# Patient Record
Sex: FEMALE | Race: WHITE | NOT HISPANIC OR LATINO | URBAN - METROPOLITAN AREA
[De-identification: names, ages, dates, MRNs, and addresses within clinical notes are randomized per-mention and may not be internally consistent; named-entity substitution may affect disease eponyms.]

---

## 2017-01-23 ENCOUNTER — APPOINTMENT (RX ONLY)
Dept: URBAN - METROPOLITAN AREA CLINIC 24 | Facility: CLINIC | Age: 77
Setting detail: DERMATOLOGY
End: 2017-01-23

## 2017-01-23 DIAGNOSIS — L82.0 INFLAMED SEBORRHEIC KERATOSIS: ICD-10-CM

## 2017-01-23 DIAGNOSIS — Z85.828 PERSONAL HISTORY OF OTHER MALIGNANT NEOPLASM OF SKIN: ICD-10-CM

## 2017-01-23 DIAGNOSIS — L82.1 OTHER SEBORRHEIC KERATOSIS: ICD-10-CM

## 2017-01-23 PROCEDURE — ? LIQUID NITROGEN

## 2017-01-23 PROCEDURE — 17111 DESTRUCTION B9 LESIONS 15/>: CPT

## 2017-01-23 PROCEDURE — 99214 OFFICE O/P EST MOD 30 MIN: CPT | Mod: 25

## 2017-01-23 ASSESSMENT — LOCATION DETAILED DESCRIPTION DERM
LOCATION DETAILED: RIGHT MID-UPPER BACK
LOCATION DETAILED: LEFT SUPERIOR MEDIAL UPPER BACK
LOCATION DETAILED: PERIUMBILICAL SKIN
LOCATION DETAILED: RIGHT DISTAL PRETIBIAL REGION
LOCATION DETAILED: LEFT ANTERIOR MEDIAL PROXIMAL UPPER ARM
LOCATION DETAILED: LEFT POSTERIOR SHOULDER

## 2017-01-23 ASSESSMENT — LOCATION SIMPLE DESCRIPTION DERM
LOCATION SIMPLE: LEFT UPPER ARM
LOCATION SIMPLE: LEFT SHOULDER
LOCATION SIMPLE: LEFT UPPER BACK
LOCATION SIMPLE: RIGHT PRETIBIAL REGION
LOCATION SIMPLE: RIGHT UPPER BACK
LOCATION SIMPLE: ABDOMEN

## 2017-01-23 ASSESSMENT — LOCATION ZONE DERM
LOCATION ZONE: TRUNK
LOCATION ZONE: ARM
LOCATION ZONE: LEG

## 2017-01-23 NOTE — PROCEDURE: LIQUID NITROGEN
Detail Level: Detailed
Medical Necessity Information: It is in your best interest to select a reason for this procedure from the list below. All of these items fulfill various CMS LCD requirements except the new and changing color options.
Include Z78.9 (Other Specified Conditions Influencing Health Status) As An Associated Diagnosis?: No
Medical Necessity Clause: This procedure was medically necessary because the lesions that were treated were:
Post-Care Instructions: I reviewed with the patient in detail post-care instructions. Patient is to wear sunprotection, and avoid picking at any of the treated lesions. Pt may apply Vaseline to crusted or scabbing areas.
Duration Of Freeze Thaw-Cycle (Seconds): 5
Total Number Of Lesions Treated: 20
Consent: The patient's consent was obtained including but not limited to risks of crusting, scabbing, blistering, scarring, darker or lighter pigmentary change, recurrence, incomplete removal and infection.

## 2017-03-13 ENCOUNTER — HOSPITAL ENCOUNTER (OUTPATIENT)
Dept: SURGERY | Age: 77
Discharge: HOME | End: 2017-03-13
Payer: MEDICARE

## 2017-03-13 ENCOUNTER — HOSPITAL ENCOUNTER (OUTPATIENT)
Dept: PHYSICAL THERAPY | Age: 77
Discharge: HOME | End: 2017-03-13

## 2017-03-13 LAB
ANION GAP BLD CALC-SCNC: 9 MMOL/L (ref 7–16)
APPEARANCE UR: CLEAR
APTT PPP: 25.4 SEC (ref 25.3–32.9)
BACTERIA SPEC CULT: NORMAL
BASOPHILS # BLD AUTO: 0 K/UL (ref 0–0.2)
BASOPHILS # BLD: 0 % (ref 0–2)
BILIRUB UR QL: NEGATIVE
BUN SERPL-MCNC: 20 MG/DL (ref 8–23)
CALCIUM SERPL-MCNC: 9.2 MG/DL (ref 8.3–10.4)
CHLORIDE SERPL-SCNC: 107 MMOL/L (ref 98–107)
CO2 SERPL-SCNC: 27 MMOL/L (ref 21–32)
COLOR UR: YELLOW
CREAT SERPL-MCNC: 0.97 MG/DL (ref 0.6–1)
DIFFERENTIAL METHOD BLD: ABNORMAL
EOSINOPHIL # BLD: 0.2 K/UL (ref 0–0.8)
EOSINOPHIL NFR BLD: 2 % (ref 0.5–7.8)
ERYTHROCYTE [DISTWIDTH] IN BLOOD BY AUTOMATED COUNT: 12.8 % (ref 11.9–14.6)
GLUCOSE SERPL-MCNC: 120 MG/DL (ref 65–100)
GLUCOSE UR STRIP.AUTO-MCNC: NEGATIVE MG/DL
HCT VFR BLD AUTO: 38.8 % (ref 35.8–46.3)
HGB BLD-MCNC: 12.4 G/DL (ref 11.7–15.4)
HGB UR QL STRIP: NEGATIVE
IMM GRANULOCYTES # BLD: 0 K/UL (ref 0–0.5)
IMM GRANULOCYTES NFR BLD AUTO: 0.4 % (ref 0–5)
INR PPP: 1 (ref 0.9–1.2)
KETONES UR QL STRIP.AUTO: NEGATIVE MG/DL
LEUKOCYTE ESTERASE UR QL STRIP.AUTO: NEGATIVE
LYMPHOCYTES # BLD AUTO: 25 % (ref 13–44)
LYMPHOCYTES # BLD: 1.9 K/UL (ref 0.5–4.6)
MCH RBC QN AUTO: 27.6 PG (ref 26.1–32.9)
MCHC RBC AUTO-ENTMCNC: 32 G/DL (ref 31.4–35)
MCV RBC AUTO: 86.4 FL (ref 79.6–97.8)
MONOCYTES # BLD: 0.4 K/UL (ref 0.1–1.3)
MONOCYTES NFR BLD AUTO: 5 % (ref 4–12)
NEUTS SEG # BLD: 5 K/UL (ref 1.7–8.2)
NEUTS SEG NFR BLD AUTO: 68 % (ref 43–78)
NITRITE UR QL STRIP.AUTO: NEGATIVE
PH UR STRIP: 7 [PH] (ref 5–9)
PLATELET # BLD AUTO: 254 K/UL (ref 150–450)
PMV BLD AUTO: 10.6 FL (ref 10.8–14.1)
POTASSIUM SERPL-SCNC: 3.7 MMOL/L (ref 3.5–5.1)
PROT UR STRIP-MCNC: NEGATIVE MG/DL
PROTHROMBIN TIME: 10.3 SEC (ref 9.6–12)
RBC # BLD AUTO: 4.49 M/UL (ref 4.05–5.25)
SERVICE CMNT-IMP: NORMAL
SODIUM SERPL-SCNC: 143 MMOL/L (ref 136–145)
SP GR UR REFRACTOMETRY: 1 (ref 1–1.02)
UROBILINOGEN UR QL STRIP.AUTO: 0.2 EU/DL (ref 0.2–1)
WBC # BLD AUTO: 7.5 K/UL (ref 4.3–11.1)

## 2017-03-13 PROCEDURE — 85610 PROTHROMBIN TIME: CPT | Performed by: PHYSICIAN ASSISTANT

## 2017-03-13 PROCEDURE — 36415 COLL VENOUS BLD VENIPUNCTURE: CPT | Performed by: PHYSICIAN ASSISTANT

## 2017-03-13 PROCEDURE — 85025 COMPLETE CBC W/AUTO DIFF WBC: CPT | Performed by: PHYSICIAN ASSISTANT

## 2017-03-13 PROCEDURE — 87641 MR-STAPH DNA AMP PROBE: CPT | Performed by: PHYSICIAN ASSISTANT

## 2017-03-13 PROCEDURE — 80048 BASIC METABOLIC PNL TOTAL CA: CPT | Performed by: PHYSICIAN ASSISTANT

## 2017-03-13 PROCEDURE — 81003 URINALYSIS AUTO W/O SCOPE: CPT | Performed by: PHYSICIAN ASSISTANT

## 2017-03-13 PROCEDURE — 85730 THROMBOPLASTIN TIME PARTIAL: CPT | Performed by: PHYSICIAN ASSISTANT

## 2017-03-13 RX ORDER — OMEPRAZOLE 40 MG/1
40 CAPSULE, DELAYED RELEASE ORAL DAILY
COMMUNITY

## 2017-03-13 RX ORDER — ESTRADIOL 2 MG/1
2 TABLET ORAL DAILY
COMMUNITY
End: 2017-04-08

## 2017-03-13 RX ORDER — IBUPROFEN 800 MG/1
800 TABLET ORAL AS NEEDED
COMMUNITY
End: 2017-04-08

## 2017-03-13 RX ORDER — ASPIRIN 81 MG/1
81 TABLET ORAL DAILY
COMMUNITY
End: 2017-04-08

## 2017-03-13 NOTE — ADVANCED PRACTICE NURSE
Total Joint Surgery Preoperative Chart Review      Patient ID:  Kam Kelley  [id number]  78 y.o.  1940  Surgeon: Dr. Bishop Precise  Date of Surgery: 4/5/2017  Procedure: Total Left Knee Arthroplasty  Primary Care Physician: Ludwig Bhatti [phone]      Subjective:   Kam Kelley is a 78 y.o. WHITE OR  female who presents for preoperative evaluation for Total Left Knee arthroplasty. This is a preoperative chart review note based on data collected by the nurse at the surgical Pre-Assessment visit. Past Medical History:   Diagnosis Date    Arthritis     Chronic pain     hands and toes    Former smoker, stopped smoking in distant past     GERD (gastroesophageal reflux disease)     controlled with med    High cholesterol     on medication    Hypertension     Morbid obesity ([address]     Nausea & vomiting     Pre-diabetes     Urinary incontinence     med      Past Surgical History:   Procedure Laterality Date    BREAST SURGERY PROCEDURE UNLISTED      Juan Jose. breast bx    HX GYN      Hysterectomy    HX HEENT      T&A    HX KNEE REPLACEMENT Right 2010    FIDE BIOPSY BREAST STEREOTACTIC      US GUIDED CORE BREAST BIOPSY       Family History   Problem Relation Age of Onset    Elevated Lipids Mother     Heart Disease Mother     Hypertension Mother     Cancer Father     Elevated Lipids Father     Hypertension Father     Cancer Sister     Malignant Hyperthermia Neg Hx     Pseudocholinesterase Deficiency Neg Hx     Delayed Awakening Neg Hx     Post-op Nausea/Vomiting Neg Hx     Emergence Delirium Neg Hx     Post-op Cognitive Dysfunction Neg Hx     Other Neg Hx     Breast Cancer Neg Hx       Social History   Substance Use Topics    Smoking status: Former Smoker    Smokeless tobacco: Never Used      Comment: \"40 years ago\"    Alcohol use Yes      Comment: socially       Prior to Admission medications    Medication Sig Start Date End Date Taking?  Authorizing Provider estradiol (ESTRACE) 2 mg tablet Take 2 mg by mouth daily. Take morning of surgery per anesthesia guidelines. Yes Historical Provider   mirabegron ER (MYRBETRIQ) 50 mg ER tablet Take 50 mg by mouth daily. Take morning of surgery per anesthesia guidelines. Indications: URINARY URGE INCONTINENCE   Yes Historical Provider   omeprazole (PRILOSEC) 40 mg capsule Take 40 mg by mouth daily. Take morning of surgery per anesthesia guidelines. Yes Historical Provider   aspirin delayed-release 81 mg tablet Take 81 mg by mouth daily. Yes Historical Provider   ibuprofen (MOTRIN) 800 mg tablet Take 800 mg by mouth as needed for Pain. Stop 5 days prior to surgery per anesthesia guidelines. Indications: Pain   Yes Historical Provider   simvastatin (ZOCOR) 40 mg tablet Take 40 mg by mouth daily. Per anesthesia protocol:instructed to take am of surgery. 12/21/10   Historical Provider   lisinopril-hydrochlorothiazide (PRINZIDE, ZESTORETIC) 20-12.5 mg per tablet Take 1 Tab by mouth daily. Historical Provider   docusate sodium (COLACE) 100 mg capsule Take 100 mg by mouth daily.  12/21/10   Historical Provider     Allergies   Allergen Reactions    Latex Itching     redness    Adhesive Rash    Pcn [Penicillins] Swelling    Sulfa (Sulfonamide Antibiotics) Swelling          Objective:     Physical Exam:   Patient Vitals for the past 24 hrs:   Temp Pulse Resp BP SpO2   03/13/17 1215 97.3 °F (36.3 °C) 85 18 158/64 95 %       ECG:    EKG Results     None          Data Review:   Labs:   Recent Results (from the past 24 hour(s))   CBC WITH AUTOMATED DIFF    Collection Time: 03/13/17 10:30 AM   Result Value Ref Range    WBC 7.5 4.3 - 11.1 K/uL    RBC 4.49 4.05 - 5.25 M/uL    HGB 12.4 11.7 - 15.4 g/dL    HCT 38.8 35.8 - 46.3 %    MCV 86.4 79.6 - 97.8 FL    MCH 27.6 26.1 - 32.9 PG    MCHC 32.0 31.4 - 35.0 g/dL    RDW 12.8 11.9 - 14.6 %    PLATELET 136 025 - 485 K/uL    MPV 10.6 (L) 10.8 - 14.1 FL    DF AUTOMATED      NEUTROPHILS 68 43 - 78 %    LYMPHOCYTES 25 13 - 44 %    MONOCYTES 5 4.0 - 12.0 %    EOSINOPHILS 2 0.5 - 7.8 %    BASOPHILS 0 0.0 - 2.0 %    IMMATURE GRANULOCYTES 0.4 0.0 - 5.0 %    ABS. NEUTROPHILS 5.0 1.7 - 8.2 K/UL    ABS. LYMPHOCYTES 1.9 0.5 - 4.6 K/UL    ABS. MONOCYTES 0.4 0.1 - 1.3 K/UL    ABS. EOSINOPHILS 0.2 0.0 - 0.8 K/UL    ABS. BASOPHILS 0.0 0.0 - 0.2 K/UL    ABS. IMM.  GRANS. 0.0 0.0 - 0.5 K/UL   PROTHROMBIN TIME + INR    Collection Time: 03/13/17 10:30 AM   Result Value Ref Range    Prothrombin time 10.3 9.6 - 12.0 sec    INR 1.0 0.9 - 1.2     PTT    Collection Time: 03/13/17 10:30 AM   Result Value Ref Range    aPTT 25.4 25.3 - 66.6 SEC   METABOLIC PANEL, BASIC    Collection Time: 03/13/17 10:30 AM   Result Value Ref Range    Sodium 143 136 - 145 mmol/L    Potassium 3.7 3.5 - 5.1 mmol/L    Chloride 107 98 - 107 mmol/L    CO2 27 21 - 32 mmol/L    Anion gap 9 7 - 16 mmol/L    Glucose 120 (H) 65 - 100 mg/dL    BUN 20 8 - 23 MG/DL    Creatinine 0.97 0.6 - 1.0 MG/DL    GFR est AA >60 >60 ml/min/1.73m2    GFR est non-AA 60 (L) >60 ml/min/1.73m2    Calcium 9.2 8.3 - 10.4 MG/DL   URINALYSIS W/ RFLX MICROSCOPIC    Collection Time: 03/13/17 10:30 AM   Result Value Ref Range    Color YELLOW      Appearance CLEAR      Specific gravity 1.005 1.001 - 1.023      pH (UA) 7.0 5.0 - 9.0      Protein NEGATIVE  NEG mg/dL    Glucose NEGATIVE  mg/dL    Ketone NEGATIVE  NEG mg/dL    Bilirubin NEGATIVE  NEG      Blood NEGATIVE  NEG      Urobilinogen 0.2 0.2 - 1.0 EU/dL    Nitrites NEGATIVE  NEG      Leukocyte Esterase NEGATIVE  NEG         Total Joint Surgery Pre-Assessment Recommendations:             Signed By: JAYLA Shields    March 13, 2017

## 2017-03-13 NOTE — PERIOP NOTES
Patient verified name, , and surgery as listed in [facility]. Type 3 surgery, Joint assessment complete. Labs per surgeon: cbc,bmp,pt,ptt,ua ; results within limits; mssa pendibg; T&S on DOS. Copy of labs faxed to PCP and surgeon. EKG with PCP note dated 3-9-17: placed in chart for reference if needed. Hibiclens and instructions given per hospital policy. Nasal Swab collected per MD order and instructions for Mupirocin nasal ointment if required. Patient provided with handouts including Guide to Surgery, Pain Management, Hand Hygiene, Blood Transfusion Education, and [facility] Brochure. Patient answered medical/surgical history questions at their best of ability. All prior to admission medications documented in [facility]. Original medication prescription bottle not visualized during patient appointment. Patient instructed to hold all vitamins 7 days prior to surgery and NSAIDS 5 days prior to surgery. Medications to be held prior to surgery : ibuprofen- 5 days. Patient instructed to continue previous medications as prescribed prior to surgery and to take the following medications the day of surgery according to anesthesia guidelines with a small sip of water: simvastatin, estradiol, myrbetriq, omeprazole. Patient taught back and verbalized understanding.

## 2017-03-13 NOTE — PERIOP NOTES
Recent Results (from the past 12 hour(s))   CBC WITH AUTOMATED DIFF    Collection Time: 03/13/17 10:30 AM   Result Value Ref Range    WBC 7.5 4.3 - 11.1 K/uL    RBC 4.49 4.05 - 5.25 M/uL    HGB 12.4 11.7 - 15.4 g/dL    HCT 38.8 35.8 - 46.3 %    MCV 86.4 79.6 - 97.8 FL    MCH 27.6 26.1 - 32.9 PG    MCHC 32.0 31.4 - 35.0 g/dL    RDW 12.8 11.9 - 14.6 %    PLATELET 151 540 - 834 K/uL    MPV 10.6 (L) 10.8 - 14.1 FL    DF AUTOMATED      NEUTROPHILS 68 43 - 78 %    LYMPHOCYTES 25 13 - 44 %    MONOCYTES 5 4.0 - 12.0 %    EOSINOPHILS 2 0.5 - 7.8 %    BASOPHILS 0 0.0 - 2.0 %    IMMATURE GRANULOCYTES 0.4 0.0 - 5.0 %    ABS. NEUTROPHILS 5.0 1.7 - 8.2 K/UL    ABS. LYMPHOCYTES 1.9 0.5 - 4.6 K/UL    ABS. MONOCYTES 0.4 0.1 - 1.3 K/UL    ABS. EOSINOPHILS 0.2 0.0 - 0.8 K/UL    ABS. BASOPHILS 0.0 0.0 - 0.2 K/UL    ABS. IMM.  GRANS. 0.0 0.0 - 0.5 K/UL   PROTHROMBIN TIME + INR    Collection Time: 03/13/17 10:30 AM   Result Value Ref Range    Prothrombin time 10.3 9.6 - 12.0 sec    INR 1.0 0.9 - 1.2     PTT    Collection Time: 03/13/17 10:30 AM   Result Value Ref Range    aPTT 25.4 25.3 - 37.3 SEC   METABOLIC PANEL, BASIC    Collection Time: 03/13/17 10:30 AM   Result Value Ref Range    Sodium 143 136 - 145 mmol/L    Potassium 3.7 3.5 - 5.1 mmol/L    Chloride 107 98 - 107 mmol/L    CO2 27 21 - 32 mmol/L    Anion gap 9 7 - 16 mmol/L    Glucose 120 (H) 65 - 100 mg/dL    BUN 20 8 - 23 MG/DL    Creatinine 0.97 0.6 - 1.0 MG/DL    GFR est AA >60 >60 ml/min/1.73m2    GFR est non-AA 60 (L) >60 ml/min/1.73m2    Calcium 9.2 8.3 - 10.4 MG/DL   URINALYSIS W/ RFLX MICROSCOPIC    Collection Time: 03/13/17 10:30 AM   Result Value Ref Range    Color YELLOW      Appearance CLEAR      Specific gravity 1.005 1.001 - 1.023      pH (UA) 7.0 5.0 - 9.0      Protein NEGATIVE  NEG mg/dL    Glucose NEGATIVE  mg/dL    Ketone NEGATIVE  NEG mg/dL    Bilirubin NEGATIVE  NEG      Blood NEGATIVE  NEG      Urobilinogen 0.2 0.2 - 1.0 EU/dL    Nitrites NEGATIVE  NEG Leukocyte Esterase NEGATIVE  NEG

## 2017-03-13 NOTE — PROGRESS NOTES
Physical Therapy at [address] [place] [address], [address]  [phone]  [facility] Camp Prehab Interview       ICD-10: Treatment Diagnosis:   · Pain in Left Knee (M25.562)  · Stiffness of Left Knee, Not elsewhere classified (M25.662)    SUBJECTIVE:  Subjective: \"I have been through this before on the R.\"      OBJECTIVE:  Patient attends [facility]. Patient has attended a conservative trial of Physical Therapy at [address] [place] [address]. Patient has a PT HEP that they are currently performing. We reviewed the Prehab Questionnaire:  Home Situation:    · Home Environment: Private residence  · # Steps to Enter: 0  · One/Two Story Residence: One story  · Living Alone: Yes  · Support Systems: Family member(s)  · Patient Expects to be Discharged to[de-identified] Rehabilitation facility ([facility])  · Current DME Used/Available at Home: Walker, rolling, Cane, straight, Commode, bedside  · Tub or Shower Type: Shower     Patient self reported Lower Extremity Functional Scale (LEFS) score for today as 32/80. Patient attends the Prehab Education class and all questions are answered. ASSESSMENT:  COMMENTS: Scheduled for L TKA. Reports pain with transfers and gait. Had R TKA Jan. 2011. Independent with gait and ADLs. PLAN OF CARE:  Left TKA is scheduled with Dr. Nakia Yeboah on 4/5/17.     Thank you for this referral,  Lane Ovalle, PT

## 2017-03-15 VITALS
SYSTOLIC BLOOD PRESSURE: 158 MMHG | BODY MASS INDEX: 30.53 KG/M2 | TEMPERATURE: 97.3 F | OXYGEN SATURATION: 95 % | HEIGHT: 66 IN | WEIGHT: 190 LBS | RESPIRATION RATE: 18 BRPM | HEART RATE: 85 BPM | DIASTOLIC BLOOD PRESSURE: 64 MMHG

## 2017-03-15 NOTE — PROGRESS NOTES
17 1030   Oxygen Therapy   O2 Sat (%) 96 %   Pulse via Oximetry 71 beats per minute   O2 Device Room air   Pre-Treatment   Breath Sounds Bilateral Clear   Pre FEV1 (liters) 1.6 liters   % Predicted 71   Incentive Spirometry Treatment   Actual Volume (ml) 2000 ml     Initial respiratory Assessment completed with pt. Pt was interviewed and evaluated in Joint camp prior to surgery. Patient ID:  Ean Castellon  [id number]  78 y.o.  1940  Surgeon: Dr. Latisha Altamirano  Date of Surgery: 2017  Procedure: Total Left Knee Arthroplasty  Primary Care Physician: Ananth Valdez -[phone]  Specialists:                                  Pt instructed in the use of Incentive Spirometry. Pt instructed to bring Incentive Spirometer back on date of surgery & to start using Is upon return to pt room. Pt taught proper cough technique      History of smokin PPD LESS THAN 10 YEARS     Quit date:    Secondhand smoke:PARENTS      Past procedures with Oxygen desaturation:NONE    Past Medical History:   Diagnosis Date    Arthritis     Chronic pain     hands and toes    Former smoker, stopped smoking in distant past     GERD (gastroesophageal reflux disease)     controlled with med    High cholesterol     on medication    Hypertension     Morbid obesity ([address]     Nausea & vomiting     Pre-diabetes     Urinary incontinence     med                                                                                                                                                         Respiratory history:BRONCHITIS 2-3 TIMES A YEARS                                   DENIES SOB                                 HX OF CARL?      NO        Respiratory meds:                                                     PAST SLEEP STUDY:           NO                                        FAMILY PRESENT:    SON                                    CARL assessment:                                               SLEEPS ON SIDE& BACK                                                    PHYSICAL EXAM   Body mass index is 30.67 kg/(m^2).    Visit Vitals    /64 (BP 1 Location: Right arm, BP Patient Position: At rest;Sitting)    Pulse 85    Temp 97.3 °F (36.3 °C)    Resp 18    Ht 5' 6\" (1.676 m)    Wt 86.2 kg (190 lb)    SpO2 95%    BMI 30.67 kg/m2     Neck circumference: 38     cm    Loud snoring:YES      Witnessed apnea or wakening gasping or choking:,DENIES   PT WAKES HERSELF UP    Awakens with headaches:DENIES    Morning or daytime tiredness/ sleepiness: DENIES      Dry mouth or sore throat in morning:SOME    Freidman stage:4    SACS score:9    STOP/BANG:3                              CPAP:NONE          NONE  Referrals:    Pt. Phone Number:

## 2017-03-30 NOTE — H&P
[address] [facility]  Pre Operative History and Physical Exam    Patient ID:  Nasim Kim  [id number]  28 y.o.  1940    Today: March 30, 2017       Assessment:   1. Arthritis of the left knee      Plan:    1. Proceed with scheduled Procedure(s) (LRB):  LEFT KNEE ARTHROPLASTY TOTAL/HOLLI (Left)            CC: Left knee pain    HPI:   The patient has end stage arthritis of the left knee. The patient was evaluated and examined during a consultation prior to this office visit. There have been no changes to the patient's orthopedic condition since the initial consultation. The patient has failed previous conservative treatment for this condition including antiinflammatories , and lifestyle modifications. The necessity for joint replacement is present. The patient will be admitted the day of surgery for Procedure(s) (LRB):  LEFT KNEE ARTHROPLASTY TOTAL/HOLLI (Left)      Past Medical/Surgical History:  Past Medical History:   Diagnosis Date    Arthritis     Chronic pain     hands and toes    Former smoker, stopped smoking in distant past     GERD (gastroesophageal reflux disease)     controlled with med    High cholesterol     on medication    Hypertension     Morbid obesity ([address]     Nausea & vomiting     Pre-diabetes     Urinary incontinence     med     Past Surgical History:   Procedure Laterality Date    BREAST SURGERY PROCEDURE UNLISTED      Juan Jose. breast bx    HX GYN      Hysterectomy    HX HEENT      T&A    HX KNEE REPLACEMENT Right 2010    FIDE BIOPSY BREAST STEREOTACTIC      US GUIDED CORE BREAST BIOPSY          Allergies:    Allergies   Allergen Reactions    Latex Itching     redness    Adhesive Rash    Pcn [Penicillins] Swelling    Sulfa (Sulfonamide Antibiotics) Swelling        Objective:                    HEENT: NC/AT                   Lungs:  Unlabored respirations, clear breath sounds                    Heart:   RRR                    Abdomen: soft Extremities:  Pain with ROM of the left knee    Meds:   No current facility-administered medications for this encounter. Current Outpatient Prescriptions   Medication Sig    estradiol (ESTRACE) 2 mg tablet Take 2 mg by mouth daily. Take morning of surgery per anesthesia guidelines.  mirabegron ER (MYRBETRIQ) 50 mg ER tablet Take 50 mg by mouth daily. Take morning of surgery per anesthesia guidelines. Indications: URINARY URGE INCONTINENCE    omeprazole (PRILOSEC) 40 mg capsule Take 40 mg by mouth daily. Take morning of surgery per anesthesia guidelines.  aspirin delayed-release 81 mg tablet Take 81 mg by mouth daily.  ibuprofen (MOTRIN) 800 mg tablet Take 800 mg by mouth as needed for Pain. Stop 5 days prior to surgery per anesthesia guidelines. Indications: Pain    simvastatin (ZOCOR) 40 mg tablet Take 40 mg by mouth daily. Per anesthesia protocol:instructed to take am of surgery.  lisinopril-hydrochlorothiazide (PRINZIDE, ZESTORETIC) 20-12.5 mg per tablet Take 1 Tab by mouth daily.  docusate sodium (COLACE) 100 mg capsule Take 100 mg by mouth daily.          Labs:  Hospital Outpatient Visit on 03/13/2017   Component Date Value Ref Range Status    WBC 03/13/2017 7.5  4.3 - 11.1 K/uL Final    RBC 03/13/2017 4.49  4.05 - 5.25 M/uL Final    HGB 03/13/2017 12.4  11.7 - 15.4 g/dL Final    HCT 03/13/2017 38.8  35.8 - 46.3 % Final    MCV 03/13/2017 86.4  79.6 - 97.8 FL Final    MCH 03/13/2017 27.6  26.1 - 32.9 PG Final    MCHC 03/13/2017 32.0  31.4 - 35.0 g/dL Final    RDW 03/13/2017 12.8  11.9 - 14.6 % Final    PLATELET 17/53/3185 236  150 - 450 K/uL Final    MPV 03/13/2017 10.6* 10.8 - 14.1 FL Final    DF 03/13/2017 AUTOMATED    Final    NEUTROPHILS 03/13/2017 68  43 - 78 % Final    LYMPHOCYTES 03/13/2017 25  13 - 44 % Final    MONOCYTES 03/13/2017 5  4.0 - 12.0 % Final    EOSINOPHILS 03/13/2017 2  0.5 - 7.8 % Final    BASOPHILS 03/13/2017 0  0.0 - 2.0 % Final    IMMATURE GRANULOCYTES 03/13/2017 0.4  0.0 - 5.0 % Final    ABS. NEUTROPHILS 03/13/2017 5.0  1.7 - 8.2 K/UL Final    ABS. LYMPHOCYTES 03/13/2017 1.9  0.5 - 4.6 K/UL Final    ABS. MONOCYTES 03/13/2017 0.4  0.1 - 1.3 K/UL Final    ABS. EOSINOPHILS 03/13/2017 0.2  0.0 - 0.8 K/UL Final    ABS. BASOPHILS 03/13/2017 0.0  0.0 - 0.2 K/UL Final    ABS. IMM. GRANS. 03/13/2017 0.0  0.0 - 0.5 K/UL Final    Prothrombin time 03/13/2017 10.3  9.6 - 12.0 sec Final    INR 03/13/2017 1.0  0.9 - 1.2   Final    Comment: Suggested therapeutic INR range:  Venous thrombosis and embolus  2.0-3.0  Prosthetic heart valve         2.5-3.5      aPTT 03/13/2017 25.4  25.3 - 32.9 SEC Final    Heparin Therapeutic Range = 56.6-81.7 secs    Sodium 03/13/2017 143  136 - 145 mmol/L Final    Potassium 03/13/2017 3.7  3.5 - 5.1 mmol/L Final    Chloride 03/13/2017 107  98 - 107 mmol/L Final    CO2 03/13/2017 27  21 - 32 mmol/L Final    Anion gap 03/13/2017 9  7 - 16 mmol/L Final    Glucose 03/13/2017 120* 65 - 100 mg/dL Final    Comment: 47 - 60 mg/dl Consistent with, but not fully diagnostic of hypoglycemia. 101 - 125 mg/dl Impaired fasting glucose/consistent with pre-diabetes mellitus  > 126 mg/dl Fasting glucose consistent with overt diabetes mellitus      BUN 03/13/2017 20  8 - 23 MG/DL Final    Creatinine 03/13/2017 0.97  0.6 - 1.0 MG/DL Final    GFR est AA 03/13/2017 >60  >60 ml/min/1.73m2 Final    GFR est non-AA 03/13/2017 60* >60 ml/min/1.73m2 Final    Comment: (NOTE)  Estimated GFR is calculated using the Modification of Diet in Renal   Disease (MDRD) Study equation, reported for both  Americans   (GFRAA) and non- Americans (GFRNA), and normalized to 1.73m2   body surface area. The physician must decide which value applies to   the patient. The MDRD study equation should only be used in   individuals age 25 or older.  It has not been validated for the   following: pregnant women, patients with serious comorbid conditions, or on certain medications, or persons with extremes of body size,   muscle mass, or nutritional status.  Calcium 03/13/2017 9.2  8.3 - 10.4 MG/DL Final    Color 03/13/2017 YELLOW    Final    Appearance 03/13/2017 CLEAR    Final    Specific gravity 03/13/2017 1.005  1.001 - 1.023   Final    pH (UA) 03/13/2017 7.0  5.0 - 9.0   Final    Protein 03/13/2017 NEGATIVE   NEG mg/dL Final    Glucose 03/13/2017 NEGATIVE   mg/dL Final    Ketone 03/13/2017 NEGATIVE   NEG mg/dL Final    Bilirubin 03/13/2017 NEGATIVE   NEG   Final    Blood 03/13/2017 NEGATIVE   NEG   Final    Urobilinogen 03/13/2017 0.2  0.2 - 1.0 EU/dL Final    Nitrites 03/13/2017 NEGATIVE   NEG   Final    Leukocyte Esterase 03/13/2017 NEGATIVE   NEG   Final    Special Requests: 03/13/2017 NASAL    Final    Culture result: 03/13/2017 SA target not detected. A MRSA NEGATIVE, SA NEGATIVE test result does not preclude MRSA or SA nasal colonization. Final                 There is no problem list on file for this patient.         Signed By: Diony Lux MD  March 30, 2017

## 2017-03-30 NOTE — H&P
????? Insurance: Medicare; [place] Visit: 37706 EST Level 3 Diagnosis: M17.9 Osteoarthritis of left knee  '09/01/2014'    Proc 1: ?????  Laterality:????? Med 1: ????? Proc 2: ?????  Laterality:????? Med 2: ????? FX 1: ?????           Laterality:  ????? FX2: ?????            Laterality: ????? Casting: ? ???? Cast Supply: ? ????   DME: ????? Laterality: ? ??? DME: ????? Laterality: ? ???   XRAY RIGHT: ?????   LEFT: ????? BILAT: ????? Follow up: Surgery   ?????        BMI:30.3  Date of Service: 2016-12-01  Work Status:  ????? Allergies:Latex; Penicillin;Sulfa  Medications:Aspirin;Estradiol (0.5 MG); Lisinopril-Hydrochlorothiazide (20-12.5 MG); Oxybutynin Chloride (5 MG);Pantoprazole Sodium (40 MG); Simvastatin (40 MG); Stool Softener    CC: Left knee pain    HISTORY: Mrs. Noel Albert is a 78year old female who is seen today for evaluation of pain and discomfort in the left knee. She underwent right total knee replacement years ago and has done well with regard to that surgery. She is now complaining of pain and discomfort in the left knee associated with standing, walking, some pain at rest and some pain at night. She is limited in her activities. She has undergone conservative management with modification of activities; she cannot use NSAIDs. Patient is limited in her ADLs. PMH:  The [facility] patient health form was updated by the patient, reviewed and signed. ROS:  Noted on the patient form. Pertinent positives and negatives are addressed with the patient, particularly those related to musculoskeletal concerns. Non-orthopedic concerns were referred back to the primary care physician. PE: On exam today, the patient is alert and oriented x 3. She is ambulatory with a left antalgic. On upright standing, the pelvis is level.  Negative SLR bilaterally, negative stretch and negative popliteal compression test. No point tenderness to palpation about either hip, good painless ROM of both hips with no guarding or spasm. On examination of both knees, the skin is intact. Genu varus alignment of her left knee. Tenderness to palpation over the medial joint line, no instability to varus/valgus stress, no AP instability fair quad strength, no extensor lag and no popliteal masses. Calves are soft and non-tender. Neurovascular exam is normal.     X-RAYS: AP, lateral, sunrise view and 45 degree PA view of the right knee reveals osteoarthritis of the left knee best appreciated on the 45 degree PA view where there is loss of the cartilage space, bone on bone articulation. X-RAY DIAGNOSIS: Osteoarthritis of the left knee. DIAGNOSIS:  Osteoarthritis of the left knee. MEDICAL DECISION MAKING: Patient is having pain and discomfort of the left knee, secondary to osteoarthritis. I have discussed the problem with her, I have recommended left total knee arthroplasty. I have discussed surgical procedure, surgical risk and rehab time with the patient. She understands these and desires to proceed with surgery. Surgery will be scheduled and arranged. She was provided with total knee literature, given a form for handicap parking sticker.        Electronically Signed By Gareth OLIVEIRA/jayjay

## 2017-04-04 ENCOUNTER — ANESTHESIA EVENT (OUTPATIENT)
Dept: SURGERY | Age: 77
DRG: 470 | End: 2017-04-04
Payer: MEDICARE

## 2017-04-04 RX ORDER — VANCOMYCIN HYDROCHLORIDE
1250 ONCE
Status: COMPLETED | OUTPATIENT
Start: 2017-04-05 | End: 2017-04-06

## 2017-04-05 ENCOUNTER — HOSPITAL ENCOUNTER (INPATIENT)
Age: 77
LOS: 3 days | Discharge: NURSING, REHAB OR LONG-TERM CARE | DRG: 470 | End: 2017-04-08
Attending: ORTHOPAEDIC SURGERY | Admitting: ORTHOPAEDIC SURGERY
Payer: MEDICARE

## 2017-04-05 ENCOUNTER — APPOINTMENT (OUTPATIENT)
Dept: GENERAL RADIOLOGY | Age: 77
DRG: 470 | End: 2017-04-05
Attending: ORTHOPAEDIC SURGERY
Payer: MEDICARE

## 2017-04-05 ENCOUNTER — ANESTHESIA (OUTPATIENT)
Dept: SURGERY | Age: 77
DRG: 470 | End: 2017-04-05
Payer: MEDICARE

## 2017-04-05 ENCOUNTER — SURGERY (OUTPATIENT)
Age: 77
End: 2017-04-05

## 2017-04-05 DIAGNOSIS — Z96.652 STATUS POST TOTAL LEFT KNEE REPLACEMENT: Primary | ICD-10-CM

## 2017-04-05 LAB
ABO + RH BLD: NORMAL
BLOOD GROUP ANTIBODIES SERPL: NORMAL
GLUCOSE BLD STRIP.AUTO-MCNC: 127 MG/DL (ref 65–100)
HGB BLD-MCNC: 10.8 G/DL (ref 11.7–15.4)
SPECIMEN EXP DATE BLD: NORMAL

## 2017-04-05 PROCEDURE — 77030034849: Performed by: ORTHOPAEDIC SURGERY

## 2017-04-05 PROCEDURE — 74011000250 HC RX REV CODE- 250: Performed by: ORTHOPAEDIC SURGERY

## 2017-04-05 PROCEDURE — 74011250636 HC RX REV CODE- 250/636: Performed by: ORTHOPAEDIC SURGERY

## 2017-04-05 PROCEDURE — 77030002912 HC SUT ETHBND J&J -A: Performed by: ORTHOPAEDIC SURGERY

## 2017-04-05 PROCEDURE — 77030003602 HC NDL NRV BLK BBMI -B: Performed by: ANESTHESIOLOGY

## 2017-04-05 PROCEDURE — 77030005520 HC CATH URETH FOL38 BARD -A: Performed by: ORTHOPAEDIC SURGERY

## 2017-04-05 PROCEDURE — 74011000250 HC RX REV CODE- 250

## 2017-04-05 PROCEDURE — 85018 HEMOGLOBIN: CPT | Performed by: PHYSICIAN ASSISTANT

## 2017-04-05 PROCEDURE — 77030007880 HC KT SPN EPDRL BBMI -B: Performed by: ANESTHESIOLOGY

## 2017-04-05 PROCEDURE — 77030006720 HC BLD PAT RMR ZIMM -B: Performed by: ORTHOPAEDIC SURGERY

## 2017-04-05 PROCEDURE — 36415 COLL VENOUS BLD VENIPUNCTURE: CPT | Performed by: PHYSICIAN ASSISTANT

## 2017-04-05 PROCEDURE — 86580 TB INTRADERMAL TEST: CPT | Performed by: ORTHOPAEDIC SURGERY

## 2017-04-05 PROCEDURE — 74011250636 HC RX REV CODE- 250/636: Performed by: ANESTHESIOLOGY

## 2017-04-05 PROCEDURE — 74011250636 HC RX REV CODE- 250/636

## 2017-04-05 PROCEDURE — 77030003665 HC NDL SPN BBMI -A: Performed by: ANESTHESIOLOGY

## 2017-04-05 PROCEDURE — 77030011208: Performed by: ORTHOPAEDIC SURGERY

## 2017-04-05 PROCEDURE — 94760 N-INVAS EAR/PLS OXIMETRY 1: CPT

## 2017-04-05 PROCEDURE — 77030032490 HC SLV COMPR SCD KNE COVD -B

## 2017-04-05 PROCEDURE — 77030019557 HC ELECTRD VES SEAL MEDT -F: Performed by: ORTHOPAEDIC SURGERY

## 2017-04-05 PROCEDURE — 77030013727 HC IRR FAN PULSVC ZIMM -B: Performed by: ORTHOPAEDIC SURGERY

## 2017-04-05 PROCEDURE — C1776 JOINT DEVICE (IMPLANTABLE): HCPCS | Performed by: ORTHOPAEDIC SURGERY

## 2017-04-05 PROCEDURE — 74011000258 HC RX REV CODE- 258: Performed by: ORTHOPAEDIC SURGERY

## 2017-04-05 PROCEDURE — 97165 OT EVAL LOW COMPLEX 30 MIN: CPT

## 2017-04-05 PROCEDURE — 82962 GLUCOSE BLOOD TEST: CPT

## 2017-04-05 PROCEDURE — 77030012890

## 2017-04-05 PROCEDURE — 77030012935 HC DRSG AQUACEL BMS -B: Performed by: ORTHOPAEDIC SURGERY

## 2017-04-05 PROCEDURE — 97161 PT EVAL LOW COMPLEX 20 MIN: CPT

## 2017-04-05 PROCEDURE — 77030018836 HC SOL IRR NACL ICUM -A: Performed by: ORTHOPAEDIC SURGERY

## 2017-04-05 PROCEDURE — 77030035236 HC SUT PDS STRATFX BARB J&J -B: Performed by: ORTHOPAEDIC SURGERY

## 2017-04-05 PROCEDURE — 76060000034 HC ANESTHESIA 1.5 TO 2 HR: Performed by: ORTHOPAEDIC SURGERY

## 2017-04-05 PROCEDURE — 76010000162 HC OR TIME 1.5 TO 2 HR INTENSV-TIER 1: Performed by: ORTHOPAEDIC SURGERY

## 2017-04-05 PROCEDURE — 77030011640 HC PAD GRND REM COVD -A: Performed by: ORTHOPAEDIC SURGERY

## 2017-04-05 PROCEDURE — 74011250637 HC RX REV CODE- 250/637: Performed by: PHYSICIAN ASSISTANT

## 2017-04-05 PROCEDURE — 73560 X-RAY EXAM OF KNEE 1 OR 2: CPT

## 2017-04-05 PROCEDURE — 76010010054 HC POST OP PAIN BLOCK: Performed by: ORTHOPAEDIC SURGERY

## 2017-04-05 PROCEDURE — 74011000250 HC RX REV CODE- 250: Performed by: ANESTHESIOLOGY

## 2017-04-05 PROCEDURE — 76942 ECHO GUIDE FOR BIOPSY: CPT | Performed by: ORTHOPAEDIC SURGERY

## 2017-04-05 PROCEDURE — 76210000016 HC OR PH I REC 1 TO 1.5 HR: Performed by: ORTHOPAEDIC SURGERY

## 2017-04-05 PROCEDURE — 97110 THERAPEUTIC EXERCISES: CPT

## 2017-04-05 PROCEDURE — 77030031139 HC SUT VCRL2 J&J -A: Performed by: ORTHOPAEDIC SURGERY

## 2017-04-05 PROCEDURE — 74011000302 HC RX REV CODE- 302: Performed by: ORTHOPAEDIC SURGERY

## 2017-04-05 PROCEDURE — 74011250636 HC RX REV CODE- 250/636: Performed by: PHYSICIAN ASSISTANT

## 2017-04-05 PROCEDURE — 77030020782 HC GWN BAIR PAWS FLX 3M -B: Performed by: ANESTHESIOLOGY

## 2017-04-05 PROCEDURE — 77010033678 HC OXYGEN DAILY

## 2017-04-05 PROCEDURE — 86900 BLOOD TYPING SEROLOGIC ABO: CPT | Performed by: PHYSICIAN ASSISTANT

## 2017-04-05 PROCEDURE — 77030006789 HC BLD SAW OSC STRY -C: Performed by: ORTHOPAEDIC SURGERY

## 2017-04-05 PROCEDURE — 65270000029 HC RM PRIVATE

## 2017-04-05 PROCEDURE — 74011250637 HC RX REV CODE- 250/637: Performed by: ORTHOPAEDIC SURGERY

## 2017-04-05 PROCEDURE — 0SRD0JA REPLACEMENT OF LEFT KNEE JOINT WITH SYNTHETIC SUBSTITUTE, UNCEMENTED, OPEN APPROACH: ICD-10-PCS | Performed by: ORTHOPAEDIC SURGERY

## 2017-04-05 PROCEDURE — 77030002966 HC SUT PDS J&J -A: Performed by: ORTHOPAEDIC SURGERY

## 2017-04-05 PROCEDURE — 77030036688 HC BLNKT CLD THER S2SG -B

## 2017-04-05 PROCEDURE — 77030008467 HC STPLR SKN COVD -B: Performed by: ORTHOPAEDIC SURGERY

## 2017-04-05 DEVICE — COMPONENT PAT DIA32MM THK10MM SUPERIOR/INFERIOR KNEE: Type: IMPLANTABLE DEVICE | Site: KNEE | Status: FUNCTIONAL

## 2017-04-05 DEVICE — BASEPLATE TIB SZ 3 AP44MM ML67MM KNEE TRITANIUM 4 CRUCFRM: Type: IMPLANTABLE DEVICE | Site: KNEE | Status: FUNCTIONAL

## 2017-04-05 DEVICE — IMPLANTABLE DEVICE: Type: IMPLANTABLE DEVICE | Site: KNEE | Status: FUNCTIONAL

## 2017-04-05 RX ORDER — MIDAZOLAM HYDROCHLORIDE 1 MG/ML
2 INJECTION, SOLUTION INTRAMUSCULAR; INTRAVENOUS
Status: DISCONTINUED | OUTPATIENT
Start: 2017-04-05 | End: 2017-04-05 | Stop reason: HOSPADM

## 2017-04-05 RX ORDER — HYDROMORPHONE HYDROCHLORIDE 2 MG/1
2 TABLET ORAL
Status: DISCONTINUED | OUTPATIENT
Start: 2017-04-05 | End: 2017-04-07

## 2017-04-05 RX ORDER — SODIUM CHLORIDE, SODIUM LACTATE, POTASSIUM CHLORIDE, CALCIUM CHLORIDE 600; 310; 30; 20 MG/100ML; MG/100ML; MG/100ML; MG/100ML
1000 INJECTION, SOLUTION INTRAVENOUS CONTINUOUS
Status: DISCONTINUED | OUTPATIENT
Start: 2017-04-05 | End: 2017-04-05 | Stop reason: HOSPADM

## 2017-04-05 RX ORDER — LIDOCAINE HYDROCHLORIDE 10 MG/ML
0.1 INJECTION INFILTRATION; PERINEURAL AS NEEDED
Status: DISCONTINUED | OUTPATIENT
Start: 2017-04-05 | End: 2017-04-05 | Stop reason: HOSPADM

## 2017-04-05 RX ORDER — SODIUM CHLORIDE 9 MG/ML
100 INJECTION, SOLUTION INTRAVENOUS CONTINUOUS
Status: DISPENSED | OUTPATIENT
Start: 2017-04-05 | End: 2017-04-07

## 2017-04-05 RX ORDER — NALOXONE HYDROCHLORIDE 0.4 MG/ML
.2-.4 INJECTION, SOLUTION INTRAMUSCULAR; INTRAVENOUS; SUBCUTANEOUS
Status: DISCONTINUED | OUTPATIENT
Start: 2017-04-05 | End: 2017-04-08 | Stop reason: HOSPADM

## 2017-04-05 RX ORDER — DIPHENHYDRAMINE HCL 25 MG
25 CAPSULE ORAL
Status: DISCONTINUED | OUTPATIENT
Start: 2017-04-05 | End: 2017-04-08 | Stop reason: HOSPADM

## 2017-04-05 RX ORDER — ACETAMINOPHEN 500 MG
500 TABLET ORAL ONCE
Status: DISCONTINUED | OUTPATIENT
Start: 2017-04-05 | End: 2017-04-05 | Stop reason: HOSPADM

## 2017-04-05 RX ORDER — LISINOPRIL AND HYDROCHLOROTHIAZIDE 12.5; 2 MG/1; MG/1
1 TABLET ORAL DAILY
Status: DISCONTINUED | OUTPATIENT
Start: 2017-04-06 | End: 2017-04-08 | Stop reason: HOSPADM

## 2017-04-05 RX ORDER — ROPIVACAINE HYDROCHLORIDE 2 MG/ML
INJECTION, SOLUTION EPIDURAL; INFILTRATION; PERINEURAL AS NEEDED
Status: DISCONTINUED | OUTPATIENT
Start: 2017-04-05 | End: 2017-04-05 | Stop reason: HOSPADM

## 2017-04-05 RX ORDER — SODIUM CHLORIDE 0.9 % (FLUSH) 0.9 %
5-10 SYRINGE (ML) INJECTION EVERY 8 HOURS
Status: DISCONTINUED | OUTPATIENT
Start: 2017-04-05 | End: 2017-04-08 | Stop reason: HOSPADM

## 2017-04-05 RX ORDER — NALOXONE HYDROCHLORIDE 0.4 MG/ML
0.1 INJECTION, SOLUTION INTRAMUSCULAR; INTRAVENOUS; SUBCUTANEOUS AS NEEDED
Status: DISCONTINUED | OUTPATIENT
Start: 2017-04-05 | End: 2017-04-05 | Stop reason: HOSPADM

## 2017-04-05 RX ORDER — FENTANYL CITRATE 50 UG/ML
100 INJECTION, SOLUTION INTRAMUSCULAR; INTRAVENOUS AS NEEDED
Status: DISCONTINUED | OUTPATIENT
Start: 2017-04-05 | End: 2017-04-05 | Stop reason: HOSPADM

## 2017-04-05 RX ORDER — KETOROLAC TROMETHAMINE 30 MG/ML
INJECTION, SOLUTION INTRAMUSCULAR; INTRAVENOUS AS NEEDED
Status: DISCONTINUED | OUTPATIENT
Start: 2017-04-05 | End: 2017-04-05 | Stop reason: HOSPADM

## 2017-04-05 RX ORDER — DEXAMETHASONE SODIUM PHOSPHATE 100 MG/10ML
10 INJECTION INTRAMUSCULAR; INTRAVENOUS ONCE
Status: ACTIVE | OUTPATIENT
Start: 2017-04-06 | End: 2017-04-07

## 2017-04-05 RX ORDER — FAMOTIDINE 20 MG/1
20 TABLET, FILM COATED ORAL ONCE
Status: DISCONTINUED | OUTPATIENT
Start: 2017-04-05 | End: 2017-04-05 | Stop reason: HOSPADM

## 2017-04-05 RX ORDER — ASPIRIN 325 MG
325 TABLET, DELAYED RELEASE (ENTERIC COATED) ORAL EVERY 12 HOURS
Status: DISCONTINUED | OUTPATIENT
Start: 2017-04-05 | End: 2017-04-08 | Stop reason: HOSPADM

## 2017-04-05 RX ORDER — SODIUM CHLORIDE 0.9 % (FLUSH) 0.9 %
5-10 SYRINGE (ML) INJECTION AS NEEDED
Status: DISCONTINUED | OUTPATIENT
Start: 2017-04-05 | End: 2017-04-08 | Stop reason: HOSPADM

## 2017-04-05 RX ORDER — AMOXICILLIN 250 MG
2 CAPSULE ORAL DAILY
Status: DISCONTINUED | OUTPATIENT
Start: 2017-04-06 | End: 2017-04-08 | Stop reason: HOSPADM

## 2017-04-05 RX ORDER — OXYCODONE HYDROCHLORIDE 5 MG/1
5 TABLET ORAL
Status: DISCONTINUED | OUTPATIENT
Start: 2017-04-05 | End: 2017-04-05 | Stop reason: HOSPADM

## 2017-04-05 RX ORDER — HYDROMORPHONE HYDROCHLORIDE 2 MG/1
2 TABLET ORAL
Status: DISCONTINUED | OUTPATIENT
Start: 2017-04-05 | End: 2017-04-05

## 2017-04-05 RX ORDER — CEFAZOLIN SODIUM IN 0.9 % NACL 2 G/50 ML
2 INTRAVENOUS SOLUTION, PIGGYBACK (ML) INTRAVENOUS EVERY 8 HOURS
Status: DISCONTINUED | OUTPATIENT
Start: 2017-04-05 | End: 2017-04-05 | Stop reason: ALTCHOICE

## 2017-04-05 RX ORDER — PANTOPRAZOLE SODIUM 40 MG/1
40 TABLET, DELAYED RELEASE ORAL
Status: DISCONTINUED | OUTPATIENT
Start: 2017-04-06 | End: 2017-04-08 | Stop reason: HOSPADM

## 2017-04-05 RX ORDER — FAMOTIDINE 10 MG/ML
20 INJECTION INTRAVENOUS ONCE
Status: DISCONTINUED | OUTPATIENT
Start: 2017-04-05 | End: 2017-04-05 | Stop reason: HOSPADM

## 2017-04-05 RX ORDER — ONDANSETRON 2 MG/ML
4 INJECTION INTRAMUSCULAR; INTRAVENOUS
Status: DISCONTINUED | OUTPATIENT
Start: 2017-04-05 | End: 2017-04-08 | Stop reason: HOSPADM

## 2017-04-05 RX ORDER — PROPOFOL 10 MG/ML
INJECTION, EMULSION INTRAVENOUS
Status: DISCONTINUED | OUTPATIENT
Start: 2017-04-05 | End: 2017-04-05 | Stop reason: HOSPADM

## 2017-04-05 RX ORDER — DOCUSATE SODIUM 100 MG/1
100 CAPSULE, LIQUID FILLED ORAL DAILY
Status: DISCONTINUED | OUTPATIENT
Start: 2017-04-06 | End: 2017-04-06

## 2017-04-05 RX ORDER — DEXAMETHASONE SODIUM PHOSPHATE 4 MG/ML
INJECTION, SOLUTION INTRA-ARTICULAR; INTRALESIONAL; INTRAMUSCULAR; INTRAVENOUS; SOFT TISSUE AS NEEDED
Status: DISCONTINUED | OUTPATIENT
Start: 2017-04-05 | End: 2017-04-05 | Stop reason: HOSPADM

## 2017-04-05 RX ORDER — LIDOCAINE HYDROCHLORIDE 20 MG/ML
INJECTION, SOLUTION EPIDURAL; INFILTRATION; INTRACAUDAL; PERINEURAL AS NEEDED
Status: DISCONTINUED | OUTPATIENT
Start: 2017-04-05 | End: 2017-04-05 | Stop reason: HOSPADM

## 2017-04-05 RX ORDER — SODIUM CHLORIDE 0.9 % (FLUSH) 0.9 %
5-10 SYRINGE (ML) INJECTION AS NEEDED
Status: DISCONTINUED | OUTPATIENT
Start: 2017-04-05 | End: 2017-04-05 | Stop reason: HOSPADM

## 2017-04-05 RX ORDER — MORPHINE SULFATE 10 MG/ML
INJECTION, SOLUTION INTRAMUSCULAR; INTRAVENOUS AS NEEDED
Status: DISCONTINUED | OUTPATIENT
Start: 2017-04-05 | End: 2017-04-05 | Stop reason: HOSPADM

## 2017-04-05 RX ORDER — SODIUM CHLORIDE 0.9 % (FLUSH) 0.9 %
5-10 SYRINGE (ML) INJECTION EVERY 8 HOURS
Status: DISCONTINUED | OUTPATIENT
Start: 2017-04-05 | End: 2017-04-05 | Stop reason: HOSPADM

## 2017-04-05 RX ORDER — ACETAMINOPHEN 500 MG
1000 TABLET ORAL EVERY 6 HOURS
Status: DISCONTINUED | OUTPATIENT
Start: 2017-04-06 | End: 2017-04-08 | Stop reason: HOSPADM

## 2017-04-05 RX ORDER — HYDROMORPHONE HYDROCHLORIDE 2 MG/ML
0.5 INJECTION, SOLUTION INTRAMUSCULAR; INTRAVENOUS; SUBCUTANEOUS
Status: DISCONTINUED | OUTPATIENT
Start: 2017-04-05 | End: 2017-04-05 | Stop reason: HOSPADM

## 2017-04-05 RX ORDER — MIDAZOLAM HYDROCHLORIDE 1 MG/ML
INJECTION, SOLUTION INTRAMUSCULAR; INTRAVENOUS AS NEEDED
Status: DISCONTINUED | OUTPATIENT
Start: 2017-04-05 | End: 2017-04-05 | Stop reason: HOSPADM

## 2017-04-05 RX ORDER — ACETAMINOPHEN 10 MG/ML
1000 INJECTION, SOLUTION INTRAVENOUS ONCE
Status: COMPLETED | OUTPATIENT
Start: 2017-04-05 | End: 2017-04-05

## 2017-04-05 RX ORDER — ONDANSETRON 2 MG/ML
4 INJECTION INTRAMUSCULAR; INTRAVENOUS ONCE
Status: DISCONTINUED | OUTPATIENT
Start: 2017-04-05 | End: 2017-04-05 | Stop reason: HOSPADM

## 2017-04-05 RX ORDER — DIPHENHYDRAMINE HYDROCHLORIDE 50 MG/ML
12.5 INJECTION, SOLUTION INTRAMUSCULAR; INTRAVENOUS ONCE
Status: DISCONTINUED | OUTPATIENT
Start: 2017-04-05 | End: 2017-04-05 | Stop reason: HOSPADM

## 2017-04-05 RX ORDER — ONDANSETRON 2 MG/ML
INJECTION INTRAMUSCULAR; INTRAVENOUS AS NEEDED
Status: DISCONTINUED | OUTPATIENT
Start: 2017-04-05 | End: 2017-04-05 | Stop reason: HOSPADM

## 2017-04-05 RX ORDER — OXYCODONE HYDROCHLORIDE 5 MG/1
10 TABLET ORAL
Status: DISCONTINUED | OUTPATIENT
Start: 2017-04-05 | End: 2017-04-05 | Stop reason: HOSPADM

## 2017-04-05 RX ORDER — HYDROMORPHONE HYDROCHLORIDE 1 MG/ML
1 INJECTION, SOLUTION INTRAMUSCULAR; INTRAVENOUS; SUBCUTANEOUS
Status: DISCONTINUED | OUTPATIENT
Start: 2017-04-05 | End: 2017-04-08 | Stop reason: HOSPADM

## 2017-04-05 RX ORDER — SODIUM CHLORIDE, SODIUM LACTATE, POTASSIUM CHLORIDE, CALCIUM CHLORIDE 600; 310; 30; 20 MG/100ML; MG/100ML; MG/100ML; MG/100ML
75 INJECTION, SOLUTION INTRAVENOUS CONTINUOUS
Status: DISCONTINUED | OUTPATIENT
Start: 2017-04-05 | End: 2017-04-05 | Stop reason: HOSPADM

## 2017-04-05 RX ORDER — NEOMYCIN AND POLYMYXIN B SULFATES 40; 200000 MG/ML; [USP'U]/ML
SOLUTION IRRIGATION AS NEEDED
Status: DISCONTINUED | OUTPATIENT
Start: 2017-04-05 | End: 2017-04-05 | Stop reason: HOSPADM

## 2017-04-05 RX ADMIN — ASPIRIN 325 MG: 325 TABLET, DELAYED RELEASE ORAL at 21:33

## 2017-04-05 RX ADMIN — VANCOMYCIN HYDROCHLORIDE 1250 MG: 10 INJECTION, POWDER, LYOPHILIZED, FOR SOLUTION INTRAVENOUS at 08:42

## 2017-04-05 RX ADMIN — TUBERCULIN PURIFIED PROTEIN DERIVATIVE 5 UNITS: 5 INJECTION, SOLUTION INTRADERMAL at 07:53

## 2017-04-05 RX ADMIN — SODIUM CHLORIDE, SODIUM LACTATE, POTASSIUM CHLORIDE, AND CALCIUM CHLORIDE: 600; 310; 30; 20 INJECTION, SOLUTION INTRAVENOUS at 09:19

## 2017-04-05 RX ADMIN — SODIUM CHLORIDE 100 ML/HR: 900 INJECTION, SOLUTION INTRAVENOUS at 14:00

## 2017-04-05 RX ADMIN — LIDOCAINE HYDROCHLORIDE 40 MG: 20 INJECTION, SOLUTION EPIDURAL; INFILTRATION; INTRACAUDAL; PERINEURAL at 09:48

## 2017-04-05 RX ADMIN — ACETAMINOPHEN 1000 MG: 500 TABLET, FILM COATED ORAL at 22:59

## 2017-04-05 RX ADMIN — VANCOMYCIN HYDROCHLORIDE 1.25 G: 10 INJECTION, POWDER, LYOPHILIZED, FOR SOLUTION INTRAVENOUS at 09:30

## 2017-04-05 RX ADMIN — ROPIVACAINE HYDROCHLORIDE 20 ML: 2 INJECTION, SOLUTION EPIDURAL; INFILTRATION; PERINEURAL at 08:46

## 2017-04-05 RX ADMIN — HYDROMORPHONE HYDROCHLORIDE 2 MG: 2 TABLET ORAL at 14:05

## 2017-04-05 RX ADMIN — ACETAMINOPHEN 1000 MG: 10 INJECTION, SOLUTION INTRAVENOUS at 17:41

## 2017-04-05 RX ADMIN — HYDROMORPHONE HYDROCHLORIDE 2 MG: 2 TABLET ORAL at 22:59

## 2017-04-05 RX ADMIN — LIDOCAINE HYDROCHLORIDE 0.1 ML: 10 INJECTION, SOLUTION INFILTRATION; PERINEURAL at 07:53

## 2017-04-05 RX ADMIN — PROPOFOL 50 MCG/KG/MIN: 10 INJECTION, EMULSION INTRAVENOUS at 09:48

## 2017-04-05 RX ADMIN — SODIUM CHLORIDE, SODIUM LACTATE, POTASSIUM CHLORIDE, AND CALCIUM CHLORIDE: 600; 310; 30; 20 INJECTION, SOLUTION INTRAVENOUS at 10:31

## 2017-04-05 RX ADMIN — KETOROLAC TROMETHAMINE 30 MG: 30 INJECTION, SOLUTION INTRAMUSCULAR; INTRAVENOUS at 10:50

## 2017-04-05 RX ADMIN — NEOMYCIN AND POLYMYXIN B SULFATES 3 ML: 40; 200000 SOLUTION IRRIGATION at 11:05

## 2017-04-05 RX ADMIN — MIDAZOLAM HYDROCHLORIDE 2 MG: 1 INJECTION, SOLUTION INTRAMUSCULAR; INTRAVENOUS at 09:38

## 2017-04-05 RX ADMIN — CLINDAMYCIN PHOSPHATE 600 MG: 150 INJECTION, SOLUTION, CONCENTRATE INTRAVENOUS at 22:59

## 2017-04-05 RX ADMIN — SODIUM CHLORIDE 1 G: 900 INJECTION, SOLUTION INTRAVENOUS at 11:10

## 2017-04-05 RX ADMIN — ROPIVACAINE HYDROCHLORIDE 60 ML: 2 INJECTION, SOLUTION EPIDURAL; INFILTRATION at 10:50

## 2017-04-05 RX ADMIN — HYDROMORPHONE HYDROCHLORIDE 2 MG: 2 TABLET ORAL at 17:54

## 2017-04-05 RX ADMIN — MIDAZOLAM HYDROCHLORIDE 2 MG: 1 INJECTION, SOLUTION INTRAMUSCULAR; INTRAVENOUS at 08:43

## 2017-04-05 RX ADMIN — MORPHINE SULFATE 10 MG: 10 INJECTION INTRAMUSCULAR; INTRAVENOUS; SUBCUTANEOUS at 10:50

## 2017-04-05 RX ADMIN — GENTAMICIN SULFATE 360 MG: 40 INJECTION, SOLUTION INTRAMUSCULAR; INTRAVENOUS at 07:54

## 2017-04-05 RX ADMIN — FENTANYL CITRATE 100 MCG: 50 INJECTION, SOLUTION INTRAMUSCULAR; INTRAVENOUS at 08:43

## 2017-04-05 RX ADMIN — DIPHENHYDRAMINE HYDROCHLORIDE 25 MG: 25 CAPSULE ORAL at 22:58

## 2017-04-05 RX ADMIN — ONDANSETRON 4 MG: 2 INJECTION INTRAMUSCULAR; INTRAVENOUS at 09:40

## 2017-04-05 RX ADMIN — DEXAMETHASONE SODIUM PHOSPHATE 10 MG: 4 INJECTION, SOLUTION INTRA-ARTICULAR; INTRALESIONAL; INTRAMUSCULAR; INTRAVENOUS; SOFT TISSUE at 09:46

## 2017-04-05 RX ADMIN — SODIUM CHLORIDE, SODIUM LACTATE, POTASSIUM CHLORIDE, AND CALCIUM CHLORIDE 500 ML: 600; 310; 30; 20 INJECTION, SOLUTION INTRAVENOUS at 07:59

## 2017-04-05 RX ADMIN — CLINDAMYCIN PHOSPHATE 600 MG: 150 INJECTION, SOLUTION, CONCENTRATE INTRAVENOUS at 16:16

## 2017-04-05 NOTE — ANESTHESIA PROCEDURE NOTES
Peripheral Block    Start time: 4/5/2017 8:43 AM  End time: 4/5/2017 8:47 AM  Performed by: Gildardo Bañuelos  Authorized by: Gildardo Bañuelos       Pre-procedure: Indications: at surgeon's request, post-op pain management and procedure for pain    Preanesthetic Checklist: patient identified, risks and benefits discussed, site marked, timeout performed, anesthesia consent given and patient being monitored    Timeout Time: 08:43          Block Type:   Block Type:   Adductor canal  Laterality:  Left  Monitoring:  Standard ASA monitoring, responsive to questions, oxygen, continuous pulse ox, frequent vital sign checks and heart rate  Injection Technique:  Single shot  Procedures: ultrasound guided    Patient Position: supine  Prep: chlorhexidine    Location:  Mid thigh  Needle Type:  Stimuplex  Needle Gauge:  22 G  Needle Localization:  Ultrasound guidance  Medication Injected:  0.2%  ropivacaine  Volume (mL):  20    Assessment:  Number of attempts:  1  Injection Assessment:  Incremental injection every 5 mL, no paresthesia, ultrasound image on chart, no intravascular symptoms, negative aspiration for blood and local visualized surrounding nerve on ultrasound  Patient tolerance:  Patient tolerated the procedure well with no immediate complications

## 2017-04-05 NOTE — H&P
The patient has end stage arthritis of the left knee. The patient was see and examined and there are no changes to the patient's orthopedic condition. They have tried conservative treatment for this condition; including antiinflammatories and lifestyle modifications and have failed. The necessity for the joint replacement is still present, and the H&P from the office is still current. The patient will be admitted today for Procedure(s) (LRB):  LEFT KNEE ARTHROPLASTY TOTAL/HOLLI/FNB / VANCO/GENTAMYCIN (RI) (Left) .

## 2017-04-05 NOTE — PROGRESS NOTES
Problem: Mobility Impaired (Adult and Pediatric)  Goal: *Acute Goals and Plan of Care (Insert Text)  GOALS (1-4 days):  (1.)Ms. Tremayne Garcia will move from supine to sit and sit to supine in bed with INDEPENDENT. (2.)Ms. Tremayne Garcia will transfer from bed to chair and chair to bed with INDEPENDENT using the least restrictive device. (3.)Ms. Tremayne Garcia will ambulate with INDEPENDENT for 250 feet with the least restrictive device. (4.)Ms. Tremayne Garcia will ambulate up/down 3 steps with bilateral railing with MINIMAL ASSIST with no device. (5.)Ms. Tremayne Garcia will increase left knee ROM to 5°-80°.  ________________________________________________________________________________________________  Outcome: Progressing Towards Goal      PHYSICAL THERAPY JOINT CAMP TKA: INITIAL ASSESSMENT 4/5/2017  INPATIENT: Hospital Day: 1  [place] / [place] / Product Type: Medicare /      NAME/AGE/GENDER: Mitch Etienne is a 78 y.o. female             PRIMARY DIAGNOSIS:  Osteoarthritis of left knee, unspecified osteoarthritis type [M17.9]              Procedure(s) and Anesthesia Type:     * LEFT KNEE ARTHROPLASTY TOTAL - Spinal (Left)  ICD-10: Treatment Diagnosis:        · Pain in Left Knee (M25.562)  · Stiffness of Left Knee, Not elsewhere classified (M25.662)  · Difficulty in walking, Not elsewhere classified (R26.2)  · Other abnormalities of gait and mobility (R26.89)       ASSESSMENT:      Ms. Tremayne Garcia presents status post left total knee replacement with decreased independence with bed mobility, transfers, gait, and therapeutic exercise. Therapy will maximize independence with functional mobility. This section established at most recent assessment   PROBLEM LIST (Impairments causing functional limitations):  1. Decreased Strength  2. Decreased Transfer Abilities  3. Decreased Ambulation Ability/Technique  4. Decreased Balance  5. Increased Pain  6.  Decreased Activity Tolerance    INTERVENTIONS PLANNED: (Benefits and precautions of physical therapy have been discussed with the patient.)  1. Bed Mobility  2. Gait Training  3. Home Exercise Program (HEP)  4. Therapeutic Exercise/Strengthening  5. Transfer Training  6. Range of Motion: active/assisted/passive  7. Therapeutic Activities  8. Group Therapy      TREATMENT PLAN: Frequency/Duration: Follow patient BID   to address above goals. Rehabilitation Potential For Stated Goals: GOOD      RECOMMENDED REHABILITATION/EQUIPMENT: (at time of discharge pending progress): Rehab. HISTORY:   History of Present Injury/Illness (Reason for Referral):  Decreased mobility ability  Past Medical History/Comorbidities:   Ms. Roseanne Cazares  has a past medical history of Arthritis; Chronic pain; Former smoker, stopped smoking in distant past; GERD (gastroesophageal reflux disease); High cholesterol; Hypertension; Morbid obesity ([address] Nausea & vomiting; Pre-diabetes; Status post total left knee replacement (4/5/2017); and Urinary incontinence. She also has no past medical history of Adverse effect of anesthesia; Aneurysm ([address] Arrhythmia; Asthma; Autoimmune disease ([address] CAD (coronary artery disease); Cancer ([address] Chronic kidney disease; Coagulation defects; COPD; Diabetes ([address] Difficult intubation; Endocarditis; Heart failure ([address] Liver disease; Malignant hyperthermia due to anesthesia; Other ill-defined conditions; Pseudocholinesterase deficiency; Psychiatric disorder; PUD (peptic ulcer disease); Rheumatic fever; Seizures ([address] Stroke [facility]); Thromboembolus ([address] Thyroid disease; Unspecified adverse effect of anesthesia; or Unspecified sleep apnea. Ms. Roseanne Cazares  has a past surgical history that includes gyn; heent; us guided core breast biopsy; rohit biopsy breast stereotactic; knee replacement (Right, 2010); and breast surgery procedure unlisted.   Social History/Living Environment:   Home Environment: Private residence  # Steps to Enter: 1  One/Two Story Residence: One story  Living Alone: Yes  Support Systems: Child(marlene)  Patient Expects to be Discharged to[de-identified] Rehabilitation facility ([address])  Current DME Used/Available at Home: Lunette Leghorn, rolling, Cane, straight, Commode, bedside  Prior Level of Function/Work/Activity:  [place] with ambulation and ADLs. Number of Personal Factors/Comorbidities that affect the Plan of Care: 0: LOW COMPLEXITY   EXAMINATION:   Most Recent Physical Functioning:      Gross Assessment  AROM: Generally decreased, functional  Strength: Generally decreased, functional  Coordination: Generally decreased, functional           LLE AROM  L Knee Flexion: 60  L Knee Extension: 20            Bed Mobility  Supine to Sit: Minimum assistance  Sit to Supine: Minimum assistance     Transfers  Sit to Stand: Minimum assistance  Stand to Sit: Minimum assistance     Balance  Sitting: Intact  Standing: Pull to stand; With support    Posture  Posture (WDL): Within defined limits           Weight Bearing Status  Left Side Weight Bearing: As tolerated  Distance (ft): 5 Feet (ft) (to head of bed)  Ambulation - Level of Assistance: Minimal assistance;Assist x2; Additional time  Assistive Device: Walker, rolling  Gait Abnormalities: Antalgic         Braces/Orthotics: none     Left Knee Cold  Type: Cryocuff       Body Structures Involved:  1. Bones  2. Joints  3. Muscles Body Functions Affected:  1. Neuromusculoskeletal  2. Movement Related Activities and Participation Affected:  1. Mobility   Number of elements that affect the Plan of Care: 4+: HIGH COMPLEXITY   CLINICAL PRESENTATION:   Presentation: Stable and uncomplicated: LOW COMPLEXITY   CLINICAL DECISION MAKIN hospitals [address] [id number] AM-PAC 6 Clicks   Basic Mobility Inpatient Short Form  How much difficulty does the patient currently have. .. Unable A Lot A Little None   1. Turning over in bed (including adjusting bedclothes, sheets and blankets)? [ ] 1   [ ] 2   [X] 3   [ ] 4   2.   Sitting down on and standing up from a chair with arms ( e.g., wheelchair, bedside commode, etc.)   [ ] 1   [ ] 2   [X] 3   [ ] 4   3. Moving from lying on back to sitting on the side of the bed? [ ] 1   [ ] 2   [X] 3   [ ] 4   How much help from another person does the patient currently need. .. Total A Lot A Little None   4. Moving to and from a bed to a chair (including a wheelchair)? [ ] 1   [ ] 2   [X] 3   [ ] 4   5. Need to walk in hospital room? [ ] 1   [ ] 2   [X] 3   [ ] 4   6. Climbing 3-5 steps with a railing? [ ] 1   [ ] 2   [X] 3   [ ] 4   © 2007, Trustees of [address] [place] [address] [address] [address], under license to [employer] All rights reserved       Score:  Initial: 18 Most Recent: X (Date: -- )     Interpretation of Tool:  Represents activities that are increasingly more difficult (i.e. Bed mobility, Transfers, Gait). Score 24 23 22-20 19-15 14-10 9-7 6       Modifier CH CI CJ CK CL CM CN         · Mobility - Walking and Moving Around:               - CURRENT STATUS:    CK - 40%-59% impaired, limited or restricted               - GOAL STATUS:           CK - 40%-59% impaired, limited or restricted               - D/C STATUS:                       CK - 40%-59% impaired, limited or restricted  [place] / Product Type: Medicare /       Medical Necessity:     · Skilled intervention continues to be required due to decreased mobility ability. Reason for Services/Other Comments:  · Patient continues to require skilled intervention due to decreased mobility ability.    Use of outcome tool(s) and clinical judgement create a POC that gives a: Clear prediction of patient's progress: LOW COMPLEXITY                 TREATMENT:   (In addition to Assessment/Re-Assessment sessions the following treatments were rendered)      Pre-treatment Symptoms/Complaints:  No complaints  Pain: Initial:   Pain Intensity 1: 0  Pain Location 1: Knee  Pain Orientation 1: Left  Post Session: 0      Assessment/Reassessment only, no treatment provided today        Date:    Date:    Date:      ACTIVITY/EXERCISE AM PM AM PM AM PM   GROUP THERAPY  [ ]  [ ]  [ ]  [ ]  [ ]  [ ]   Ankle Pumps               Quad Sets               Gluteal Sets               Hip ABd/ADduction               Straight Leg Raises               Knee Slides               Short Arc Quads               Long Arc Quads               Chair Slides                               B = bilateral; AA = active assistive; A = active; P = passive       Treatment/Session Assessment:         Response to Treatment:  Agreeable to take steps to head of bed. Education:  [X] Home Exercises  [ ] Fall Precautions  [ ] Hip Precautions [ ] Going Home Video  [ ] Knee/Hip Prosthesis Review  [ ] Mathieu tamycar Management/Safety [ ] Adaptive Equipment as Needed         Interdisciplinary Collaboration:   · Physical Therapist  · Occupational Therapist  · Registered Nurse     After treatment position/precautions:   · Supine in bed  · Caregiver at bedside  · Call light within reach  · RN notified     Compliance with Program/Exercises: compliant most of the time. Recommendations/Intent for next treatment session:  Treatment next visit will focus on increasing Ms. Rosado's independence with bed mobility, transfers, gait training, strength/ROM exercises, modalities for pain, and patient education.        Total Treatment Duration:  PT Patient Time In/Time Out  Time In: 1340  Time Out: [address] [facility] [address], PT

## 2017-04-05 NOTE — PROGRESS NOTES
04/05/17 1350   Oxygen Therapy   O2 Sat (%) 96 %   Pulse via Oximetry 78 beats per minute   O2 Device Nasal cannula   O2 Flow Rate (L/min) 2 l/min  (placed on RA)   Joint Camp Notes Reviewed. Pt working on IS. Pt encouraged to do 10 breaths per hour while awake on IS. Good NPC. No respiratory distress noted at this time. No complications noted at this time.

## 2017-04-05 NOTE — PROGRESS NOTES
TRANSFER - IN REPORT:    Verbal report received from Yokasta Doyle RN on Emerald Rachel  being received from PACU for routine post - op      Report consisted of patients Situation, Background, Assessment and   Recommendations(SBAR). Information from the following report(s) SBAR, Intake/Output and MAR was reviewed with the receiving nurse. Opportunity for questions and clarification was provided. Assessment completed upon patients arrival to unit and care assumed. Portable xrays taken on pt's arrival to floor. Oriented to room, bed controls, and how to order meals. No complaints. Moving feet but still numb. Aquacel dry and intact to L knee with iceman. TEDs and SCDs to LEs. Instructed to use IS. Yellow gripper socks to feet and instructed not to get up without staff to assist. Son at bedside.

## 2017-04-05 NOTE — PERIOP NOTES
TRANSFER - OUT REPORT:    Verbal report given to Angelica Castellanos on Emerald Rachel  being transferred to room 324 for routine progression of care       Report consisted of patients Situation, Background, Assessment and   Recommendations(SBAR). Information from the following report(s) SBAR, OR Summary, Procedure Summary, Intake/Output and MAR was reviewed with the receiving nurse. Opportunity for questions and clarification was provided.       Patient transported with:   O2 @ 2 liters

## 2017-04-05 NOTE — PROGRESS NOTES
Problem: Self Care Deficits Care Plan (Adult)  Goal: *Acute Goals and Plan of Care (Insert Text)  GOALS:   DISCHARGE GOALS (in preparation for going home/rehab): 3 days  1. Ms. Lee Leon will perform one lower body dressing activity with minimal assistance required to demonstrate improved functional mobility and safety. 2. Ms. Lee Leon will perform one lower body bathing activity with minimal assistance required to demonstrate improved functional mobility and safety. 3. Ms. Lee Leon will perform toileting/toilet transfer with contact guard assistance to demonstrate improved functional mobility and safety. 4. Ms. Lee Leon will perform shower transfer with contact guard assistance to demonstrate improved functional mobility and safety. JOINT CAMP OCCUPATIONAL THERAPY TKA: Initial Assessment and PM 4/5/2017  INPATIENT: Hospital Day: 1  [place] / [place] / Product Type: Medicare /      NAME/AGE/GENDER: Victorina Mullins is a 78 y.o. female             PRIMARY DIAGNOSIS:  Osteoarthritis of left knee, unspecified osteoarthritis type [M17.9]              Procedure(s) and Anesthesia Type:     * LEFT KNEE ARTHROPLASTY TOTAL - Spinal (Left)  ICD-10: Treatment Diagnosis:        · Pain in Left Knee (M25.562)  · Stiffness of Left Knee, Not elsewhere classified (W63.854)  · Other lack of cordination (R27.8)       ASSESSMENT:      Ms. Lee Leon is s/p left TKA and presents with decreased weight bearing on left LE and decreased independence with functional mobility and activities of daily living. Patient would benefit from skilled Occupational Therapy to maximize independence and safety with self-care task and functional mobility. Pt would also benefit from education on adaptive equipment and safety precautions in preparation for going home or for recommendations for post-hospital rehab program.  Patient plans for further rehab [facility] rehab facility.   OT reviewed therapy schedule and plan of care with patient. Patient was able to transfer and preform self care skills as charted below. Patient instructed to call for assistance when needing to get up from the bed and all needs in reach. Patient verbalized understanding of call light. This section established at most recent assessment   PROBLEM LIST (Impairments causing functional limitations):  1. Decreased Strength  2. Decreased ADL/Functional Activities  3. Decreased Transfer Abilities  4. Increased Pain  5. Increased Fatigue  6. Decreased Flexibility/Joint Mobility  7. Decreased Knowledge of Precautions    INTERVENTIONS PLANNED: (Benefits and precautions of occupational therapy have been discussed with the patient.)  1. Activities of daily living training  2. Adaptive equipment training  3. Balance training  4. Clothing management  5. Donning&doffing training  6. Theraputic activity      TREATMENT PLAN: Frequency/Duration: Follow patient 1 time to address above goals. Rehabilitation Potential For Stated Goals: GOOD      RECOMMENDED REHABILITATION/EQUIPMENT: (at time of discharge pending progress): Continue Skilled Therapy and Rehab. OCCUPATIONAL PROFILE AND HISTORY:   History of Present Injury/Illness (Reason for Referral): Pt presents this date s/p (Left) TKA. Past Medical History/Comorbidities:   Ms. Roseanne Cazares  has a past medical history of Arthritis; Chronic pain; Former smoker, stopped smoking in distant past; GERD (gastroesophageal reflux disease); High cholesterol; Hypertension; Morbid obesity ([address] Nausea & vomiting; Pre-diabetes; Status post total left knee replacement (4/5/2017); and Urinary incontinence. She also has no past medical history of Adverse effect of anesthesia; Aneurysm ([address] Arrhythmia; Asthma; Autoimmune disease ([address] CAD (coronary artery disease); Cancer ([address] Chronic kidney disease; Coagulation defects; COPD; Diabetes ([address] Difficult intubation; Endocarditis; Heart failure ([address]  Liver disease; Malignant hyperthermia due to anesthesia; Other ill-defined conditions; Pseudocholinesterase deficiency; Psychiatric disorder; PUD (peptic ulcer disease); Rheumatic fever; Seizures ([facility] Stroke [facility]); Thromboembolus ([facility] Thyroid disease; Unspecified adverse effect of anesthesia; or Unspecified sleep apnea. Ms. Therese Vogt  has a past surgical history that includes gyn; heent; us guided core breast biopsy; rohit biopsy breast stereotactic; knee replacement (Right, 2010); and breast surgery procedure unlisted. Social History/Living Environment:   Home Environment: Private residence  # Steps to Enter: 1  One/Two Story Residence: One story  Living Alone: Yes  Support Systems: Child(marlene)  Patient Expects to be Discharged to[de-identified] Rehabilitation facility ([address])  Current DME Used/Available at Home: Raymon Mayank, rolling, Cane, straight, Commode, bedside  Tub or Shower Type: Shower  Prior Level of Function/Work/Activity:  Mod I and living alone      Number of Personal Factors/Comorbidities that affect the Plan of Care: Brief history (0):  LOW COMPLEXITY   ASSESSMENT OF OCCUPATIONAL PERFORMANCE[de-identified]   Most Recent Physical Functioning:   Balance  Sitting: Intact  Standing: Pull to stand; With support        Patient Vitals for the past 6 hrs:       BP BP Patient Position SpO2 O2 Flow Rate (L/min) Pulse   04/05/17 0837 146/65 At rest 98 % 2 l/min 78   04/05/17 0843 147/67 At rest 99 % 2 l/min 83   04/05/17 0847 167/74 At rest 93 % 2 l/min 74   04/05/17 0852 120/59 - 95 % 2 l/min 74   04/05/17 0857 117/56 - 96 % 2 l/min 75   04/05/17 0902 121/58 - 97 % 2 l/min 74   04/05/17 0917 119/58 - 97 % 2 l/min 71   04/05/17 1128 116/56 - 95 % 2 l/min 81   04/05/17 1133 113/56 - 96 % 2 l/min 78   04/05/17 1138 96/53 - 98 % 2 l/min 74   04/05/17 1143 107/57 - 96 % 2 l/min 72   04/05/17 1153 109/58 - 100 % 2 l/min 73   04/05/17 1209 117/57 - 97 % 2 l/min 68   04/05/17 1216 113/54 - 97 % 2 l/min 68   04/05/17 1330 - - - 2 l/min - 17 1350 - - 96 % 2 l/min -   17 1356 - - 94 % - -   17 1401 117/68 - 97 % - 74        Gross Assessment  AROM: Generally decreased, functional  Strength: Generally decreased, functional  Coordination: Generally decreased, functional            LLE AROM  L Knee Flexion: 60  L Knee Extension: 20 Coordination  Fine Motor Skills-Upper: Left Intact; Right Intact  Gross Motor Skills-Upper: Left Intact; Right Intact           Mental Status  Neurologic State: Alert; Appropriate for age  Orientation Level: Appropriate for age  Cognition: Appropriate decision making; Appropriate for age attention/concentration; Appropriate safety awareness; Follows commands  Perception: Appears intact  Perseveration: No perseveration noted  Safety/Judgement: Awareness of environment; Fall prevention            RLE Assessment  RLE Assessment (WDL): Within defined limits       Basic ADLs (From Assessment) Complex ADLs (From Assessment)   Basic ADL  Feeding: Setup  Oral Facial Hygiene/Grooming: Supervision  Bathing: Moderate assistance  Upper Body Dressing: Supervision  Lower Body Dressing: Moderate assistance  Toileting: Total assistance (catheter)     Grooming/Bathing/Dressing Activities of Daily Living     Cognitive Retraining  Safety/Judgement: Awareness of environment; Fall prevention                 Functional Transfers  Toilet Transfer : Minimum assistance;Assist x2  Shower Transfer: Minimum assistance;Assist x2     Bed/Mat Mobility  Supine to Sit: Minimum assistance  Sit to Supine: Minimum assistance  Sit to Stand: Minimum assistance           Physical Skills Involved:  1. Range of Motion  2. Balance  3. Mobility Cognitive Skills Affected (resulting in the inability to perform in a timely and safe manner): 1. none Psychosocial Skills Affected:  1.  Environmental Adaptations   Number of elements that affect the Plan of Care: 3-5:  MODERATE COMPLEXITY   CLINICAL DECISION MAKIN Splother Mobility Inpatient Short Form  How much help from another person does the patient currently need. .. Total A Lot A Little None   1. Putting on and taking off regular lower body clothing?   [ ] 1   [X] 2   [ ] 3   [ ] 4   2. Bathing (including washing, rinsing, drying)? [ ] 1   [X] 2   [ ] 3   [ ] 4   3. Toileting, which includes using toilet, bedpan or urinal?   [X] 1   [ ] 2   [ ] 3   [ ] 4   4. Putting on and taking off regular upper body clothing?   [ ] 1   [ ] 2   [X] 3   [ ] 4   5. Taking care of personal grooming such as brushing teeth? [ ] 1   [ ] 2   [X] 3   [ ] 4   6. Eating meals? [ ] 1   [ ] 2   [ ] 3   [X] 4   © 2007, Trustees of [address] [place] [address] [address] [address], under license to [employer]. All rights reserved   Score:  Initial: 15 Most Recent: X (Date: -- )     Interpretation of Tool:  Represents activities that are increasingly more difficult (i.e. Bed mobility, Transfers, Gait). Score 24 23 22-20 19-15 14-10 9-7 6       Modifier CH CI CJ CK CL CM CN         · Self Care:               - CURRENT STATUS:    CK - 40%-59% impaired, limited or restricted               - GOAL STATUS:           CJ - 20%-39% impaired, limited or restricted               - D/C STATUS:                       ---------------To be determined---------------  [place] / [place] / Product Type: Medicare /       Medical Necessity:     · Patient is expected to demonstrate progress in balance, coordination and functional technique to decrease assistance required with self care and functional mobility and improve safety during self care and functional mobility. Reason for Services/Other Comments:  · Patient continues to require skilled intervention due to decrease self care and functional mobility.    Use of outcome tool(s) and clinical judgement create a POC that gives a: LOW COMPLEXITY                 TREATMENT:   (In addition to Assessment/Re-Assessment sessions the following treatments were rendered)      Pre-treatment Symptoms/Complaints:  none  Pain: Initial:   Pain Intensity 1: 0     Post Session:  1/10 nurse notified      Assessment/Reassessment only, no treatment provided today     Treatment/Session Assessment:         Response to Treatment:  Tolerated well, wants to go to rehab because she lives alone. Education:  [ ] Home Exercises  [X] Fall Precautions  [ ] Hip Precautions [ ] Going Home Video  [X] Knee/Hip Prosthesis Review  [X] Walker Management/Safety [X] Adaptive Equipment as Needed         Interdisciplinary Collaboration:   · Physical Therapist  · Occupational Therapist  · Registered Nurse     After treatment position/precautions:   · Supine in bed  · Bed/Chair-wheels locked  · Bed in low position  · Call light within reach  · RN notified  · Family at bedside  · Side rails x 3  · respiratory therapy present     Compliance with Program/Exercises: compliant all of the time. Recommendations/Intent for next treatment session:  Treatment next visit will focus on increasing Ms. Rosado's independence with bed mobility, transfers, gait training, strength/ROM exercises, modalities for pain, and patient education.        Total Treatment Duration:  OT Patient Time In/Time Out  Time In: 1350  Time Out: [address] [facility], OT

## 2017-04-05 NOTE — CONSULTS
MD Martha   Medical Director  [address] [place] [address], [address] [facility]  Tel: [phone]     Physical Medicine & Rehabilitation Note-consult    Patient: Megan Watson MRN: [id number]  SSN: [id number]    YOB: 1940  Age: 78 y.o. Sex: female      Admit Date: 4/5/2017  Admitting Physician: Kurt Rodas MD    Medical Decision Making/Plan/Recommend:  Gait impairment and functional deficits. Therapy progressing steadily, without unusual barriers to progress. She is at [facility] with transfers, and ambulation using a RW. Patient plans for SNF discharge. Best care option is admission to a sub acute rehab program at [facility]. She has non acute medical conditions described below and post op  functional deficits. She will benefit from continued daily skilled rehabilitation efforts and regular medical and nursing care at SNF. Continue PT, OT for active/assisted/passive left TKA ROM, strengthening, mobility, transfers, gait training. Will follow progress. Chief Complaint : Gait dysfunction secondary to below.   Admit Diagnosis: Osteoarthritis of left knee, unspecified osteoarthritis typ*  left total knee arthroplasty  Pain  DVT risk  Post op hemorrhagic anemia  Acute Rehab Dx:  Gait impairment  Debility    deconditioning  Mobility and ambulation deficits  Self Care/ADL deficits    Medical Dx:  Past Medical History:   Diagnosis Date    Arthritis     Chronic pain     hands and toes    Former smoker, stopped smoking in distant past     GERD (gastroesophageal reflux disease)     controlled with med    High cholesterol     on medication    Hypertension     Morbid obesity ([address]     Nausea & vomiting     Pre-diabetes     Status post total left knee replacement 4/5/2017    Urinary incontinence     med     Subjective:     Date of Evaluation:  April 5, 2017    HPI: Megan Watson is a 78 y.o. female patient at [address] [place] [address] who was admitted on 4/5/2017  by Jocelyne Abebe MD with below mentioned medical history, is being seen for Physical Medicine and Rehabilitation consult. Tomás Aguilar presented with debilitating left knee pain  Secondary to end stage DJD. The pain was refractory to conservative treatment including intra-articular cortisone injections and modification of activities. She is now s/p left total knee arthroplasty per Dr. Jocelyne Abebe MD on 4/5/2017. We are consulted to assist with rehab needs and placement. Patient will be WBAT LLE. Patient is tolerating acute post op therapy well, shows no unexpected barrier to progress. She is still very l;imited by decreased  LLE strength, decreased ROM and pain. Tomás Aguilar is seen and examined today. Medical Records reviewed. Pt is independent with ambulation, prior to admission, limited by left knee pain. Patient had right TKA last year with good result. He is also limited by chronic low back pain. Current Level of Function: bed mobility - min A, transfers - min A, decreased balance , ambulation -  5' with min A using a RW . Prior Level of Function/Work/Activity:   Tyler with ambulation and ADLs. Family History   Problem Relation Age of Onset    Elevated Lipids Mother     Heart Disease Mother     Hypertension Mother     Cancer Father     Elevated Lipids Father     Hypertension Father     Cancer Sister     Malignant Hyperthermia Neg Hx     Pseudocholinesterase Deficiency Neg Hx     Delayed Awakening Neg Hx     Post-op Nausea/Vomiting Neg Hx     Emergence Delirium Neg Hx     Post-op Cognitive Dysfunction Neg Hx     Other Neg Hx     Breast Cancer Neg Hx       Social History   Substance Use Topics    Smoking status: Former Smoker    Smokeless tobacco: Never Used      Comment: \"40 years ago\"    Alcohol use Yes      Comment: socially     Past Surgical History:   Procedure Laterality Date    BREAST SURGERY PROCEDURE UNLISTED      Juan Jose. breast bx    HX GYN      Hysterectomy    HX HEENT      T&A    HX KNEE REPLACEMENT Right 2010    FIDE BIOPSY BREAST STEREOTACTIC      US GUIDED CORE BREAST BIOPSY        Prior to Admission medications    Medication Sig Start Date End Date Taking? Authorizing Provider   estradiol (ESTRACE) 2 mg tablet Take 2 mg by mouth daily. Take morning of surgery per anesthesia guidelines. Yes Historical Provider   mirabegron ER (MYRBETRIQ) 50 mg ER tablet Take 50 mg by mouth daily. Take morning of surgery per anesthesia guidelines. Indications: URINARY URGE INCONTINENCE   Yes Historical Provider   omeprazole (PRILOSEC) 40 mg capsule Take 40 mg by mouth daily. Take morning of surgery per anesthesia guidelines. Yes Historical Provider   aspirin delayed-release 81 mg tablet Take 81 mg by mouth daily. Yes Historical Provider   ibuprofen (MOTRIN) 800 mg tablet Take 800 mg by mouth as needed for Pain. Stop 5 days prior to surgery per anesthesia guidelines. Indications: Pain   Yes Historical Provider   simvastatin (ZOCOR) 40 mg tablet Take 40 mg by mouth daily. Per anesthesia protocol:instructed to take am of surgery. 12/21/10  Yes Historical Provider   lisinopril-hydrochlorothiazide (PRINZIDE, ZESTORETIC) 20-12.5 mg per tablet Take 1 Tab by mouth daily. Yes Historical Provider   docusate sodium (COLACE) 100 mg capsule Take 100 mg by mouth daily. 12/21/10  Yes Historical Provider     Allergies   Allergen Reactions    Latex Itching     redness    Adhesive Rash    Pcn [Penicillins] Swelling    Sulfa (Sulfonamide Antibiotics) Swelling        Review of Systems: +left knee pain, +antalgic gait. Denies chest pain, shortness of breath, cough, headache, visual problems, abdominal pain, dysurea, calf pain. Pertinent positives are as noted in the medical records and unremarkable otherwise. Objective:     Vitals:  Blood pressure 122/58, pulse 73, temperature 95.7 °F (35.4 °C), resp.  rate 18, height 5' 6\" (1.676 m), weight 190 lb (86.2 kg), SpO2 91 %, not currently breastfeeding. Temp (24hrs), Av.4 °F (35.8 °C), Min:95.6 °F (35.3 °C), Max:98.3 °F (36.8 °C)    BMI (calculated): 30.7 (17 1035)   Intake and Output:       Physical Exam:   General: Alert and age appropriately oriented. No acute cardio respiratory distress. HEENT: Normocephalic, no conjunctival pallor. Oral mucosa moist without cyanosis. No JVD. Lungs: Clear to auscultation. No wheeze. Respiration even and unlabored   Heart: Regular rate and rhythm, S1, S2   No  Murmurs. Abdomen: Soft, non-tender, non-distended. Genitourinary: defered   Neuromuscular:      Grossly no focal motor deficits. Left knee extension strong  Left ankle dorsiflexion 5/5  Left ankle plantarflexion 5/5  No sensory deficits distally BLE to soft touch. Skin/extremity: Non tender calves BLE. No rashes, no marginal erythema. Labs/Studies:  Recent Results (from the past 72 hour(s))   TYPE & SCREEN    Collection Time: 17  7:50 AM   Result Value Ref Range    Crossmatch Expiration 2017     ABO/Rh(D) Consuella Model POSITIVE     Antibody screen NEG    GLUCOSE, POC    Collection Time: 17  8:06 AM   Result Value Ref Range    Glucose (POC) 127 (H) 65 - 100 mg/dL       Functional Assessment:  Reviewed participation and progress in therapies  Gross Assessment  AROM: Generally decreased, functional (17 1405)  Strength: Generally decreased, functional (17 1405)  Coordination: Generally decreased, functional (17 1405)   Bed Mobility  Supine to Sit: Minimum assistance (17 1405)  Sit to Supine: Minimum assistance (17 1405)   Balance  Sitting: Intact (17 1405)  Standing: Pull to stand; With support (17 140)               Bed/Mat Mobility  Supine to Sit: Minimum assistance (17 1405)  Sit to Supine: Minimum assistance (17 1405)  Sit to Stand: Minimum assistance (04/05/17 1405)     Ambulation:  Gait  Gait Abnormalities: Antalgic (04/05/17 1405)  Ambulation - Level of Assistance: Minimal assistance;Assist x2; Additional time (04/05/17 1405)  Distance (ft): 5 Feet (ft) (to head of bed) (04/05/17 1405)  Assistive Device: Walker, rolling (04/05/17 1405)    Impression/Plan:     Principal Problem:    Status post total left knee replacement (4/5/2017)        Current Facility-Administered Medications   Medication Dose Route Frequency Provider Last Rate Last Dose    [START ON 4/6/2017] docusate sodium (COLACE) capsule 100 mg  100 mg Oral DAILY Anila Alves [place]        [START ON 4/6/2017] lisinopril-hydroCHLOROthiazide (PRINZIDE, ZESTORETIC) 20-12.5 mg per tablet 1 Tab  1 Tab Oral DAILY Jodiea Joselyn [place]        [START ON 4/6/2017] mirabegron ER (MYRBETRIQ) tablet 50 mg (Patient Supplied)  50 mg Oral DAILY Jodiea Joselyn [place]        [START ON 4/6/2017] pantoprazole (PROTONIX) tablet 40 mg  40 mg Oral ACB Jodie Joselyn [place]        0.9% sodium chloride infusion  100 mL/hr IntraVENous CONTINUOUS MEHDI Oneill 100 mL/hr at 04/05/17 1400 100 mL/hr at 04/05/17 1400    sodium chloride (NS) flush 5-10 mL  5-10 mL IntraVENous Q8H Nolvia Oneillma        sodium chloride (NS) flush 5-10 mL  5-10 mL IntraVENous PRN MEHDI Oneill        [START ON 4/6/2017] acetaminophen (TYLENOL) tablet 1,000 mg  1,000 mg Oral Q6H Anila Alves [place]        HYDROmorphone (DILAUDID) tablet 2 mg  2 mg Oral Q4H PRN MEHDI Oneill   2 mg at 04/05/17 1754    HYDROmorphone (PF) (DILAUDID) injection 1 mg  1 mg IntraVENous Q3H PRN MEHDI Oneill        naloxone [facility]) injection 0.2-0.4 mg  0.2-0.4 mg IntraVENous Q10MIN PRN MEHDI Oneill        [START ON 4/6/2017] dexamethasone (DECADRON) injection 10 mg  10 mg IntraVENous ONCE Dada Oneill        ondansetron [facility]) injection 4 mg  4 mg IntraVENous Q4H PRN MEHDI Oneill        diphenhydrAMINE (BENADRYL) capsule 25 mg  25 mg Oral Q4H PRN Dada Salas        [START ON 4/6/2017] senna-docusate (PERICOLACE) 8.6-50 mg per tablet 2 Tab  2 Tab Oral DAILY Dada Salas        aspirin delayed-release tablet 325 mg  325 mg Oral Q12H Dada Salas        clindamycin phosphate (CLEOCIN) 600 mg in 0.9% sodium chloride (MBP/ADV) 100 mL ADV  600 mg IntraVENous Seferino Guido  mL/hr at 04/05/17 1616 600 mg at 04/05/17 1616        Recommendations: Recommend sub acute rehab at [facility]. Continue Acute Rehab Program. PT, OT  to focus on  gait training, transfer training, balance activities, ROM and strengthening exercises. Coordination of rehab/medical care. Will follow along with you for PM&R issues and provide you follow up. Will follow with SW/ /Admissions Coordinators regarding insurance approvals and acceptance. Rehabilitation Management/ Medical Management: 1. Devices:Walkers, Type: Rolling Walker  2. Consult:Rehab team including PT, OT,  and . 3. Disposition Rehab-discussed with patient. 4. Thigh-high or knee-high ASUNCION's when out of bed. 5. DVT Prophylaxis - aspirin 325mg bid x 35days. 6. Incentive spirometer Q1H while awake  7. Post op hemorrhagic anemia-monitor. asymptomatic   8. Activity: WBAT LLE  9. Planned Labs: CBC,BMP  10. Pain Control: Continue scheduled tylenol, Celebrex and  PRN meds. 11. Wound Care: Keep left TKA wound clean and dry and reinforce dressing PRN. May remove Aquacel 1 week post op ad replace with new one. Remove staples 12-14 post surgery, when incision appears appropriately closed and apply benzoin and 1/2\" steristrips. Follow up with Dr Leopold Ito  2 weeks after discharge from rehab. Follow up with ORTHO per instructions. Thank you for the opportunity to participate in the care of this patient.     Signed By: Markel Kumari MD     April 5, 2017

## 2017-04-05 NOTE — ANESTHESIA POSTPROCEDURE EVALUATION
Post-Anesthesia Evaluation and Assessment    Patient: Emerald Rachel MRN: [id number]  SSN: [id number]    YOB: 1940  Age: 78 y.o. Sex: female       Cardiovascular Function/Vital Signs  Visit Vitals    /54    Pulse 68    Temp 36.8 °C (98.3 °F)    Resp 20    Ht 5' 6\" (1.676 m)    Wt 86.2 kg (190 lb)    SpO2 97%    BMI 30.67 kg/m2       Patient is status post spinal anesthesia for Procedure(s):  LEFT KNEE ARTHROPLASTY TOTAL. Nausea/Vomiting: None    Postoperative hydration reviewed and adequate. Pain:  Pain Scale 1: Numeric (0 - 10) (04/05/17 1209)  Pain Intensity 1: 0 (04/05/17 1209)   Managed    Neurological Status:   Neuro (WDL): Within Defined Limits (04/05/17 1209)  Neuro  Neurologic State: Alert (04/05/17 1209)  Orientation Level: Oriented to person;Oriented to place (04/05/17 1209)  LUE Motor Response: Purposeful (04/05/17 1209)  LLE Motor Response: Purposeful (04/05/17 1209)  RUE Motor Response: Purposeful (04/05/17 1209)  RLE Motor Response: Purposeful (04/05/17 1209)   At baseline    Mental Status and Level of Consciousness: Arousable    Pulmonary Status:   O2 Device: Nasal cannula (04/05/17 1128)   Adequate oxygenation and airway patent    Complications related to anesthesia: None    Post-anesthesia assessment completed.  No concerns    Signed By: Paco Cantu MD     April 5, 2017

## 2017-04-05 NOTE — PROGRESS NOTES
Sitting up in bed eating. Medicated for pain again with dilaudid po. Good movement and sensation to feet. Son in room.

## 2017-04-05 NOTE — CONSULTS
Consult received for \"medical management. \"  Pt has chronic medical conditions including GERD, HLD, HTN. Vital signs are stable. I see no acute, active medical issues for which to manage. Please re-consult for any specific medical issues that need to be addressed for this hospitalization and I will be happy to address those. No charge to patient.     Barb Calderón, DO

## 2017-04-05 NOTE — PERIOP NOTES
Betadine lavage:  17.5cc of betadine lot # [id number] , exp. Date  ,09-  in 500cc of . 9NS Lot # [id number] , exp.  Date : 03-

## 2017-04-05 NOTE — ANESTHESIA PROCEDURE NOTES
Spinal Block    Start time: 4/5/2017 9:37 AM  End time: 4/5/2017 9:42 AM  Performed by: Boris Bailey  Authorized by: Boris Bailey     Pre-procedure:   Indications: at surgeon's request, post-op pain management, procedure for pain and primary anesthetic  Preanesthetic Checklist: patient identified, risks and benefits discussed, anesthesia consent, site marked, patient being monitored and timeout performed    Timeout Time: 09:37          Spinal Block:   Patient Position:  Seated  Prep Region:  Lumbar  Prep: chlorhexidine      Location:  L3-4  Technique:  Single shot  Local:  Lidocaine 1%      Needle:   Needle Type:  Pencan  Needle Gauge:  25 G  Attempts:  1      Events: CSF confirmed, no blood with aspiration and no paresthesia        Assessment:  Insertion:  Uncomplicated  Patient tolerance:  Patient tolerated the procedure well with no immediate complications  Bupiv 3.06% 2cc

## 2017-04-05 NOTE — ROUTINE PROCESS
TRANSFER - IN REPORT:    Verbal report received from Val Crawford RN on Glorious Riggers  being received from [facility] for routine progression of care      Report consisted of patients Situation, Background, Assessment and   Recommendations(SBAR). Information from the following report(s) SBAR was reviewed with the receiving nurse. Opportunity for questions and clarification was provided. Assessment completed upon patients arrival to unit and care assumed.

## 2017-04-05 NOTE — IP AVS SNAPSHOT
Stacy Ponce 
 
 
 [address] [place] [address] 
[phone] Patient: Wes Boyer MRN: [id number] VTM:[id number]/[id number] You are allergic to the following Allergen Reactions Latex Itching  
 redness Adhesive Rash Pcn (Penicillins) Swelling Sulfa (Sulfonamide Antibiotics) Swelling Immunizations Administered for This Admission Name Date  
 TB Skin Test (PPD) Intradermal 4/5/2017 Recent Documentation Height Weight Breastfeeding? BMI Smoking Status 1.676 m 86.2 kg No 30.67 kg/m2 Former Smoker Emergency Contacts Name Discharge Info Relation Home Work Mobile [facility] DISCHARGE CAREGIVER [3] Son [22] [phone] Berhane [address] CAREGIVER [3] Son [22] [id number] About your hospitalization You were admitted on:  April 5, 2017 You last received care in the:  [facility] You were discharged on:  April 8, 2017 Unit phone number:  [phone] Why you were hospitalized Your primary diagnosis was:  Status Post Total Left Knee Replacement Providers Seen During Your Hospitalizations Provider Role Specialty Primary office phone Fiona Thurston MD Attending Provider Orthopedic Surgery [phone] Your Primary Care Physician (PCP) Primary Care Physician Office Phone Office Fax [address] Follow-up Information Follow up With Details Comments Contact Info [place]   [address] [facility] [address]  Ellis Harper 
[phone] [address] [address] 
[phone] Your Appointments Monday April 24, 2017 11:00 AM EDT  
PT Initial Visit with Nancy Sood, PT  
[facility] ([address]) [address] [facility] [address] [place] [address] [phone] Please arrive 10-15 minutes prior to your appointment time. Wear comfortable clothing. Please bring your insurance cards with you. Please bring a list of your medications including herbal supplements. Please bring a list of your allergies including food allergies. Wednesday April 26, 2017 11:00 AM EDT  
PT Recurring Visit Kathryn Hall with Sheila Hurt, PT  
[facility] ([address]) [address] [place] [address]  
[phone] Friday April 28, 2017 11:00 AM EDT  
PT Recurring Visit Kathryn Hall with Sheila Hurt, PT  
[facility] ([address]) [address] [place] [address]  
[phone] Monday May 01, 2017 11:00 AM EDT  
PT Recurring Visit Kathryn Hall with Sheila Hurt, PT  
[facility] ([address]) [address] [place] [address]  
[phone] Wednesday May 03, 2017 11:00 AM EDT  
PT Recurring Visit Kathryn Hall with Sheila Hurt, PT  
[facility] ([address]) [address] [place] [address]  
[phone] Friday May 05, 2017 11:00 AM EDT  
PT Recurring Visit Kathryn Hall with Sheila Hurt, PT  
[facility] ([address]) [address] [place] [address]  
[phone] Monday May 08, 2017 11:00 AM EDT  
PT Recurring Visit Kathryn Hall with Sheila Hurt, PT  
[facility] ([address]) [address] [place] [address]  
[phone] Wednesday May 10, 2017 11:00 AM EDT  
PT Recurring Visit Kathryn Hall with Sheila Hurt, PT  
[facility] ([address]) [address] [place] [address]  
[phone] Friday May 12, 2017 11:00 AM EDT  
PT Recurring Visit Kathryn Hall with Sheila Hurt, PT  
[facility] ([address]) [address] [place] [address] [phone] Monday May 15, 2017 11:00 AM EDT  
PT Recurring Visit Radha Gómez with Avis Jones, PT  
[facility] ([address]) [address] [facility] [address] [address]  
[phone] Wednesday May 17, 2017 11:00 AM EDT  
PT Recurring Visit Radha Gómez with Avis Jones, PT  
[facility] ([address]) [address] [address]  
[phone] Friday May 19, 2017 11:00 AM EDT  
PT Recurring Visit Radha Gómez with Avis Jones, PT  
[facility] ([address]) [address] [facility] [address]  
[phone] Monday May 22, 2017 11:00 AM EDT  
PT Recurring Visit Radha Gómez with Avis Jones, PT  
[facility] ([address]) [address] [facility] [address] [facility] [address]  
[phone] Current Discharge Medication List  
  
START taking these medications Dose & Instructions Dispensing Information Comments Morning Noon Evening Bedtime HYDROmorphone 2 mg tablet Commonly known as:  DILAUDID Your last dose was: Your next dose is:    
   
   
 Dose:  2 mg Take 1 Tab by mouth every three (3) hours as needed. Max Daily Amount: 16 mg.  
 Quantity:  40 Tab Refills:  0  
     
   
   
   
  
 oxyCODONE IR 10 mg Tab immediate release tablet Commonly known as:  Fide Neighbor Your last dose was: Your next dose is:    
   
   
 Dose:  10 mg Take 1 Tab by mouth every four (4) hours as needed. Max Daily Amount: 60 mg.  
 Quantity:  60 Tab Refills:  0 CONTINUE these medications which have CHANGED Dose & Instructions Dispensing Information Comments Morning Noon Evening Bedtime  
 aspirin delayed-release 325 mg tablet What changed:   
- medication strength 
- how much to take - when to take this Your last dose was: Your next dose is: Dose:  325 mg Take 1 Tab by mouth every twelve (12) hours every twelve (12) hours for 35 days. Quantity:  70 Tab Refills:  0 This medication is to prevent blood clots for 5 weeks after surgery. CONTINUE these medications which have NOT CHANGED Dose & Instructions Dispensing Information Comments Morning Noon Evening Bedtime COLACE 100 mg capsule Generic drug:  docusate sodium Your last dose was: Your next dose is:    
   
   
 Dose:  100 mg Take 100 mg by mouth daily. Refills:  0  
     
   
   
   
  
 lisinopril-hydroCHLOROthiazide 20-12.5 mg per tablet Commonly known as:  Raymona Began Your last dose was: Your next dose is:    
   
   
 Dose:  1 Tab Take 1 Tab by mouth daily. Refills:  0 MYRBETRIQ 50 mg ER tablet Generic drug:  mirabegron ER Your last dose was: Your next dose is:    
   
   
 Dose:  50 mg Take 50 mg by mouth daily. Take morning of surgery per anesthesia guidelines. Indications: URINARY URGE INCONTINENCE Refills:  0  
     
   
   
   
  
 omeprazole 40 mg capsule Commonly known as:  PRILOSEC Your last dose was: Your next dose is:    
   
   
 Dose:  40 mg Take 40 mg by mouth daily. Take morning of surgery per anesthesia guidelines. Refills:  0  
     
   
   
   
  
 simvastatin 40 mg tablet Commonly known as:  ZOCOR Your last dose was: Your next dose is:    
   
   
 Dose:  40 mg Take 40 mg by mouth daily. Per anesthesia protocol:instructed to take am of surgery. Refills:  0 STOP taking these medications   
 estradiol 2 mg tablet Commonly known as:  ESTRACE  
   
  
 ibuprofen 800 mg tablet Commonly known as:  MOTRIN Where to Get Your Medications Information on where to get these meds will be given to you by the nurse or doctor. ! Ask your nurse or doctor about these medications  
  aspirin delayed-release 325 mg tablet HYDROmorphone 2 mg tablet  
 oxyCODONE IR 10 mg Tab immediate release tablet Discharge Instructions [facility] Patient Discharge Instructions Mine Mayers / [id number] : 1940 Admitted 2017 Discharged: 2017 IF YOU HAVE ANY PROBLEMS ONCE YOU ARE AT HOME CALL THE FOLLOWING NUMBERS:  
Main office number: [phone] Medications · The medications you are to continue on are listed on the medication reconciliation sheet. · Narcotic pain medications as well as supplemental iron can cause constipation. If this occurs try stopping the narcotic pain medication and/or the iron. · It is important that you take the medication exactly as they are prescribed. · Medications which increase your risk of blood clots are listed to stop for 5 weeks after surgery as well as medications or supplements which increase your risk of bleeding complications. · Keep your medication in the bottles provided by the pharmacist and keep a list of the medication names, dosages, and times to be taken in your wallet. · Do not take other medications without consulting your doctor. Important Information Do NOT smoke as this will greatly increase your risk of infection! Resume your prehospital diet. If you have excessive nausea or vomitting call your doctor's office Leg swelling and warmth is normal for 6 months after surgery. If you experience swelling in your leg elevate you leg while laying down with your toes above your heart. If you have sudden onset severe swelling with leg pain call our office. Use Domingo Hose stockings until we see you in the office for your follow up appointment. The stitches deep inside take approximately 6 months to dissolve. There will be sharp shooting, stinging and burning pain.  This is normal and will resolve between 3-6 months after surgery. Difficulty sleeping is normal following total Knee and Hip replacement. You may try melatonin, an over-the-counter sleep aid or benadryl to help with sleep. Most patients will resume sleeping through the night 8 weeks after surgery. Home Physical Therapy is arranged. Home Health will contact you within 48 hrs of discharge that you have chosen. If you have not received a call within this time frame please contact that provider you chose. You should be given this information before you leave the hospital.  
 
You are at a risk for falls. Use the rolling walker when walking. Patients who have had a joint replacement should not drive if they are still taking narcotic pain mediation during the daytime hours. Most patients wean themselves off of pain medication within 2-5 weeks after surgery. When to Call the office - If you have a temperature greater then 101 
- Uncontrolled vomiting - Loose control of your bladder or bowel function - Are unable to bear any wieght  
- Need a pain medication refill Information obtained by : 
I understand that if any problems occur once I am at home I am to contact my physician. I understand and acknowledge receipt of the instructions indicated above. Physician's or R.N.'s Signature                                                                  Date/Time Patient or Representative Signature                                                          Date/Time Discharge Orders Procedure Order Date Status Priority Quantity Spec Type Associated Dx  CALL YOUR DOCTOR For: Temperature greater than 100.4., Severe uncontrolled pain. , Persistant nausea and vomiting., Persistant dizziness or light-headedness. , Hives, Difficulty breathing, headache, or visual disturbances. , Redness, tenderness, or s. .. 04/06/17 0735 Normal Routine 1  Status post total left knee replacement [[id number]] Questions: For:  Temperature greater than 100.4. For:  Severe uncontrolled pain. For:  Persistant nausea and vomiting. For:  Persistant dizziness or light-headedness. For:  Irma Sans For:  Difficulty breathing, headache, or visual disturbances. For:  Redness, tenderness, or signs of infection. ACTIVITY AFTER DISCHARGE Patient should: Restrict driving, Restrict lifting, Other (specify) 04/06/17 0735 Normal Routine 1  Status post total left knee replacement [[id number]] Questions: Patient should:  Restrict driving Patient should:  Restrict lifting Patient should: Other (specify) DRESSING, CHANGE SPECIFY 04/06/17 0735 Normal Routine 1  Status post total left knee replacement [[id number]] Comments:  Routine dressing changes. Notify if excessive drainage. If staples are present they are to be removed 10 days post surgery and steri strips applied. REFERRAL TO HOME HEALTH 04/06/17 0735 Normal Routine 1  Status post total left knee replacement [[id number]] REFERRAL TO PHYSICAL THERAPY 04/06/17 0735 Normal Routine 1  Status post total left knee replacement [[id number]] Comments:  Referral to Home PT [facility] Announcement We are excited to announce that we are making your provider's discharge notes available to you in [employer]. You will see these notes when they are completed and signed by the physician that discharged you from your recent hospital stay. If you have any questions or concerns about any information you see in [employer], please call the Health Information Department where you were seen or reach out to your Primary Care Provider for more information about your plan of care. Introducing [facility]! [employer] introduces [facility] patient portal. Now you can access parts of your medical record, email your doctor's office, and request medication refills online. 1. In your internet browser, go to [email or web address] 2. Click on the First Time User? Click Here link in the Sign In box. You will see the New Member Sign Up page. 3. Enter your [facility] Access Code exactly as it appears below. You will not need to use this code after youve completed the sign-up process. If you do not sign up before the expiration date, you must request a new code. · [facility] Access Code: [email or web address] Expires: 6/6/2017 10:53 AM 
 
4. Enter the last four digits of your Social Security Number (xxxx) and Date of Birth (mm/dd/yyyy) as indicated and click Submit. You will be taken to the next sign-up page. 5. Create a [facility] ID. This will be your [facility] login ID and cannot be changed, so think of one that is secure and easy to remember. 6. Create a [facility] password. You can change your password at any time. 7. Enter your Password Reset Question and Answer. This can be used at a later time if you forget your password. 8. Enter your e-mail address. You will receive e-mail notification when new information is available in [address] 9. Click Sign Up. You can now view and download portions of your medical record. 10. Click the Download Summary menu link to download a portable copy of your medical information. If you have questions, please visit the Frequently Asked Questions section of the [facility] website. Remember, [facility] is NOT to be used for urgent needs. For medical emergencies, dial 911. Now available from your iPhone and Android! General Information Please provide this summary of care documentation to your next provider. Patient Signature:  ____________________________________________________________ Date:  ____________________________________________________________  
  
Rita Alan Provider Signature:  ____________________________________________________________ Date:  ____________________________________________________________

## 2017-04-05 NOTE — OP NOTES
[facility]  Cementless Total Knee Arthroplasty: Posterior Cruciate Retaining     Patient:Shirley Rosado   : 1940  Medical Record Number:[id number]  Pre-operative Diagnosis:  Osteoarthritis of left knee, unspecified osteoarthritis type [M17.9]  Post-operative Diagnosis: Status post total left knee replacement  Location: [address] [place] [address]  Surgeon: Jennifer Sweeney MD   Assistant: Bert Vivas PA-C    Anesthesia: Spinal and FNB    Procedure:Procedure(s) (LRB):  LEFT KNEE ARTHROPLASTY TOTAL (Left)   The complexity of the total joint surgery requires the use of a first assistant for positioning, retraction and expertise in closure. Tourniquet Time: 0 minutes  EBL: 250 cc  Findings: severe degenerative arthritis, patellar osteophytes, posterior femoral osteophytes   BMI: Body mass index is 30.67 kg/(m^2). Osmani Reid was brought to the operating room and positioned on the operating table. She was anesthestized with anesthesia. A corey catheter was placed preoperatively and IV antibiotics was administered. Prior to the incision being made a timeout was called identifying the patient, procedure ,operative side and surgeon The operative leg was prepped and draped in the usual sterile manner. An anterior longitudinal incision was accomplished just medial to the tibial tubercle and extending approximal 6 centimeters proximal to the superior pole of the patella. A medial parapatellar capsular incision was performed. The medial capsular flap was elevated around to the insertion of the semimembranous tendon. The patella was everted and the knee flexed and externally rotated. The medial and external menisci were excised. The lateral half of the fat pad excised and the patella femoral ligament was released. The anterior cruciate ligament was resect and the posterior cruciate ligament was retained.   Using extramedullary instrumentation, the tibial cut was accomplished with appropriate posterior slope. The distal femur was addressed. A drill hole was made above the intracondylar notch. Using appropriate intramedullary instrumentation,a five degree valgus distal cut was accomplished. The femur was sized. The anterior and posterior cuts were then made about the distal femur. The osteophytes were removed from the tibial and femoral surfaces. The flexion and extension gaps were assessed with the appropriate spacer blocks. Additional surgical procedures included: none. The flexion and extension gaps were deemed appropriately balanced. The appropriate cutting blocks were then utilized to perform the anterior, posterior and chamfer cuts, with appropriate lateral translation accomplished for the patellofemoral groove. Approximately 9 mm of bone was removed from the high side of the tibia. The tibia was sized. .  The tibial base plate was pinned into place with the appropriate external rotation and stem site prepared. A preliminary range of motion was accomplished. The  Patient was found to obtain full extension as well as appropriate flexion. The patient's ligaments were stable in flexion and extension to medial and lateral stressing and the alignment was through the appropriate mechanical axis. The patella was then everted. The bone was resect to accommodate the three peg patella button. A trial reduction revealed appropriate tracking through the patellofemoral groove with no lateral retinacular release being accomplished. All trial components were removed. The real implants were opened: Sizes listed below. The knee was irrigated. There were no femoral deficiencies. There were no tibial deficiencies. No augmentation was utilized. The permanent cementless Tibial and Femoral components were impacted into place. The cementless  patella component was then pressed in place.     [facility] knee was placed through range of motion and noted to be stable as mentioned above with the trail components. The wound was dry, therefore no drain was used. The operative knee was injected with 60 cc of Naropin, 10 cc's of morphine and 1 cc of 30 mg of Toradol. The knee was then soaked with a diluted betadine solution for approximately 3 min. This was then thoroughly irrigated. The capsular layer was closed using a #1 PDS suture. Then, 1 gram (100 mg/ml) of Transexamic Acid was injected into the joint space. The subcutaneous layers were closed using 2-0 Stratafix. Finally the skin was closed using 3-0 Vicryl and skin staples, which were applied in occlusive fashion and sterile bandage applied. An Iceman cryo pad was applied on the operative leg. Sponge count and needle counts were correct. Zhou Moore left the operating room     Implants:   Implant Name Type Inv.  Item Serial No.  Lot No. LRB No. Used   PAT ASYM MTL-BK 10MM SZ A32 -- [employer] - [id number]  PAT ASYM MTL-BK 10MM SZ A32 -- [employer]ON [id number] [employer] [facility] [id number] Left 1   BASEPLT TIB PC TRITNM SZ 3 -- [employer]ATH[employer] - [id number]  BASEPLT TIB PC TRITNM SZ 3 -- [employer]ON [id number] [employer] HOW [id number] Left 1   COMPNT FEM CR TRIATHLN 3 L PA --  - [id number]  COMPNT FEM CR TRIATHLN 3 L PA --  [id number] [employer] HOW [id number] Left 1   INSERT TIB CR TRIATHLN 3 11MM --  - [id number]  INSERT TIB CR TRIATHLN 3 11MM --  [id number] [employer] HOW [id number] Left 1   INSERT TIB CR TRIATHLN 3 9MM --  - [id number]  not implanted INSERT TIB CR TRIATHLN 3 9MM --  [id number] [employer] HOW [id number] Left 1         Signed By: Joann Christie MD   4/5/2017,  11:33 AM

## 2017-04-05 NOTE — ANESTHESIA PREPROCEDURE EVALUATION
Anesthetic History     PONV          Review of Systems / Medical History  Patient summary reviewed and pertinent labs reviewed    Pulmonary  Within defined limits                 Neuro/Psych   Within defined limits           Cardiovascular    Hypertension: well controlled              Exercise tolerance: >4 METS     GI/Hepatic/Renal     GERD           Endo/Other        Obesity and arthritis     Other Findings              Physical Exam    Airway  Mallampati: II  TM Distance: 4 - 6 cm  Neck ROM: normal range of motion   Mouth opening: Normal     Cardiovascular    Rhythm: regular  Rate: normal         Dental  No notable dental hx       Pulmonary  Breath sounds clear to auscultation               Abdominal  GI exam deferred       Other Findings            Anesthetic Plan    ASA: 2  Anesthesia type: spinal      Post-op pain plan if not by surgeon: peripheral nerve block single      Anesthetic plan and risks discussed with: Patient

## 2017-04-05 NOTE — PROGRESS NOTES
Therapy was in to work with pt. Sitting up in bed eating, starting to have pain, medicated with dilaudid po.

## 2017-04-06 LAB
ANION GAP BLD CALC-SCNC: 5 MMOL/L (ref 7–16)
BUN SERPL-MCNC: 54 MG/DL (ref 8–23)
CALCIUM SERPL-MCNC: 7.2 MG/DL (ref 8.3–10.4)
CHLORIDE SERPL-SCNC: 108 MMOL/L (ref 98–107)
CO2 SERPL-SCNC: 27 MMOL/L (ref 21–32)
CREAT SERPL-MCNC: 1.08 MG/DL (ref 0.6–1)
GLUCOSE SERPL-MCNC: 145 MG/DL (ref 65–100)
HGB BLD-MCNC: 9.8 G/DL (ref 11.7–15.4)
MM INDURATION POC: 0 MM (ref 0–5)
POTASSIUM SERPL-SCNC: 4.4 MMOL/L (ref 3.5–5.1)
PPD POC: NORMAL NEGATIVE
SODIUM SERPL-SCNC: 140 MMOL/L (ref 136–145)

## 2017-04-06 PROCEDURE — 97150 GROUP THERAPEUTIC PROCEDURES: CPT

## 2017-04-06 PROCEDURE — 80048 BASIC METABOLIC PNL TOTAL CA: CPT | Performed by: PHYSICIAN ASSISTANT

## 2017-04-06 PROCEDURE — 85018 HEMOGLOBIN: CPT | Performed by: PHYSICIAN ASSISTANT

## 2017-04-06 PROCEDURE — 65270000029 HC RM PRIVATE

## 2017-04-06 PROCEDURE — 74011250637 HC RX REV CODE- 250/637: Performed by: PHYSICIAN ASSISTANT

## 2017-04-06 PROCEDURE — 36415 COLL VENOUS BLD VENIPUNCTURE: CPT | Performed by: PHYSICIAN ASSISTANT

## 2017-04-06 PROCEDURE — 97116 GAIT TRAINING THERAPY: CPT

## 2017-04-06 PROCEDURE — 97110 THERAPEUTIC EXERCISES: CPT

## 2017-04-06 PROCEDURE — 74011250636 HC RX REV CODE- 250/636: Performed by: PHYSICIAN ASSISTANT

## 2017-04-06 PROCEDURE — 74011250637 HC RX REV CODE- 250/637: Performed by: ORTHOPAEDIC SURGERY

## 2017-04-06 PROCEDURE — 77030032490 HC SLV COMPR SCD KNE COVD -B

## 2017-04-06 RX ORDER — HYDROMORPHONE HYDROCHLORIDE 2 MG/1
2 TABLET ORAL
Qty: 40 TAB | Refills: 0 | Status: SHIPPED | OUTPATIENT
Start: 2017-04-06 | End: 2021-06-23 | Stop reason: ALTCHOICE

## 2017-04-06 RX ORDER — DOCUSATE SODIUM 100 MG/1
100 CAPSULE, LIQUID FILLED ORAL
Status: DISCONTINUED | OUTPATIENT
Start: 2017-04-06 | End: 2017-04-08 | Stop reason: HOSPADM

## 2017-04-06 RX ORDER — ASPIRIN 325 MG
325 TABLET, DELAYED RELEASE (ENTERIC COATED) ORAL EVERY 12 HOURS
Qty: 70 TAB | Refills: 0 | Status: SHIPPED | OUTPATIENT
Start: 2017-04-06 | End: 2017-05-11

## 2017-04-06 RX ADMIN — PANTOPRAZOLE SODIUM 40 MG: 40 TABLET, DELAYED RELEASE ORAL at 05:16

## 2017-04-06 RX ADMIN — HYDROMORPHONE HYDROCHLORIDE 2 MG: 2 TABLET ORAL at 19:40

## 2017-04-06 RX ADMIN — HYDROMORPHONE HYDROCHLORIDE 1 MG: 1 INJECTION, SOLUTION INTRAMUSCULAR; INTRAVENOUS; SUBCUTANEOUS at 23:52

## 2017-04-06 RX ADMIN — ACETAMINOPHEN 1000 MG: 500 TABLET, FILM COATED ORAL at 12:10

## 2017-04-06 RX ADMIN — SENNOSIDES AND DOCUSATE SODIUM 2 TABLET: 8.6; 5 TABLET ORAL at 09:14

## 2017-04-06 RX ADMIN — ASPIRIN 325 MG: 325 TABLET, DELAYED RELEASE ORAL at 09:14

## 2017-04-06 RX ADMIN — DIPHENHYDRAMINE HYDROCHLORIDE 25 MG: 25 CAPSULE ORAL at 23:52

## 2017-04-06 RX ADMIN — HYDROMORPHONE HYDROCHLORIDE 2 MG: 2 TABLET ORAL at 16:13

## 2017-04-06 RX ADMIN — HYDROMORPHONE HYDROCHLORIDE 2 MG: 2 TABLET ORAL at 09:14

## 2017-04-06 RX ADMIN — HYDROMORPHONE HYDROCHLORIDE 2 MG: 2 TABLET ORAL at 05:16

## 2017-04-06 RX ADMIN — ACETAMINOPHEN 1000 MG: 500 TABLET, FILM COATED ORAL at 05:16

## 2017-04-06 RX ADMIN — LISINOPRIL AND HYDROCHLOROTHIAZIDE 1 TABLET: 12.5; 2 TABLET ORAL at 09:14

## 2017-04-06 RX ADMIN — ASPIRIN 325 MG: 325 TABLET, DELAYED RELEASE ORAL at 19:40

## 2017-04-06 RX ADMIN — ACETAMINOPHEN 1000 MG: 500 TABLET, FILM COATED ORAL at 16:15

## 2017-04-06 RX ADMIN — ACETAMINOPHEN 1000 MG: 500 TABLET, FILM COATED ORAL at 23:52

## 2017-04-06 RX ADMIN — HYDROMORPHONE HYDROCHLORIDE 2 MG: 2 TABLET ORAL at 12:10

## 2017-04-06 NOTE — PROGRESS NOTES
2017         Post Op day: 1 Day Post-Op Procedure(s) (LRB):  LEFT KNEE ARTHROPLASTY TOTAL (Left)      Admit Date: 2017  Admit Diagnosis: Osteoarthritis of left knee, unspecified osteoarthritis type [M17.12]       Principle Problem: Status post total left knee replacement. Subjective: Doing well, No complaints, No SOB, No Chest Pain, No Nausea or Vomiting     Objective:   Vital Signs are Stable, No Acute Distress, Alert and Oriented, Dressing is Dry,  Neurovascular exam is normal.     Assessment / Plan :  Patient Active Problem List   Diagnosis Code    Status post total left knee replacement Z96.652    Patient Vitals for the past 8 hrs:   BP Temp Pulse Resp SpO2   17 0352 114/60 96.6 °F (35.9 °C) 63 16 92 %    Temp (24hrs), Av.2 °F (35.7 °C), Min:95.2 °F (35.1 °C), Max:98.3 °F (36.8 °C)    Body mass index is 30.67 kg/(m^2).     Lab Results   Component Value Date/Time    HGB 9.8 2017 05:46 AM      Pt seen by and discussed with Supervising Physician   Continue PT       Signed By: MEHDI Sales  2017,  7:34 AM

## 2017-04-06 NOTE — PROGRESS NOTES
Problem: Mobility Impaired (Adult and Pediatric)  Goal: *Acute Goals and Plan of Care (Insert Text)  GOALS (1-4 days):  (1.)Ms. Lee Loen will move from supine to sit and sit to supine in bed with INDEPENDENT. (2.)Ms. Lee Leon will transfer from bed to chair and chair to bed with INDEPENDENT using the least restrictive device. (3.)Ms. Lee Leon will ambulate with INDEPENDENT for 250 feet with the least restrictive device. (4.)Ms. Lee Leon will ambulate up/down 3 steps with bilateral railing with MINIMAL ASSIST with no device. (5.)Ms. Lee Leon will increase left knee ROM to 5°-80°.  ________________________________________________________________________________________________  Outcome: Progressing Towards Goal      PHYSICAL THERAPY JOINT CAMP TKA: Daily Note 4/6/2017  INPATIENT: Hospital Day: 2  [place] / [place] / Product Type: Medicare /      NAME/AGE/GENDER: Victorina Mullins is a 78 y.o. female             PRIMARY DIAGNOSIS:  Osteoarthritis of left knee, unspecified osteoarthritis type [M17.9]              Procedure(s) and Anesthesia Type:     * LEFT KNEE ARTHROPLASTY TOTAL - Spinal (Left)  ICD-10: Treatment Diagnosis:        · Pain in Left Knee (M25.562)  · Stiffness of Left Knee, Not elsewhere classified (M25.662)  · Difficulty in walking, Not elsewhere classified (R26.2)  · Other abnormalities of gait and mobility (R26.89)       ASSESSMENT:      Ms. Lee Leon presents status post left total knee replacement with decreased independence with bed mobility, transfers, gait, and therapeutic exercise. Therapy will maximize independence with functional mobility. She is supine upon arrival.  She performs exercises in the bed without increase in pain and has a good quad contraction and can do a SLR with knee bent. She walks 30 ft using RW with CGA, during gait she became light headed. She will sit up for awhile.       This section established at most recent assessment   PROBLEM LIST (Impairments causing functional limitations):  1. Decreased Strength  2. Decreased Transfer Abilities  3. Decreased Ambulation Ability/Technique  4. Decreased Balance  5. Increased Pain  6. Decreased Activity Tolerance    INTERVENTIONS PLANNED: (Benefits and precautions of physical therapy have been discussed with the patient.)  1. Bed Mobility  2. Gait Training  3. Home Exercise Program (HEP)  4. Therapeutic Exercise/Strengthening  5. Transfer Training  6. Range of Motion: active/assisted/passive  7. Therapeutic Activities  8. Group Therapy      TREATMENT PLAN: Frequency/Duration: Follow patient BID   to address above goals. Rehabilitation Potential For Stated Goals: GOOD      RECOMMENDED REHABILITATION/EQUIPMENT: (at time of discharge pending progress): Rehab. HISTORY:   History of Present Injury/Illness (Reason for Referral):  Decreased mobility ability  Past Medical History/Comorbidities:   Ms. 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 She also has no past medical history of Adverse effect of anesthesia; Aneurysm ([address] Arrhythmia; Asthma; Autoimmune disease ([address] CAD (coronary artery disease); Cancer ([address] Chronic kidney disease; Coagulation defects; COPD; Diabetes ([address] Difficult intubation; Endocarditis; Heart failure ([address] Liver disease; Malignant hyperthermia due to anesthesia; Other ill-defined conditions; Pseudocholinesterase deficiency; Psychiatric disorder; PUD (peptic ulcer disease); Rheumatic fever; Seizures ([address] Stroke [facility]); Thromboembolus ([address] Thyroid disease; Unspecified adverse effect of anesthesia; or Unspecified sleep apnea.   Ms. Madison Junior  has a past surgical history that includes gyn; heent; us guided core breast biopsy; rohit biopsy breast stereotactic; knee replacement (Right, 2010); and breast surgery procedure unlisted. Social History/Living Environment:   Home Environment: Private residence  # Steps to Enter: 1  One/Two Story Residence: One story  Living Alone: Yes  Support Systems: Child(marlene)  Patient Expects to be Discharged to[de-identified] Rehabilitation facility ([address])  Current DME Used/Available at Home: Sushil De Anda, rolling, Cane, straight, Commode, bedside  Tub or Shower Type: Shower  Prior Level of Function/Work/Activity:  [place] with ambulation and ADLs. Number of Personal Factors/Comorbidities that affect the Plan of Care: 0: LOW COMPLEXITY   EXAMINATION:   Most Recent Physical Functioning:                               Bed Mobility  Supine to Sit: Contact guard assistance  Sit to Supine:  (left up in chair)     Transfers  Sit to Stand: Minimum assistance  Stand to Sit: Minimum assistance                      Weight Bearing Status  Left Side Weight Bearing: As tolerated  Distance (ft): 30 Feet (ft)  Ambulation - Level of Assistance: Contact guard assistance  Assistive Device: Walker, rolling  Gait Abnormalities: Antalgic         Braces/Orthotics: none     Left Knee Cold  Type: Cryocuff       Body Structures Involved:  1. Bones  2. Joints  3. Muscles Body Functions Affected:  1. Neuromusculoskeletal  2. Movement Related Activities and Participation Affected:  1. Mobility   Number of elements that affect the Plan of Care: 4+: HIGH COMPLEXITY   CLINICAL PRESENTATION:   Presentation: Stable and uncomplicated: LOW COMPLEXITY   CLINICAL DECISION MAKIN [facility] [address] [id number] AM-PAC 6 Clicks   Basic Mobility Inpatient Short Form  How much difficulty does the patient currently have. .. Unable A Lot A Little None   1. Turning over in bed (including adjusting bedclothes, sheets and blankets)? [ ] 1   [ ] 2   [X] 3   [ ] 4   2.   Sitting down on and standing up from a chair with arms ( e.g., wheelchair, bedside commode, etc.)   [ ] 1   [ ] 2   [X] 3   [ ] 4   3. Moving from lying on back to sitting on the side of the bed? [ ] 1   [ ] 2   [X] 3   [ ] 4   How much help from another person does the patient currently need. .. Total A Lot A Little None   4. Moving to and from a bed to a chair (including a wheelchair)? [ ] 1   [ ] 2   [X] 3   [ ] 4   5. Need to walk in hospital room? [ ] 1   [ ] 2   [X] 3   [ ] 4   6. Climbing 3-5 steps with a railing? [ ] 1   [ ] 2   [X] 3   [ ] 4   © 2007, Trustees of [address] [place] [address] [address] [address], under license to [employer]. All rights reserved       Score:  Initial: 18 Most Recent: X (Date: -- )     Interpretation of Tool:  Represents activities that are increasingly more difficult (i.e. Bed mobility, Transfers, Gait). Score 24 23 22-20 19-15 14-10 9-7 6       Modifier CH CI CJ CK CL CM CN         · Mobility - Walking and Moving Around:               - CURRENT STATUS:    CK - 40%-59% impaired, limited or restricted               - GOAL STATUS:           CK - 40%-59% impaired, limited or restricted               - D/C STATUS:                       CK - 40%-59% impaired, limited or restricted  [place] / [place] / Product Type: Medicare /       Medical Necessity:     · Skilled intervention continues to be required due to decreased mobility ability. Reason for Services/Other Comments:  · Patient continues to require skilled intervention due to decreased mobility ability.    Use of outcome tool(s) and clinical judgement create a POC that gives a: Clear prediction of patient's progress: LOW COMPLEXITY                 TREATMENT:   (In addition to Assessment/Re-Assessment sessions the following treatments were rendered)      Pre-treatment Symptoms/Complaints:  No complaints  Pain: Initial:   Pain Intensity 1: 2 (3/10  after therapy)  Post Session:        Gait Training (15 Minutes):  Gait training to improve and/or restore physical functioning as related to mobility. Ambulated 30 Feet (ft) with Contact guard assistance using a Walker, rolling and minimal   related to their knee position and motion to promote proper body alignment. Therapeutic Exercise: (15 Minutes):  Exercises per grid below to improve strength. Required minimal verbal cues to promote proper body alignment. Progressed range as indicated. Date:  4/6    Date:    Date:      ACTIVITY/EXERCISE AM PM AM PM AM PM   GROUP THERAPY  [ ]  [ ]  [ ]  [ ]  [ ]  [ ]   Ankle Pumps 15              Quad Sets  15             Gluteal Sets  15             Hip ABd/ADduction  15             Straight Leg Raises  15             Knee Slides  15             Short Arc Quads  15             Long Arc Quads               Chair Slides                               B = bilateral; AA = active assistive; A = active; P = passive       Treatment/Session Assessment:         Response to Treatment:  Agreeable to take steps to head of bed. Education:  [X] Home Exercises  [ ] Fall Precautions  [ ] Hip Precautions [ ] Going Home Video  [ ] Knee/Hip Prosthesis Review  [x ] Walker Management/Safety [ ] Adaptive Equipment as Needed         Interdisciplinary Collaboration:   · Physical Therapy Assistant and Registered Nurse     After treatment position/precautions:   · Up in chair, Caregiver at bedside, Call light within reach and RN notified     Compliance with Program/Exercises: compliant most of the time. Recommendations/Intent for next treatment session:  Treatment next visit will focus on increasing Ms. Rosado's independence with bed mobility, transfers, gait training, strength/ROM exercises, modalities for pain, and patient education.        Total Treatment Duration:  PT Patient Time In/Time Out  Time In: 0715  Time Out: 2874     Lyndsey Pike, PTA

## 2017-04-06 NOTE — PROGRESS NOTES
Dilaudid 2 mg po for c/o pain. Benadryl 25 mg po with Tylenol 1000 mg po as Tylenol PM.No change in NV status noted.

## 2017-04-06 NOTE — DISCHARGE INSTRUCTIONS
[facility]   Patient Discharge Instructions    Sanya Jordan / [id number] : 1940    Admitted 2017 Discharged: 2017     IF YOU HAVE ANY PROBLEMS ONCE YOU ARE AT HOME CALL THE FOLLOWING NUMBERS:   Main office number: [phone]      Medications    · The medications you are to continue on are listed on the medication reconciliation sheet. · Narcotic pain medications as well as supplemental iron can cause constipation. If this occurs try stopping the narcotic pain medication and/or the iron. · It is important that you take the medication exactly as they are prescribed. · Medications which increase your risk of blood clots are listed to stop for 5 weeks after surgery as well as medications or supplements which increase your risk of bleeding complications. · Keep your medication in the bottles provided by the pharmacist and keep a list of the medication names, dosages, and times to be taken in your wallet. · Do not take other medications without consulting your doctor. Important Information    Do NOT smoke as this will greatly increase your risk of infection! Resume your prehospital diet. If you have excessive nausea or vomitting call your doctor's office     Leg swelling and warmth is normal for 6 months after surgery. If you experience swelling in your leg elevate you leg while laying down with your toes above your heart. If you have sudden onset severe swelling with leg pain call our office. Use Domingo Hose stockings until we see you in the office for your follow up appointment. The stitches deep inside take approximately 6 months to dissolve. There will be sharp shooting, stinging and burning pain. This is normal and will resolve between 3-6 months after surgery. Difficulty sleeping is normal following total Knee and Hip replacement. You may try melatonin, an over-the-counter sleep aid or benadryl to help with sleep.  Most patients will resume sleeping through the night 8 weeks after surgery. Home Physical Therapy is arranged. Home Health will contact you within 48 hrs of discharge that you have chosen. If you have not received a call within this time frame please contact that provider you chose. You should be given this information before you leave the hospital.     You are at a risk for falls. Use the rolling walker when walking. Patients who have had a joint replacement should not drive if they are still taking narcotic pain mediation during the daytime hours. Most patients wean themselves off of pain medication within 2-5 weeks after surgery. When to Call the office    - If you have a temperature greater then 101  - Uncontrolled vomiting   - Loose control of your bladder or bowel function  - Are unable to bear any wieght   - Need a pain medication refill     Information obtained by :  I understand that if any problems occur once I am at home I am to contact my physician. I understand and acknowledge receipt of the instructions indicated above.                                                                                                                                            Physician's or R.N.'s Signature                                                                  Date/Time                                                                                                                                              Patient or Representative Signature                                                          Date/Time

## 2017-04-06 NOTE — PROGRESS NOTES
Problem: Mobility Impaired (Adult and Pediatric)  Goal: *Acute Goals and Plan of Care (Insert Text)  GOALS (1-4 days):  (1.)Ms. Mary Carroll will move from supine to sit and sit to supine in bed with INDEPENDENT. (2.)Ms. Mary Carroll will transfer from bed to chair and chair to bed with INDEPENDENT using the least restrictive device. (3.)Ms. Mary Carroll will ambulate with INDEPENDENT for 250 feet with the least restrictive device. (4.)Ms. Mary Carroll will ambulate up/down 3 steps with bilateral railing with MINIMAL ASSIST with no device. (5.)Ms. Mary Carroll will increase left knee ROM to 5°-80°.  ________________________________________________________________________________________________  Outcome: Progressing Towards Goal      PHYSICAL THERAPY JOINT CAMP TKA: Daily Note and PM 4/6/2017  INPATIENT: Hospital Day: 2  [place] / Product Type: Medicare /      NAME/AGE/GENDER: Zhou Moore is a 78 y.o. female             PRIMARY DIAGNOSIS:  Osteoarthritis of left knee, unspecified osteoarthritis type [M17.9]              Procedure(s) and Anesthesia Type:     * LEFT KNEE ARTHROPLASTY TOTAL - Spinal (Left)  ICD-10: Treatment Diagnosis:        · Pain in Left Knee (M25.562)  · Stiffness of Left Knee, Not elsewhere classified (M25.662)  · Difficulty in walking, Not elsewhere classified (R26.2)  · Other abnormalities of gait and mobility (R26.89)       ASSESSMENT:      Ms. Mary Carroll presents status post left total knee replacement with decreased independence with bed mobility, transfers, gait, and therapeutic exercise. Therapy will maximize independence with functional mobility. She walk 60 ft using RW with CGA. She performs exercises in the gym without increase in pain. This section established at most recent assessment   PROBLEM LIST (Impairments causing functional limitations):  1. Decreased Strength  2. Decreased Transfer Abilities  3. Decreased Ambulation Ability/Technique  4.  Decreased Balance  5. Increased Pain  6. Decreased Activity Tolerance    INTERVENTIONS PLANNED: (Benefits and precautions of physical therapy have been discussed with the patient.)  1. Bed Mobility  2. Gait Training  3. Home Exercise Program (HEP)  4. Therapeutic Exercise/Strengthening  5. Transfer Training  6. Range of Motion: active/assisted/passive  7. Therapeutic Activities  8. Group Therapy      TREATMENT PLAN: Frequency/Duration: Follow patient BID   to address above goals. Rehabilitation Potential For Stated Goals: GOOD      RECOMMENDED REHABILITATION/EQUIPMENT: (at time of discharge pending progress): Rehab. HISTORY:   History of Present Injury/Illness (Reason for Referral):  Decreased mobility ability  Past Medical History/Comorbidities:   Ms. Gordon Evans  has a past medical history of Arthritis; Chronic pain; Former smoker, stopped smoking in distant past; GERD (gastroesophageal reflux disease); High cholesterol; Hypertension; Morbid obesity ([address] Nausea & vomiting; Pre-diabetes; Status post total left knee replacement (4/5/2017); and Urinary incontinence. She also has no past medical history of Adverse effect of anesthesia; Aneurysm ([address] Arrhythmia; Asthma; Autoimmune disease ([address] CAD (coronary artery disease); Cancer ([address] Chronic kidney disease; Coagulation defects; COPD; Diabetes ([address] Difficult intubation; Endocarditis; Heart failure ([address] Liver disease; Malignant hyperthermia due to anesthesia; Other ill-defined conditions; Pseudocholinesterase deficiency; Psychiatric disorder; PUD (peptic ulcer disease); Rheumatic fever; Seizures ([address] Stroke [facility]); Thromboembolus ([address] Thyroid disease; Unspecified adverse effect of anesthesia; or Unspecified sleep apnea. Ms. Gordon Evans  has a past surgical history that includes gyn; heent; us guided core breast biopsy; rohit biopsy breast stereotactic; knee replacement (Right, 2010); and breast surgery procedure unlisted.   Social History/Living Environment:   Home Environment: Private residence  # Steps to Enter: 1  One/Two Story Residence: One story  Living Alone: Yes  Support Systems: Child(marlene)  Patient Expects to be Discharged to[de-identified] Rehabilitation facility ([address])  Current DME Used/Available at Home: [place], rolling, Cane, straight, Commode, bedside  Tub or Shower Type: Shower  Prior Level of Function/Work/Activity:  [place] with ambulation and ADLs. Number of Personal Factors/Comorbidities that affect the Plan of Care: 0: LOW COMPLEXITY   EXAMINATION:   Most Recent Physical Functioning:                               Bed Mobility  Supine to Sit: Contact guard assistance  Sit to Supine:  (left up in chair)     Transfers  Sit to Stand: Contact guard assistance  Stand to Sit: Contact guard assistance                      Weight Bearing Status  Left Side Weight Bearing: As tolerated  Distance (ft): 60 Feet (ft)  Ambulation - Level of Assistance: Contact guard assistance  Assistive Device: Walker, rolling  Gait Abnormalities: Antalgic         Braces/Orthotics: none     Left Knee Cold  Type: Cryocuff       Body Structures Involved:  1. Bones  2. Joints  3. Muscles Body Functions Affected:  1. Neuromusculoskeletal  2. Movement Related Activities and Participation Affected:  1. Mobility   Number of elements that affect the Plan of Care: 4+: HIGH COMPLEXITY   CLINICAL PRESENTATION:   Presentation: Stable and uncomplicated: LOW COMPLEXITY   CLINICAL DECISION MAKIN [facility] [address] [id number] AM-PAC 6 Clicks   Basic Mobility Inpatient Short Form  How much difficulty does the patient currently have. .. Unable A Lot A Little None   1. Turning over in bed (including adjusting bedclothes, sheets and blankets)? [ ] 1   [ ] 2   [X] 3   [ ] 4   2. Sitting down on and standing up from a chair with arms ( e.g., wheelchair, bedside commode, etc.)   [ ] 1   [ ] 2   [X] 3   [ ] 4   3. Moving from lying on back to sitting on the side of the bed?    [ ] 1   [ ] 2 [X] 3   [ ] 4   How much help from another person does the patient currently need. .. Total A Lot A Little None   4. Moving to and from a bed to a chair (including a wheelchair)? [ ] 1   [ ] 2   [X] 3   [ ] 4   5. Need to walk in hospital room? [ ] 1   [ ] 2   [X] 3   [ ] 4   6. Climbing 3-5 steps with a railing? [ ] 1   [ ] 2   [X] 3   [ ] 4   © 2007, Trustees of [address] [place] [address] [address] [address], under license to [employer]. All rights reserved       Score:  Initial: 18 Most Recent: X (Date: -- )     Interpretation of Tool:  Represents activities that are increasingly more difficult (i.e. Bed mobility, Transfers, Gait). Score 24 23 22-20 19-15 14-10 9-7 6       Modifier CH CI CJ CK CL CM CN         · Mobility - Walking and Moving Around:               - CURRENT STATUS:    CK - 40%-59% impaired, limited or restricted               - GOAL STATUS:           CK - 40%-59% impaired, limited or restricted               - D/C STATUS:                       CK - 40%-59% impaired, limited or restricted  [place] / Product Type: Medicare /       Medical Necessity:     · Skilled intervention continues to be required due to decreased mobility ability. Reason for Services/Other Comments:  · Patient continues to require skilled intervention due to decreased mobility ability. Use of outcome tool(s) and clinical judgement create a POC that gives a: Clear prediction of patient's progress: LOW COMPLEXITY                 TREATMENT:   (In addition to Assessment/Re-Assessment sessions the following treatments were rendered)      Pre-treatment Symptoms/Complaints:  No complaints  Pain: Initial:   Pain Intensity 1: 2 (2/10 after therapy)  Post Session:        Gait Training (15 Minutes):  Gait training to improve and/or restore physical functioning as related to mobility.   Ambulated 60 Feet (ft) with Contact guard assistance using a Walker, rolling and minimal   related to their knee position and motion to promote proper body alignment. Therapeutic Exercise: (45 Minutes (group)):  Exercises per grid below to improve strength. Required minimal verbal cues to promote proper body alignment. Progressed range as indicated. Date:  4/6    Date:    Date:      ACTIVITY/EXERCISE AM PM AM PM AM PM   GROUP THERAPY  [ ]  [ ]  [ ]  [ ]  [ ]  [ ]   Ankle Pumps 15   20           Quad Sets  15  20           Gluteal Sets  15  20           Hip ABd/ADduction  15  20           Straight Leg Raises  15  20           Knee Slides  15  20           Short Arc Quads  15  20           Long Arc Quads               Chair Slides    20                           B = bilateral; AA = active assistive; A = active; P = passive       Treatment/Session Assessment:         Response to Treatment:  Agreeable to take steps to head of bed. Education:  [X] Home Exercises  [ ] Fall Precautions  [ ] Hip Precautions [ ] Going Home Video  [ ] Knee/Hip Prosthesis Review  [x ] Walker Management/Safety [ ] Adaptive Equipment as Needed         Interdisciplinary Collaboration:   · Physical Therapy Assistant and Registered Nurse     After treatment position/precautions:   · Up in chair, Caregiver at bedside, Call light within reach and RN notified     Compliance with Program/Exercises: compliant most of the time. Recommendations/Intent for next treatment session:  Treatment next visit will focus on increasing Ms. Rosado's independence with bed mobility, transfers, gait training, strength/ROM exercises, modalities for pain, and patient education.        Total Treatment Duration:  PT Patient Time In/Time Out  Time In: 1345  Time Out: [address] [place]

## 2017-04-06 NOTE — PROGRESS NOTES
Slept at intervals during shift. No further c/o voiced. No change in NV status noted. Call light within reach.

## 2017-04-06 NOTE — PROGRESS NOTES
Lying quietly with eyes closed. RR even and unlabored. No apparent distress noted. No change in NV status noted. Call light within reach.

## 2017-04-06 NOTE — PROGRESS NOTES
2017         Post Op day: 1 Day Post-OpProcedure(s) (LRB):  LEFT KNEE ARTHROPLASTY TOTAL (Left)      Admit Date: 2017  Admit Diagnosis: Osteoarthritis of left knee, unspecified osteoarthritis type [M17.12]    LAB:    Recent Results (from the past 24 hour(s))   TYPE & SCREEN    Collection Time: 17  7:50 AM   Result Value Ref Range    Crossmatch Expiration 2017     ABO/Rh(D) [place] POSITIVE     Antibody screen NEG    GLUCOSE, POC    Collection Time: 17  8:06 AM   Result Value Ref Range    Glucose (POC) 127 (H) 65 - 100 mg/dL   HEMOGLOBIN    Collection Time: 17  8:31 PM   Result Value Ref Range    HGB 10.8 (L) 11.7 - 15.4 g/dL   HEMOGLOBIN    Collection Time: 17  5:46 AM   Result Value Ref Range    HGB 9.8 (L) 11.7 - 33.6 g/dL   METABOLIC PANEL, BASIC    Collection Time: 17  5:46 AM   Result Value Ref Range    Sodium 140 136 - 145 mmol/L    Potassium 4.4 3.5 - 5.1 mmol/L    Chloride 108 (H) 98 - 107 mmol/L    CO2 27 21 - 32 mmol/L    Anion gap 5 (L) 7 - 16 mmol/L    Glucose 145 (H) 65 - 100 mg/dL    BUN 54 (H) 8 - 23 MG/DL    Creatinine 1.08 (H) 0.6 - 1.0 MG/DL    GFR est AA >60 >60 ml/min/1.73m2    GFR est non-AA 53 (L) >60 ml/min/1.73m2    Calcium 7.2 (L) 8.3 - 10.4 MG/DL     Vital Signs:    Patient Vitals for the past 8 hrs:   BP Temp Pulse Resp SpO2   17 0352 114/60 96.6 °F (35.9 °C) 63 16 92 %     Temp (24hrs), Av.2 °F (35.7 °C), Min:95.2 °F (35.1 °C), Max:98.3 °F (36.8 °C)    Body mass index is 30.67 kg/(m^2).   Pain Control:   Pain Assessment  Pain Scale 1: Numeric (0 - 10)  Pain Intensity 1: 1  Pain Onset 1: years ago  Pain Location 1: Knee  Pain Orientation 1: Left  Pain Description 1: Aching  Pain Intervention(s) 1: Medication (see MAR)    Subjective: Doing well, No complaints, No SOB, No Chest Pain, No Nausea or Vomitting     Objective: Vital Signs are Stable, No Acute Distress, Alert and Oriented, Dressing is Dry,  Neurovascular exam is normal. PT/OT:            Assistive Device: Walker (comment)        LLE AROM  L Knee Flexion: 60  L Knee Extension: 20       Weight Bearing Status: WBAT    Meds:  [unfilled]  [unfilled]  [unfilled]    Assessment:   Patient Active Problem List   Diagnosis Code    Status post total left knee replacement P71.873             Plan: Continue Physical Therapy, Monitor  Hbg,         Signed By: Isael Robertson MD

## 2017-04-06 NOTE — PROGRESS NOTES
Was in recliner. Up to BR with assistance, voided. Back in bed now with iceman to knee. TEDs on. Good movement and sensation to feet. Aquacel dry and intact to knee. Medicated for pain with dilaudid po.

## 2017-04-06 NOTE — PROGRESS NOTES
Medicated for pain again with dilaudid po and tylenol. Had therapy. Back in bed. Iceman to knee. NV checks WDL. No further changes.

## 2017-04-06 NOTE — PROGRESS NOTES
Care Management Interventions  PCP Verified by CM: Yes  Mode of Transport at Discharge: Self  Transition of Care Consult (CM Consult): SNF  Partner SNF: Yes  Discharge Durable Medical Equipment: No  Physical Therapy Consult: Yes  Occupational Therapy Consult: Yes  Plan discussed with Pt/Family/Caregiver: Yes  Freedom of Choice Offered: Yes  Discharge Location  Discharge Placement: Skilled nursing facility    Patient is a 78 yr old female admitted for a left TKA. Patient reports she has made arrangements to go to [facility] for rehab on d/c. I spoke to [facility] and confirmed these plans. Referral sent for their review. We will make plans for transfer on Sat 4-8 after her three night Inpt stay.   Jose Thomas

## 2017-04-06 NOTE — PROGRESS NOTES
Resting comfortably,dsng D/I. NV status WDL. Denies needs at present. SCDs on bilaterally. Iceman cooling system in use. Call light within reach.

## 2017-04-06 NOTE — PROGRESS NOTES
Dilaudid 2 mg po for c/o pain. Assisted to bathroom,gait steady using walker. Voids QS. Iceman cooling system in use. SCDs on bilaterally. Neurovascular status remains WDL. Call light within reach.

## 2017-04-07 LAB
HGB BLD-MCNC: 9.8 G/DL (ref 11.7–15.4)
MM INDURATION POC: 0 MM (ref 0–5)
PPD POC: NORMAL NEGATIVE

## 2017-04-07 PROCEDURE — 97150 GROUP THERAPEUTIC PROCEDURES: CPT

## 2017-04-07 PROCEDURE — 97110 THERAPEUTIC EXERCISES: CPT

## 2017-04-07 PROCEDURE — 97535 SELF CARE MNGMENT TRAINING: CPT

## 2017-04-07 PROCEDURE — 74011250637 HC RX REV CODE- 250/637: Performed by: ORTHOPAEDIC SURGERY

## 2017-04-07 PROCEDURE — 74011250637 HC RX REV CODE- 250/637: Performed by: PHYSICIAN ASSISTANT

## 2017-04-07 PROCEDURE — 74011250636 HC RX REV CODE- 250/636: Performed by: PHYSICIAN ASSISTANT

## 2017-04-07 PROCEDURE — 36415 COLL VENOUS BLD VENIPUNCTURE: CPT | Performed by: PHYSICIAN ASSISTANT

## 2017-04-07 PROCEDURE — 85018 HEMOGLOBIN: CPT | Performed by: PHYSICIAN ASSISTANT

## 2017-04-07 PROCEDURE — 97116 GAIT TRAINING THERAPY: CPT

## 2017-04-07 PROCEDURE — 65270000029 HC RM PRIVATE

## 2017-04-07 RX ORDER — OXYCODONE HYDROCHLORIDE 5 MG/1
10 TABLET ORAL
Status: DISCONTINUED | OUTPATIENT
Start: 2017-04-07 | End: 2017-04-08 | Stop reason: HOSPADM

## 2017-04-07 RX ADMIN — HYDROMORPHONE HYDROCHLORIDE 0.5 MG: 1 INJECTION, SOLUTION INTRAMUSCULAR; INTRAVENOUS; SUBCUTANEOUS at 16:56

## 2017-04-07 RX ADMIN — PANTOPRAZOLE SODIUM 40 MG: 40 TABLET, DELAYED RELEASE ORAL at 05:43

## 2017-04-07 RX ADMIN — OXYCODONE HYDROCHLORIDE 10 MG: 5 TABLET ORAL at 09:40

## 2017-04-07 RX ADMIN — OXYCODONE HYDROCHLORIDE 10 MG: 5 TABLET ORAL at 14:35

## 2017-04-07 RX ADMIN — HYDROMORPHONE HYDROCHLORIDE 2 MG: 2 TABLET ORAL at 04:14

## 2017-04-07 RX ADMIN — Medication 10 ML: at 17:00

## 2017-04-07 RX ADMIN — LISINOPRIL AND HYDROCHLOROTHIAZIDE 1 TABLET: 12.5; 2 TABLET ORAL at 09:40

## 2017-04-07 RX ADMIN — HYDROMORPHONE HYDROCHLORIDE 1 MG: 1 INJECTION, SOLUTION INTRAMUSCULAR; INTRAVENOUS; SUBCUTANEOUS at 22:25

## 2017-04-07 RX ADMIN — ONDANSETRON 4 MG: 2 INJECTION INTRAMUSCULAR; INTRAVENOUS at 05:48

## 2017-04-07 RX ADMIN — SENNOSIDES AND DOCUSATE SODIUM 2 TABLET: 8.6; 5 TABLET ORAL at 09:40

## 2017-04-07 RX ADMIN — OXYCODONE HYDROCHLORIDE 10 MG: 5 TABLET ORAL at 21:14

## 2017-04-07 RX ADMIN — ASPIRIN 325 MG: 325 TABLET, DELAYED RELEASE ORAL at 09:40

## 2017-04-07 RX ADMIN — ACETAMINOPHEN 1000 MG: 500 TABLET, FILM COATED ORAL at 22:25

## 2017-04-07 RX ADMIN — OXYCODONE HYDROCHLORIDE 10 MG: 5 TABLET ORAL at 17:19

## 2017-04-07 RX ADMIN — ASPIRIN 325 MG: 325 TABLET, DELAYED RELEASE ORAL at 21:00

## 2017-04-07 RX ADMIN — ACETAMINOPHEN 1000 MG: 500 TABLET, FILM COATED ORAL at 05:43

## 2017-04-07 RX ADMIN — HYDROMORPHONE HYDROCHLORIDE 1 MG: 1 INJECTION, SOLUTION INTRAMUSCULAR; INTRAVENOUS; SUBCUTANEOUS at 05:48

## 2017-04-07 RX ADMIN — DIPHENHYDRAMINE HYDROCHLORIDE 25 MG: 25 CAPSULE ORAL at 22:25

## 2017-04-07 NOTE — PROGRESS NOTES
Problem: Mobility Impaired (Adult and Pediatric)  Goal: *Acute Goals and Plan of Care (Insert Text)  GOALS (1-4 days):  (1.)Ms. Jonel Fong will move from supine to sit and sit to supine in bed with INDEPENDENT. (2.)Ms. Jonel Fong will transfer from bed to chair and chair to bed with INDEPENDENT using the least restrictive device. (3.)Ms. Jonel Fong will ambulate with INDEPENDENT for 250 feet with the least restrictive device. (4.)Ms. Jonel Fong will ambulate up/down 3 steps with bilateral railing with MINIMAL ASSIST with no device. (5.)Ms. Jonel Fong will increase left knee ROM to 5°-80°.  ________________________________________________________________________________________________  Outcome: Progressing Towards Goal      PHYSICAL THERAPY JOINT CAMP TKA: Daily Note and PM 4/7/2017  INPATIENT: Hospital Day: 3  [place] / Product Type: Medicare /      NAME/AGE/GENDER: Margret Funez is a 78 y.o. female             PRIMARY DIAGNOSIS:  Osteoarthritis of left knee, unspecified osteoarthritis type [M17.9]              Procedure(s) and Anesthesia Type:     * LEFT KNEE ARTHROPLASTY TOTAL - Spinal (Left)  ICD-10: Treatment Diagnosis:        · Pain in Left Knee (M25.562)  · Stiffness of Left Knee, Not elsewhere classified (M25.662)  · Difficulty in walking, Not elsewhere classified (R26.2)  · Other abnormalities of gait and mobility (R26.89)       ASSESSMENT:      Ms. Jonel Fong presents status post left total knee replacement with decreased independence with bed mobility, transfers, gait, and therapeutic exercise. Therapy will maximize independence with functional mobility. She increase her distance using RW with SBA. She performs exercises in the gym without increase in pain. This section established at most recent assessment   PROBLEM LIST (Impairments causing functional limitations):  1. Decreased Strength  2. Decreased Transfer Abilities  3.  Decreased Ambulation Ability/Technique  4. Decreased Balance  5. Increased Pain  6. Decreased Activity Tolerance    INTERVENTIONS PLANNED: (Benefits and precautions of physical therapy have been discussed with the patient.)  1. Bed Mobility  2. Gait Training  3. Home Exercise Program (HEP)  4. Therapeutic Exercise/Strengthening  5. Transfer Training  6. Range of Motion: active/assisted/passive  7. Therapeutic Activities  8. Group Therapy      TREATMENT PLAN: Frequency/Duration: Follow patient BID   to address above goals. Rehabilitation Potential For Stated Goals: GOOD      RECOMMENDED REHABILITATION/EQUIPMENT: (at time of discharge pending progress): Rehab. HISTORY:   History of Present Injury/Illness (Reason for Referral):  Decreased mobility ability  Past Medical History/Comorbidities:   Ms. Therese Vogt  has a past medical history of Arthritis; Chronic pain; Former smoker, stopped smoking in distant past; GERD (gastroesophageal reflux disease); High cholesterol; Hypertension; Morbid obesity ([address] Nausea & vomiting; Pre-diabetes; Status post total left knee replacement (4/5/2017); and Urinary incontinence. She also has no past medical history of Adverse effect of anesthesia; Aneurysm ([address] Arrhythmia; Asthma; Autoimmune disease ([address] CAD (coronary artery disease); Cancer ([address] Chronic kidney disease; Coagulation defects; COPD; Diabetes ([address] Difficult intubation; Endocarditis; Heart failure ([address] Liver disease; Malignant hyperthermia due to anesthesia; Other ill-defined conditions; Pseudocholinesterase deficiency; Psychiatric disorder; PUD (peptic ulcer disease); Rheumatic fever; Seizures ([address] Stroke [facility]); Thromboembolus ([address] Thyroid disease; Unspecified adverse effect of anesthesia; or Unspecified sleep apnea.   Ms. Therese Vogt  has a past surgical history that includes gyn; heent; us guided core breast biopsy; rohit biopsy breast stereotactic; knee replacement (Right, 2010); and breast surgery procedure unlisted. Social History/Living Environment:   Home Environment: Private residence  # Steps to Enter: 1  One/Two Story Residence: One story  Living Alone: Yes  Support Systems: Child(marlene)  Patient Expects to be Discharged to[de-identified] Rehabilitation facility ([address])  Current DME Used/Available at Home: [place], rolling, Cane, straight, Commode, bedside  Tub or Shower Type: Shower  Prior Level of Function/Work/Activity:  [place] with ambulation and ADLs. Number of Personal Factors/Comorbidities that affect the Plan of Care: 0: LOW COMPLEXITY   EXAMINATION:   Most Recent Physical Functioning:                  LLE AROM  L Knee Flexion: 88  L Knee Extension: 5            Bed Mobility  Supine to Sit: Supervision     Transfers  Sit to Stand: Stand-by asssistance  Stand to Sit: Stand-by asssistance  Bed to Chair: Stand-by asssistance     Balance  Sitting: Intact  Standing: With support                Weight Bearing Status  Left Side Weight Bearing: As tolerated  Distance (ft): 200 Feet (ft) (x 2)  Ambulation - Level of Assistance: Stand-by asssistance  Assistive Device: Walker, rolling  Gait Abnormalities: Antalgic         Braces/Orthotics: none     Left Knee Cold  Type: Cryocuff       Body Structures Involved:  1. Bones  2. Joints  3. Muscles Body Functions Affected:  1. Neuromusculoskeletal  2. Movement Related Activities and Participation Affected:  1. Mobility   Number of elements that affect the Plan of Care: 4+: HIGH COMPLEXITY   CLINICAL PRESENTATION:   Presentation: Stable and uncomplicated: LOW COMPLEXITY   CLINICAL DECISION MAKIN [facility] [address] [id number] AM-PAC 6 Clicks   Basic Mobility Inpatient Short Form  How much difficulty does the patient currently have. .. Unable A Lot A Little None   1. Turning over in bed (including adjusting bedclothes, sheets and blankets)? [ ] 1   [ ] 2   [X] 3   [ ] 4   2.   Sitting down on and standing up from a chair with arms ( e.g., wheelchair, bedside commode, etc.)   [ ] 1   [ ] 2   [X] 3   [ ] 4   3. Moving from lying on back to sitting on the side of the bed? [ ] 1   [ ] 2   [X] 3   [ ] 4   How much help from another person does the patient currently need. .. Total A Lot A Little None   4. Moving to and from a bed to a chair (including a wheelchair)? [ ] 1   [ ] 2   [X] 3   [ ] 4   5. Need to walk in hospital room? [ ] 1   [ ] 2   [X] 3   [ ] 4   6. Climbing 3-5 steps with a railing? [ ] 1   [ ] 2   [X] 3   [ ] 4   © 2007, Trustees of [address] [place] [address] [address] [address], under license to [employer]. All rights reserved       Score:  Initial: 18 Most Recent: X (Date: -- )     Interpretation of Tool:  Represents activities that are increasingly more difficult (i.e. Bed mobility, Transfers, Gait). Score 24 23 22-20 19-15 14-10 9-7 6       Modifier CH CI CJ CK CL CM CN         · Mobility - Walking and Moving Around:               - CURRENT STATUS:    CK - 40%-59% impaired, limited or restricted               - GOAL STATUS:           CK - 40%-59% impaired, limited or restricted               - D/C STATUS:                       CK - 40%-59% impaired, limited or restricted  [place] / Product Type: Medicare /       Medical Necessity:     · Skilled intervention continues to be required due to decreased mobility ability. Reason for Services/Other Comments:  · Patient continues to require skilled intervention due to decreased mobility ability. Use of outcome tool(s) and clinical judgement create a POC that gives a: Clear prediction of patient's progress: LOW COMPLEXITY                 TREATMENT:   (In addition to Assessment/Re-Assessment sessions the following treatments were rendered)      Pre-treatment Symptoms/Complaints:  No complaints  Pain: Initial:   Pain Intensity 1: 0  Post Session:        Gait Training (15 Minutes):  Gait training to improve and/or restore physical functioning as related to mobility.   Ambulated 200 Feet (ft) (x 2) with Stand-by asssistance using a Walker, rolling and minimal   related to their knee position and motion to promote proper body alignment. Therapeutic Exercise: (15 Minutes):  Exercises per grid below to improve strength. Required minimal verbal cues to promote proper body alignment. Progressed range as indicated. Date:  4/6    Date:  4/7    Date:      ACTIVITY/EXERCISE AM PM AM PM AM PM   GROUP THERAPY  [ ]  [ ]  [x ]  [ ]  [ ]  [ ]   Ankle Pumps 15   20 20  20        Quad Sets  15  20  20  20       Gluteal Sets  15  20  20  20       Hip ABd/ADduction  15  20  20  20       Straight Leg Raises  15  20  20  20       Knee Slides  15  20  20  20       Short Arc Quads  15  20  20  20       Long Arc Quads               Chair Slides    20  20  20                       B = bilateral; AA = active assistive; A = active; P = passive       Treatment/Session Assessment:         Response to Treatment:  Agreeable to take steps to head of bed. Education:  [X] Home Exercises  [ ] Fall Precautions  [ ] Hip Precautions [ ] Going Home Video  [ ] Knee/Hip Prosthesis Review  [x ] Walker Management/Safety [ ] Adaptive Equipment as Needed         Interdisciplinary Collaboration:   · Physical Therapy Assistant and Registered Nurse     After treatment position/precautions:   · Up in chair, Caregiver at bedside, Call light within reach and RN notified     Compliance with Program/Exercises: compliant most of the time. Recommendations/Intent for next treatment session:  Treatment next visit will focus on increasing Ms. Rosado's independence with bed mobility, transfers, gait training, strength/ROM exercises, modalities for pain, and patient education.        Total Treatment Duration:  PT Patient Time In/Time Out  Time In: 1300  Time Out: [address] [place] P[place]

## 2017-04-07 NOTE — PROGRESS NOTES
Lying quietly with eyes closed. RR even and unlabored. No apparent distress noted. Call light within reach.

## 2017-04-07 NOTE — PROGRESS NOTES
Elizabethet a/o x4 denies any nausea at this time, patient more comfortable after receiving 0.5 mg Dilaudid IV.

## 2017-04-07 NOTE — PROGRESS NOTES
Problem: Mobility Impaired (Adult and Pediatric)  Goal: *Acute Goals and Plan of Care (Insert Text)  GOALS (1-4 days):  (1.)Ms. Reyes Goddard will move from supine to sit and sit to supine in bed with INDEPENDENT. (2.)Ms. Reyes Goddard will transfer from bed to chair and chair to bed with INDEPENDENT using the least restrictive device. (3.)Ms. Reyes Goddard will ambulate with INDEPENDENT for 250 feet with the least restrictive device. (4.)Ms. Reyes Goddard will ambulate up/down 3 steps with bilateral railing with MINIMAL ASSIST with no device. (5.)Ms. Reyes Goddard will increase left knee ROM to 5°-80°.  ________________________________________________________________________________________________  Outcome: Progressing Towards Goal      PHYSICAL THERAPY JOINT CAMP TKA: Daily Note and AM 4/7/2017  INPATIENT: Hospital Day: 3  [place] / Product Type: Medicare /      NAME/AGE/GENDER: Megan Watson is a 78 y.o. female             PRIMARY DIAGNOSIS:  Osteoarthritis of left knee, unspecified osteoarthritis type [M17.9]              Procedure(s) and Anesthesia Type:     * LEFT KNEE ARTHROPLASTY TOTAL - Spinal (Left)  ICD-10: Treatment Diagnosis:        · Pain in Left Knee (M25.562)  · Stiffness of Left Knee, Not elsewhere classified (M25.662)  · Difficulty in walking, Not elsewhere classified (R26.2)  · Other abnormalities of gait and mobility (R26.89)       ASSESSMENT:      Ms. Reyes Goddard presents status post left total knee replacement with decreased independence with bed mobility, transfers, gait, and therapeutic exercise. Therapy will maximize independence with functional mobility. She increase her distance using RW with SBA. She performs exercises in the gym without increase in pain. She will go to rehab tomorrow. This section established at most recent assessment   PROBLEM LIST (Impairments causing functional limitations):  1. Decreased Strength  2. Decreased Transfer Abilities  3.  Decreased Ambulation Ability/Technique  4. Decreased Balance  5. Increased Pain  6. Decreased Activity Tolerance    INTERVENTIONS PLANNED: (Benefits and precautions of physical therapy have been discussed with the patient.)  1. Bed Mobility  2. Gait Training  3. Home Exercise Program (HEP)  4. Therapeutic Exercise/Strengthening  5. Transfer Training  6. Range of Motion: active/assisted/passive  7. Therapeutic Activities  8. Group Therapy      TREATMENT PLAN: Frequency/Duration: Follow patient BID   to address above goals. Rehabilitation Potential For Stated Goals: GOOD      RECOMMENDED REHABILITATION/EQUIPMENT: (at time of discharge pending progress): Rehab. HISTORY:   History of Present Injury/Illness (Reason for Referral):  Decreased mobility ability  Past Medical History/Comorbidities:   Ms. Tova Nunez  has a past medical history of Arthritis; Chronic pain; Former smoker, stopped smoking in distant past; GERD (gastroesophageal reflux disease); High cholesterol; Hypertension; Morbid obesity ([address] Nausea & vomiting; Pre-diabetes; Status post total left knee replacement (4/5/2017); and Urinary incontinence. She also has no past medical history of Adverse effect of anesthesia; Aneurysm ([address] Arrhythmia; Asthma; Autoimmune disease ([address] CAD (coronary artery disease); Cancer ([address] Chronic kidney disease; Coagulation defects; COPD; Diabetes ([address] Difficult intubation; Endocarditis; Heart failure ([address] Liver disease; Malignant hyperthermia due to anesthesia; Other ill-defined conditions; Pseudocholinesterase deficiency; Psychiatric disorder; PUD (peptic ulcer disease); Rheumatic fever; Seizures ([address] Stroke [facility]); Thromboembolus ([address] Thyroid disease; Unspecified adverse effect of anesthesia; or Unspecified sleep apnea.   Ms. Tova Nunez  has a past surgical history that includes gyn; heent; us guided core breast biopsy; rohit biopsy breast stereotactic; knee replacement (Right, 2010); and breast surgery procedure unlisted. Social History/Living Environment:   Home Environment: Private residence  # Steps to Enter: 1  One/Two Story Residence: One story  Living Alone: Yes  Support Systems: Child(marlene)  Patient Expects to be Discharged to[de-identified] Rehabilitation facility ([address])  Current DME Used/Available at Home: Tamea Bongo, rolling, Cane, straight, Commode, bedside  Tub or Shower Type: Shower  Prior Level of Function/Work/Activity:  [place] with ambulation and ADLs. Number of Personal Factors/Comorbidities that affect the Plan of Care: 0: LOW COMPLEXITY   EXAMINATION:   Most Recent Physical Functioning:                  LLE AROM  L Knee Flexion: 88  L Knee Extension: 5            Bed Mobility  Supine to Sit: Supervision     Transfers  Sit to Stand: Stand-by asssistance  Stand to Sit: Stand-by asssistance  Bed to Chair: Stand-by asssistance     Balance  Sitting: Intact  Standing: With support                Weight Bearing Status  Left Side Weight Bearing: As tolerated  Distance (ft): 200 Feet (ft) (x 2)  Ambulation - Level of Assistance: Stand-by asssistance  Assistive Device: Walker, rolling  Gait Abnormalities: Antalgic         Braces/Orthotics: none     Left Knee Cold  Type: Cryocuff       Body Structures Involved:  1. Bones  2. Joints  3. Muscles Body Functions Affected:  1. Neuromusculoskeletal  2. Movement Related Activities and Participation Affected:  1. Mobility   Number of elements that affect the Plan of Care: 4+: HIGH COMPLEXITY   CLINICAL PRESENTATION:   Presentation: Stable and uncomplicated: LOW COMPLEXITY   CLINICAL DECISION MAKIN [facility] [address] [id number] AM-PAC 6 Clicks   Basic Mobility Inpatient Short Form  How much difficulty does the patient currently have. .. Unable A Lot A Little None   1. Turning over in bed (including adjusting bedclothes, sheets and blankets)? [ ] 1   [ ] 2   [X] 3   [ ] 4   2.   Sitting down on and standing up from a chair with arms ( e.g., wheelchair, bedside commode, etc.)   [ ] 1   [ ] 2   [X] 3   [ ] 4   3. Moving from lying on back to sitting on the side of the bed? [ ] 1   [ ] 2   [X] 3   [ ] 4   How much help from another person does the patient currently need. .. Total A Lot A Little None   4. Moving to and from a bed to a chair (including a wheelchair)? [ ] 1   [ ] 2   [X] 3   [ ] 4   5. Need to walk in hospital room? [ ] 1   [ ] 2   [X] 3   [ ] 4   6. Climbing 3-5 steps with a railing? [ ] 1   [ ] 2   [X] 3   [ ] 4   © 2007, Trustees of [facility] [employer], under license to [employer]. All rights reserved       Score:  Initial: 18 Most Recent: X (Date: -- )     Interpretation of Tool:  Represents activities that are increasingly more difficult (i.e. Bed mobility, Transfers, Gait). Score 24 23 22-20 19-15 14-10 9-7 6       Modifier CH CI CJ CK CL CM CN         · Mobility - Walking and Moving Around:               - CURRENT STATUS:    CK - 40%-59% impaired, limited or restricted               - GOAL STATUS:           CK - 40%-59% impaired, limited or restricted               - D/C STATUS:                       CK - 40%-59% impaired, limited or restricted  [place] / Product Type: Medicare /       Medical Necessity:     · Skilled intervention continues to be required due to decreased mobility ability. Reason for Services/Other Comments:  · Patient continues to require skilled intervention due to decreased mobility ability. Use of outcome tool(s) and clinical judgement create a POC that gives a: Clear prediction of patient's progress: LOW COMPLEXITY                 TREATMENT:   (In addition to Assessment/Re-Assessment sessions the following treatments were rendered)      Pre-treatment Symptoms/Complaints:  No complaints  Pain: Initial:   Pain Intensity 1: 0  Post Session:        Gait Training (15 Minutes):  Gait training to improve and/or restore physical functioning as related to mobility. Ambulated 200 Feet (ft) (x 2) with Stand-by asssistance using a Walker, rolling and minimal   related to their knee position and motion to promote proper body alignment. Therapeutic Exercise: (45 Minutes (group)):  Exercises per grid below to improve strength. Required minimal verbal cues to promote proper body alignment. Progressed range as indicated. Date:  4/6    Date:  4/7    Date:      ACTIVITY/EXERCISE AM PM AM PM AM PM   GROUP THERAPY  [ ]  [ ]  [x ]  [ ]  [ ]  [ ]   Ankle Pumps 15   20 20          Quad Sets  15  20  20         Gluteal Sets  15  20  20         Hip ABd/ADduction  15  20  20         Straight Leg Raises  15  20  20         Knee Slides  15  20  20         Short Arc Quads  15  20  20         Long Arc Quads               Chair Slides    20  20                         B = bilateral; AA = active assistive; A = active; P = passive       Treatment/Session Assessment:         Response to Treatment:  Agreeable to take steps to head of bed. Education:  [X] Home Exercises  [ ] Fall Precautions  [ ] Hip Precautions [ ] Going Home Video  [ ] Knee/Hip Prosthesis Review  [x ] Walker Management/Safety [ ] Adaptive Equipment as Needed         Interdisciplinary Collaboration:   · Physical Therapy Assistant and Registered Nurse     After treatment position/precautions:   · Up in chair, Caregiver at bedside, Call light within reach and RN notified     Compliance with Program/Exercises: compliant most of the time. Recommendations/Intent for next treatment session:  Treatment next visit will focus on increasing Ms. Rosado's independence with bed mobility, transfers, gait training, strength/ROM exercises, modalities for pain, and patient education.        Total Treatment Duration:  PT Patient Time In/Time Out  Time In: 1030  Time Out: Damari Pike, BELTRAN

## 2017-04-07 NOTE — PROGRESS NOTES
2017         Post Op day: 2 Days Post-OpProcedure(s) (LRB):  LEFT KNEE ARTHROPLASTY TOTAL (Left)      Admit Date: 2017  Admit Diagnosis: Osteoarthritis of left knee, unspecified osteoarthritis type [M17.12]    LAB:    Recent Results (from the past 24 hour(s))   PLEASE READ & DOCUMENT PPD TEST IN 24 HRS    Collection Time: 17  9:30 AM   Result Value Ref Range    PPD  Negative    mm Induration 0 mm   HEMOGLOBIN    Collection Time: 17  4:42 AM   Result Value Ref Range    HGB 9.8 (L) 11.7 - 15.4 g/dL     Vital Signs:    Patient Vitals for the past 8 hrs:   BP Temp Pulse Resp SpO2   17 2352 124/55 97.2 °F (36.2 °C) 77 18 96 %     Temp (24hrs), Av.2 °F (35.7 °C), Min:95.4 °F (35.2 °C), Max:97.6 °F (36.4 °C)    Body mass index is 30.67 kg/(m^2). Pain Control:   Pain Assessment  Pain Scale 1: Numeric (0 - 10)  Pain Intensity 1: 5  Pain Onset 1: years ago  Pain Location 1: Knee  Pain Orientation 1: Left  Pain Description 1: Aching  Pain Intervention(s) 1: Medication (see MAR)    Subjective: Doing well, No complaints, No SOB, No Chest Pain, No Nausea or Vomitting     Objective: Vital Signs are Stable, No Acute Distress, Alert and Oriented, Dressing is Dry,  Neurovascular exam is normal.       PT/OT:            Assistive Device: Walker (comment)        LLE AROM  L Knee Flexion: 60  L Knee Extension: 20       Weight Bearing Status: WBAT    Meds:  [unfilled]  [unfilled]  [unfilled]    Assessment:   Patient Active Problem List   Diagnosis Code    Status post total left knee replacement Z96.652             Plan: Continue Physical Therapy, Monitor  Hbg, rehab tomorrow.         Signed By: Nikkie Romo MD

## 2017-04-07 NOTE — PROGRESS NOTES
Dilaudid 1 mg slow IVP for c/o pain. Zofran 4mg given slow IVP for c/o nausea. Slept at intervals during shift. No change in NV status noted. Call light within reach.

## 2017-04-07 NOTE — PROGRESS NOTES
2017         Post Op day: 2 Days Post-Op Procedure(s) (LRB):  LEFT KNEE ARTHROPLASTY TOTAL (Left)      Admit Date: 2017  Admit Diagnosis: Osteoarthritis of left knee, unspecified osteoarthritis type [M17.12]       Principle Problem: Status post total left knee replacement. Subjective: Doing well, No complaints, No SOB, No Chest Pain, No Nausea or Vomiting     Objective:   Vital Signs are Stable, No Acute Distress, Alert and Oriented, Dressing is Dry,  Neurovascular exam is normal.     Assessment / Plan :  Patient Active Problem List   Diagnosis Code    Status post total left knee replacement Z96.652    Patient Vitals for the past 8 hrs:   BP Temp Pulse Resp SpO2   17 2352 124/55 97.2 °F (36.2 °C) 77 18 96 %    Temp (24hrs), Av.2 °F (35.7 °C), Min:95.4 °F (35.2 °C), Max:97.6 °F (36.4 °C)    Body mass index is 30.67 kg/(m^2).     Lab Results   Component Value Date/Time    HGB 9.8 2017 04:42 AM      Pt seen by and discussed with Supervising Physician   Continue PT       Signed By: MEHDI Goff  2017,  7:31 AM

## 2017-04-07 NOTE — PROGRESS NOTES
Problem: Self Care Deficits Care Plan (Adult)  Goal: *Acute Goals and Plan of Care (Insert Text)  GOALS:   DISCHARGE GOALS (in preparation for going home/rehab): 3 days  1. Ms. Martha Canchola will perform one lower body dressing activity with minimal assistance required to demonstrate improved functional mobility and safety. GOAL MET 4/7/2017  2. Ms. Martha Canchola will perform one lower body bathing activity with minimal assistance required to demonstrate improved functional mobility and safety. GOAL MET 4/7/2017  3. Ms. Martha Canchola will perform toileting/toilet transfer with contact guard assistance to demonstrate improved functional mobility and safety. 4. Ms. Martha Canchola will perform shower transfer with contact guard assistance to demonstrate improved functional mobility and safety. GOAL MET 4/7/2017      JOINT CAMP OCCUPATIONAL THERAPY TKA: Daily Note, Treatment Day: 1st and AM 4/7/2017  INPATIENT: Hospital Day: 3  [place] / Product Type: Medicare /      NAME/AGE/GENDER: Shawna Charlton is a 78 y.o. female             PRIMARY DIAGNOSIS:  Osteoarthritis of left knee, unspecified osteoarthritis type [M17.9]              Procedure(s) and Anesthesia Type:     * LEFT KNEE ARTHROPLASTY TOTAL - Spinal (Left)  ICD-10: Treatment Diagnosis:        · Pain in Left Knee (M25.562)  · Stiffness of Left Knee, Not elsewhere classified (L10.591)  · Other lack of cordination (R27.8)       ASSESSMENT:      Ms. Martha Canchola is s/p left TKA and presents with decreased weight bearing on left LE and decreased independence with functional mobility and activities of daily living. Pt tolerated treatment well and is progressing towards treatment. Patient would benefit from skilled Occupational Therapy to maximize independence and safety with self-care task and functional mobility. This section established at most recent assessment   PROBLEM LIST (Impairments causing functional limitations):  1.  Decreased Strength  2. Decreased ADL/Functional Activities  3. Decreased Transfer Abilities  4. Increased Pain  5. Increased Fatigue  6. Decreased Flexibility/Joint Mobility  7. Decreased Knowledge of Precautions    INTERVENTIONS PLANNED: (Benefits and precautions of occupational therapy have been discussed with the patient.)  1. Activities of daily living training  2. Adaptive equipment training  3. Balance training  4. Clothing management  5. Donning&doffing training  6. Theraputic activity      TREATMENT PLAN: Frequency/Duration: Follow patient 1 time to address above goals. Rehabilitation Potential For Stated Goals: GOOD      RECOMMENDED REHABILITATION/EQUIPMENT: (at time of discharge pending progress): Continue Skilled Therapy and Rehab. OCCUPATIONAL PROFILE AND HISTORY:   History of Present Injury/Illness (Reason for Referral): Pt presents this date s/p (Left) TKA. Past Medical History/Comorbidities:   Ms. 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 She also has no past medical history of Adverse effect of anesthesia; Aneurysm ([address] Arrhythmia; Asthma; Autoimmune disease ([address] CAD (coronary artery disease); Cancer ([address] Chronic kidney disease; Coagulation defects; COPD; Diabetes ([address] Difficult intubation; Endocarditis; Heart failure ([address] Liver disease; Malignant hyperthermia due to anesthesia; Other ill-defined conditions; Pseudocholinesterase deficiency; Psychiatric disorder; PUD (peptic ulcer disease); Rheumatic fever; Seizures ([address] Stroke [facility]); Thromboembolus ([address] Thyroid disease; Unspecified adverse effect of anesthesia; or Unspecified sleep apnea.   Ms. Noel Albert  has a past surgical history that includes gyn; heent; us guided core breast biopsy; rohit biopsy breast stereotactic; knee replacement (Right, 2010); and breast surgery procedure unlisted. Social History/Living Environment:   Home Environment: Private residence  # Steps to Enter: 1  One/Two Story Residence: One story  Living Alone: Yes  Support Systems: Child(marlene)  Patient Expects to be Discharged to[de-identified] Rehabilitation facility ([address])  Current DME Used/Available at Home: Vicie Poet, rolling, Cane, straight, Commode, bedside  Tub or Shower Type: Shower  Prior Level of Function/Work/Activity:  Mod I and living alone      Number of Personal Factors/Comorbidities that affect the Plan of Care: Brief history (0):  LOW COMPLEXITY   ASSESSMENT OF OCCUPATIONAL PERFORMANCE[de-identified]   Most Recent Physical Functioning:   Balance  Sitting: Intact  Standing: With support        Patient Vitals for the past 6 hrs:   BP BP Patient Position SpO2 Pulse   04/07/17 0748 102/53 At rest 93 % 73                                   Mental Status  Neurologic State: Alert  Orientation Level: Oriented X4  Cognition: Appropriate decision making; Appropriate for age attention/concentration  Perception: Appears intact  Perseveration: No perseveration noted  Safety/Judgement: Fall prevention                    Basic ADLs (From Assessment) Complex ADLs (From Assessment)   Basic ADL  Feeding: Setup  Oral Facial Hygiene/Grooming: Supervision  Bathing: Moderate assistance  Upper Body Dressing: Supervision  Lower Body Dressing: Moderate assistance  Toileting:  Total assistance (catheter)     Grooming/Bathing/Dressing Activities of Daily Living     Cognitive Retraining  Safety/Judgement: Fall prevention   Upper Body Bathing  Bathing Assistance: Modified independent  Position Performed: Seated in chair  Adaptive Equipment: Shower chair     Lower Body Bathing  Bathing Assistance: Modified independent  Perineal  : Independent  Lower Body : Modified independent  [address]: Shower chair     Upper Body Dressing Assistance  Dressing Assistance: Independent  Bra: Independent  Pullover Shirt: Independent Functional Transfers  Shower Transfer: Supervision   Lower Body Dressing Assistance  Dressing Assistance: Minimum assistance  Underpants: Minimum assistance  Pants With Elastic Waist: Minimum assistance  Socks: Minimum assistance  Antiembolitic Stockings: Compensatory technique training  Slip on Shoes with Back: Minimum assistance  Cues: Don;Doff;Physical assistance Bed/Mat Mobility  Supine to Sit: Supervision  Sit to Stand: Supervision  Bed to Chair: Supervision           Physical Skills Involved:  1. Range of Motion  2. Balance  3. Mobility Cognitive Skills Affected (resulting in the inability to perform in a timely and safe manner): 1. none Psychosocial Skills Affected:  1. Environmental Adaptations   Number of elements that affect the Plan of Care: 3-5:  MODERATE COMPLEXITY   CLINICAL DECISION MAKIN[address] [place] [address] AM-PAC 6 Clicks   Basic Mobility Inpatient Short Form  How much help from another person does the patient currently need. .. Total A Lot A Little None   1. Putting on and taking off regular lower body clothing?   [ ] 1   [X] 2   [ ] 3   [ ] 4   2. Bathing (including washing, rinsing, drying)? [ ] 1   [X] 2   [ ] 3   [ ] 4   3. Toileting, which includes using toilet, bedpan or urinal?   [X] 1   [ ] 2   [ ] 3   [ ] 4   4. Putting on and taking off regular upper body clothing?   [ ] 1   [ ] 2   [X] 3   [ ] 4   5. Taking care of personal grooming such as brushing teeth? [ ] 1   [ ] 2   [X] 3   [ ] 4   6. Eating meals? [ ] 1   [ ] 2   [ ] 3   [X] 4   © , Trustees of [address] [place] [address], under license to [employer]. All rights reserved   Score:  Initial: 15 Most Recent: X (Date: -- )     Interpretation of Tool:  Represents activities that are increasingly more difficult (i.e. Bed mobility, Transfers, Gait).        Score 24 23 22-20 19-15 14-10 9-7 6       Modifier CH CI CJ CK CL CM CN · Self Care:               - CURRENT STATUS:    CK - 40%-59% impaired, limited or restricted               - GOAL STATUS:           CJ - 20%-39% impaired, limited or restricted               - D/C STATUS:                       ---------------To be determined---------------  [place] / Product Type: Medicare /       Medical Necessity:     · Patient is expected to demonstrate progress in balance, coordination and functional technique to decrease assistance required with self care and functional mobility and improve safety during self care and functional mobility. Reason for Services/Other Comments:  · Patient continues to require skilled intervention due to decrease self care and functional mobility. Use of outcome tool(s) and clinical judgement create a POC that gives a: LOW COMPLEXITY                 TREATMENT:   (In addition to Assessment/Re-Assessment sessions the following treatments were rendered)      Pre-treatment Symptoms/Complaints:  none  Pain: Initial:   Pain Intensity 1: 3 (before and after )  Pain Location 1: Knee  Pain Intervention(s) 1: Repositioned, Shower     Post Session:  1/10 nurse notified      Self Care: (30): Procedure(s) (per grid) utilized to improve and/or restore self-care/home management as related to dressing and bathing. Required min verbal and manual cueing to facilitate activities of daily living skills. Treatment/Session Assessment:         Response to Treatment:  Tolerated well, wants to go to rehab because she lives alone.      Education:  [ ] Home Exercises  [X] Fall Precautions  [ ] Hip Precautions [ ] Going Home Video  [X] Knee/Hip Prosthesis Review  [X] Walker Management/Safety [X] Adaptive Equipment as Needed         Interdisciplinary Collaboration:   · Physical Therapist, Certified Occupational Therapy Assistant and Registered Nurse     After treatment position/precautions:   · Up in chair, Bed/Chair-wheels locked, Call light within reach and RN notified     Compliance with Program/Exercises: compliant all of the time. Recommendations/Intent for next treatment session:  Treatment next visit will focus on increasing Ms. Rosado's independence with bed mobility, transfers, gait training, strength/ROM exercises, modalities for pain, and patient education.        Total Treatment Duration:  OT Patient Time In/Time Out  Time In: 9457  Time Out: Charlotte Chavez [address]

## 2017-04-08 VITALS
DIASTOLIC BLOOD PRESSURE: 58 MMHG | WEIGHT: 190 LBS | OXYGEN SATURATION: 90 % | HEART RATE: 86 BPM | RESPIRATION RATE: 18 BRPM | BODY MASS INDEX: 30.53 KG/M2 | HEIGHT: 66 IN | TEMPERATURE: 99.1 F | SYSTOLIC BLOOD PRESSURE: 126 MMHG

## 2017-04-08 LAB — HGB BLD-MCNC: 9.1 G/DL (ref 11.7–15.4)

## 2017-04-08 PROCEDURE — 74011250637 HC RX REV CODE- 250/637: Performed by: ORTHOPAEDIC SURGERY

## 2017-04-08 PROCEDURE — 97150 GROUP THERAPEUTIC PROCEDURES: CPT

## 2017-04-08 PROCEDURE — 97116 GAIT TRAINING THERAPY: CPT

## 2017-04-08 PROCEDURE — 36415 COLL VENOUS BLD VENIPUNCTURE: CPT | Performed by: PHYSICIAN ASSISTANT

## 2017-04-08 PROCEDURE — 74011250637 HC RX REV CODE- 250/637: Performed by: PHYSICIAN ASSISTANT

## 2017-04-08 PROCEDURE — 74011250636 HC RX REV CODE- 250/636: Performed by: PHYSICIAN ASSISTANT

## 2017-04-08 PROCEDURE — 77030012935 HC DRSG AQUACEL BMS -B

## 2017-04-08 PROCEDURE — 85018 HEMOGLOBIN: CPT | Performed by: PHYSICIAN ASSISTANT

## 2017-04-08 RX ORDER — OXYCODONE HYDROCHLORIDE 10 MG/1
10 TABLET ORAL
Qty: 60 TAB | Refills: 0 | OUTPATIENT
Start: 2017-04-08 | End: 2021-06-21

## 2017-04-08 RX ADMIN — ACETAMINOPHEN 1000 MG: 500 TABLET, FILM COATED ORAL at 06:01

## 2017-04-08 RX ADMIN — ACETAMINOPHEN 1000 MG: 500 TABLET, FILM COATED ORAL at 12:02

## 2017-04-08 RX ADMIN — LISINOPRIL AND HYDROCHLOROTHIAZIDE 1 TABLET: 12.5; 2 TABLET ORAL at 08:33

## 2017-04-08 RX ADMIN — Medication 10 ML: at 12:08

## 2017-04-08 RX ADMIN — HYDROMORPHONE HYDROCHLORIDE 1 MG: 1 INJECTION, SOLUTION INTRAMUSCULAR; INTRAVENOUS; SUBCUTANEOUS at 12:02

## 2017-04-08 RX ADMIN — Medication 10 ML: at 12:06

## 2017-04-08 RX ADMIN — OXYCODONE HYDROCHLORIDE 10 MG: 5 TABLET ORAL at 06:01

## 2017-04-08 RX ADMIN — PANTOPRAZOLE SODIUM 40 MG: 40 TABLET, DELAYED RELEASE ORAL at 06:01

## 2017-04-08 RX ADMIN — OXYCODONE HYDROCHLORIDE 10 MG: 5 TABLET ORAL at 13:04

## 2017-04-08 RX ADMIN — OXYCODONE HYDROCHLORIDE 10 MG: 5 TABLET ORAL at 08:34

## 2017-04-08 RX ADMIN — SENNOSIDES AND DOCUSATE SODIUM 2 TABLET: 8.6; 5 TABLET ORAL at 08:33

## 2017-04-08 RX ADMIN — ASPIRIN 325 MG: 325 TABLET, DELAYED RELEASE ORAL at 08:34

## 2017-04-08 NOTE — DISCHARGE SUMMARY
Titus Bowers MD  Medical Director  [address] [place] [address], [address] [facility]  Tel: [phone]       REHABILITATION DISCHARGE SUMMARY     Patient: Rollo Aase MRN: [id number]  SSN: [id number]    YOB: 1940  Age: 78 y.o. Sex: female      Date: 4/8/2017  Admit Date: 4/5/2017  Discharge Date: 4/8/2017    Admitting Physician: Violeta De Jesus MD   Primary Care Physician: Lisha Beckford MD     Admission Condition: good    Chief Complaint : Gait dysfunction secondary to below. Admit Diagnosis: Osteoarthritis of left knee, unspecified osteoarthritis typ*  left total knee arthroplasty  Pain  DVT risk  Post op hemorrhagic anemia  Acute Rehab Dx:  Gait impairment  Debility   deconditioning  Mobility and ambulation deficits  Self Care/ADL deficits    HPI: Rollo Aase is a 78 y.o. female patient at [address] [place] [address] who was admitted on 4/5/2017 by Violeta De Jesus MD with below mentioned medical history, is being seen for Physical Medicine and Rehabilitation. Rollo Aase presented with debilitating left knee pain Secondary to end stage DJD. The pain was refractory to conservative treatment including intra-articular cortisone injections and modification of activities. She is now s/p left total knee arthroplasty per Dr. Violeta De Jesus MD on 4/5/2017. Patient will be WBAT LLE. Patient is tolerating acute post op therapy well, shows no unexpected barrier to progress. She is still very limited by decreased LLE strength, decreased ROM and pain. Rollo Aase is seen and examined today. Medical Records reviewed. Pt is independent with ambulation, prior to admission, limited by left knee pain. Patient had right TKA last year with good result.  He is also limited by chronic low back pain.       Rehabiitation Course:   Functional  Level On Admission: Walk: Independent  Functional Level At Discharge: Walk: [facility] Architecture: Home Situation  Home Environment: Private residence (04/05/17 1358)  # Steps to Enter: 1 (04/05/17 1358)  One/Two Story Residence: One story (04/05/17 1358)  Living Alone: Yes (04/05/17 1358)  Support Systems: Child(marlene) (04/05/17 1358)  Patient Expects to be Discharged to[de-identified] Rehabilitation facility ([facility]) (04/05/17 1358)  Current DME Used/Available at Home: Walker, rolling;Cane, straight;Commode, bedside (04/05/17 1358)  Tub or Shower Type: Shower (04/05/17 1350)     Past Medical History:   Diagnosis Date    Arthritis     Chronic pain     hands and toes    Former smoker, stopped smoking in distant past     GERD (gastroesophageal reflux disease)     controlled with med    High cholesterol     on medication    Hypertension     Morbid obesity ([address]     Nausea & vomiting     Pre-diabetes     Status post total left knee replacement 4/5/2017    Urinary incontinence     med      Past Surgical History:   Procedure Laterality Date    BREAST SURGERY PROCEDURE UNLISTED      Juan Jose. breast bx    HX GYN      Hysterectomy    HX HEENT      T&A    HX KNEE REPLACEMENT Right 2010    FIDE BIOPSY BREAST STEREOTACTIC      US GUIDED CORE BREAST BIOPSY        Family History   Problem Relation Age of Onset    Elevated Lipids Mother     Heart Disease Mother     Hypertension Mother     Cancer Father     Elevated Lipids Father     Hypertension Father     Cancer Sister     Malignant Hyperthermia Neg Hx     Pseudocholinesterase Deficiency Neg Hx     Delayed Awakening Neg Hx     Post-op Nausea/Vomiting Neg Hx     Emergence Delirium Neg Hx     Post-op Cognitive Dysfunction Neg Hx     Other Neg Hx     Breast Cancer Neg Hx       Social History   Substance Use Topics    Smoking status: Former Smoker    Smokeless tobacco: Never Used      Comment: \"40 years ago\"    Alcohol use Yes      Comment: socially       Allergies   Allergen Reactions    Latex Itching     redness    Adhesive Rash    Pcn [Penicillins] Swelling    Sulfa (Sulfonamide Antibiotics) Swelling       Prior to Admission medications    Medication Sig Start Date End Date Taking? Authorizing Provider   oxyCODONE IR (ROXICODONE) 10 mg tab immediate release tablet Take 1 Tab by mouth every four (4) hours as needed. Max Daily Amount: 60 mg. 4/8/17  Yes Ross Redman MD   aspirin delayed-release 325 mg tablet Take 1 Tab by mouth every twelve (12) hours every twelve (12) hours for 35 days. 4/6/17 5/11/17 Yes MEHDI Haque   HYDROmorphone (DILAUDID) 2 mg tablet Take 1 Tab by mouth every three (3) hours as needed. Max Daily Amount: 16 mg. 4/6/17  Yes MEHDI Haque   estradiol (ESTRACE) 2 mg tablet Take 2 mg by mouth daily. Take morning of surgery per anesthesia guidelines. Yes Historical Provider   mirabegron ER (MYRBETRIQ) 50 mg ER tablet Take 50 mg by mouth daily. Take morning of surgery per anesthesia guidelines. Indications: URINARY URGE INCONTINENCE   Yes Historical Provider   omeprazole (PRILOSEC) 40 mg capsule Take 40 mg by mouth daily. Take morning of surgery per anesthesia guidelines. Yes Historical Provider   aspirin delayed-release 81 mg tablet Take 81 mg by mouth daily. Yes Historical Provider   ibuprofen (MOTRIN) 800 mg tablet Take 800 mg by mouth as needed for Pain. Stop 5 days prior to surgery per anesthesia guidelines. Indications: Pain   Yes Historical Provider   simvastatin (ZOCOR) 40 mg tablet Take 40 mg by mouth daily. Per anesthesia protocol:instructed to take am of surgery. 12/21/10  Yes Historical Provider   lisinopril-hydrochlorothiazide (PRINZIDE, ZESTORETIC) 20-12.5 mg per tablet Take 1 Tab by mouth daily. Yes Historical Provider   docusate sodium (COLACE) 100 mg capsule Take 100 mg by mouth daily.  12/21/10  Yes Historical Provider       Current Medications:  Current Facility-Administered Medications   Medication Dose Route Frequency Provider Last Rate Last Dose    oxyCODONE IR (Daniel Lr) tablet 10 mg  10 mg Oral Q4H PRN Diony Lux MD   10 mg at 04/08/17 8510    docusate sodium (COLACE) capsule 100 mg  100 mg Oral DAILY PRN Diony Lux MD        lisinopril-hydroCHLOROthiazide [facility], ZESTORETIC) 20-12.5 mg per tablet 1 Tab  1 Tab Oral DAILY Ltanya Joselyn, [place]   1 Tab at 04/08/17 5638    mirabegron ER (MYRBETRIQ) tablet 50 mg (Patient Supplied)  50 mg Oral DAILY Ltanya Lout, [place]        pantoprazole (PROTONIX) tablet 40 mg  40 mg Oral ACB Ltanya Joselyn, [place]   40 mg at 04/08/17 0601    sodium chloride (NS) flush 5-10 mL  5-10 mL IntraVENous Q8H Ltanya Darianaut, PA   10 mL at 04/07/17 1700    sodium chloride (NS) flush 5-10 mL  5-10 mL IntraVENous PRN Ltanya Darianaut, PA   10 mL at 04/07/17 1700    acetaminophen (TYLENOL) tablet 1,000 mg  1,000 mg Oral Q6H Ltanya Lout, PA   1,000 mg at 04/08/17 0601    HYDROmorphone (PF) (DILAUDID) injection 1 mg  1 mg IntraVENous Q3H PRN Ltanya Darianaut, PA   1 mg at 04/07/17 2225    naloxone [facility]) injection 0.2-0.4 mg  0.2-0.4 mg IntraVENous Q10MIN PRN Ltanya Joselyn, PA        ondansetron [facility]) injection 4 mg  4 mg IntraVENous Q4H PRN Ltanya Darianaut, PA   4 mg at 04/07/17 0548    diphenhydrAMINE (BENADRYL) capsule 25 mg  25 mg Oral Q4H PRN Ltanya Darianaut, PA   25 mg at 04/07/17 2225    senna-docusate (PERICOLACE) 8.6-50 mg per tablet 2 Tab  2 Tab Oral DAILY Ltanya Darianaut, PA   2 Tab at 04/08/17 0469    aspirin delayed-release tablet 325 mg  325 mg Oral Q12H Ltanya Joselyn, [place]   325 mg at 04/08/17 4436        Review of Systems: Denies chest pain, shortness of breath, cough, headache, visual problems, abdominal pain, dysurea, calf pain. Pertinent positives are as noted in the medical records and unremarkable otherwise.     Vital Signs:   Patient Vitals for the past 8 hrs:   BP Temp Pulse Resp SpO2   04/08/17 0816 126/58 99.1 °F (37.3 °C) 86 18 90 %   04/08/17 0555 115/71 99 °F (37.2 °C) 100 18 93 %        Physical Exam:   General: Alert and age appropriately oriented. No acute cardio respiratory distress. HEENT: Normocephalic. Oral mucosa moist without cyanosis. Lungs: Clear to auscultation  bilaterally. Respiration even and unlabored   Heart: Regular rate and rhythm, S1, S2   No  murmurs, clicks, rub or gallops   Abdomen: Soft, non-tender, nondistended. Bowel sounds present   Genitourinary: defered   Neuromuscular:      Grossly no focal neurological deficits noted. L Ankle dorsiflexion 5/5   L Ankle plantarflexion 5/5  R Ankle dorsiflexion 5/5   R Ankle plantarflexion 5/5  No sensory deficits. Skin/extremity: No rashes, no erythema. Calves soft. Wound covered.      Skin Incision(s)/Wound(s):        Lab Review:   Recent Results (from the past 72 hour(s))   HEMOGLOBIN    Collection Time: 04/05/17  8:31 PM   Result Value Ref Range    HGB 10.8 (L) 11.7 - 15.4 g/dL   HEMOGLOBIN    Collection Time: 04/06/17  5:46 AM   Result Value Ref Range    HGB 9.8 (L) 11.7 - 95.2 g/dL   METABOLIC PANEL, BASIC    Collection Time: 04/06/17  5:46 AM   Result Value Ref Range    Sodium 140 136 - 145 mmol/L    Potassium 4.4 3.5 - 5.1 mmol/L    Chloride 108 (H) 98 - 107 mmol/L    CO2 27 21 - 32 mmol/L    Anion gap 5 (L) 7 - 16 mmol/L    Glucose 145 (H) 65 - 100 mg/dL    BUN 54 (H) 8 - 23 MG/DL    Creatinine 1.08 (H) 0.6 - 1.0 MG/DL    GFR est AA >60 >60 ml/min/1.73m2    GFR est non-AA 53 (L) >60 ml/min/1.73m2    Calcium 7.2 (L) 8.3 - 10.4 MG/DL   PLEASE READ & DOCUMENT PPD TEST IN 24 HRS    Collection Time: 04/06/17  9:30 AM   Result Value Ref Range    PPD  Negative    mm Induration 0 mm   HEMOGLOBIN    Collection Time: 04/07/17  4:42 AM   Result Value Ref Range    HGB 9.8 (L) 11.7 - 15.4 g/dL   PLEASE READ & DOCUMENT PPD TEST IN 48 HRS    Collection Time: 04/07/17  9:00 AM   Result Value Ref Range    PPD Neg Neg Negative    mm Induration 0 0 mm   HEMOGLOBIN    Collection Time: 04/08/17  4:32 AM   Result Value Ref Range    HGB 9.1 (L) 11.7 - 15.4 g/dL       PT Initial  PT Most Recent   AROM: Generally decreased, functional (04/05/17 1405) Generally decreased, functional (04/05/17 1405)         Strength: Generally decreased, functional (04/05/17 1405) Generally decreased, functional (04/05/17 1405)   Coordination: Generally decreased, functional (04/05/17 1405) Generally decreased, functional (04/05/17 1405)                     Distance (ft): 5 Feet (ft) (to head of bed) (04/05/17 1405) 200 Feet (ft) (walked distance twice) (04/08/17 1024)   Assistive Device: Walker, rolling (04/05/17 1405) Walker, rolling (04/08/17 1024)       OT Initial OT Most Recent                                               ST Initial ST Most Recent                        Principal Problem:    Status post total left knee replacement (4/5/2017)          Condition on discharge from [facility]:  good  Rehabilitation  potential : Good   Goals in Rehab : Patient to reach maximal rehabilitation potential in functional mobility,ambulation and ADL/ self care skills ; to improve strength and endurance  Plan / Disposition :STR-Rehab  as discussed with patient/ family and the Case management/ Social service team  Expected Length Of Stay : Depending on pace of progress in mobility and self care in PT and OT ,therapies    Discharge Instructions:  Rehabilitation Management/ Medical Management: 1. Devices:Walkers, Type: Rolling Walker  2. Consult:Rehab team including PT, OT,  and . 3. Disposition Rehab-discussed with patient. 4. Thigh-high or knee-high ASUNCION's when out of bed. 5. DVT Prophylaxis - aspirin 325mg bid x 30days. Please notify surgeon at [address] [place] [address] if DVT diagnosed. 6. Incentive spirometer Q1H while awake  7. Post op hemorrhagic anemia- monitor. Asymptomatic. 8. Activity: WBAT LLE  9. Planned Labs: CBC,BMP  10. Pain Control: fair control. Continue scheduled tylenol, celebrex and  PRN meds. Continue current management.   11. Wound Care: Keep right hip wound clean and dry and reinforce dressing PRN. May remove Aquacel 1 week post op ad replace with new one. Remove staples 12-14 post surgery, when incision appears appropriately closed and apply benzoin and 1/2\" steristrips. 12. In case of complications: Please notify surgeon at [address] [place] [address] if suspect infection, cellulitis, wound dehiscence or instrument failure, and if considering starting antibiotic. Follow up with ORTHO per instructions. [address] [place] [address] [phone]  Follow up with Dr Pippa Ventura  2 weeks after discharge from rehab. [phone]. Transfer Medications:      oxyCODONE IR (ROXICODONE) tablet 10 mg   Ordered Dose: 10mg Route: Oral Frequency: EVERY 4 HOURS  as needed for pain             acetaminophen (TYLENOL) tablet 1,000 mg Ordered Dose: 1,000 mg Route: Oral Frequency: EVERY 8 HOURS x 1 week then change to prn.             senna-docusate (PERICOLACE) 8.6-50 mg per tablet 1 Tab Ordered Dose: 1 Tab Route: Oral Frequency: DAILY       Current Discharge Medication List      START taking these medications    Details             CONTINUE these medications which have CHANGED    Details   aspirin delayed-release 325 mg tablet Take 1 Tab by mouth every twelve (12) hours every twelve (12) hours for 35 days. Comments: This medication is to prevent blood clots for 5 weeks after surgery. CONTINUE these medications which have NOT CHANGED    Details   mirabegron ER (MYRBETRIQ) 50 mg ER tablet Take 50 mg by mouth daily. Indications: URINARY URGE INCONTINENCE      omeprazole (PRILOSEC) 40 mg capsule Take 40 mg by mouth daily. simvastatin (ZOCOR) 40 mg tablet Take 40 mg by mouth daily. lisinopril-hydrochlorothiazide (PRINZIDE, ZESTORETIC) 20-12.5 mg per tablet Take 1 Tab by mouth daily. docusate sodium (COLACE) 100 mg capsule Take 100 mg by mouth daily. HOLD. STOP taking these medications       estradiol (ESTRACE) 2 mg tablet Comments:  HOLD. Reason for Stopping:           ibuprofen (MOTRIN) 800 mg tablet Comments:   HOLD. Reason for Stopping:             O.K. Admit to sub acute rehab today. Discharge time: 35 minutes.     Signed By: Lita Ahumada MD     April 8, 2017

## 2017-04-08 NOTE — PROGRESS NOTES
Problem: Mobility Impaired (Adult and Pediatric)  Goal: *Acute Goals and Plan of Care (Insert Text)  GOALS (1-4 days):  (1.)Ms. Madison Junior will move from supine to sit and sit to supine in bed with INDEPENDENT. (2.)Ms. Madison Junior will transfer from bed to chair and chair to bed with INDEPENDENT using the least restrictive device. (3.)Ms. Madison Junior will ambulate with INDEPENDENT for 250 feet with the least restrictive device. (4.)Ms. Madison Junior will ambulate up/down 3 steps with bilateral railing with MINIMAL ASSIST with no device. (5.)Ms. Madison Junior will increase left knee ROM to 5°-80°.  ________________________________________________________________________________________________  Outcome: Progressing Towards Goal      PHYSICAL THERAPY JOINT CAMP TKA: Daily Note, Treatment Day: 1st and AM 4/8/2017  INPATIENT: Hospital Day: 4  [place] / Product Type: Medicare /      NAME/AGE/GENDER: Naty Mlils is a 78 y.o. female             PRIMARY DIAGNOSIS:  Osteoarthritis of left knee, unspecified osteoarthritis type [M17.9]              Procedure(s) and Anesthesia Type:     * LEFT KNEE ARTHROPLASTY TOTAL - Spinal (Left)  ICD-10: Treatment Diagnosis:        · Pain in Left Knee (M25.562)  · Stiffness of Left Knee, Not elsewhere classified (M25.662)  · Difficulty in walking, Not elsewhere classified (R26.2)  · Other abnormalities of gait and mobility (R26.89)       ASSESSMENT:      Ms. Madison Junior presents status post left total knee replacement with decreased independence with bed mobility, transfers, gait, and therapeutic exercise. Therapy will maximize independence with functional mobility. Pt progressing with out of bed activity. This section established at most recent assessment   PROBLEM LIST (Impairments causing functional limitations):  1. Decreased Strength  2. Decreased Transfer Abilities  3. Decreased Ambulation Ability/Technique  4. Decreased Balance  5.  Increased Pain  6. Decreased Activity Tolerance    INTERVENTIONS PLANNED: (Benefits and precautions of physical therapy have been discussed with the patient.)  1. Bed Mobility  2. Gait Training  3. Home Exercise Program (HEP)  4. Therapeutic Exercise/Strengthening  5. Transfer Training  6. Range of Motion: active/assisted/passive  7. Therapeutic Activities  8. Group Therapy      TREATMENT PLAN: Frequency/Duration: Follow patient BID   to address above goals. Rehabilitation Potential For Stated Goals: GOOD      RECOMMENDED REHABILITATION/EQUIPMENT: (at time of discharge pending progress): Rehab. HISTORY:   History of Present Injury/Illness (Reason for Referral):  Decreased mobility ability  Past Medical History/Comorbidities:   Ms. Reyes Goddard  has a past medical history of Arthritis; Chronic pain; Former smoker, stopped smoking in distant past; GERD (gastroesophageal reflux disease); High cholesterol; Hypertension; Morbid obesity ([address] Nausea & vomiting; Pre-diabetes; Status post total left knee replacement (4/5/2017); and Urinary incontinence. She also has no past medical history of Adverse effect of anesthesia; Aneurysm ([address] Arrhythmia; Asthma; Autoimmune disease ([address] CAD (coronary artery disease); Cancer ([address] Chronic kidney disease; Coagulation defects; COPD; Diabetes ([address] Difficult intubation; Endocarditis; Heart failure ([address] Liver disease; Malignant hyperthermia due to anesthesia; Other ill-defined conditions; Pseudocholinesterase deficiency; Psychiatric disorder; PUD (peptic ulcer disease); Rheumatic fever; Seizures ([address] Stroke [facility]); Thromboembolus ([address] Thyroid disease; Unspecified adverse effect of anesthesia; or Unspecified sleep apnea. Ms. Reyes Goddard  has a past surgical history that includes gyn; heent; us guided core breast biopsy; rohit biopsy breast stereotactic; knee replacement (Right, 2010); and breast surgery procedure unlisted.   Social History/Living Environment:   Home Environment: Private residence  # Steps to Enter: 1  One/Two Story Residence: One story  Living Alone: Yes  Support Systems: Child(marlene)  Patient Expects to be Discharged to[de-identified] Rehabilitation facility ([facility])  Current DME Used/Available at Home: [place] Severe, rolling, Cane, straight, Commode, bedside  Tub or Shower Type: Shower  Prior Level of Function/Work/Activity:  [place] with ambulation and ADLs. Number of Personal Factors/Comorbidities that affect the Plan of Care: 0: LOW COMPLEXITY   EXAMINATION:   Most Recent Physical Functioning:                  LLE AROM  L Knee Flexion: 78  L Knee Extension: 5                  Transfers  Sit to Stand: Stand-by asssistance  Stand to Sit: Stand-by asssistance     Balance  Sitting: Intact  Standing: Pull to stand; With support                Weight Bearing Status  Left Side Weight Bearing: As tolerated  Distance (ft): 200 Feet (ft) (walked distance twice)  Ambulation - Level of Assistance: Stand-by asssistance  Assistive Device: Walker, rolling  Gait Abnormalities: Antalgic         Braces/Orthotics: none             Body Structures Involved:  1. Bones  2. Joints  3. Muscles Body Functions Affected:  1. Neuromusculoskeletal  2. Movement Related Activities and Participation Affected:  1. Mobility   Number of elements that affect the Plan of Care: 4+: HIGH COMPLEXITY   CLINICAL PRESENTATION:   Presentation: Stable and uncomplicated: LOW COMPLEXITY   CLINICAL DECISION MAKIN [facility] [id number] AM-PAC 6 Clicks   Basic Mobility Inpatient Short Form  How much difficulty does the patient currently have. .. Unable A Lot A Little None   1. Turning over in bed (including adjusting bedclothes, sheets and blankets)? [ ] 1   [ ] 2   [X] 3   [ ] 4   2. Sitting down on and standing up from a chair with arms ( e.g., wheelchair, bedside commode, etc.)   [ ] 1   [ ] 2   [X] 3   [ ] 4   3. Moving from lying on back to sitting on the side of the bed?    [ ] 1   [ ] 2   [X] 3   [ ] 4   How much help from another person does the patient currently need. .. Total A Lot A Little None   4. Moving to and from a bed to a chair (including a wheelchair)? [ ] 1   [ ] 2   [X] 3   [ ] 4   5. Need to walk in hospital room? [ ] 1   [ ] 2   [X] 3   [ ] 4   6. Climbing 3-5 steps with a railing? [ ] 1   [ ] 2   [X] 3   [ ] 4   © 2007, Trustees of [address] [place] [address] [facility], under license to [employer]. All rights reserved       Score:  Initial: 18 Most Recent: X (Date: -- )     Interpretation of Tool:  Represents activities that are increasingly more difficult (i.e. Bed mobility, Transfers, Gait). Score 24 23 22-20 19-15 14-10 9-7 6       Modifier CH CI CJ CK CL CM CN         · Mobility - Walking and Moving Around:               - CURRENT STATUS:    CK - 40%-59% impaired, limited or restricted               - GOAL STATUS:           CK - 40%-59% impaired, limited or restricted               - D/C STATUS:                       CK - 40%-59% impaired, limited or restricted  [place] / Product Type: Medicare /       Medical Necessity:     · Skilled intervention continues to be required due to decreased mobility ability. Reason for Services/Other Comments:  · Patient continues to require skilled intervention due to decreased mobility ability. Use of outcome tool(s) and clinical judgement create a POC that gives a: Clear prediction of patient's progress: LOW COMPLEXITY                 TREATMENT:   (In addition to Assessment/Re-Assessment sessions the following treatments were rendered)      Pre-treatment Symptoms/Complaints:  No complaints  Pain: Initial:   Pain Intensity 1: 10  Pain Location 1: Knee  Pain Orientation 1: Left  Post Session:        Gait Training (15 Minutes):  Gait training to improve and/or restore physical functioning as related to mobility.   Ambulated 200 Feet (ft) (walked distance twice) with Stand-by asssistance using a Terrie Bilis, rolling and minimal   related to their knee position and motion to promote proper body alignment. Therapeutic Exercise: ( 45 minutes):  Exercises per grid below to improve strength. Required minimal verbal cues to promote proper body alignment. Progressed range as indicated. Date:  4/6    Date:  4/7    Date:   4/8   ACTIVITY/EXERCISE AM PM AM PM AM PM   GROUP THERAPY  [ ]  [ ]  [x ]  [ ]  Juanian ]  [ ]   Ankle Pumps 15   20 20  20   25     Quad Sets  15  20  20  20  25     Gluteal Sets  15  20  20  20  25     Hip ABd/ADduction  15  20  20  20  25     Straight Leg Raises  15  20  20  20  25     Knee Slides  15  20  20  20  25     Short Arc Quads  15  20  20  20  25     Long Arc Quads               Chair Slides    20  20  20  25                     B = bilateral; AA = active assistive; A = active; P = passive       Treatment/Session Assessment:         Response to Treatment:  Agreeable to take steps to head of bed. Education:  [X] Home Exercises  [ ] Fall Precautions  [ ] Hip Precautions [ ] Going Home Video  [ ] Knee/Hip Prosthesis Review  [x ] Walker Management/Safety [ ] Adaptive Equipment as Needed         Interdisciplinary Collaboration:   · Physical Therapist, Registered Nurse and Rehabilitation Attendant     After treatment position/precautions:   · Up in chair, Caregiver at bedside, Call light within reach and RN notified     Compliance with Program/Exercises: compliant most of the time. Recommendations/Intent for next treatment session:  Treatment next visit will focus on increasing Ms. Rosado's independence with bed mobility, transfers, gait training, strength/ROM exercises, modalities for pain, and patient education.        Total Treatment Duration:  PT Patient Time In/Time Out  Time In: 0900  Time Out: LUIS ENRIQUE Knox

## 2017-04-08 NOTE — PROGRESS NOTES
TRANSFER - OUT REPORT:    Verbal report given to Franco RN (name) on Anam Mead  being transferred to [facility] (unit) for routine progression of care       Report consisted of patients Situation, Background, Assessment and   Recommendations(SBAR). Information from the following report(s) SBAR, Kardex, Intake/Output and Recent Results was reviewed with the receiving nurse. Lines:       Opportunity for questions and clarification was provided.       Patient transported with: By car to facility with family

## 2017-04-08 NOTE — PROGRESS NOTES
C/O PAIN 10 OUT OF 10. OXYCODONE 10 MG. PO GIVEN. Has slept all night. She has stress incon't. And her clothes and pad are wet. She did void in commode also. Dressing dry and intact to lt. Knee. neuro vas. cks good to both feet.

## 2017-04-08 NOTE — PROGRESS NOTES
2017         Post Op day: 3 Days Post-Op     Admit Date: 2017  Admit Diagnosis: Osteoarthritis of left knee, unspecified osteoarthritis type [M17.12]        Subjective: Doing well, No complaints, No SOB, No Chest Pain, No Nausea or Vomiting     Objective:   Vital Signs are Stable, No Acute Distress, Alert and Oriented, Dressing is Dry,  Neurovascular exam is normal.     Assessment / Plan :  Patient Active Problem List   Diagnosis Code    Status post total left knee replacement Z96.652    No data found. Temp (24hrs), Av.9 °F (36.1 °C), Min:96.1 °F (35.6 °C), Max:98.2 °F (36.8 °C)    Body mass index is 30.67 kg/(m^2).     Lab Results   Component Value Date/Time    HGB 9.1 2017 04:32 AM      Pt seen by and discussed with Supervising Physician   Continue PT, current pain meds  Plan  For rehab placement today      Signed By: MEHDI Solis

## 2017-04-08 NOTE — PROGRESS NOTES
Assisted to bathroom,gait steady using walker. Voids QS. Dilaudid 1 mg slow IVP for c/o pain. Benadryl 25mg po for c/o itching. Call light within reach.

## 2017-04-24 ENCOUNTER — HOSPITAL ENCOUNTER (OUTPATIENT)
Dept: PHYSICAL THERAPY | Age: 77
Discharge: HOME | End: 2017-04-24
Payer: MEDICARE

## 2017-04-24 PROCEDURE — G8979 MOBILITY GOAL STATUS: HCPCS

## 2017-04-24 PROCEDURE — 97161 PT EVAL LOW COMPLEX 20 MIN: CPT

## 2017-04-24 PROCEDURE — G8978 MOBILITY CURRENT STATUS: HCPCS

## 2017-04-24 PROCEDURE — 97110 THERAPEUTIC EXERCISES: CPT

## 2017-04-24 NOTE — PROGRESS NOTES
Ambulatory/Rehab Services H2 Model Falls Risk Assessment    Risk Factor Pts. ·   Confusion/Disorientation/Impulsivity  []    4 ·   Symptomatic Depression  []   2 ·   Altered Elimination  []   1 ·   Dizziness/Vertigo  []   1 ·   Gender (Male)  []   1 ·   Any administered antiepileptics (anticonvulsants):  []   2 ·   Any administered benzodiazepines:  []   1 ·   Visual Impairment (specify):  []   1 ·   Portable Oxygen Use  []   1 ·   Orthostatic ? BP  []   1 ·   History of Recent Falls (within 3 mos.)  []   5     Ability to Rise from Chair (choose one) Pts. ·   Ability to rise in a single movement  []   0 ·   Pushes up, successful in one attempt  [x]   1 ·   Multiple attempts, but successful  []   3 ·   Unable to rise without assistance  []   4   Total: (5 or greater = High Risk) 1     Falls Prevention Plan:   []                Physical Limitations to Exercise (specify):   []                Mobility Assistance Device (type):   []                Exercise/Equipment Adaptation (specify):    ©2010 [facility] [place] Patent #[id number].  Federal Law prohibits the replication, distribution or use without written permission from [facility] [employer]

## 2017-04-24 NOTE — PROGRESS NOTES
[facility] [place] [address] [facility]   [address], [address]  [phone]   [id number] [id number]        OUTPATIENT PHYSICAL THERAPY:Initial Assessment 4/24/2017    ICD-10: Treatment Diagnosis:   R26.2 Difficulty in walking, not elsewhere classified     M25.662 Stiffness of left knee, not elsewhere classified     M25.562 Pain in left knee     Precautions/Allergies:   Latex; Adhesive; Pcn [penicillins]; and Sulfa (sulfonamide antibiotics)   Fall Risk Score: 1 (? 5 = High Risk)  MD Orders: Eval and treat MEDICAL/REFERRING DIAGNOSIS:  Presence of left artificial knee joint [Z96.652]   DATE OF ONSET: April 5, 2017  REFERRING PHYSICIAN: Jairo Miller., *  RETURN PHYSICIAN APPOINTMENT: April 27     INITIAL ASSESSMENT:  Ms. Tracey Fernando presents with pain and stiffness of L knee post L TKA on 4/5/17. Fair gait with rolling walker. Minimal edema. Very motivated and should progress well. PROBLEM LIST (Impacting functional limitations):  1. Decreased Strength  2. Decreased ADL/Functional Activities  3. Increased Pain  4. Decreased Flexibility/Joint Mobility INTERVENTIONS PLANNED:  1. Gait Training  2. Home Exercise Program (HEP)  3. Manual Therapy  4. Range of Motion (ROM)  5. Therapeutic Activites  6. Therapeutic Exercise/Strengthening   TREATMENT PLAN:  Effective Dates: 4/24/17 TO 7/21/17. Frequency/Duration: 3 times a week for 3 weeks then 2 x wek for 5 weeks and upon reassessment,  will adjust frequency and duration as progress indicates. GOALS: (Goals have been discussed and agreed upon with patient.)  Short-Term Functional Goals: Time Frame: 4 weeks   1. Establish independent HEP with no cueing. 2. Increase L knee ROM to 0-115 to increase ease of sitting and ambulation   3. Increase L LE strength so able to initiate reciprocal pattern on stairs. 4. Independent gait with straight cane in home and community   Discharge Goals: Time Frame:   1.  Improve score on LEFS by 9 points to enable prolonged sitting, standing and ambulation   2. Independent gait without assistive device. 3. Increase L knee ROM to 0-125 to normalize gait   4. Increase strength so able to do reciprocal pattern on stairs  5. Increase L LE strength so able to ambulate 20 min with minimal to no increase in pain   Rehabilitation Potential For Stated Goals: Excellent  Regarding Chano Rosado's therapy, I certify that the treatment plan above will be carried out by a therapist or under their direction. Thank you for this referral,  Avis Jones, PT     Referring Physician Signature: Joe Ramos., *              Date                    The information in this section was collected on 4/24/17 (except where otherwise noted). HISTORY:   History of Present Injury/Illness (Reason for Referral):         Pt is post L TKA on 4/5/2017 secondary to OA. Pt was at [facility] for 10 days for rehab of L knee. Pt was then referred to outpatient PT for ROM and strengthening of L knee and LE. Pt presents with decreased motion of L knee, weakness of LE's and fair gait with rolling walker. Reports pain with increased activity. Not sleeping well. Past Medical History/Comorbidities:   Ms. Therese Vogt  has a past medical history of Arthritis; Chronic pain; Former smoker, stopped smoking in distant past; GERD (gastroesophageal reflux disease); High cholesterol; Hypertension; Morbid obesity ([address] Nausea & vomiting; Pre-diabetes; Status post total left knee replacement (4/5/2017); and Urinary incontinence. She also has no past medical history of Adverse effect of anesthesia; Aneurysm ([address] Arrhythmia; Asthma; Autoimmune disease ([address] CAD (coronary artery disease); Cancer ([address] Chronic kidney disease; Coagulation defects; COPD; Diabetes ([address] Difficult intubation; Endocarditis; Heart failure ([address] Liver disease; Malignant hyperthermia due to anesthesia;  Other ill-defined conditions; Pseudocholinesterase deficiency; Psychiatric disorder; PUD (peptic ulcer disease); Rheumatic fever; Seizures ([facility] Stroke [facility]); Thromboembolus ([facility] Thyroid disease; Unspecified adverse effect of anesthesia; or Unspecified sleep apnea. Ms. Noel Albert  has a past surgical history that includes gyn; heent; us guided core breast biopsy; rohit biopsy breast stereotactic; knee replacement (Right, 2010); and breast surgery procedure unlisted. Social History/Living Environment:     Lives in home without stairs but has stairs at [employer]  Prior Level of Function/Work/Activity:  Retired. Enjoys walking and traveling   Dominant Side:         RIGHT  Current Medications:       Current Outpatient Prescriptions:     oxyCODONE IR (ROXICODONE) 10 mg tab immediate release tablet, Take 1 Tab by mouth every four (4) hours as needed. Max Daily Amount: 60 mg., Disp: 60 Tab, Rfl: 0    aspirin delayed-release 325 mg tablet, Take 1 Tab by mouth every twelve (12) hours every twelve (12) hours for 35 days. , Disp: 70 Tab, Rfl: 0    HYDROmorphone (DILAUDID) 2 mg tablet, Take 1 Tab by mouth every three (3) hours as needed. Max Daily Amount: 16 mg., Disp: 40 Tab, Rfl: 0    mirabegron ER (MYRBETRIQ) 50 mg ER tablet, Take 50 mg by mouth daily. Take morning of surgery per anesthesia guidelines. Indications: URINARY URGE INCONTINENCE, Disp: , Rfl:     omeprazole (PRILOSEC) 40 mg capsule, Take 40 mg by mouth daily. Take morning of surgery per anesthesia guidelines. , Disp: , Rfl:     simvastatin (ZOCOR) 40 mg tablet, Take 40 mg by mouth daily. Per anesthesia protocol:instructed to take am of surgery. , Disp: , Rfl:     lisinopril-hydrochlorothiazide (PRINZIDE, ZESTORETIC) 20-12.5 mg per tablet, Take 1 Tab by mouth daily. , Disp: , Rfl:     docusate sodium (COLACE) 100 mg capsule, Take 100 mg by mouth daily. , Disp: , Rfl:    No longer taking oxycodone. Taking Motrin 800 mg as needed.   Taking motrin and benydril at night    Date Last Reviewed:  4/24/2017     Number of Personal Factors/Comorbidities that affect the Plan of Care: 1-2: MODERATE COMPLEXITY   EXAMINATION:   Observation/Orthostatic Postural Assessment: To PT with rolling walker. Pt with flat foot gait. Decreased knee ext at heel strike and stance . Decreased toe off. Limp to L. Moderate edema. Tight HS. In 90/90, HS are 60. Palpation:          Tender medial knee and back of knee   ROM:     L knee -5 to 103  R knee  0- 125                                       Strength:     Knee ext  L 4-/5, R 4+/5  Knee flex L 4-/5,  R 5/5         Hip flexion  L 4/5,  R 5/5  Hip abd  L 3+/5,  R 4/5              Neurological Screen: Intact to light touch   Balance:  Good with straight cane    Body Structures Involved:  1. Bones  2. Joints  3. Muscles  4. Ligaments Body Functions Affected:  1. Sensory/Pain  2. Neuromusculoskeletal  3. Movement Related Activities and Participation Affected:  1. Learning and Applying Knowledge  2. General Tasks and Demands  3. Mobility  4. Self Care  5. Community, Social and [place]   Number of elements (examined above) that affect the Plan of Care: 3: MODERATE COMPLEXITY   CLINICAL PRESENTATION:   Presentation: Evolving clinical presentation with changing clinical characteristics: MODERATE COMPLEXITY   CLINICAL DECISION MAKING:   Outcome Measure: Tool Used: Lower Extremity Functional Scale (LEFS)  Score:  Initial: 30/80 Most Recent: X/80 (Date: -- )   Interpretation of Score: 20 questions each scored on a 5 point scale with 0 representing \"extreme difficulty or unable to perform\" and 4 representing \"no difficulty\". The lower the score, the greater the functional disability. 80/80 represents no disability. Minimal detectable change is 9 points. Score 80 79-63 62-48 47-32 31-16 15-1 0   Modifier CH CI CJ CK CL CM CN     ?  Mobility - Walking and Moving Around:     - CURRENT STATUS: CL - 60%-79% impaired, limited or restricted    - GOAL STATUS: CJ - 20%-39% impaired, limited or restricted    - D/C STATUS:  ---------------To be determined---------------    Medical Necessity:   · Patient is expected to demonstrate progress in strength, range of motion, balance and gait to increase independence with home and community activities. Reason for Services/Other Comments:  · Patient continues to require skilled intervention due to decreased ROM and strength of L knee and LE. Use of outcome tool(s) and clinical judgement create a POC that gives a: Clear prediction of patient's progress: LOW COMPLEXITY            TREATMENT:   (In addition to Assessment/Re-Assessment sessions the following treatments were rendered)  Pre-treatment Symptoms/Complaints:  Pain and stiffness of L knee and LE. Fair gait   Pain: Initial:   Pain Intensity 1: 8  Post Session:  6     THERAPEUTIC EXERCISE: (15  minutes):  Exercises per grid below to improve mobility, strength, coordination and gait. Required moderate verbal and manual cues to promote proper body alignment, promote proper body posture and promote proper body mechanics. Progressed resistance, range and repetitions as indicated. Instructed in exercises of QS (quadriceps sets), SLR (straight leg raises), hip abd, SAQ's (short arc quadriceps), LAQ's (long arc quadriceps), and standing HSC. Instructed in HS and HC stretch. Instructed in use of straight cane with min assist progressing to SBA. Instructed in 6 in step ups. Evaluation (  xx   ):    Manual Therapy (      ): Patella and patella tendon mobs. Soft tissue work to [employer] and HS. PROM for knee flex and extn. Therapeutic Modalities:    HEP: As above; handouts given to patient for all exercises. Treatment/Session Assessment:  Pt is post L TKA on 4/5/17. Presents with decreased ROM and strength of L knee and LE. Fair gait with rolling walker but progressed to straight cane today. She is to use cane at home and in community.   Will progress to no assistive device as ROM and strength increases. Work to increase ROM to normalize gait. Work to increase strength to enable return to prior activity levels including walking for exercises. Very motivated. Should progress well. · Response to Treatment:  Independent with straight cane. Good understanding of exercises. .  · Compliance with Program/Exercises: Will assess as treatment progresses. · Recommendations/Intent for next treatment session: \"Next visit will focus on ROM and strengtheing of L knee and LE.  \".   Total Treatment Duration:  PT Patient Time In/Time Out  Time In: 1100  Time Out: 1200  Treatment number  1  More Soliz, PT

## 2017-04-26 ENCOUNTER — HOSPITAL ENCOUNTER (OUTPATIENT)
Dept: PHYSICAL THERAPY | Age: 77
Discharge: HOME | End: 2017-04-26
Payer: MEDICARE

## 2017-04-26 PROCEDURE — 97140 MANUAL THERAPY 1/> REGIONS: CPT

## 2017-04-26 PROCEDURE — 97110 THERAPEUTIC EXERCISES: CPT

## 2017-04-28 ENCOUNTER — HOSPITAL ENCOUNTER (OUTPATIENT)
Dept: PHYSICAL THERAPY | Age: 77
Discharge: HOME | End: 2017-04-28
Payer: MEDICARE

## 2017-04-28 PROCEDURE — 97140 MANUAL THERAPY 1/> REGIONS: CPT

## 2017-04-28 PROCEDURE — 97110 THERAPEUTIC EXERCISES: CPT

## 2017-04-28 NOTE — PROGRESS NOTES
[facility]   [address], [address]  [phone]   [phone]        OUTPATIENT PHYSICAL THERAPY:Daily Note 4/27/2017    ICD-10: Treatment Diagnosis:   R26.2 Difficulty in walking, not elsewhere classified     M25.662 Stiffness of left knee, not elsewhere classified     M25.562 Pain in left knee     Precautions/Allergies:   Latex; Adhesive; Pcn [penicillins]; and Sulfa (sulfonamide antibiotics)   Fall Risk Score: 1 (? 5 = High Risk)  MD Orders: Eval and treat MEDICAL/REFERRING DIAGNOSIS:  Presence of left artificial knee joint [Z96.652]   DATE OF ONSET: April 5, 2017  REFERRING PHYSICIAN: Daniel Ellis, *  RETURN PHYSICIAN APPOINTMENT: April 27     INITIAL ASSESSMENT:  Ms. Beatrice Leal presents with pain and stiffness of L knee post L TKA on 4/5/17. Fair gait with rolling walker. Minimal edema. Very motivated and should progress well. PROBLEM LIST (Impacting functional limitations):  1. Decreased Strength  2. Decreased ADL/Functional Activities  3. Increased Pain  4. Decreased Flexibility/Joint Mobility INTERVENTIONS PLANNED:  1. Gait Training  2. Home Exercise Program (HEP)  3. Manual Therapy  4. Range of Motion (ROM)  5. Therapeutic Activites  6. Therapeutic Exercise/Strengthening   TREATMENT PLAN:  Effective Dates: 4/24/17 TO 7/21/17. Frequency/Duration: 3 times a week for 3 weeks then 2 x wek for 5 weeks and upon reassessment,  will adjust frequency and duration as progress indicates. GOALS: (Goals have been discussed and agreed upon with patient.)  Short-Term Functional Goals: Time Frame: 4 weeks   1. Establish independent HEP with no cueing. Discharge Goals: Time Frame:   1. Improve score on  Rehabilitation Potential For Stated Goals: Excellent  Regarding Katie Rosado's therapy, I certify that the treatment plan above will be carried out by a therapist or under their direction.   Thank you for this referral,  Fawn Contreras PT     Referring Physician Signature: Daniel Ospina., *              Date                    The information in this section was collected on 4/24/17 (except where otherwise noted). HISTORY:   History of Present Injury/Illness (Reason for Referral):    Past Medical History/Comorbidities:   Ms. Beatrice Leal  has a past medical history of Arthritis; Chronic pain; Former smoker, stopped smoking in distant past; GERD (gastroesophageal reflux disease); High cholesterol; Hypertension; Morbid obesity ([address] Nausea & vomiting; Pre-diabetes; Status post total left knee replacement (4/5/2017); and Urinary incontinence. She also has no past medical history of Adverse effect of anesthesia; Aneurysm ([address] Arrhythmia; Asthma; Autoimmune disease ([address] CAD (coronary artery disease); Cancer ([address] Chronic kidney disease; Coagulation defects; COPD; Diabetes ([address] Difficult intubation; Endocarditis; Heart failure ([address] Liver disease; Malignant hyperthermia due to anesthesia; Other ill-defined conditions; Pseudocholinesterase deficiency; Psychiatric disorder; PUD (peptic ulcer disease); Rheumatic fever; Seizures ([address] Stroke [facility]); Thromboembolus ([address] Thyroid disease; Unspecified adverse effect of anesthesia; or Unspecified sleep apnea. Ms. Beatrice Leal  has a past surgical history that includes gyn; heent; us guided core breast biopsy; rohit biopsy breast stereotactic; knee replacement (Right, 2010); and breast surgery procedure unlisted. Social History/Living Environment:     Lives in home without stairs but has stairs at [employer]  Prior Level of Function/Work/Activity:  Retired. Enjoys walking and traveling   Dominant Side:         RIGHT  Current Medications:       Current Outpatient Prescriptions:     oxyCODONE IR (ROXICODONE) 10 mg tab immediate release tablet, Take 1 Tab by mouth every four (4) hours as needed.  Max Daily Amount: 60 mg., Disp: 60 Tab, Rfl: 0    aspirin delayed-release 325 mg tablet, Take 1 Tab by mouth every twelve (12) hours every twelve (12) hours for 35 days. , Disp: 70 Tab, Rfl: 0    HYDROmorphone (DILAUDID) 2 mg tablet, Take 1 Tab by mouth every three (3) hours as needed. Max Daily Amount: 16 mg., Disp: 40 Tab, Rfl: 0    mirabegron ER (MYRBETRIQ) 50 mg ER tablet, Take 50 mg by mouth daily. Take morning of surgery per anesthesia guidelines. Indications: URINARY URGE INCONTINENCE, Disp: , Rfl:     omeprazole (PRILOSEC) 40 mg capsule, Take 40 mg by mouth daily. Take morning of surgery per anesthesia guidelines. , Disp: , Rfl:     simvastatin (ZOCOR) 40 mg tablet, Take 40 mg by mouth daily. Per anesthesia protocol:instructed to take am of surgery. , Disp: , Rfl:     lisinopril-hydrochlorothiazide (PRINZIDE, ZESTORETIC) 20-12.5 mg per tablet, Take 1 Tab by mouth daily. , Disp: , Rfl:     docusate sodium (COLACE) 100 mg capsule, Take 100 mg by mouth daily. , Disp: , Rfl:    No longer taking oxycodone. Taking Motrin 800 mg as needed. Taking motrin and benydril at night    Date Last Reviewed:  4/27/2017     Number of Personal Factors/Comorbidities that affect the Plan of Care: 1-2: MODERATE COMPLEXITY   EXAMINATION:   Observation/Orthostatic Postural Assessment: To PT with rolling walker. Palpation:          Tender medial knee and back of knee   ROM:     L knee -5 to 103  R knee  0- 125                                       Strength:     Knee ext  L 4-/5, R 4+/5  Knee flex L 4-/5,  R 5/5         Hip flexion  L 4/5,  R 5/5  Hip abd  L 3+/5,  R 4/5              Neurological Screen: Intact to light touch   Balance:  Good with straight cane    Body Structures Involved:  1. Bones  2. Joints  3. Muscles  4. Ligaments Body Functions Affected:  1. Sensory/Pain  2. Neuromusculoskeletal  3. Movement Related Activities and Participation Affected:  1. Learning and Applying Knowledge  2. General Tasks and Demands  3. Mobility  4. Self Care  5.  Community, Social and [place]   Number of elements (examined above) that affect the Plan of Care: 3: MODERATE COMPLEXITY   CLINICAL PRESENTATION:   Presentation: Evolving clinical presentation with changing clinical characteristics: MODERATE COMPLEXITY   CLINICAL DECISION MAKING:   Outcome Measure: Tool Used: Lower Extremity Functional Scale (LEFS)  Score:  Initial: 30/80 Most Recent: X/80 (Date: -- )   Interpretation of Score: 20 questions each scored on a 5 point scale with 0 representing \"extreme difficulty or unable to perform\" and 4 representing \"no difficulty\". The lower the score, the greater the functional disability. 80/80 represents no disability. Minimal detectable change is 9 points. Score 80 79-63 62-48 47-32 31-16 15-1 0   Modifier CH CI CJ CK CL CM CN     ? Mobility - Walking and Moving Around:     - CURRENT STATUS: CL - 60%-79% impaired, limited or restricted    - GOAL STATUS: CJ - 20%-39% impaired, limited or restricted    - D/C STATUS:  ---------------To be determined---------------    Medical Necessity:   · Patient is expected to demonstrate progress in strength, range of motion, balance and gait to increase independence with home and community activities. Reason for Services/Other Comments:  · Patient continues to require skilled intervention due to decreased ROM and strength of L knee and LE. Use of outcome tool(s) and clinical judgement create a POC that gives a: Clear prediction of patient's progress: LOW COMPLEXITY            TREATMENT:   (In addition to Assessment/Re-Assessment sessions the following treatments were rendered)  Pre-treatment Symptoms/Complaints:  Pain and stiffness of L knee and LE. Fair gait   Pain: Initial:   Pain Intensity 1: 6  Post Session:  6     THERAPEUTIC EXERCISE: (15  minutes):  Exercises per grid below to improve mobility, strength, coordination and gait.   Required moderate verbal and manual cues to promote proper body alignment, promote proper body posture and promote proper body mechanics. Progressed resistance, range and repetitions as indicated. Instructed in exercises of QS (quadriceps sets), SLR (straight leg raises), hip abd, SAQ's (short arc quadriceps), LAQ's (long arc quadriceps), and standing HSC. Instructed in HS and HC stretch. Instructed in use of straight cane with min assist progressing to SBA. Instructed in 6 in step ups. Evaluation (  xx   ):    Manual Therapy (      ): Patella and patella tendon mobs. Soft tissue work to [employer] and HS. PROM for knee flex and extn. Therapeutic Modalities:    HEP: As above; handouts given to patient for all exercises. Treatment/Session Assessment:  Pt is post L TKA on 4/5/17. Presents with decreased ROM and strength of L knee and LE. Fair gait with rolling walker but progressed to straight cane today. She is to use cane at home and in community. Will progress to no assistive device as ROM and strength increases. Work to increase ROM to normalize gait. Work to increase strength to enable return to prior activity levels including walking for exercises. Very motivated. Should progress well. · Response to Treatment:  Independent with straight cane. Good understanding of exercises. .  · Compliance with Program/Exercises: Will assess as treatment progresses. · Recommendations/Intent for next treatment session: \"Next visit will focus on ROM and strengtheing of L knee and LE.  \".   Total Treatment Duration:  PT Patient Time In/Time Out  Time In: 1100  Time Out: 1200  Treatment number  2  Lubna Live PT

## 2017-04-29 NOTE — PROGRESS NOTES
[facility]   [address], [address]  [phone]   [phone]        OUTPATIENT PHYSICAL THERAPY:Daily Note 4/28/2017    ICD-10: Treatment Diagnosis:   R26.2 Difficulty in walking, not elsewhere classified     M25.662 Stiffness of left knee, not elsewhere classified     M25.562 Pain in left knee     Precautions/Allergies:   Latex; Adhesive; Pcn [penicillins]; and Sulfa (sulfonamide antibiotics)   Fall Risk Score: 1 (? 5 = High Risk)  MD Orders: Eval and treat MEDICAL/REFERRING DIAGNOSIS:  Presence of left artificial knee joint [Z96.652]   DATE OF ONSET: April 5, 2017  REFERRING PHYSICIAN: George Rouse., *  RETURN PHYSICIAN APPOINTMENT: April 27     INITIAL ASSESSMENT:  Ms. Elsy Dodson presents with pain and stiffness of L knee post L TKA on 4/5/17. Fair gait with rolling walker. Minimal edema. Very motivated and should progress well. PROBLEM LIST (Impacting functional limitations):  1. Decreased Strength  2. Decreased ADL/Functional Activities  3. Increased Pain  4. Decreased Flexibility/Joint Mobility INTERVENTIONS PLANNED:  1. Gait Training  2. Home Exercise Program (HEP)  3. Manual Therapy  4. Range of Motion (ROM)  5. Therapeutic Activites  6. Therapeutic Exercise/Strengthening   TREATMENT PLAN:  Effective Dates: 4/24/17 TO 7/21/17. Frequency/Duration: 3 times a week for 3 weeks then 2 x wek for 5 weeks and upon reassessment,  will adjust frequency and duration as progress indicates. GOALS: (Goals have been discussed and agreed upon with patient.)  Short-Term Functional Goals: Time Frame: 4 weeks   1. Establish independent HEP with no cueing. Discharge Goals: Time Frame:   1. Improve score on  Rehabilitation Potential For Stated Goals: Excellent  Regarding Lida Rosado's therapy, I certify that the treatment plan above will be carried out by a therapist or under their direction.   Thank you for this referral,  Candis Sheffield, LUIS ENRIQUE     Referring Physician Signature: George Rouse., *              Date                    The information in this section was collected on 4/24/17 (except where otherwise noted). HISTORY:   History of Present Injury/Illness (Reason for Referral):    Past Medical History/Comorbidities:   Ms. 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 She also has no past medical history of Adverse effect of anesthesia; Aneurysm ([address] Arrhythmia; Asthma; Autoimmune disease ([address] CAD (coronary artery disease); Cancer ([address] Chronic kidney disease; Coagulation defects; COPD; Diabetes ([address] Difficult intubation; Endocarditis; Heart failure ([address] Liver disease; Malignant hyperthermia due to anesthesia; Other ill-defined conditions; Pseudocholinesterase deficiency; Psychiatric disorder; PUD (peptic ulcer disease); Rheumatic fever; Seizures ([address] Stroke [facility]); Thromboembolus ([address] Thyroid disease; Unspecified adverse effect of anesthesia; or Unspecified sleep apnea. Ms. Elsy Dodson  has a past surgical history that includes gyn; heent; us guided core breast biopsy; rohit biopsy breast stereotactic; knee replacement (Right, 2010); and breast surgery procedure unlisted. Social History/Living Environment:     Lives in home without stairs but has stairs at [employer]  Prior Level of Function/Work/Activity:  Retired. Enjoys walking and traveling   Dominant Side:         RIGHT  Current Medications:       Current Outpatient Prescriptions:     oxyCODONE IR (ROXICODONE) 10 mg tab immediate release tablet, Take 1 Tab by mouth every four (4) hours as needed.  Max Daily Amount: 60 mg., Disp: 60 Tab, Rfl: 0    aspirin delayed-release 325 mg tablet, Take 1 Tab by mouth every twelve (12) hours every twelve (12) hours for 35 days. , Disp: 70 Tab, Rfl: 0    HYDROmorphone (DILAUDID) 2 mg tablet, Take 1 Tab by mouth every three (3) hours as needed. Max Daily Amount: 16 mg., Disp: 40 Tab, Rfl: 0    mirabegron ER (MYRBETRIQ) 50 mg ER tablet, Take 50 mg by mouth daily. Take morning of surgery per anesthesia guidelines. Indications: URINARY URGE INCONTINENCE, Disp: , Rfl:     omeprazole (PRILOSEC) 40 mg capsule, Take 40 mg by mouth daily. Take morning of surgery per anesthesia guidelines. , Disp: , Rfl:     simvastatin (ZOCOR) 40 mg tablet, Take 40 mg by mouth daily. Per anesthesia protocol:instructed to take am of surgery. , Disp: , Rfl:     lisinopril-hydrochlorothiazide (PRINZIDE, ZESTORETIC) 20-12.5 mg per tablet, Take 1 Tab by mouth daily. , Disp: , Rfl:     docusate sodium (COLACE) 100 mg capsule, Take 100 mg by mouth daily. , Disp: , Rfl:    No longer taking oxycodone. Taking Motrin 800 mg as needed. Taking motrin and benydril at night    Date Last Reviewed:  4/28/2017     Number of Personal Factors/Comorbidities that affect the Plan of Care: 1-2: MODERATE COMPLEXITY   EXAMINATION:   Observation/Orthostatic Postural Assessment: To PT with rolling walker. Palpation:          Tender medial knee and back of knee   ROM:     L knee -5 to 103  R knee  0- 125                                       Strength:     Knee ext  L 4-/5, R 4+/5  Knee flex L 4-/5,  R 5/5         Hip flexion  L 4/5,  R 5/5  Hip abd  L 3+/5,  R 4/5              Neurological Screen: Intact to light touch   Balance:  Good with straight cane    Body Structures Involved:  1. Bones  2. Joints  3. Muscles  4. Ligaments Body Functions Affected:  1. Sensory/Pain  2. Neuromusculoskeletal  3. Movement Related Activities and Participation Affected:  1. Learning and Applying Knowledge  2. General Tasks and Demands  3. Mobility  4. Self Care  5.  Community, Social and [place]   Number of elements (examined above) that affect the Plan of Care: 3: MODERATE COMPLEXITY   CLINICAL PRESENTATION:   Presentation: Evolving clinical presentation with changing clinical characteristics: MODERATE COMPLEXITY   CLINICAL DECISION MAKING:   Outcome Measure: Tool Used: Lower Extremity Functional Scale (LEFS)  Score:  Initial: 30/80 Most Recent: X/80 (Date: -- )   Interpretation of Score: 20 questions each scored on a 5 point scale with 0 representing \"extreme difficulty or unable to perform\" and 4 representing \"no difficulty\". The lower the score, the greater the functional disability. 80/80 represents no disability. Minimal detectable change is 9 points. Score 80 79-63 62-48 47-32 31-16 15-1 0   Modifier CH CI CJ CK CL CM CN     ? Mobility - Walking and Moving Around:     - CURRENT STATUS: CL - 60%-79% impaired, limited or restricted    - GOAL STATUS: CJ - 20%-39% impaired, limited or restricted    - D/C STATUS:  ---------------To be determined---------------    Medical Necessity:   · Patient is expected to demonstrate progress in strength, range of motion, balance and gait to increase independence with home and community activities. Reason for Services/Other Comments:  · Patient continues to require skilled intervention due to decreased ROM and strength of L knee and LE. Use of outcome tool(s) and clinical judgement create a POC that gives a: Clear prediction of patient's progress: LOW COMPLEXITY            TREATMENT:   (In addition to Assessment/Re-Assessment sessions the following treatments were rendered)  Pre-treatment Symptoms/Complaints:  Pain and stiffness of L knee and LE. Fair gait   Pain: Initial:   Pain Intensity 1: 4  Post Session:  6     THERAPEUTIC EXERCISE: (15  minutes):  Exercises per grid below to improve mobility, strength, coordination and gait.   Required moderate verbal and manual cues to promote proper body alignment, promote proper body posture and promote proper body mechanics. Progressed resistance, range and repetitions as indicated. Instructed in exercises of QS (quadriceps sets), SLR (straight leg raises), hip abd, SAQ's (short arc quadriceps), LAQ's (long arc quadriceps), and standing HSC. Instructed in HS and HC stretch. Instructed in use of straight cane with min assist progressing to SBA. Instructed in 6 in step ups. Evaluation (  xx   ):    Manual Therapy (      ): Patella and patella tendon mobs. Soft tissue work to [employer] and HS. PROM for knee flex and extn. Therapeutic Modalities:    HEP: As above; handouts given to patient for all exercises. Treatment/Session Assessment:  Pt is post L TKA on 4/5/17. Presents with decreased ROM and strength of L knee and LE. Fair gait with rolling walker but progressed to straight cane today. She is to use cane at home and in community. Will progress to no assistive device as ROM and strength increases. Work to increase ROM to normalize gait. Work to increase strength to enable return to prior activity levels including walking for exercises. Very motivated. Should progress well. · Response to Treatment:  Independent with straight cane. Good understanding of exercises. .  · Compliance with Program/Exercises: Will assess as treatment progresses. · Recommendations/Intent for next treatment session: \"Next visit will focus on ROM and strengtheing of L knee and LE.  \".   Total Treatment Duration:  PT Patient Time In/Time Out  Time In: 1100  Time Out: 1200  Treatment number  3  More Lind, PT

## 2017-05-01 ENCOUNTER — HOSPITAL ENCOUNTER (OUTPATIENT)
Dept: PHYSICAL THERAPY | Age: 77
Discharge: HOME | End: 2017-05-01
Payer: MEDICARE

## 2017-05-01 PROCEDURE — 97110 THERAPEUTIC EXERCISES: CPT

## 2017-05-01 PROCEDURE — 97140 MANUAL THERAPY 1/> REGIONS: CPT

## 2017-05-02 NOTE — PROGRESS NOTES
Therapy Center at [address]   [address], [address]  [phone]   [phone]        OUTPATIENT PHYSICAL THERAPY:Daily Note 5/1/2017    ICD-10: Treatment Diagnosis:   R26.2 Difficulty in walking, not elsewhere classified     M25.662 Stiffness of left knee, not elsewhere classified     M25.562 Pain in left knee     Precautions/Allergies:   Latex; Adhesive; Pcn [penicillins]; and Sulfa (sulfonamide antibiotics)   Fall Risk Score: 1 (? 5 = High Risk)  MD Orders: Eval and treat MEDICAL/REFERRING DIAGNOSIS:  Presence of left artificial knee joint [Z96.652]   DATE OF ONSET: April 5, 2017  REFERRING PHYSICIAN: Shaina Allen, *  RETURN PHYSICIAN APPOINTMENT: April 27     INITIAL ASSESSMENT:  Ms. Gold Kang presents with pain and stiffness of L knee post L TKA on 4/5/17. Fair gait with rolling walker. Minimal edema. Very motivated and should progress well. PROBLEM LIST (Impacting functional limitations):  1. Decreased Strength  2. Decreased ADL/Functional Activities  3. Increased Pain  4. Decreased Flexibility/Joint Mobility INTERVENTIONS PLANNED:  1. Gait Training  2. Home Exercise Program (HEP)  3. Manual Therapy  4. Range of Motion (ROM)  5. Therapeutic Activites  6. Therapeutic Exercise/Strengthening   TREATMENT PLAN:  Effective Dates: 4/24/17 TO 7/21/17. Frequency/Duration: 3 times a week for 3 weeks then 2 x wek for 5 weeks and upon reassessment,  will adjust frequency and duration as progress indicates. GOALS: (Goals have been discussed and agreed upon with patient.)  Short-Term Functional Goals: Time Frame: 4 weeks   1. Establish independent HEP with no cueing. Discharge Goals: Time Frame:   1. Improve score on  Rehabilitation Potential For Stated Goals: Excellent  Regarding Marjeremiah Rosado's therapy, I certify that the treatment plan above will be carried out by a therapist or under their direction.   Thank you for this referral,  Nancy Sood PT     Referring Physician Signature: Kim Kim., *              Date                    The information in this section was collected on 4/24/17 (except where otherwise noted). HISTORY:   History of Present Injury/Illness (Reason for Referral):    Past Medical History/Comorbidities:   Ms. Reji Harvey  has a past medical history of Arthritis; Chronic pain; Former smoker, stopped smoking in distant past; GERD (gastroesophageal reflux disease); High cholesterol; Hypertension; Morbid obesity ([address] Nausea & vomiting; Pre-diabetes; Status post total left knee replacement (4/5/2017); and Urinary incontinence. She also has no past medical history of Adverse effect of anesthesia; Aneurysm ([address] Arrhythmia; Asthma; Autoimmune disease ([address] CAD (coronary artery disease); Cancer ([address] Chronic kidney disease; Coagulation defects; COPD; Diabetes ([address] Difficult intubation; Endocarditis; Heart failure ([address] Liver disease; Malignant hyperthermia due to anesthesia; Other ill-defined conditions; Pseudocholinesterase deficiency; Psychiatric disorder; PUD (peptic ulcer disease); Rheumatic fever; Seizures ([address] Stroke [facility]); Thromboembolus ([address] Thyroid disease; Unspecified adverse effect of anesthesia; or Unspecified sleep apnea. Ms. Reji Harvey  has a past surgical history that includes gyn; heent; us guided core breast biopsy; rohit biopsy breast stereotactic; knee replacement (Right, 2010); and breast surgery procedure unlisted. Social History/Living Environment:     Lives in home without stairs but has stairs at [employer]  Prior Level of Function/Work/Activity:  Retired. Enjoys walking and traveling   Dominant Side:         RIGHT  Current Medications:       Current Outpatient Prescriptions:     oxyCODONE IR (ROXICODONE) 10 mg tab immediate release tablet, Take 1 Tab by mouth every four (4) hours as needed.  Max Daily Amount: 60 mg., Disp: 60 Tab, Rfl: 0    aspirin delayed-release 325 mg tablet, Take 1 Tab by mouth every twelve (12) hours every twelve (12) hours for 35 days. , Disp: 70 Tab, Rfl: 0    HYDROmorphone (DILAUDID) 2 mg tablet, Take 1 Tab by mouth every three (3) hours as needed. Max Daily Amount: 16 mg., Disp: 40 Tab, Rfl: 0    mirabegron ER (MYRBETRIQ) 50 mg ER tablet, Take 50 mg by mouth daily. Take morning of surgery per anesthesia guidelines. Indications: URINARY URGE INCONTINENCE, Disp: , Rfl:     omeprazole (PRILOSEC) 40 mg capsule, Take 40 mg by mouth daily. Take morning of surgery per anesthesia guidelines. , Disp: , Rfl:     simvastatin (ZOCOR) 40 mg tablet, Take 40 mg by mouth daily. Per anesthesia protocol:instructed to take am of surgery. , Disp: , Rfl:     lisinopril-hydrochlorothiazide (PRINZIDE, ZESTORETIC) 20-12.5 mg per tablet, Take 1 Tab by mouth daily. , Disp: , Rfl:     docusate sodium (COLACE) 100 mg capsule, Take 100 mg by mouth daily. , Disp: , Rfl:    No longer taking oxycodone. Taking Motrin 800 mg as needed. Taking motrin and benydril at night    Date Last Reviewed:  5/1/2017     Number of Personal Factors/Comorbidities that affect the Plan of Care: 1-2: MODERATE COMPLEXITY   EXAMINATION:   Observation/Orthostatic Postural Assessment: To PT with rolling walker. Palpation:          Tender medial knee and back of knee   ROM:     L knee -5 to 103  R knee  0- 125                                       Strength:     Knee ext  L 4-/5, R 4+/5  Knee flex L 4-/5,  R 5/5         Hip flexion  L 4/5,  R 5/5  Hip abd  L 3+/5,  R 4/5              Neurological Screen: Intact to light touch   Balance:  Good with straight cane    Body Structures Involved:  1. Bones  2. Joints  3. Muscles  4. Ligaments Body Functions Affected:  1. Sensory/Pain  2. Neuromusculoskeletal  3. Movement Related Activities and Participation Affected:  1. Learning and Applying Knowledge  2. General Tasks and Demands  3. Mobility  4. Self Care  5.  Community, Social and [place]   Number of elements (examined above) that affect the Plan of Care: 3: MODERATE COMPLEXITY   CLINICAL PRESENTATION:   Presentation: Evolving clinical presentation with changing clinical characteristics: MODERATE COMPLEXITY   CLINICAL DECISION MAKING:   Outcome Measure: Tool Used: Lower Extremity Functional Scale (LEFS)  Score:  Initial: 30/80 Most Recent: X/80 (Date: -- )   Interpretation of Score: 20 questions each scored on a 5 point scale with 0 representing \"extreme difficulty or unable to perform\" and 4 representing \"no difficulty\". The lower the score, the greater the functional disability. 80/80 represents no disability. Minimal detectable change is 9 points. Score 80 79-63 62-48 47-32 31-16 15-1 0   Modifier CH CI CJ CK CL CM CN     ? Mobility - Walking and Moving Around:     - CURRENT STATUS: CL - 60%-79% impaired, limited or restricted    - GOAL STATUS: CJ - 20%-39% impaired, limited or restricted    - D/C STATUS:  ---------------To be determined---------------    Medical Necessity:   · Patient is expected to demonstrate progress in strength, range of motion, balance and gait to increase independence with home and community activities. Reason for Services/Other Comments:  · Patient continues to require skilled intervention due to decreased ROM and strength of L knee and LE. Use of outcome tool(s) and clinical judgement create a POC that gives a: Clear prediction of patient's progress: LOW COMPLEXITY            TREATMENT:   (In addition to Assessment/Re-Assessment sessions the following treatments were rendered)  Pre-treatment Symptoms/Complaints:  Pain and stiffness of L knee and LE. Fair gait   Pain: Initial:   Pain Intensity 1: 4  Post Session:  6     THERAPEUTIC EXERCISE: (15  minutes):  Exercises per grid below to improve mobility, strength, coordination and gait.   Required moderate verbal and manual cues to promote proper body alignment, promote proper body posture and promote proper body mechanics. Progressed resistance, range and repetitions as indicated. Instructed in exercises of QS (quadriceps sets), SLR (straight leg raises), hip abd, SAQ's (short arc quadriceps), LAQ's (long arc quadriceps), and standing HSC. Instructed in HS and HC stretch. Instructed in use of straight cane with min assist progressing to SBA. Instructed in 6 in step ups. Evaluation (  xx   ):    Manual Therapy (      ): Patella and patella tendon mobs. Soft tissue work to [employer] and HS. PROM for knee flex and extn. Therapeutic Modalities:    HEP: As above; handouts given to patient for all exercises. Treatment/Session Assessment:  Pt is post L TKA on 4/5/17. Presents with decreased ROM and strength of L knee and LE. Fair gait with rolling walker but progressed to straight cane today. She is to use cane at home and in community. Will progress to no assistive device as ROM and strength increases. Work to increase ROM to normalize gait. Work to increase strength to enable return to prior activity levels including walking for exercises. Very motivated. Should progress well. · Response to Treatment:  Independent with straight cane. Good understanding of exercises. .  · Compliance with Program/Exercises: Will assess as treatment progresses. · Recommendations/Intent for next treatment session: \"Next visit will focus on ROM and strengtheing of L knee and LE.  \".   Total Treatment Duration:  PT Patient Time In/Time Out  Time In: 1100  Time Out: 1200  Treatment number  [address] [place], PT

## 2017-05-03 ENCOUNTER — HOSPITAL ENCOUNTER (OUTPATIENT)
Dept: PHYSICAL THERAPY | Age: 77
Discharge: HOME | End: 2017-05-03
Payer: MEDICARE

## 2017-05-03 PROCEDURE — 97110 THERAPEUTIC EXERCISES: CPT

## 2017-05-03 PROCEDURE — 97140 MANUAL THERAPY 1/> REGIONS: CPT

## 2017-05-03 NOTE — PROGRESS NOTES
Therapy Center at [facility] Therapy   [address], [address]  [phone]   [phone]        OUTPATIENT PHYSICAL THERAPY:Daily Note 5/3/2017    ICD-10: Treatment Diagnosis:   R26.2 Difficulty in walking, not elsewhere classified     M25.662 Stiffness of left knee, not elsewhere classified     M25.562 Pain in left knee     Precautions/Allergies:   Latex; Adhesive; Pcn [penicillins]; and Sulfa (sulfonamide antibiotics)   Fall Risk Score: 1 (? 5 = High Risk)  MD Orders: Eval and treat MEDICAL/REFERRING DIAGNOSIS:  Presence of left artificial knee joint [Z96.652]   DATE OF ONSET: April 5, 2017  REFERRING PHYSICIAN: Yeimy Barrett., *  RETURN PHYSICIAN APPOINTMENT: April 27     INITIAL ASSESSMENT:  Ms. Taz Hernandes presents with pain and stiffness of L knee post L TKA on 4/5/17. Fair gait with rolling walker. Minimal edema. Very motivated and should progress well. PROBLEM LIST (Impacting functional limitations):  1. Decreased Strength  2. Decreased ADL/Functional Activities  3. Increased Pain  4. Decreased Flexibility/Joint Mobility INTERVENTIONS PLANNED:  1. Gait Training  2. Home Exercise Program (HEP)  3. Manual Therapy  4. Range of Motion (ROM)  5. Therapeutic Activites  6. Therapeutic Exercise/Strengthening   TREATMENT PLAN:  Effective Dates: 4/24/17 TO 7/21/17. Frequency/Duration: 3 times a week for 3 weeks then 2 x wek for 5 weeks and upon reassessment,  will adjust frequency and duration as progress indicates. GOALS: (Goals have been discussed and agreed upon with patient.)  Short-Term Functional Goals: Time Frame: 4 weeks   1. Establish independent HEP with no cueing. Discharge Goals: Time Frame:   1. Improve score on  Rehabilitation Potential For Stated Goals: Excellent  Regarding Radha Rosado's therapy, I certify that the treatment plan above will be carried out by a therapist or under their direction.   Thank you for this referral,  Vivek You PT     Referring Physician Signature: Isadora Aguilera., *              Date                    The information in this section was collected on 4/24/17 (except where otherwise noted). HISTORY:   History of Present Injury/Illness (Reason for Referral):    Past Medical History/Comorbidities:   Ms. 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 She also has no past medical history of Adverse effect of anesthesia; Aneurysm ([address] Arrhythmia; Asthma; Autoimmune disease ([address] CAD (coronary artery disease); Cancer ([address] Chronic kidney disease; Coagulation defects; COPD; Diabetes ([address] Difficult intubation; Endocarditis; Heart failure ([address] Liver disease; Malignant hyperthermia due to anesthesia; Other ill-defined conditions; Pseudocholinesterase deficiency; Psychiatric disorder; PUD (peptic ulcer disease); Rheumatic fever; Seizures ([address] Stroke [facility]); Thromboembolus ([address] Thyroid disease; Unspecified adverse effect of anesthesia; or Unspecified sleep apnea. Ms. Radha Carranza  has a past surgical history that includes gyn; heent; us guided core breast biopsy; rohit biopsy breast stereotactic; knee replacement (Right, 2010); and breast surgery procedure unlisted. Social History/Living Environment:     Lives in home without stairs but has stairs at [employer]  Prior Level of Function/Work/Activity:  Retired. Enjoys walking and traveling   Dominant Side:         RIGHT  Current Medications:       Current Outpatient Prescriptions:     oxyCODONE IR (ROXICODONE) 10 mg tab immediate release tablet, Take 1 Tab by mouth every four (4) hours as needed.  Max Daily Amount: 60 mg., Disp: 60 Tab, Rfl: 0    aspirin delayed-release 325 mg tablet, Take 1 Tab by mouth every twelve (12) hours every twelve (12) hours for 35 days. , Disp: 70 Tab, Rfl: 0    HYDROmorphone (DILAUDID) 2 mg tablet, Take 1 Tab by mouth every three (3) hours as needed. Max Daily Amount: 16 mg., Disp: 40 Tab, Rfl: 0    mirabegron ER (MYRBETRIQ) 50 mg ER tablet, Take 50 mg by mouth daily. Take morning of surgery per anesthesia guidelines. Indications: URINARY URGE INCONTINENCE, Disp: , Rfl:     omeprazole (PRILOSEC) 40 mg capsule, Take 40 mg by mouth daily. Take morning of surgery per anesthesia guidelines. , Disp: , Rfl:     simvastatin (ZOCOR) 40 mg tablet, Take 40 mg by mouth daily. Per anesthesia protocol:instructed to take am of surgery. , Disp: , Rfl:     lisinopril-hydrochlorothiazide (PRINZIDE, ZESTORETIC) 20-12.5 mg per tablet, Take 1 Tab by mouth daily. , Disp: , Rfl:     docusate sodium (COLACE) 100 mg capsule, Take 100 mg by mouth daily. , Disp: , Rfl:    No longer taking oxycodone. Taking Motrin 800 mg as needed. Taking motrin and benydril at night    Date Last Reviewed:  5/3/2017     Number of Personal Factors/Comorbidities that affect the Plan of Care: 1-2: MODERATE COMPLEXITY   EXAMINATION:   Observation/Orthostatic Postural Assessment: To PT with rolling walker. Palpation:          Tender medial knee and back of knee   ROM:     L knee -5 to 103  R knee  0- 125                                       Strength:     Knee ext  L 4-/5, R 4+/5  Knee flex L 4-/5,  R 5/5         Hip flexion  L 4/5,  R 5/5  Hip abd  L 3+/5,  R 4/5              Neurological Screen: Intact to light touch   Balance:  Good with straight cane    Body Structures Involved:  1. Bones  2. Joints  3. Muscles  4. Ligaments Body Functions Affected:  1. Sensory/Pain  2. Neuromusculoskeletal  3. Movement Related Activities and Participation Affected:  1. Learning and Applying Knowledge  2. General Tasks and Demands  3. Mobility  4. Self Care  5.  Community, Social and [place]   Number of elements (examined above) that affect the Plan of Care: 3: MODERATE COMPLEXITY   CLINICAL PRESENTATION:   Presentation: Evolving clinical presentation with changing clinical characteristics: MODERATE COMPLEXITY   CLINICAL DECISION MAKING:   Outcome Measure: Tool Used: Lower Extremity Functional Scale (LEFS)  Score:  Initial: 30/80 Most Recent: X/80 (Date: -- )   Interpretation of Score: 20 questions each scored on a 5 point scale with 0 representing \"extreme difficulty or unable to perform\" and 4 representing \"no difficulty\". The lower the score, the greater the functional disability. 80/80 represents no disability. Minimal detectable change is 9 points. Score 80 79-63 62-48 47-32 31-16 15-1 0   Modifier CH CI CJ CK CL CM CN     ? Mobility - Walking and Moving Around:     - CURRENT STATUS: CL - 60%-79% impaired, limited or restricted    - GOAL STATUS: CJ - 20%-39% impaired, limited or restricted    - D/C STATUS:  ---------------To be determined---------------    Medical Necessity:   · Patient is expected to demonstrate progress in strength, range of motion, balance and gait to increase independence with home and community activities. Reason for Services/Other Comments:  · Patient continues to require skilled intervention due to decreased ROM and strength of L knee and LE. Use of outcome tool(s) and clinical judgement create a POC that gives a: Clear prediction of patient's progress: LOW COMPLEXITY            TREATMENT:   (In addition to Assessment/Re-Assessment sessions the following treatments were rendered)  Pre-treatment Symptoms/Complaints:  Pain and stiffness of L knee and LE. Fair gait   Pain: Initial:   Pain Intensity 1: 4  Post Session:  6     THERAPEUTIC EXERCISE: (15  minutes):  Exercises per grid below to improve mobility, strength, coordination and gait.   Required moderate verbal and manual cues to promote proper body alignment, promote proper body posture and promote proper body mechanics. Progressed resistance, range and repetitions as indicated. Instructed in exercises of QS (quadriceps sets), SLR (straight leg raises), hip abd, SAQ's (short arc quadriceps), LAQ's (long arc quadriceps), and standing HSC. Instructed in HS and HC stretch. Instructed in use of straight cane with min assist progressing to SBA. Instructed in 6 in step ups. Evaluation (  xx   ):    Manual Therapy (      ): Patella and patella tendon mobs. Soft tissue work to [employer] and HS. PROM for knee flex and extn. Therapeutic Modalities:    HEP: As above; handouts given to patient for all exercises. Treatment/Session Assessment:  Pt is post L TKA on 4/5/17. Presents with decreased ROM and strength of L knee and LE. Fair gait with rolling walker but progressed to straight cane today. She is to use cane at home and in community. Will progress to no assistive device as ROM and strength increases. Work to increase ROM to normalize gait. Work to increase strength to enable return to prior activity levels including walking for exercises. Very motivated. Should progress well. · Response to Treatment:  Independent with straight cane. Good understanding of exercises. .  · Compliance with Program/Exercises: Will assess as treatment progresses. · Recommendations/Intent for next treatment session: \"Next visit will focus on ROM and strengtheing of L knee and LE.  \".   Total Treatment Duration:  PT Patient Time In/Time Out  Time In: 1015  Time Out: 1115  Treatment number  [address], PT

## 2017-05-05 ENCOUNTER — HOSPITAL ENCOUNTER (OUTPATIENT)
Dept: PHYSICAL THERAPY | Age: 77
Discharge: HOME | End: 2017-05-05
Payer: MEDICARE

## 2017-05-05 PROCEDURE — 97110 THERAPEUTIC EXERCISES: CPT

## 2017-05-05 PROCEDURE — 97140 MANUAL THERAPY 1/> REGIONS: CPT

## 2017-05-05 NOTE — PROGRESS NOTES
Therapy Center at [place] [address] Therapy   [address], [address]  [phone]   [phone]        OUTPATIENT PHYSICAL THERAPY:Daily Note 5/5/2017    ICD-10: Treatment Diagnosis:   R26.2 Difficulty in walking, not elsewhere classified     M25.662 Stiffness of left knee, not elsewhere classified     M25.562 Pain in left knee     Precautions/Allergies:   Latex; Adhesive; Pcn [penicillins]; and Sulfa (sulfonamide antibiotics)   Fall Risk Score: 1 (? 5 = High Risk)  MD Orders: Eval and treat MEDICAL/REFERRING DIAGNOSIS:  Presence of left artificial knee joint [Z96.652]   DATE OF ONSET: April 5, 2017  REFERRING PHYSICIAN: Frederick Bell., *  RETURN PHYSICIAN APPOINTMENT: April 27     INITIAL ASSESSMENT:  Ms. Mariel Cabrera presents with pain and stiffness of L knee post L TKA on 4/5/17. Fair gait with rolling walker. Minimal edema. Very motivated and should progress well. PROBLEM LIST (Impacting functional limitations):  1. Decreased Strength  2. Decreased ADL/Functional Activities  3. Increased Pain  4. Decreased Flexibility/Joint Mobility INTERVENTIONS PLANNED:  1. Gait Training  2. Home Exercise Program (HEP)  3. Manual Therapy  4. Range of Motion (ROM)  5. Therapeutic Activites  6. Therapeutic Exercise/Strengthening   TREATMENT PLAN:  Effective Dates: 4/24/17 TO 7/21/17. Frequency/Duration: 3 times a week for 3 weeks then 2 x wek for 5 weeks and upon reassessment,  will adjust frequency and duration as progress indicates. GOALS: (Goals have been discussed and agreed upon with patient.)  Short-Term Functional Goals: Time Frame: 4 weeks   1. Establish independent HEP with no cueing. 2. Increase L knee ROM to 0-115 to increase ease of sitting and ambulation   3. Increase L LE strength so able to initiate reciprocal pattern on stairs. 4. Independent gait with straight cane in home and community   Discharge Goals: Time Frame:   1.  Improve score on LEFS by 9 points to enable prolonged sitting, standing and ambulation   2. Independent gait without assistive device. 3. Increase L knee ROM to 0-125 to normalize gait   4. Increase strength so able to do reciprocal pattern on stairs  Increase L LE strength so able to ambulate 20 min with minimal to no increase in pain   Rehabilitation Potential For Stated Goals: Excellent  Regarding Lili Rosado's therapy, I certify that the treatment plan above will be carried out by a therapist or under their direction. Thank you for this referral,  Genia Obregon, PT     Referring Physician Signature: Meng Murray., *              Date                    The information in this section was collected on 4/24/17 (except where otherwise noted). HISTORY:   History of Present Injury/Illness (Reason for Referral):    Pt is post L TKA on 4/5/2017 secondary to OA. Pt was at [facility] for 10 days for rehab of L knee. Pt was then referred to outpatient PT for ROM and strengthening of L knee and LE. Pt presents with decreased motion of L knee, weakness of LE's and fair gait with rolling walker. Reports pain with increased activity. Not sleeping well. Past Medical History/Comorbidities:   Ms. 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 She also has no past medical history of Adverse effect of anesthesia; Aneurysm ([address] Arrhythmia; Asthma; Autoimmune disease ([address] CAD (coronary artery disease); Cancer ([address] Chronic kidney disease; Coagulation defects; COPD; Diabetes ([address] Difficult intubation; Endocarditis; Heart failure ([address] Liver disease; Malignant hyperthermia due to anesthesia;  Other ill-defined conditions; Pseudocholinesterase deficiency; Psychiatric disorder; PUD (peptic ulcer disease); Rheumatic fever; Seizures ([facility]); Stroke [facility]); Thromboembolus ([facility]); Thyroid disease; Unspecified adverse effect of anesthesia; or Unspecified sleep apnea. Ms. Shelley Lanier  has a past surgical history that includes gyn; heent; us guided core breast biopsy; rohit biopsy breast stereotactic; knee replacement (Right, 2010); and breast surgery procedure unlisted. Social History/Living Environment:     Lives in home without stairs but has stairs at [employer]  Prior Level of Function/Work/Activity:  Retired. Enjoys walking and traveling   Dominant Side:         RIGHT   Current Medications:       Current Outpatient Prescriptions:     oxyCODONE IR (ROXICODONE) 10 mg tab immediate release tablet, Take 1 Tab by mouth every four (4) hours as needed. Max Daily Amount: 60 mg., Disp: 60 Tab, Rfl: 0    aspirin delayed-release 325 mg tablet, Take 1 Tab by mouth every twelve (12) hours every twelve (12) hours for 35 days. , Disp: 70 Tab, Rfl: 0    HYDROmorphone (DILAUDID) 2 mg tablet, Take 1 Tab by mouth every three (3) hours as needed. Max Daily Amount: 16 mg., Disp: 40 Tab, Rfl: 0    mirabegron ER (MYRBETRIQ) 50 mg ER tablet, Take 50 mg by mouth daily. Take morning of surgery per anesthesia guidelines. Indications: URINARY URGE INCONTINENCE, Disp: , Rfl:     omeprazole (PRILOSEC) 40 mg capsule, Take 40 mg by mouth daily. Take morning of surgery per anesthesia guidelines. , Disp: , Rfl:     simvastatin (ZOCOR) 40 mg tablet, Take 40 mg by mouth daily. Per anesthesia protocol:instructed to take am of surgery. , Disp: , Rfl:     lisinopril-hydrochlorothiazide (PRINZIDE, ZESTORETIC) 20-12.5 mg per tablet, Take 1 Tab by mouth daily. , Disp: , Rfl:     docusate sodium (COLACE) 100 mg capsule, Take 100 mg by mouth daily. , Disp: , Rfl:    No longer taking oxycodone. Taking Motrin 800 mg as needed.   Taking motrin and benydril at night    Date Last Reviewed:  5/5/2017     Number of Personal Factors/Comorbidities that affect the Plan of Care: 1-2: MODERATE COMPLEXITY   EXAMINATION:   Observation/Orthostatic Postural Assessment: To PT with rolling walker. . Pt with flat foot gait. Decreased knee ext at heel strike and stance . Decreased toe off. Limp to L. Moderate edema. Tight HS. In 90/90, HS are 60. Palpation:          Tender medial knee and back of knee   ROM:     L knee -5 to 103  R knee  0- 125                                       Strength:     Knee ext  L 4-/5, R 4+/5  Knee flex L 4-/5,  R 5/5         Hip flexion  L 4/5,  R 5/5  Hip abd  L 3+/5,  R 4/5              Neurological Screen: Intact to light touch   Balance:  Good with straight cane    Body Structures Involved:  1. Bones  2. Joints  3. Muscles  4. Ligaments Body Functions Affected:  1. Sensory/Pain  2. Neuromusculoskeletal  3. Movement Related Activities and Participation Affected:  1. Learning and Applying Knowledge  2. General Tasks and Demands  3. Mobility  4. Self Care  5. Community, Social and [place]   Number of elements (examined above) that affect the Plan of Care: 3: MODERATE COMPLEXITY   CLINICAL PRESENTATION:   Presentation: Evolving clinical presentation with changing clinical characteristics: MODERATE COMPLEXITY   CLINICAL DECISION MAKING:   Outcome Measure: Tool Used: Lower Extremity Functional Scale (LEFS)  Score:  Initial: 30/80 Most Recent: X/80 (Date: -- )   Interpretation of Score: 20 questions each scored on a 5 point scale with 0 representing \"extreme difficulty or unable to perform\" and 4 representing \"no difficulty\". The lower the score, the greater the functional disability. 80/80 represents no disability. Minimal detectable change is 9 points. Score 80 79-63 62-48 47-32 31-16 15-1 0   Modifier CH CI CJ CK CL CM CN     ?  Mobility - Walking and Moving Around:     - CURRENT STATUS: CL - 60%-79% impaired, limited or restricted    - GOAL STATUS: CJ - 20%-39% impaired, limited or restricted    - D/C STATUS:  ---------------To be determined---------------    Medical Necessity:   · Patient is expected to demonstrate progress in strength, range of motion, balance and gait to increase independence with home and community activities. Reason for Services/Other Comments:  · Patient continues to require skilled intervention due to decreased ROM and strength of L knee and LE. Use of outcome tool(s) and clinical judgement create a POC that gives a: Clear prediction of patient's progress: LOW COMPLEXITY            TREATMENT:   (In addition to Assessment/Re-Assessment sessions the following treatments were rendered)  Pre-treatment Symptoms/Complaints:  No longer using cane. Feels stiff today. Taking pain med at night and only occasionally during the day. Sleeping a little better. Sister leaving today. Pain: Initial:   Pain Intensity 1: 4  Post Session:  3      THERAPEUTIC EXERCISE: (30  minutes):  Exercises per grid below to improve mobility, strength, coordination and gait. Required moderate verbal and manual cues to promote proper body alignment, promote proper body posture and promote proper body mechanics. Progressed resistance, range and repetitions as indicated. Performed  QS (quadriceps sets),  SAQ's (short arc quadriceps),and LAQ's. . HS and HC stretch. Sitting [address] at 20# x 4 sets of 10. Standing cable for hip flex and abd at 17# and hip ext at 32# x 2 sets of 20. Performed 6 in step up and over x 30. Worked on heel toe gait without assistive device. Good increase in knee ext and toe off. Work to increase weight shift . NuStep at level 3 x 10 min. .          Manual Therapy ( 30 min  ): Patella and patella tendon mobs. Soft tissue work to [employer] and HS. PROM for knee flex and extn. Sitting knee flex to 120. Full passive ext. Still lacks active ext in sitting. Therapeutic Modalities:    HEP: As above; handouts given to patient for all exercises.   Treatment/Session Assessment:  Pt is post L TKA on 4/5/17. Presents with decreased ROM and strength of L knee and LE. Initially gait with rolling walker but quickly progress to straight cane and now progressing to independence without assistive device. No using cane in home and in community for short distances. Good increase in ROM in flexion and ext. Will continue to work on end range flexion and active et. Progressing strengthening program so able to increase activity level. Encouraged to start walking for 10 -15 min. Very motivated. Should progress well. · Response to Treatment:  Independent with out assistive device in home. Good increase in ROM Good understanding of exercises. .  · Compliance with Program/Exercises: Doing exercises at home . · Recommendations/Intent for next treatment session: \"Next visit will focus on ROM and strengtheing of L knee and LE.  \".   Total Treatment Duration:  PT Patient Time In/Time Out  Time In: 1100  Time Out: 1200  Treatment number  [id number]

## 2017-05-08 ENCOUNTER — HOSPITAL ENCOUNTER (OUTPATIENT)
Dept: PHYSICAL THERAPY | Age: 77
Discharge: HOME | End: 2017-05-08
Payer: MEDICARE

## 2017-05-08 PROCEDURE — 97140 MANUAL THERAPY 1/> REGIONS: CPT

## 2017-05-08 PROCEDURE — 97110 THERAPEUTIC EXERCISES: CPT

## 2017-05-08 NOTE — PROGRESS NOTES
[facility]   [address], [address]  [phone]   [phone]        OUTPATIENT PHYSICAL THERAPY:Daily Note 5/8/2017    ICD-10: Treatment Diagnosis:   R26.2 Difficulty in walking, not elsewhere classified     M25.662 Stiffness of left knee, not elsewhere classified     M25.562 Pain in left knee     Precautions/Allergies:   Latex; Adhesive; Pcn [penicillins]; and Sulfa (sulfonamide antibiotics)   Fall Risk Score: 1 (? 5 = High Risk)  MD Orders: Eval and treat MEDICAL/REFERRING DIAGNOSIS:  Presence of left artificial knee joint [Z96.652]   DATE OF ONSET: April 5, 2017  REFERRING PHYSICIAN: Edie Pearson., *  RETURN PHYSICIAN APPOINTMENT: April 27     INITIAL ASSESSMENT:  Ms. Quique Rosas presents with pain and stiffness of L knee post L TKA on 4/5/17. Fair gait with rolling walker. Minimal edema. Very motivated and should progress well. PROBLEM LIST (Impacting functional limitations):  1. Decreased Strength  2. Decreased ADL/Functional Activities  3. Increased Pain  4. Decreased Flexibility/Joint Mobility INTERVENTIONS PLANNED:  1. Gait Training  2. Home Exercise Program (HEP)  3. Manual Therapy  4. Range of Motion (ROM)  5. Therapeutic Activites  6. Therapeutic Exercise/Strengthening   TREATMENT PLAN:  Effective Dates: 4/24/17 TO 7/21/17. Frequency/Duration: 3 times a week for 3 weeks then 2 x wek for 5 weeks and upon reassessment,  will adjust frequency and duration as progress indicates. GOALS: (Goals have been discussed and agreed upon with patient.)  Short-Term Functional Goals: Time Frame: 4 weeks   1. Establish independent HEP with no cueing. 2. Increase L knee ROM to 0-115 to increase ease of sitting and ambulation   3. Increase L LE strength so able to initiate reciprocal pattern on stairs. 4. Independent gait with straight cane in home and community   Discharge Goals: Time Frame:   1.  Improve score on LEFS by 9 points to enable prolonged sitting, standing and ambulation   2. Independent gait without assistive device. 3. Increase L knee ROM to 0-125 to normalize gait   4. Increase strength so able to do reciprocal pattern on stairs  Increase L LE strength so able to ambulate 20 min with minimal to no increase in pain   Rehabilitation Potential For Stated Goals: Excellent  Regarding Joelle Rosado's therapy, I certify that the treatment plan above will be carried out by a therapist or under their direction. Thank you for this referral,  Hope Mata     Referring Physician Signature: Manjula Fischer., *              Date                    The information in this section was collected on 4/24/17 (except where otherwise noted). HISTORY:   History of Present Injury/Illness (Reason for Referral):    Pt is post L TKA on 4/5/2017 secondary to OA. Pt was at [facility] for 10 days for rehab of L knee. Pt was then referred to outpatient PT for ROM and strengthening of L knee and LE. Pt presents with decreased motion of L knee, weakness of LE's and fair gait with rolling walker. Reports pain with increased activity. Not sleeping well. Past Medical History/Comorbidities:   Ms. 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 She also has no past medical history of Adverse effect of anesthesia; Aneurysm ([address] Arrhythmia; Asthma; Autoimmune disease ([address] CAD (coronary artery disease); Cancer ([address] Chronic kidney disease; Coagulation defects; COPD; Diabetes ([address] Difficult intubation; Endocarditis; Heart failure ([address] Liver disease; Malignant hyperthermia due to anesthesia;  Other ill-defined conditions; Pseudocholinesterase deficiency; Psychiatric disorder; PUD (peptic ulcer disease); Rheumatic fever; Seizures ([facility] [address] Stroke [facility]); Thromboembolus ([facility] [address] Thyroid disease; Unspecified adverse effect of anesthesia; or Unspecified sleep apnea. Ms. Yunior Cr  has a past surgical history that includes gyn; heent; us guided core breast biopsy; rohit biopsy breast stereotactic; knee replacement (Right, 2010); and breast surgery procedure unlisted. Social History/Living Environment:     Lives in home without stairs but has stairs at [employer]  Prior Level of Function/Work/Activity:  Retired. Enjoys walking and traveling   Dominant Side:         RIGHT   Current Medications:       Current Outpatient Prescriptions:     oxyCODONE IR (ROXICODONE) 10 mg tab immediate release tablet, Take 1 Tab by mouth every four (4) hours as needed. Max Daily Amount: 60 mg., Disp: 60 Tab, Rfl: 0    aspirin delayed-release 325 mg tablet, Take 1 Tab by mouth every twelve (12) hours every twelve (12) hours for 35 days. , Disp: 70 Tab, Rfl: 0    HYDROmorphone (DILAUDID) 2 mg tablet, Take 1 Tab by mouth every three (3) hours as needed. Max Daily Amount: 16 mg., Disp: 40 Tab, Rfl: 0    mirabegron ER (MYRBETRIQ) 50 mg ER tablet, Take 50 mg by mouth daily. Take morning of surgery per anesthesia guidelines. Indications: URINARY URGE INCONTINENCE, Disp: , Rfl:     omeprazole (PRILOSEC) 40 mg capsule, Take 40 mg by mouth daily. Take morning of surgery per anesthesia guidelines. , Disp: , Rfl:     simvastatin (ZOCOR) 40 mg tablet, Take 40 mg by mouth daily. Per anesthesia protocol:instructed to take am of surgery. , Disp: , Rfl:     lisinopril-hydrochlorothiazide (PRINZIDE, ZESTORETIC) 20-12.5 mg per tablet, Take 1 Tab by mouth daily. , Disp: , Rfl:     docusate sodium (COLACE) 100 mg capsule, Take 100 mg by mouth daily. , Disp: , Rfl:    No longer taking oxycodone. Taking Motrin 800 mg as needed.   Taking motrin and benydril at night    Date Last Reviewed:  5/8/2017     Number of Personal Factors/Comorbidities that affect the Plan of Care: 1-2: MODERATE COMPLEXITY   EXAMINATION:   Observation/Orthostatic Postural Assessment: To PT with rolling walker. . Pt with flat foot gait. Decreased knee ext at heel strike and stance . Decreased toe off. Limp to L. Moderate edema. Tight HS. In 90/90, HS are 60. Palpation:          Tender medial knee and back of knee   ROM:     L knee -5 to 103  R knee  0- 125                                       Strength:     Knee ext  L 4-/5, R 4+/5  Knee flex L 4-/5,  R 5/5         Hip flexion  L 4/5,  R 5/5  Hip abd  L 3+/5,  R 4/5              Neurological Screen: Intact to light touch   Balance:  Good with straight cane    Body Structures Involved:  1. Bones  2. Joints  3. Muscles  4. Ligaments Body Functions Affected:  1. Sensory/Pain  2. Neuromusculoskeletal  3. Movement Related Activities and Participation Affected:  1. Learning and Applying Knowledge  2. General Tasks and Demands  3. Mobility  4. Self Care  5. Community, Social and [place]   Number of elements (examined above) that affect the Plan of Care: 3: MODERATE COMPLEXITY   CLINICAL PRESENTATION:   Presentation: Evolving clinical presentation with changing clinical characteristics: MODERATE COMPLEXITY   CLINICAL DECISION MAKING:   Outcome Measure: Tool Used: Lower Extremity Functional Scale (LEFS)  Score:  Initial: 30/80 Most Recent: X/80 (Date: -- )   Interpretation of Score: 20 questions each scored on a 5 point scale with 0 representing \"extreme difficulty or unable to perform\" and 4 representing \"no difficulty\". The lower the score, the greater the functional disability. 80/80 represents no disability. Minimal detectable change is 9 points. Score 80 79-63 62-48 47-32 31-16 15-1 0   Modifier CH CI CJ CK CL CM CN     ?  Mobility - Walking and Moving Around:     - CURRENT STATUS: CL - 60%-79% impaired, limited or restricted    - GOAL STATUS: CJ - 20%-39% impaired, limited or restricted    - D/C STATUS:  ---------------To be determined---------------    Medical Necessity:   · Patient is expected to demonstrate progress in strength, range of motion, balance and gait to increase independence with home and community activities. Reason for Services/Other Comments:  · Patient continues to require skilled intervention due to decreased ROM and strength of L knee and LE. Use of outcome tool(s) and clinical judgement create a POC that gives a: Clear prediction of patient's progress: LOW COMPLEXITY            TREATMENT:   (In addition to Assessment/Re-Assessment sessions the following treatments were rendered)  Pre-treatment Symptoms/Complaints: Patient reports knee still feels stiff, but she was able to sleep in the bed last night with little difficulty. Pain: Initial:   Pain Intensity 1: 4  Post Session:  3      THERAPEUTIC EXERCISE: (30  minutes):  Exercises per grid below to improve mobility, strength, coordination and gait. Required moderate verbal and manual cues to promote proper body alignment, promote proper body posture and promote proper body mechanics. Progressed resistance, range and repetitions as indicated. Performed  QS (quadriceps sets),  SAQ's (short arc quadriceps),and LAQ's. . HS and HC stretch. Sitting [address] at 20# x 4 sets of 10. Standing cable for hip flex and abd at 17# and hip ext at 32# x 2 sets of 20. Performed 6 in step up and over x 30. Worked on heel toe gait without assistive device. Good increase in knee ext and toe off. Work to increase weight shift . NuStep at level 3 x 10 min. Wall ball squats x 10         Manual Therapy ( 30 min  ): Patella and patella tendon mobs. Soft tissue work to [employer] and HS. PROM for knee flex and extn. Sitting knee flex to 123. Full passive ext. Still lacks active ext in sitting. Therapeutic Modalities:    HEP: As above; handouts given to patient for all exercises.   Treatment/Session Assessment:  Pt is post L TKA on 4/5/17. Presents with decreased ROM and strength of L knee and LE. Good increase in ROM in flexion and ext. Will continue to work on end range flexion and active et. Progressing strengthening program so able to increase activity level. Very motivated. Should progress well. Instructed patient to continue home exercises and stretches as directed. · Response to Treatment:  Independent with out assistive device in home. Good increase in ROM Good understanding of exercises. .  · Compliance with Program/Exercises: Doing exercises at home . · Recommendations/Intent for next treatment session: \"Next visit will focus on ROM and strengtheing of L knee and LE.  \".   Total Treatment Duration:  PT Patient Time In/Time Out  Time In: 1100  Time Out: 1200  Treatment number 300 [employer], [place]

## 2017-05-10 ENCOUNTER — HOSPITAL ENCOUNTER (OUTPATIENT)
Dept: PHYSICAL THERAPY | Age: 77
Discharge: HOME | End: 2017-05-10
Payer: MEDICARE

## 2017-05-10 PROCEDURE — 97140 MANUAL THERAPY 1/> REGIONS: CPT

## 2017-05-10 PROCEDURE — G8979 MOBILITY GOAL STATUS: HCPCS

## 2017-05-10 PROCEDURE — 97110 THERAPEUTIC EXERCISES: CPT

## 2017-05-10 PROCEDURE — G8978 MOBILITY CURRENT STATUS: HCPCS

## 2017-05-10 NOTE — PROGRESS NOTES
[facility] at [facility] Therapy   [address], [address]  [phone]   [phone]        OUTPATIENT PHYSICAL THERAPY:Daily Note and Progress Report 5/10/2017    ICD-10: Treatment Diagnosis:   R26.2 Difficulty in walking, not elsewhere classified     M25.662 Stiffness of left knee, not elsewhere classified     M25.562 Pain in left knee     Precautions/Allergies:   Latex; Adhesive; Pcn [penicillins]; and Sulfa (sulfonamide antibiotics)   Fall Risk Score: 1 (? 5 = High Risk)  MD Orders: Eval and treat MEDICAL/REFERRING DIAGNOSIS:  Presence of left artificial knee joint [Z96.652]   DATE OF ONSET: April 5, 2017  REFERRING PHYSICIAN: Jose Antonio Spencer., *  RETURN PHYSICIAN APPOINTMENT: April 27     INITIAL ASSESSMENT:  Ms. Tova Nunez presents with pain and stiffness of L knee post L TKA on 4/5/17. Fair gait with rolling walker. Minimal edema. Very motivated and should progress well. PROBLEM LIST (Impacting functional limitations):  1. Decreased Strength  2. Decreased ADL/Functional Activities  3. Increased Pain  4. Decreased Flexibility/Joint Mobility INTERVENTIONS PLANNED:  1. Gait Training  2. Home Exercise Program (HEP)  3. Manual Therapy  4. Range of Motion (ROM)  5. Therapeutic Activites  6. Therapeutic Exercise/Strengthening   TREATMENT PLAN:  Effective Dates: 4/24/17 TO 7/21/17. Frequency/Duration: 3 times a week for 3 weeks then 2 x wek for 5 weeks and upon reassessment,  will adjust frequency and duration as progress indicates. GOALS: (Goals have been discussed and agreed upon with patient.)  Short-Term Functional Goals: Time Frame: 4 weeks   1. Establish independent HEP with no cueing. MET  2. Increase L knee ROM to 0-115 to increase ease of sitting and ambulation  MET  3. Increase L LE strength so able to initiate reciprocal pattern on stairs. MET  4.  Independent gait with straight cane in home and community  MET  Discharge Goals: Time Frame: 1. Improve score on LEFS by 9 points to enable prolonged sitting, standing and ambulation   2. Independent gait without assistive device. MET IN HOME  3. Increase L knee ROM to 0-125 to normalize gait   ONGOING  4. Increase strength so able to do reciprocal pattern on stairs  ONGOING  5. Increase L LE strength so able to ambulate 20 min with minimal to no increase in pain ONGOING   Rehabilitation Potential For Stated Goals: Excellent  Regarding Wilian Rosado's therapy, I certify that the treatment plan above will be carried out by a therapist or under their direction. Thank you for this referral,  Nancy Sood, PT     Referring Physician Signature: Kim Kim., *              Date                    The information in this section was collected on 4/24/17 (except where otherwise noted). HISTORY:   History of Present Injury/Illness (Reason for Referral):    Pt is post L TKA on 4/5/2017 secondary to OA. Pt was at [facility] for 10 days for rehab of L knee. Pt was then referred to outpatient PT for ROM and strengthening of L knee and LE. Pt presents with decreased motion of L knee, weakness of LE's and fair gait with rolling walker. Reports pain with increased activity. Not sleeping well. Past Medical History/Comorbidities:   Ms. Reji Harvey  has a past medical history of Arthritis; Chronic pain; Former smoker, stopped smoking in distant past; GERD (gastroesophageal reflux disease); High cholesterol; Hypertension; Morbid obesity ([address] Nausea & vomiting; Pre-diabetes; Status post total left knee replacement (4/5/2017); and Urinary incontinence. She also has no past medical history of Adverse effect of anesthesia; Aneurysm ([address] Arrhythmia; Asthma; Autoimmune disease ([address] CAD (coronary artery disease); Cancer ([address] Chronic kidney disease; Coagulation defects; COPD; Diabetes ([address] Difficult intubation; Endocarditis; Heart failure ([address]  Liver disease; Malignant hyperthermia due to anesthesia; Other ill-defined conditions; Pseudocholinesterase deficiency; Psychiatric disorder; PUD (peptic ulcer disease); Rheumatic fever; Seizures ([facility] [address] Stroke [facility]); Thromboembolus ([facility] [address] Thyroid disease; Unspecified adverse effect of anesthesia; or Unspecified sleep apnea. Ms. Reji Harvey  has a past surgical history that includes gyn; heent; us guided core breast biopsy; rohit biopsy breast stereotactic; knee replacement (Right, 2010); and breast surgery procedure unlisted. Social History/Living Environment:     Lives in home without stairs but has stairs at [employer]  Prior Level of Function/Work/Activity:  Retired. Enjoys walking and traveling   Dominant Side:         RIGHT   Current Medications:       Current Outpatient Prescriptions:     oxyCODONE IR (ROXICODONE) 10 mg tab immediate release tablet, Take 1 Tab by mouth every four (4) hours as needed. Max Daily Amount: 60 mg., Disp: 60 Tab, Rfl: 0    aspirin delayed-release 325 mg tablet, Take 1 Tab by mouth every twelve (12) hours every twelve (12) hours for 35 days. , Disp: 70 Tab, Rfl: 0    HYDROmorphone (DILAUDID) 2 mg tablet, Take 1 Tab by mouth every three (3) hours as needed. Max Daily Amount: 16 mg., Disp: 40 Tab, Rfl: 0    mirabegron ER (MYRBETRIQ) 50 mg ER tablet, Take 50 mg by mouth daily. Take morning of surgery per anesthesia guidelines. Indications: URINARY URGE INCONTINENCE, Disp: , Rfl:     omeprazole (PRILOSEC) 40 mg capsule, Take 40 mg by mouth daily. Take morning of surgery per anesthesia guidelines. , Disp: , Rfl:     simvastatin (ZOCOR) 40 mg tablet, Take 40 mg by mouth daily. Per anesthesia protocol:instructed to take am of surgery. , Disp: , Rfl:     lisinopril-hydrochlorothiazide (PRINZIDE, ZESTORETIC) 20-12.5 mg per tablet, Take 1 Tab by mouth daily. , Disp: , Rfl:     docusate sodium (COLACE) 100 mg capsule, Take 100 mg by mouth daily. , Disp: , Rfl:    No longer taking oxycodone. Taking Motrin 800 mg as needed.   Taking motrin and benydril at night    Date Last Reviewed:  5/10/2017     Number of Personal Factors/Comorbidities that affect the Plan of Care: 1-2: MODERATE COMPLEXITY   EXAMINATION:   Observation/Orthostatic Postural Assessment: To PT without assistive device. Reports that she is only using cane in a crowd. Good heel toe gait. Improved knee ext at heel strike and improved toe off. Still slight limp to L. Minimal edema. Less tight HS. In 90/90, HS are 75. Palpation:          Tender medial knee   ROM:     L knee 0-123  R knee  0- 125                                       Strength:     Knee ext  L 4/5, R 4+/5  Knee flex L 4/5,  R 5/5         Hip flexion  L 4+/5,  R 5/5  Hip abd  L 4-/5,  R 4/5              Neurological Screen: Intact to light touch   Balance:  Good with straight cane    Body Structures Involved:  1. Bones  2. Joints  3. Muscles  4. Ligaments Body Functions Affected:  1. Sensory/Pain  2. Neuromusculoskeletal  3. Movement Related Activities and Participation Affected:  1. Learning and Applying Knowledge  2. General Tasks and Demands  3. Mobility  4. Self Care  5. Community, Social and [place]   Number of elements (examined above) that affect the Plan of Care: 3: MODERATE COMPLEXITY   CLINICAL PRESENTATION:   Presentation: Evolving clinical presentation with changing clinical characteristics: MODERATE COMPLEXITY   CLINICAL DECISION MAKING:   Outcome Measure: Tool Used: Lower Extremity Functional Scale (LEFS)  Score:  Initial: 30/80 Most Recent: 45/80 (Date: 5/10/17 )   Interpretation of Score: 20 questions each scored on a 5 point scale with 0 representing \"extreme difficulty or unable to perform\" and 4 representing \"no difficulty\". The lower the score, the greater the functional disability. 80/80 represents no disability. Minimal detectable change is 9 points. Score 80 79-63 62-48 47-32 31-16 15-1 0   Modifier CH CI CJ CK CL CM CN     ?  Mobility - Walking and Moving Around:     - CURRENT STATUS: CK - 40%-59% impaired, limited or restricted    - GOAL STATUS: CJ - 20%-39% impaired, limited or restricted    - D/C STATUS:  ---------------To be determined---------------    Medical Necessity:   · Patient is expected to demonstrate progress in strength, range of motion, balance and gait to increase independence with home and community activities. Reason for Services/Other Comments:  · Patient continues to require skilled intervention due to decreased ROM and strength of L knee and LE. Use of outcome tool(s) and clinical judgement create a POC that gives a: Clear prediction of patient's progress: LOW COMPLEXITY            TREATMENT:   (In addition to Assessment/Re-Assessment sessions the following treatments were rendered)  Pre-treatment Symptoms/Complaints: Patient reports knee still feels stiff starla in AM and after sitting but doing more. Taking less pain med  Pain: Initial:   Pain Intensity 1: 4  Post Session:  3      THERAPEUTIC EXERCISE: (30  minutes):  Exercises per grid below to improve mobility, strength, coordination and gait. Required moderate verbal and manual cues to promote proper body alignment, promote proper body posture and promote proper body mechanics. Progressed resistance, range and repetitions as indicated. Performed  QS (quadriceps sets),  SAQ's (short arc quadriceps),and LAQ's. . HS and HC stretch. Sitting [address] at 20# x 4 sets of 10. Standing cable for hip flex and abd at 17# and hip ext at 32# x 2 sets of 20. Performed 6 in step up and over x 20 and 8 on x 10. .  Worked on heel toe gait without assistive device. Good increase in knee ext and toe off. Work to increase weight shift . NuStep at level 4 x 10 min. Sit to stand from plinth x 2 sets of 10. Wall ball squats x 10         Manual Therapy ( 30 min  ): Patella and patella tendon mobs. Soft tissue work to [employer] and HS. PROM for knee flex and extn. Sitting knee flex to 125. Full passive ext.   Still lacks active ext in sitting. Therapeutic Modalities:    HEP: As above; handouts given to patient for all exercises. Treatment/Session Assessment:  Pt is post L TKA on 4/5/17. Presented with decreased ROM and strength of L knee and LE. Good increase in ROM in flexion and ext. Excellent improvement in gait. Will continue to work on end range flexion and active ext. Steady increase in strength with  increased activity level. Very motivated. Should continue to progress well. Instructed patient to continue home exercises and stretches as directed. · Response to Treatment:  Independent with out assistive device in home. Good increase in ROM Good understanding of exercises. .  · Compliance with Program/Exercises: Doing exercises at home . · Recommendations/Intent for next treatment session: \"Next visit will focus on ROM and strengtheing of L knee and LE.  \".   Total Treatment Duration:  PT Patient Time In/Time Out  Time In: 1100  Time Out: 1200  Treatment number 8  More Roa, PT,

## 2017-05-12 ENCOUNTER — HOSPITAL ENCOUNTER (OUTPATIENT)
Dept: PHYSICAL THERAPY | Age: 77
Discharge: HOME | End: 2017-05-12
Payer: MEDICARE

## 2017-05-12 PROCEDURE — 97140 MANUAL THERAPY 1/> REGIONS: CPT

## 2017-05-12 PROCEDURE — 97110 THERAPEUTIC EXERCISES: CPT

## 2017-05-12 NOTE — PROGRESS NOTES
[facility]rapy Center at [facility] Therapy   [address], [address]  [phone]   [phone]        OUTPATIENT PHYSICAL THERAPY:Daily Note 5/12/2017    ICD-10: Treatment Diagnosis:   R26.2 Difficulty in walking, not elsewhere classified     M25.662 Stiffness of left knee, not elsewhere classified     M25.562 Pain in left knee     Precautions/Allergies:   Latex; Adhesive; Pcn [penicillins]; and Sulfa (sulfonamide antibiotics)   Fall Risk Score: 1 (? 5 = High Risk)  MD Orders: Eval and treat MEDICAL/REFERRING DIAGNOSIS:  Presence of left artificial knee joint [Z96.652]   DATE OF ONSET: April 5, 2017  REFERRING PHYSICIAN: Cathi Harrington., *  RETURN PHYSICIAN APPOINTMENT: April 27     INITIAL ASSESSMENT:  Ms. Lee Leon presents with pain and stiffness of L knee post L TKA on 4/5/17. Fair gait with rolling walker. Minimal edema. Very motivated and should progress well. PROBLEM LIST (Impacting functional limitations):  1. Decreased Strength  2. Decreased ADL/Functional Activities  3. Increased Pain  4. Decreased Flexibility/Joint Mobility INTERVENTIONS PLANNED:  1. Gait Training  2. Home Exercise Program (HEP)  3. Manual Therapy  4. Range of Motion (ROM)  5. Therapeutic Activites  6. Therapeutic Exercise/Strengthening   TREATMENT PLAN:  Effective Dates: 4/24/17 TO 7/21/17. Frequency/Duration: 3 times a week for 3 weeks then 2 x wek for 5 weeks and upon reassessment,  will adjust frequency and duration as progress indicates. GOALS: (Goals have been discussed and agreed upon with patient.)  Short-Term Functional Goals: Time Frame: 4 weeks   1. Establish independent HEP with no cueing. MET  2. Increase L knee ROM to 0-115 to increase ease of sitting and ambulation  MET  3. Increase L LE strength so able to initiate reciprocal pattern on stairs. MET  4. Independent gait with straight cane in home and community  MET  Discharge Goals: Time Frame:   1.  Improve score on LEFS by 9 points to enable prolonged sitting, standing and ambulation   2. Independent gait without assistive device. MET IN HOME  3. Increase L knee ROM to 0-125 to normalize gait   ONGOING  4. Increase strength so able to do reciprocal pattern on stairs  ONGOING  5. Increase L LE strength so able to ambulate 20 min with minimal to no increase in pain ONGOING   Rehabilitation Potential For Stated Goals: Excellent  Regarding Warren Rosado's therapy, I certify that the treatment plan above will be carried out by a therapist or under their direction. Thank you for this referral,  Sara Gomez, PT     Referring Physician Signature: Massimo Robertson., *              Date                    The information in this section was collected on 4/24/17 (except where otherwise noted). HISTORY:   History of Present Injury/Illness (Reason for Referral):    Pt is post L TKA on 4/5/2017 secondary to OA. Pt was at [facility] for 10 days for rehab of L knee. Pt was then referred to outpatient PT for ROM and strengthening of L knee and LE. Pt presents with decreased motion of L knee, weakness of LE's and fair gait with rolling walker. Reports pain with increased activity. Not sleeping well. Past Medical History/Comorbidities:   Ms. Lewis Pacheco  has a past medical history of Arthritis; Chronic pain; Former smoker, stopped smoking in distant past; GERD (gastroesophageal reflux disease); High cholesterol; Hypertension; Morbid obesity ([address] Nausea & vomiting; Pre-diabetes; Status post total left knee replacement (4/5/2017); and Urinary incontinence. She also has no past medical history of Adverse effect of anesthesia; Aneurysm ([address] Arrhythmia; Asthma; Autoimmune disease ([address] CAD (coronary artery disease); Cancer ([address] Chronic kidney disease; Coagulation defects; COPD; Diabetes ([address] Difficult intubation; Endocarditis; Heart failure ([address] Liver disease; Malignant hyperthermia due to anesthesia;  Other ill-defined conditions; Pseudocholinesterase deficiency; Psychiatric disorder; PUD (peptic ulcer disease); Rheumatic fever; Seizures ([facility] Stroke [facility]); Thromboembolus ([facility] Thyroid disease; Unspecified adverse effect of anesthesia; or Unspecified sleep apnea. Ms. Luigi Parson  has a past surgical history that includes gyn; heent; us guided core breast biopsy; rohit biopsy breast stereotactic; knee replacement (Right, 2010); and breast surgery procedure unlisted. Social History/Living Environment:     Lives in home without stairs but has stairs at [facility]  Prior Level of Function/Work/Activity:  Retired. Enjoys walking and traveling   Dominant Side:         RIGHT   Current Medications:       Current Outpatient Prescriptions:     oxyCODONE IR (ROXICODONE) 10 mg tab immediate release tablet, Take 1 Tab by mouth every four (4) hours as needed. Max Daily Amount: 60 mg., Disp: 60 Tab, Rfl: 0    HYDROmorphone (DILAUDID) 2 mg tablet, Take 1 Tab by mouth every three (3) hours as needed. Max Daily Amount: 16 mg., Disp: 40 Tab, Rfl: 0    mirabegron ER (MYRBETRIQ) 50 mg ER tablet, Take 50 mg by mouth daily. Take morning of surgery per anesthesia guidelines. Indications: URINARY URGE INCONTINENCE, Disp: , Rfl:     omeprazole (PRILOSEC) 40 mg capsule, Take 40 mg by mouth daily. Take morning of surgery per anesthesia guidelines. , Disp: , Rfl:     simvastatin (ZOCOR) 40 mg tablet, Take 40 mg by mouth daily. Per anesthesia protocol:instructed to take am of surgery. , Disp: , Rfl:     lisinopril-hydrochlorothiazide (PRINZIDE, ZESTORETIC) 20-12.5 mg per tablet, Take 1 Tab by mouth daily. , Disp: , Rfl:     docusate sodium (COLACE) 100 mg capsule, Take 100 mg by mouth daily. , Disp: , Rfl:    No longer taking oxycodone. Taking Motrin 800 mg as needed.   Taking motrin and benydril at night    Date Last Reviewed:  5/12/2017     Number of Personal Factors/Comorbidities that affect the Plan of Care: 1-2: MODERATE COMPLEXITY   EXAMINATION: Observation/Orthostatic Postural Assessment: To PT without assistive device. Reports that she is only using cane in a crowd. Good heel toe gait. Improved knee ext at heel strike and improved toe off. Still slight limp to L. Minimal edema. Less tight HS. In 90/90, HS are 75. Palpation:          Tender medial knee   ROM:     L knee 0-123  R knee  0- 125                                       Strength:     Knee ext  L 4/5, R 4+/5  Knee flex L 4/5,  R 5/5         Hip flexion  L 4+/5,  R 5/5  Hip abd  L 4-/5,  R 4/5              Neurological Screen: Intact to light touch   Balance:  Good with straight cane    Body Structures Involved:  1. Bones  2. Joints  3. Muscles  4. Ligaments Body Functions Affected:  1. Sensory/Pain  2. Neuromusculoskeletal  3. Movement Related Activities and Participation Affected:  1. Learning and Applying Knowledge  2. General Tasks and Demands  3. Mobility  4. Self Care  5. Community, Social and [place]   Number of elements (examined above) that affect the Plan of Care: 3: MODERATE COMPLEXITY   CLINICAL PRESENTATION:   Presentation: Evolving clinical presentation with changing clinical characteristics: MODERATE COMPLEXITY   CLINICAL DECISION MAKING:   Outcome Measure: Tool Used: Lower Extremity Functional Scale (LEFS)  Score:  Initial: 30/80 Most Recent: 45/80 (Date: 5/10/17 )   Interpretation of Score: 20 questions each scored on a 5 point scale with 0 representing \"extreme difficulty or unable to perform\" and 4 representing \"no difficulty\". The lower the score, the greater the functional disability. 80/80 represents no disability. Minimal detectable change is 9 points. Score 80 79-63 62-48 47-32 31-16 15-1 0   Modifier CH CI CJ CK CL CM CN     ?  Mobility - Walking and Moving Around:     - CURRENT STATUS: CK - 40%-59% impaired, limited or restricted    - GOAL STATUS: CJ - 20%-39% impaired, limited or restricted    - D/C STATUS:  ---------------To be determined---------------    Medical Necessity:   · Patient is expected to demonstrate progress in strength, range of motion, balance and gait to increase independence with home and community activities. Reason for Services/Other Comments:  · Patient continues to require skilled intervention due to decreased ROM and strength of L knee and LE. Use of outcome tool(s) and clinical judgement create a POC that gives a: Clear prediction of patient's progress: LOW COMPLEXITY            TREATMENT:   (In addition to Assessment/Re-Assessment sessions the following treatments were rendered)  Pre-treatment Symptoms/Complaints: Patient reports knee is really sore and stiff today. She states she may have over done it yesterday working in the yard. Pain: Initial:   Pain Intensity 1: 5  Post Session:  3      THERAPEUTIC EXERCISE: (30  minutes):  Exercises per grid below to improve mobility, strength, coordination and gait. Required moderate verbal and manual cues to promote proper body alignment, promote proper body posture and promote proper body mechanics. Progressed resistance, range and repetitions as indicated. Performed  QS (quadriceps sets),  SAQ's (short arc quadriceps),and LAQ's. . HS and HC stretch. Sitting [address] at 20# x 4 sets of 10. Standing cable for hip flex and abd at 17# and hip ext at 32# x 2 sets of 20. Performed 6 in step up and over x 20 and 8 on x 10. .  Worked on heel toe gait without assistive device. Good increase in knee ext and toe off. Work to increase weight shift . NuStep at level 4 x 10 min. Sit to stand from plinth x 2 sets of 10. Wall ball squats x 10         Manual Therapy ( 30 min  ): Patella and patella tendon mobs. Soft tissue work to [employer] and HS. PROM for knee flex and extn. Sitting knee flex to 125. Full passive ext. Still lacks active ext in sitting. Therapeutic Modalities:    HEP: As directed. Treatment/Session Assessment:  Pt is post L TKA on 4/5/17.   Presented with decreased ROM and strength of L knee and LE. Good increase in ROM in flexion and ext. Excellent improvement in gait. Will continue to work on end range flexion and active ext. Steady increase in strength with  increased activity level. Patient indicated less discomfort following treatment today and hopes to do more walking in her neighborhood over the weekend. Very motivated. Should continue to progress well. Instructed patient to continue home exercises and stretches as directed. · Response to Treatment:  Independent with out assistive device in home. Good increase in ROM Good understanding of exercises. .  · Compliance with Program/Exercises: Doing exercises at home . · Recommendations/Intent for next treatment session: \"Next visit will focus on ROM and strengtheing of L knee and LE.  \".   Total Treatment Duration:  PT Patient Time In/Time Out  Time In: 1100  Time Out: 1200  Treatment number [id number], [place]

## 2017-05-15 ENCOUNTER — HOSPITAL ENCOUNTER (OUTPATIENT)
Dept: PHYSICAL THERAPY | Age: 77
Discharge: HOME | End: 2017-05-15
Payer: MEDICARE

## 2017-05-15 PROCEDURE — 97140 MANUAL THERAPY 1/> REGIONS: CPT

## 2017-05-15 PROCEDURE — 97110 THERAPEUTIC EXERCISES: CPT

## 2017-05-15 NOTE — PROGRESS NOTES
Therapy Center at [facility] Therapy   [address], [address]  [phone]   [phone]        OUTPATIENT PHYSICAL THERAPY:Daily Note 5/15/2017    ICD-10: Treatment Diagnosis:   R26.2 Difficulty in walking, not elsewhere classified     M25.662 Stiffness of left knee, not elsewhere classified     M25.562 Pain in left knee     Precautions/Allergies:   Latex; Adhesive; Pcn [penicillins]; and Sulfa (sulfonamide antibiotics)   Fall Risk Score: 1 (? 5 = High Risk)  MD Orders: Eval and treat MEDICAL/REFERRING DIAGNOSIS:  Presence of left artificial knee joint [Z96.652]   DATE OF ONSET: April 5, 2017  REFERRING PHYSICIAN: Shaina Allen, *  RETURN PHYSICIAN APPOINTMENT: April 27     INITIAL ASSESSMENT:  Ms. Gold Kang presents with pain and stiffness of L knee post L TKA on 4/5/17. Fair gait with rolling walker. Minimal edema. Very motivated and should progress well. PROBLEM LIST (Impacting functional limitations):  1. Decreased Strength  2. Decreased ADL/Functional Activities  3. Increased Pain  4. Decreased Flexibility/Joint Mobility INTERVENTIONS PLANNED:  1. Gait Training  2. Home Exercise Program (HEP)  3. Manual Therapy  4. Range of Motion (ROM)  5. Therapeutic Activites  6. Therapeutic Exercise/Strengthening   TREATMENT PLAN:  Effective Dates: 4/24/17 TO 7/21/17. Frequency/Duration: 3 times a week for 3 weeks then 2 x wek for 5 weeks and upon reassessment,  will adjust frequency and duration as progress indicates. GOALS: (Goals have been discussed and agreed upon with patient.)  Short-Term Functional Goals: Time Frame: 4 weeks   1. Establish independent HEP with no cueing. MET  2. Increase L knee ROM to 0-115 to increase ease of sitting and ambulation  MET  3. Increase L LE strength so able to initiate reciprocal pattern on stairs. MET  4. Independent gait with straight cane in home and community  MET  Discharge Goals: Time Frame:   1.  Improve score on LEFS by 9 points to enable prolonged sitting, standing and ambulation   2. Independent gait without assistive device. MET IN HOME  3. Increase L knee ROM to 0-125 to normalize gait   ONGOING  4. Increase strength so able to do reciprocal pattern on stairs  ONGOING  5. Increase L LE strength so able to ambulate 20 min with minimal to no increase in pain ONGOING   Rehabilitation Potential For Stated Goals: Excellent  Regarding Debbie Rosado's therapy, I certify that the treatment plan above will be carried out by a therapist or under their direction. Thank you for this referral,  Len Avila, PT     Referring Physician Signature: Edie Pearson., *              Date                    The information in this section was collected on 4/24/17 (except where otherwise noted). HISTORY:   History of Present Injury/Illness (Reason for Referral):    Pt is post L TKA on 4/5/2017 secondary to OA. Pt was at [facility] for 10 days for rehab of L knee. Pt was then referred to outpatient PT for ROM and strengthening of L knee and LE. Pt presents with decreased motion of L knee, weakness of LE's and fair gait with rolling walker. Reports pain with increased activity. Not sleeping well. Past Medical History/Comorbidities:   Ms. Quique Rosas  has a past medical history of Arthritis; Chronic pain; Former smoker, stopped smoking in distant past; GERD (gastroesophageal reflux disease); High cholesterol; Hypertension; Morbid obesity ([facility]); Nausea & vomiting; Pre-diabetes; Status post total left knee replacement (4/5/2017); and Urinary incontinence. She also has no past medical history of Adverse effect of anesthesia; Aneurysm ([facility]); Arrhythmia; Asthma; Autoimmune disease ([facility]); CAD (coronary artery disease); Cancer ([facility]); Chronic kidney disease; Coagulation defects; COPD; Diabetes ([facility]); Difficult intubation; Endocarditis; Heart failure ([facility]); Liver disease; Malignant hyperthermia due to anesthesia;  Other ill-defined conditions; Pseudocholinesterase deficiency; Psychiatric disorder; PUD (peptic ulcer disease); Rheumatic fever; Seizures ([facility] [address] Stroke [facility]); Thromboembolus ([facility] [address] Thyroid disease; Unspecified adverse effect of anesthesia; or Unspecified sleep apnea. Ms. Stanton Mike  has a past surgical history that includes gyn; heent; us guided core breast biopsy; rohit biopsy breast stereotactic; knee replacement (Right, 2010); and breast surgery procedure unlisted. Social History/Living Environment:     Lives in home without stairs but has stairs at [employer]  Prior Level of Function/Work/Activity:  Retired. Enjoys walking and traveling   Dominant Side:         RIGHT   Current Medications:       Current Outpatient Prescriptions:     oxyCODONE IR (ROXICODONE) 10 mg tab immediate release tablet, Take 1 Tab by mouth every four (4) hours as needed. Max Daily Amount: 60 mg., Disp: 60 Tab, Rfl: 0    HYDROmorphone (DILAUDID) 2 mg tablet, Take 1 Tab by mouth every three (3) hours as needed. Max Daily Amount: 16 mg., Disp: 40 Tab, Rfl: 0    mirabegron ER (MYRBETRIQ) 50 mg ER tablet, Take 50 mg by mouth daily. Take morning of surgery per anesthesia guidelines. Indications: URINARY URGE INCONTINENCE, Disp: , Rfl:     omeprazole (PRILOSEC) 40 mg capsule, Take 40 mg by mouth daily. Take morning of surgery per anesthesia guidelines. , Disp: , Rfl:     simvastatin (ZOCOR) 40 mg tablet, Take 40 mg by mouth daily. Per anesthesia protocol:instructed to take am of surgery. , Disp: , Rfl:     lisinopril-hydrochlorothiazide (PRINZIDE, ZESTORETIC) 20-12.5 mg per tablet, Take 1 Tab by mouth daily. , Disp: , Rfl:     docusate sodium (COLACE) 100 mg capsule, Take 100 mg by mouth daily. , Disp: , Rfl:    No longer taking oxycodone. Taking Motrin 800 mg as needed.   Taking motrin and benydril at night    Date Last Reviewed:  5/15/2017     Number of Personal Factors/Comorbidities that affect the Plan of Care: 1-2: MODERATE COMPLEXITY   EXAMINATION: Observation/Orthostatic Postural Assessment: To PT without assistive device. Reports that she is only using cane in a crowd. Good heel toe gait. Improved knee ext at heel strike and improved toe off. Still slight limp to L. Minimal edema. Less tight HS. In 90/90, HS are 75. Palpation:          Tender medial knee   ROM:     L knee 0-123  R knee  0- 125                                       Strength:     Knee ext  L 4/5, R 4+/5  Knee flex L 4/5,  R 5/5         Hip flexion  L 4+/5,  R 5/5  Hip abd  L 4-/5,  R 4/5              Neurological Screen: Intact to light touch   Balance:  Good with straight cane    Body Structures Involved:  1. Bones  2. Joints  3. Muscles  4. Ligaments Body Functions Affected:  1. Sensory/Pain  2. Neuromusculoskeletal  3. Movement Related Activities and Participation Affected:  1. Learning and Applying Knowledge  2. General Tasks and Demands  3. Mobility  4. Self Care  5. Community, Social and [place]   Number of elements (examined above) that affect the Plan of Care: 3: MODERATE COMPLEXITY   CLINICAL PRESENTATION:   Presentation: Evolving clinical presentation with changing clinical characteristics: MODERATE COMPLEXITY   CLINICAL DECISION MAKING:   Outcome Measure: Tool Used: Lower Extremity Functional Scale (LEFS)  Score:  Initial: 30/80 Most Recent: 45/80 (Date: 5/10/17 )   Interpretation of Score: 20 questions each scored on a 5 point scale with 0 representing \"extreme difficulty or unable to perform\" and 4 representing \"no difficulty\". The lower the score, the greater the functional disability. 80/80 represents no disability. Minimal detectable change is 9 points. Score 80 79-63 62-48 47-32 31-16 15-1 0   Modifier CH CI CJ CK CL CM CN     ?  Mobility - Walking and Moving Around:     - CURRENT STATUS: CK - 40%-59% impaired, limited or restricted    - GOAL STATUS: CJ - 20%-39% impaired, limited or restricted    - D/C STATUS:  ---------------To be determined---------------    Medical Necessity:   · Patient is expected to demonstrate progress in strength, range of motion, balance and gait to increase independence with home and community activities. Reason for Services/Other Comments:  · Patient continues to require skilled intervention due to decreased ROM and strength of L knee and LE. Use of outcome tool(s) and clinical judgement create a POC that gives a: Clear prediction of patient's progress: LOW COMPLEXITY            TREATMENT:   (In addition to Assessment/Re-Assessment sessions the following treatments were rendered)  Pre-treatment Symptoms/Complaints: Patient reports knee still feels stiff but not as painful. I am doing more at home. .  Pain: Initial:   Pain Intensity 1: 4  Post Session:  3      THERAPEUTIC EXERCISE: (30  minutes):  Exercises per grid below to improve mobility, strength, coordination and gait. Required moderate verbal and manual cues to promote proper body alignment, promote proper body posture and promote proper body mechanics. Progressed resistance, range and repetitions as indicated. Performed  QS (quadriceps sets),  SAQ's (short arc quadriceps),and LAQ's. . HS and HC stretch. Sitting [address] at 20# x 4 sets of 10. Standing cable for hip flex and abd at 17# and hip ext at 32# x 2 sets of 20. Performed 6 in step up and over x 20 and on 8 in x 10. .  Worked on heel toe gait without assistive device. Good increase in knee ext and toe off. Work to increase weight shift . NuStep at level 4 x 10 min. Sit to stand from plinth x 2 sets of 10. Wall ball squats x 10         Manual Therapy ( 30 min  ): Patella and patella tendon mobs. Soft tissue work to [employer] and HS. PROM for knee flex and extn. Sitting knee flex to 127. Full passive ext. Still lacks active ext in sitting. Therapeutic Modalities:    HEP: As directed. Treatment/Session Assessment:  Pt is post L TKA on 4/5/17.   Presented with decreased ROM and strength of L knee and LE. Good increase in ROM in flexion and ext. Excellent improvement in gait. Slight limp when fatigued. Will continue to work on end range flexion and active ext. Steady increase in strength with  increased activity level including some light yard work. . Patient indicated less discomfort following treatment today and hopes to do more at home including walking and light yard work. .  Very motivated. Should continue to progress well. Instructed patient to continue home exercises and stretches as directed. · Response to Treatment:  Independent with out assistive device in home. Good increase in ROM Good understanding of exercises. .  · Compliance with Program/Exercises: Doing exercises at home . · Recommendations/Intent for next treatment session: \"Next visit will focus on ROM and strengtheing of L knee and LE.  \".   Total Treatment Duration:  PT Patient Time In/Time Out  Time In: 1100  Time Out: 1200  Treatment number [facility], PT,

## 2017-05-17 ENCOUNTER — HOSPITAL ENCOUNTER (OUTPATIENT)
Dept: PHYSICAL THERAPY | Age: 77
Discharge: HOME | End: 2017-05-17
Payer: MEDICARE

## 2017-05-17 PROCEDURE — 97140 MANUAL THERAPY 1/> REGIONS: CPT

## 2017-05-17 PROCEDURE — 97110 THERAPEUTIC EXERCISES: CPT

## 2017-05-18 NOTE — PROGRESS NOTES
[facility] [place] [address] [facility]   [address], [address]  [phone]   [phone]        OUTPATIENT PHYSICAL THERAPY:Daily Note 5/17/2017    ICD-10: Treatment Diagnosis:   R26.2 Difficulty in walking, not elsewhere classified     M25.662 Stiffness of left knee, not elsewhere classified     M25.562 Pain in left knee     Precautions/Allergies:   Latex; Adhesive; Pcn [penicillins]; and Sulfa (sulfonamide antibiotics)   Fall Risk Score: 1 (? 5 = High Risk)  MD Orders: Eval and treat MEDICAL/REFERRING DIAGNOSIS:  Presence of left artificial knee joint [Z96.652]   DATE OF ONSET: April 5, 2017  REFERRING PHYSICIAN: Solo Uriostegui., *  RETURN PHYSICIAN APPOINTMENT: April 27     INITIAL ASSESSMENT:  Ms. Reyes Goddard presents with pain and stiffness of L knee post L TKA on 4/5/17. Fair gait with rolling walker. Minimal edema. Very motivated and should progress well. PROBLEM LIST (Impacting functional limitations):  1. Decreased Strength  2. Decreased ADL/Functional Activities  3. Increased Pain  4. Decreased Flexibility/Joint Mobility INTERVENTIONS PLANNED:  1. Gait Training  2. Home Exercise Program (HEP)  3. Manual Therapy  4. Range of Motion (ROM)  5. Therapeutic Activites  6. Therapeutic Exercise/Strengthening   TREATMENT PLAN:  Effective Dates: 4/24/17 TO 7/21/17. Frequency/Duration: 3 times a week for 3 weeks then 2 x wek for 5 weeks and upon reassessment,  will adjust frequency and duration as progress indicates. GOALS: (Goals have been discussed and agreed upon with patient.)  Short-Term Functional Goals: Time Frame: 4 weeks   1. Establish independent HEP with no cueing. MET  2. Increase L knee ROM to 0-115 to increase ease of sitting and ambulation  MET  3. Increase L LE strength so able to initiate reciprocal pattern on stairs. MET  4. Independent gait with straight cane in home and community  MET  Discharge Goals: Time Frame:   1.  Improve score on LEFS by 9 points to enable prolonged sitting, standing and ambulation   2. Independent gait without assistive device. MET IN HOME  3. Increase L knee ROM to 0-125 to normalize gait   ONGOING  4. Increase strength so able to do reciprocal pattern on stairs  ONGOING  5. Increase L LE strength so able to ambulate 20 min with minimal to no increase in pain ONGOING   Rehabilitation Potential For Stated Goals: Excellent  Regarding [facility] therapy, I certify that the treatment plan above will be carried out by a therapist or under their direction. Thank you for this referral,  Aldo Lara, PT     Referring Physician Signature: Clair Rose., *              Date                    The information in this section was collected on 4/24/17 (except where otherwise noted). HISTORY:   History of Present Injury/Illness (Reason for Referral):    Pt is post L TKA on 4/5/2017 secondary to OA. Pt was at [facility] for 10 days for rehab of L knee. Pt was then referred to outpatient PT for ROM and strengthening of L knee and LE. Pt presents with decreased motion of L knee, weakness of LE's and fair gait with rolling walker. Reports pain with increased activity. Not sleeping well. Past Medical History/Comorbidities:   Ms. Vane Chong  has a past medical history of Arthritis; Chronic pain; Former smoker, stopped smoking in distant past; GERD (gastroesophageal reflux disease); High cholesterol; Hypertension; Morbid obesity ([address] Nausea & vomiting; Pre-diabetes; Status post total left knee replacement (4/5/2017); and Urinary incontinence. She also has no past medical history of Adverse effect of anesthesia; Aneurysm ([address] Arrhythmia; Asthma; Autoimmune disease ([address] CAD (coronary artery disease); Cancer ([address] Chronic kidney disease; Coagulation defects; COPD; Diabetes ([address] Difficult intubation; Endocarditis; Heart failure ([address] Liver disease; Malignant hyperthermia due to anesthesia;  Other ill-defined conditions; Pseudocholinesterase deficiency; Psychiatric disorder; PUD (peptic ulcer disease); Rheumatic fever; Seizures ([facility] Stroke [facility]); Thromboembolus ([facility] Thyroid disease; Unspecified adverse effect of anesthesia; or Unspecified sleep apnea. Ms. Mariel Cabrera  has a past surgical history that includes gyn; heent; us guided core breast biopsy; rohit biopsy breast stereotactic; knee replacement (Right, 2010); and breast surgery procedure unlisted. Social History/Living Environment:     Lives in home without stairs but has stairs at [employer]  Prior Level of Function/Work/Activity:  Retired. Enjoys walking and traveling   Dominant Side:         RIGHT   Current Medications:       Current Outpatient Prescriptions:     oxyCODONE IR (ROXICODONE) 10 mg tab immediate release tablet, Take 1 Tab by mouth every four (4) hours as needed. Max Daily Amount: 60 mg., Disp: 60 Tab, Rfl: 0    HYDROmorphone (DILAUDID) 2 mg tablet, Take 1 Tab by mouth every three (3) hours as needed. Max Daily Amount: 16 mg., Disp: 40 Tab, Rfl: 0    mirabegron ER (MYRBETRIQ) 50 mg ER tablet, Take 50 mg by mouth daily. Take morning of surgery per anesthesia guidelines. Indications: URINARY URGE INCONTINENCE, Disp: , Rfl:     omeprazole (PRILOSEC) 40 mg capsule, Take 40 mg by mouth daily. Take morning of surgery per anesthesia guidelines. , Disp: , Rfl:     simvastatin (ZOCOR) 40 mg tablet, Take 40 mg by mouth daily. Per anesthesia protocol:instructed to take am of surgery. , Disp: , Rfl:     lisinopril-hydrochlorothiazide (PRINZIDE, ZESTORETIC) 20-12.5 mg per tablet, Take 1 Tab by mouth daily. , Disp: , Rfl:     docusate sodium (COLACE) 100 mg capsule, Take 100 mg by mouth daily. , Disp: , Rfl:    No longer taking oxycodone. Taking Motrin 800 mg as needed.   Taking motrin and benydril at night    Date Last Reviewed:  5/17/2017     Number of Personal Factors/Comorbidities that affect the Plan of Care: 1-2: MODERATE COMPLEXITY   EXAMINATION: Observation/Orthostatic Postural Assessment: To PT without assistive device. Reports that she is only using cane in a crowd. Good heel toe gait. Improved knee ext at heel strike and improved toe off. Still slight limp to L. Minimal edema. Less tight HS. In 90/90, HS are 75. Palpation:          Tender medial knee   ROM:     L knee 0-123  R knee  0- 125                                       Strength:     Knee ext  L 4/5, R 4+/5  Knee flex L 4/5,  R 5/5         Hip flexion  L 4+/5,  R 5/5  Hip abd  L 4-/5,  R 4/5              Neurological Screen: Intact to light touch   Balance:  Good with straight cane    Body Structures Involved:  1. Bones  2. Joints  3. Muscles  4. Ligaments Body Functions Affected:  1. Sensory/Pain  2. Neuromusculoskeletal  3. Movement Related Activities and Participation Affected:  1. Learning and Applying Knowledge  2. General Tasks and Demands  3. Mobility  4. Self Care  5. Community, Social and [place]   Number of elements (examined above) that affect the Plan of Care: 3: MODERATE COMPLEXITY   CLINICAL PRESENTATION:   Presentation: Evolving clinical presentation with changing clinical characteristics: MODERATE COMPLEXITY   CLINICAL DECISION MAKING:   Outcome Measure: Tool Used: Lower Extremity Functional Scale (LEFS)  Score:  Initial: 30/80 Most Recent: 45/80 (Date: 5/10/17 )   Interpretation of Score: 20 questions each scored on a 5 point scale with 0 representing \"extreme difficulty or unable to perform\" and 4 representing \"no difficulty\". The lower the score, the greater the functional disability. 80/80 represents no disability. Minimal detectable change is 9 points. Score 80 79-63 62-48 47-32 31-16 15-1 0   Modifier CH CI CJ CK CL CM CN     ?  Mobility - Walking and Moving Around:     - CURRENT STATUS: CK - 40%-59% impaired, limited or restricted    - GOAL STATUS: CJ - 20%-39% impaired, limited or restricted    - D/C STATUS:  ---------------To be determined---------------    Medical Necessity:   · Patient is expected to demonstrate progress in strength, range of motion, balance and gait to increase independence with home and community activities. Reason for Services/Other Comments:  · Patient continues to require skilled intervention due to decreased ROM and strength of L knee and LE. Use of outcome tool(s) and clinical judgement create a POC that gives a: Clear prediction of patient's progress: LOW COMPLEXITY            TREATMENT:   (In addition to Assessment/Re-Assessment sessions the following treatments were rendered)  Pre-treatment Symptoms/Complaints: Patient reports knee still feels stiff but not as painful. I am doing more at home. .  Pain: Initial:   Pain Intensity 1: 3  Post Session:  3      THERAPEUTIC EXERCISE: (30  minutes):  Exercises per grid below to improve mobility, strength, coordination and gait. Required moderate verbal and manual cues to promote proper body alignment, promote proper body posture and promote proper body mechanics. Progressed resistance, range and repetitions as indicated. Performed  QS (quadriceps sets),  SAQ's (short arc quadriceps),and LAQ's. . HS and HC stretch. Sitting [address] at 20# x 4 sets of 10. Standing cable for hip flex and abd at 17# and hip ext at 32# x 2 sets of 20. Performed 6 in step up and over x 20 and on 8 in x 10. .  Worked on heel toe gait without assistive device. Good increase in knee ext and toe off. Work to increase weight shift . NuStep at level 4 x 10 min. Sit to stand from plinth x 2 sets of 10. Wall ball squats x 10         Manual Therapy ( 30 min  ): Patella and patella tendon mobs. Soft tissue work to [employer] and HS. PROM for knee flex and extn. Sitting knee flex to 127. Full passive ext. Still lacks active ext in sitting. Therapeutic Modalities:    HEP: As directed. Treatment/Session Assessment:  Pt is post L TKA on 4/5/17.   Presented with decreased ROM and strength of L knee and LE. Good increase in ROM in flexion and ext. Excellent improvement in gait. Slight limp when fatigued. Will continue to work on end range flexion and active ext. Steady increase in strength with  increased activity level including some light yard work. . Patient indicated less discomfort following treatment today and hopes to do more at home including walking and light yard work. .  Very motivated. Should continue to progress well. Instructed patient to continue home exercises and stretches as directed. · Response to Treatment:  Independent with out assistive device in home. Good increase in ROM Good understanding of exercises. .  · Compliance with Program/Exercises: Doing exercises at home . · Recommendations/Intent for next treatment session: \"Next visit will focus on ROM and strengtheing of L knee and LE.  \".   Total Treatment Duration:  PT Patient Time In/Time Out  Time In: 1100  Time Out: 1200  Treatment number [address] Kathia Cardoso, PT,

## 2017-05-19 ENCOUNTER — HOSPITAL ENCOUNTER (OUTPATIENT)
Dept: PHYSICAL THERAPY | Age: 77
Discharge: HOME | End: 2017-05-19
Payer: MEDICARE

## 2017-05-19 PROCEDURE — 97140 MANUAL THERAPY 1/> REGIONS: CPT

## 2017-05-19 PROCEDURE — 97110 THERAPEUTIC EXERCISES: CPT

## 2017-05-19 NOTE — PROGRESS NOTES
[facility] [place] [address] Therapy   [address], [address]  [phone]   [phone]        OUTPATIENT PHYSICAL THERAPY:Daily Note 5/19/2017    ICD-10: Treatment Diagnosis:   R26.2 Difficulty in walking, not elsewhere classified     M25.662 Stiffness of left knee, not elsewhere classified     M25.562 Pain in left knee     Precautions/Allergies:   Latex; Adhesive; Pcn [penicillins]; and Sulfa (sulfonamide antibiotics)   Fall Risk Score: 1 (? 5 = High Risk)  MD Orders: Eval and treat MEDICAL/REFERRING DIAGNOSIS:  Presence of left artificial knee joint [Z96.652]   DATE OF ONSET: April 5, 2017  REFERRING PHYSICIAN: Alfredo Roach, *  RETURN PHYSICIAN APPOINTMENT: April 27     INITIAL ASSESSMENT:  Ms. Queta Fenton presents with pain and stiffness of L knee post L TKA on 4/5/17. Fair gait with rolling walker. Minimal edema. Very motivated and should progress well. PROBLEM LIST (Impacting functional limitations):  1. Decreased Strength  2. Decreased ADL/Functional Activities  3. Increased Pain  4. Decreased Flexibility/Joint Mobility INTERVENTIONS PLANNED:  1. Gait Training  2. Home Exercise Program (HEP)  3. Manual Therapy  4. Range of Motion (ROM)  5. Therapeutic Activites  6. Therapeutic Exercise/Strengthening   TREATMENT PLAN:  Effective Dates: 4/24/17 TO 7/21/17. Frequency/Duration: 3 times a week for 3 weeks then 2 x wek for 5 weeks and upon reassessment,  will adjust frequency and duration as progress indicates. GOALS: (Goals have been discussed and agreed upon with patient.)  Short-Term Functional Goals: Time Frame: 4 weeks   1. Establish independent HEP with no cueing. MET  2. Increase L knee ROM to 0-115 to increase ease of sitting and ambulation  MET  3. Increase L LE strength so able to initiate reciprocal pattern on stairs. MET  4. Independent gait with straight cane in home and community  MET  Discharge Goals: Time Frame:   1.  Improve score on LEFS by 9 points to enable prolonged sitting, standing and ambulation   2. Independent gait without assistive device. MET IN HOME  3. Increase L knee ROM to 0-125 to normalize gait   ONGOING  4. Increase strength so able to do reciprocal pattern on stairs  ONGOING  5. Increase L LE strength so able to ambulate 20 min with minimal to no increase in pain ONGOING   Rehabilitation Potential For Stated Goals: Excellent  Regarding [facility]'s therapy, I certify that the treatment plan above will be carried out by a therapist or under their direction. Thank you for this referral,  Quoc Nevarez, PT     Referring Physician Signature: Daniel Ospina., *              Date                    The information in this section was collected on 4/24/17 (except where otherwise noted). HISTORY:   History of Present Injury/Illness (Reason for Referral):    Pt is post L TKA on 4/5/2017 secondary to OA. Pt was at [facility] for 10 days for rehab of L knee. Pt was then referred to outpatient PT for ROM and strengthening of L knee and LE. Pt presents with decreased motion of L knee, weakness of LE's and fair gait with rolling walker. Reports pain with increased activity. Not sleeping well. Past Medical History/Comorbidities:   Ms. Beatrice Leal  has a past medical history of Arthritis; Chronic pain; Former smoker, stopped smoking in distant past; GERD (gastroesophageal reflux disease); High cholesterol; Hypertension; Morbid obesity ([address] Nausea & vomiting; Pre-diabetes; Status post total left knee replacement (4/5/2017); and Urinary incontinence. She also has no past medical history of Adverse effect of anesthesia; Aneurysm ([address] Arrhythmia; Asthma; Autoimmune disease ([address] CAD (coronary artery disease); Cancer ([address] Chronic kidney disease; Coagulation defects; COPD; Diabetes ([address] Difficult intubation; Endocarditis; Heart failure ([address] Liver disease; Malignant hyperthermia due to anesthesia;  Other ill-defined conditions; Pseudocholinesterase deficiency; Psychiatric disorder; PUD (peptic ulcer disease); Rheumatic fever; Seizures ([facility] Stroke [facility]); Thromboembolus ([facility] Thyroid disease; Unspecified adverse effect of anesthesia; or Unspecified sleep apnea. Ms. Shanell Chester  has a past surgical history that includes gyn; heent; us guided core breast biopsy; rohit biopsy breast stereotactic; knee replacement (Right, 2010); and breast surgery procedure unlisted. Social History/Living Environment:     Lives in home without stairs but has stairs at [employer]  Prior Level of Function/Work/Activity:  Retired. Enjoys walking and traveling   Dominant Side:         RIGHT   Current Medications:       Current Outpatient Prescriptions:     oxyCODONE IR (ROXICODONE) 10 mg tab immediate release tablet, Take 1 Tab by mouth every four (4) hours as needed. Max Daily Amount: 60 mg., Disp: 60 Tab, Rfl: 0    HYDROmorphone (DILAUDID) 2 mg tablet, Take 1 Tab by mouth every three (3) hours as needed. Max Daily Amount: 16 mg., Disp: 40 Tab, Rfl: 0    mirabegron ER (MYRBETRIQ) 50 mg ER tablet, Take 50 mg by mouth daily. Take morning of surgery per anesthesia guidelines. Indications: URINARY URGE INCONTINENCE, Disp: , Rfl:     omeprazole (PRILOSEC) 40 mg capsule, Take 40 mg by mouth daily. Take morning of surgery per anesthesia guidelines. , Disp: , Rfl:     simvastatin (ZOCOR) 40 mg tablet, Take 40 mg by mouth daily. Per anesthesia protocol:instructed to take am of surgery. , Disp: , Rfl:     lisinopril-hydrochlorothiazide (PRINZIDE, ZESTORETIC) 20-12.5 mg per tablet, Take 1 Tab by mouth daily. , Disp: , Rfl:     docusate sodium (COLACE) 100 mg capsule, Take 100 mg by mouth daily. , Disp: , Rfl:    No longer taking oxycodone. Taking Motrin 800 mg as needed.   Taking motrin and benydril at night    Date Last Reviewed:  5/19/2017     Number of Personal Factors/Comorbidities that affect the Plan of Care: 1-2: MODERATE COMPLEXITY   EXAMINATION: Observation/Orthostatic Postural Assessment: To PT without assistive device. Reports that she is only using cane in a crowd. Good heel toe gait. Improved knee ext at heel strike and improved toe off. Still slight limp to L. Minimal edema. Less tight HS. In 90/90, HS are 75. Palpation:          Tender medial knee   ROM:     L knee 0-123  R knee  0- 125                                       Strength:     Knee ext  L 4/5, R 4+/5  Knee flex L 4/5,  R 5/5         Hip flexion  L 4+/5,  R 5/5  Hip abd  L 4-/5,  R 4/5              Neurological Screen: Intact to light touch   Balance:  Good with straight cane    Body Structures Involved:  1. Bones  2. Joints  3. Muscles  4. Ligaments Body Functions Affected:  1. Sensory/Pain  2. Neuromusculoskeletal  3. Movement Related Activities and Participation Affected:  1. Learning and Applying Knowledge  2. General Tasks and Demands  3. Mobility  4. Self Care  5. Community, Social and [place]   Number of elements (examined above) that affect the Plan of Care: 3: MODERATE COMPLEXITY   CLINICAL PRESENTATION:   Presentation: Evolving clinical presentation with changing clinical characteristics: MODERATE COMPLEXITY   CLINICAL DECISION MAKING:   Outcome Measure: Tool Used: Lower Extremity Functional Scale (LEFS)  Score:  Initial: 30/80 Most Recent: 45/80 (Date: 5/10/17 )   Interpretation of Score: 20 questions each scored on a 5 point scale with 0 representing \"extreme difficulty or unable to perform\" and 4 representing \"no difficulty\". The lower the score, the greater the functional disability. 80/80 represents no disability. Minimal detectable change is 9 points. Score 80 79-63 62-48 47-32 31-16 15-1 0   Modifier CH CI CJ CK CL CM CN     ?  Mobility - Walking and Moving Around:     - CURRENT STATUS: CK - 40%-59% impaired, limited or restricted    - GOAL STATUS: CJ - 20%-39% impaired, limited or restricted    - D/C STATUS:  ---------------To be determined---------------    Medical Necessity:   · Patient is expected to demonstrate progress in strength, range of motion, balance and gait to increase independence with home and community activities. Reason for Services/Other Comments:  · Patient continues to require skilled intervention due to decreased ROM and strength of L knee and LE. Use of outcome tool(s) and clinical judgement create a POC that gives a: Clear prediction of patient's progress: LOW COMPLEXITY            TREATMENT:   (In addition to Assessment/Re-Assessment sessions the following treatments were rendered)  Pre-treatment Symptoms/Complaints: Patient reports knee is sore and her foot has been hurting for last couple of days. Pain: Initial:   Pain Intensity 1: 3  Post Session:  2      THERAPEUTIC EXERCISE: (30  minutes):  Exercises per grid below to improve mobility, strength, coordination and gait. Required moderate verbal and manual cues to promote proper body alignment, promote proper body posture and promote proper body mechanics. Progressed resistance, range and repetitions as indicated. Performed  QS (quadriceps sets),  SAQ's (short arc quadriceps),and LAQ's. . HS and HC stretch. Sitting [address] at 20# x 4 sets of 10. Standing cable for hip flex and abd at 17# and hip ext at 32# x 2 sets of 20. Performed 6 in step up and over x 20 and on 8 in x 10. .  Worked on heel toe gait without assistive device. Good increase in knee ext and toe off. Work to increase weight shift . NuStep at level 4 x 10 min. Sit to stand from plinth x 2 sets of 10. Wall ball squats x 10 standing knee ext with pulley #25 x 10        Manual Therapy ( 30 min  ): Patella and patella tendon mobs. Soft tissue work to [employer] and HS. PROM for knee flex and extn. Sitting knee flex to 128. Full passive ext. Still lacks active ext in sitting. Therapeutic Modalities:    HEP: As directed. Treatment/Session Assessment:  Pt is post L TKA on 4/5/17.   Presented with decreased ROM and strength of L knee and LE. Good increase in ROM in flexion and ext. Excellent improvement in gait. Will continue to work on end range flexion and active ext. Steady increase in strength with  increased activity level including some light yard work. . Patient indicated less discomfort following treatment today and hopes to do more at home including walking and light yard work. Very motivated. Patient indicated that the tape seemed to help with both her foot and knee. Should continue to progress well. Instructed patient to continue home exercises and stretches as directed. · Response to Treatment:  Independent with out assistive device in home. Good increase in ROM Good understanding of exercises. .  · Compliance with Program/Exercises: Doing exercises at home . · Recommendations/Intent for next treatment session: \"Next visit will focus on ROM and strengtheing of L knee and LE.  \".   Total Treatment Duration:  PT Patient Time In/Time Out  Time In: 1100  Time Out: 1200  Treatment number [place]

## 2017-05-22 ENCOUNTER — HOSPITAL ENCOUNTER (OUTPATIENT)
Dept: PHYSICAL THERAPY | Age: 77
Discharge: HOME | End: 2017-05-22
Payer: MEDICARE

## 2017-05-22 PROCEDURE — 97110 THERAPEUTIC EXERCISES: CPT

## 2017-05-22 PROCEDURE — 97140 MANUAL THERAPY 1/> REGIONS: CPT

## 2017-05-22 NOTE — PROGRESS NOTES
[facility]   [address], [address]  [phone]   [phone]        OUTPATIENT PHYSICAL THERAPY:Daily Note 5/22/2017    ICD-10: Treatment Diagnosis:   R26.2 Difficulty in walking, not elsewhere classified     M25.662 Stiffness of left knee, not elsewhere classified     M25.562 Pain in left knee     Precautions/Allergies:   Latex; Adhesive; Pcn [penicillins]; and Sulfa (sulfonamide antibiotics)   Fall Risk Score: 1 (? 5 = High Risk)  MD Orders: Eval and treat MEDICAL/REFERRING DIAGNOSIS:  Presence of left artificial knee joint [Z96.652]   DATE OF ONSET: April 5, 2017  REFERRING PHYSICIAN: Eligio Garrido., *  RETURN PHYSICIAN APPOINTMENT: April 27     INITIAL ASSESSMENT:  Ms. Tremayne Garcia presents with pain and stiffness of L knee post L TKA on 4/5/17. Fair gait with rolling walker. Minimal edema. Very motivated and should progress well. PROBLEM LIST (Impacting functional limitations):  1. Decreased Strength  2. Decreased ADL/Functional Activities  3. Increased Pain  4. Decreased Flexibility/Joint Mobility INTERVENTIONS PLANNED:  1. Gait Training  2. Home Exercise Program (HEP)  3. Manual Therapy  4. Range of Motion (ROM)  5. Therapeutic Activites  6. Therapeutic Exercise/Strengthening   TREATMENT PLAN:  Effective Dates: 4/24/17 TO 7/21/17. Frequency/Duration: 3 times a week for 3 weeks then 2 x wek for 5 weeks and upon reassessment,  will adjust frequency and duration as progress indicates. GOALS: (Goals have been discussed and agreed upon with patient.)  Short-Term Functional Goals: Time Frame: 4 weeks   1. Establish independent HEP with no cueing. MET  2. Increase L knee ROM to 0-115 to increase ease of sitting and ambulation  MET  3. Increase L LE strength so able to initiate reciprocal pattern on stairs. MET  4. Independent gait with straight cane in home and community  MET  Discharge Goals: Time Frame:   1.  Improve score on LEFS by 9 points to enable prolonged sitting, standing and ambulation   2. Independent gait without assistive device. MET IN HOME  3. Increase L knee ROM to 0-125 to normalize gait   ONGOING  4. Increase strength so able to do reciprocal pattern on stairs  ONGOING  5. Increase L LE strength so able to ambulate 20 min with minimal to no increase in pain ONGOING   Rehabilitation Potential For Stated Goals: Excellent  Regarding [facility] therapy, I certify that the treatment plan above will be carried out by a therapist or under their direction. Thank you for this referral,  Aldo Lara, PT     Referring Physician Signature: Clair Rose., *              Date                    The information in this section was collected on 4/24/17 (except where otherwise noted). HISTORY:   History of Present Injury/Illness (Reason for Referral):    Pt is post L TKA on 4/5/2017 secondary to OA. Pt was at [facility] for 10 days for rehab of L knee. Pt was then referred to outpatient PT for ROM and strengthening of L knee and LE. Pt presents with decreased motion of L knee, weakness of LE's and fair gait with rolling walker. Reports pain with increased activity. Not sleeping well. Past Medical History/Comorbidities:   Ms. Vane Chong  has a past medical history of Arthritis; Chronic pain; Former smoker, stopped smoking in distant past; GERD (gastroesophageal reflux disease); High cholesterol; Hypertension; Morbid obesity ([address] Nausea & vomiting; Pre-diabetes; Status post total left knee replacement (4/5/2017); and Urinary incontinence. She also has no past medical history of Adverse effect of anesthesia; Aneurysm ([address] Arrhythmia; Asthma; Autoimmune disease ([address] CAD (coronary artery disease); Cancer ([address] Chronic kidney disease; Coagulation defects; COPD; Diabetes ([address] Difficult intubation; Endocarditis; Heart failure ([address] Liver disease; Malignant hyperthermia due to anesthesia;  Other ill-defined conditions; Pseudocholinesterase deficiency; Psychiatric disorder; PUD (peptic ulcer disease); Rheumatic fever; Seizures ([facility] Stroke [facility]); Thromboembolus ([facility] [address] Thyroid disease; Unspecified adverse effect of anesthesia; or Unspecified sleep apnea. Ms. Lee Leon  has a past surgical history that includes gyn; heent; us guided core breast biopsy; rohit biopsy breast stereotactic; knee replacement (Right, 2010); and breast surgery procedure unlisted. Social History/Living Environment:     Lives in home without stairs but has stairs at [place]  Prior Level of Function/Work/Activity:  Retired. Enjoys walking and traveling   Dominant Side:         RIGHT   Current Medications:       Current Outpatient Prescriptions:     oxyCODONE IR (ROXICODONE) 10 mg tab immediate release tablet, Take 1 Tab by mouth every four (4) hours as needed. Max Daily Amount: 60 mg., Disp: 60 Tab, Rfl: 0    HYDROmorphone (DILAUDID) 2 mg tablet, Take 1 Tab by mouth every three (3) hours as needed. Max Daily Amount: 16 mg., Disp: 40 Tab, Rfl: 0    mirabegron ER (MYRBETRIQ) 50 mg ER tablet, Take 50 mg by mouth daily. Take morning of surgery per anesthesia guidelines. Indications: URINARY URGE INCONTINENCE, Disp: , Rfl:     omeprazole (PRILOSEC) 40 mg capsule, Take 40 mg by mouth daily. Take morning of surgery per anesthesia guidelines. , Disp: , Rfl:     simvastatin (ZOCOR) 40 mg tablet, Take 40 mg by mouth daily. Per anesthesia protocol:instructed to take am of surgery. , Disp: , Rfl:     lisinopril-hydrochlorothiazide (PRINZIDE, ZESTORETIC) 20-12.5 mg per tablet, Take 1 Tab by mouth daily. , Disp: , Rfl:     docusate sodium (COLACE) 100 mg capsule, Take 100 mg by mouth daily. , Disp: , Rfl:    No longer taking oxycodone. Taking Motrin 800 mg as needed.   Taking motrin and benydril at night    Date Last Reviewed:  5/22/2017     Number of Personal Factors/Comorbidities that affect the Plan of Care: 1-2: MODERATE COMPLEXITY   EXAMINATION: Observation/Orthostatic Postural Assessment: To PT without assistive device. Reports that she is only using cane in a crowd. Good heel toe gait. Improved knee ext at heel strike and improved toe off. Still slight limp to L. Minimal edema. Less tight HS. In 90/90, HS are 75. Palpation:          Tender medial knee   ROM:     L knee 0-123  R knee  0- 125                                       Strength:     Knee ext  L 4/5, R 4+/5  Knee flex L 4/5,  R 5/5         Hip flexion  L 4+/5,  R 5/5  Hip abd  L 4-/5,  R 4/5              Neurological Screen: Intact to light touch   Balance:  Good with straight cane    Body Structures Involved:  1. Bones  2. Joints  3. Muscles  4. Ligaments Body Functions Affected:  1. Sensory/Pain  2. Neuromusculoskeletal  3. Movement Related Activities and Participation Affected:  1. Learning and Applying Knowledge  2. General Tasks and Demands  3. Mobility  4. Self Care  5. Community, Social and [place]   Number of elements (examined above) that affect the Plan of Care: 3: MODERATE COMPLEXITY   CLINICAL PRESENTATION:   Presentation: Evolving clinical presentation with changing clinical characteristics: MODERATE COMPLEXITY   CLINICAL DECISION MAKING:   Outcome Measure: Tool Used: Lower Extremity Functional Scale (LEFS)  Score:  Initial: 30/80 Most Recent: 45/80 (Date: 5/10/17 )   Interpretation of Score: 20 questions each scored on a 5 point scale with 0 representing \"extreme difficulty or unable to perform\" and 4 representing \"no difficulty\". The lower the score, the greater the functional disability. 80/80 represents no disability. Minimal detectable change is 9 points. Score 80 79-63 62-48 47-32 31-16 15-1 0   Modifier CH CI CJ CK CL CM CN     ?  Mobility - Walking and Moving Around:     - CURRENT STATUS: CK - 40%-59% impaired, limited or restricted    - GOAL STATUS: CJ - 20%-39% impaired, limited or restricted    - D/C STATUS:  ---------------To be determined---------------    Medical Necessity:   · Patient is expected to demonstrate progress in strength, range of motion, balance and gait to increase independence with home and community activities. Reason for Services/Other Comments:  · Patient continues to require skilled intervention due to decreased ROM and strength of L knee and LE. Use of outcome tool(s) and clinical judgement create a POC that gives a: Clear prediction of patient's progress: LOW COMPLEXITY            TREATMENT:   (In addition to Assessment/Re-Assessment sessions the following treatments were rendered)  Pre-treatment Symptoms/Complaints: Patient reports taping helped foot and knee. Did a lot this wekend. Stiff after sitting  Pain: Initial:   Pain Intensity 1: 3  Post Session:  2      THERAPEUTIC EXERCISE: (30  minutes):  Exercises per grid below to improve mobility, strength, coordination and gait. Required moderate verbal and manual cues to promote proper body alignment, promote proper body posture and promote proper body mechanics. Progressed resistance, range and repetitions as indicated. Performed  QS (quadriceps sets),  SAQ's (short arc quadriceps),and LAQ's. . HS and HC stretch. Sitting [address] at 20# x 4 sets of 10. Standing cable for hip flex and abd at 17# and hip ext at 32# x 2 sets of 20. Performed 6 in step up and over x 15 and on 8 in x 15. .  Worked on heel toe gait without assistive device. Good increase in knee ext and toe off. Improved weight shift. NuStep at level 4 x 10 min. Sit to stand from plinth x 2 sets of 10. Standing knee ext with pulley #25 x 10        Manual Therapy ( 30 min  ): Patella and patella tendon mobs. Soft tissue work to [employer] and HS. PROM for knee flex and extn. Sitting knee flex to 130. Full passive ext. Still lacks active ext in sitting. Therapeutic Modalities:    HEP: As directed. Treatment/Session Assessment:  Pt is post L TKA on 4/5/17.   Presented with decreased ROM and strength of L knee and LE. Good increase in ROM in flexion and ext. Excellent improvement in gait. Will continue to work on end range flexion and active ext. Pt with steady increase in strength with  increased activity level including some light yard work. . Patient indicated less overall pain. Trying to do more walking at home. Very motivated. Patient indicated that the tape seemed to help with both her foot and knee. Should continue to progress well. Instructed patient to continue home exercises and stretches as directed. · Response to Treatment:  Independent with out assistive device in home. Good increase in ROM Good understanding of exercises. .  · Compliance with Program/Exercises: Doing exercises at home . · Recommendations/Intent for next treatment session: \"Next visit will focus on ROM and strengtheing of L knee and LE.  \".   Total Treatment Duration:  PT Patient Time In/Time Out  Time In: 0100  Time Out: 1200  Treatment number 3  More Griffin, PT,

## 2017-05-24 ENCOUNTER — HOSPITAL ENCOUNTER (OUTPATIENT)
Dept: PHYSICAL THERAPY | Age: 77
Discharge: HOME | End: 2017-05-24
Payer: MEDICARE

## 2017-05-26 ENCOUNTER — HOSPITAL ENCOUNTER (OUTPATIENT)
Dept: PHYSICAL THERAPY | Age: 77
Discharge: HOME | End: 2017-05-26
Payer: MEDICARE

## 2017-05-26 PROCEDURE — 97110 THERAPEUTIC EXERCISES: CPT

## 2017-05-26 PROCEDURE — 97140 MANUAL THERAPY 1/> REGIONS: CPT

## 2017-05-26 NOTE — PROGRESS NOTES
[facility]rapy [facility] at [facility] Therapy   [address], [address]  [phone]   [phone]        OUTPATIENT PHYSICAL THERAPY:Daily Note 5/26/2017    ICD-10: Treatment Diagnosis:   R26.2 Difficulty in walking, not elsewhere classified     M25.662 Stiffness of left knee, not elsewhere classified     M25.562 Pain in left knee     Precautions/Allergies:   Latex; Adhesive; Pcn [penicillins]; and Sulfa (sulfonamide antibiotics)   Fall Risk Score: 1 (? 5 = High Risk)  MD Orders: Eval and treat MEDICAL/REFERRING DIAGNOSIS:  Presence of left artificial knee joint [Z96.652]   DATE OF ONSET: April 5, 2017  REFERRING PHYSICIAN: Eligio Garrido., *  RETURN PHYSICIAN APPOINTMENT: April 27     INITIAL ASSESSMENT:  Ms. Tremayne Garcia presents with pain and stiffness of L knee post L TKA on 4/5/17. Fair gait with rolling walker. Minimal edema. Very motivated and should progress well. PROBLEM LIST (Impacting functional limitations):  1. Decreased Strength  2. Decreased ADL/Functional Activities  3. Increased Pain  4. Decreased Flexibility/Joint Mobility INTERVENTIONS PLANNED:  1. Gait Training  2. Home Exercise Program (HEP)  3. Manual Therapy  4. Range of Motion (ROM)  5. Therapeutic Activites  6. Therapeutic Exercise/Strengthening   TREATMENT PLAN:  Effective Dates: 4/24/17 TO 7/21/17. Frequency/Duration: 3 times a week for 3 weeks then 2 x wek for 5 weeks and upon reassessment,  will adjust frequency and duration as progress indicates. GOALS: (Goals have been discussed and agreed upon with patient.)  Short-Term Functional Goals: Time Frame: 4 weeks   1. Establish independent HEP with no cueing. MET  2. Increase L knee ROM to 0-115 to increase ease of sitting and ambulation  MET  3. Increase L LE strength so able to initiate reciprocal pattern on stairs. MET  4. Independent gait with straight cane in home and community  MET  Discharge Goals: Time Frame:   1.  Improve score on LEFS by 9 points to enable prolonged sitting, standing and ambulation   2. Independent gait without assistive device. MET IN HOME  3. Increase L knee ROM to 0-125 to normalize gait   ONGOING  4. Increase strength so able to do reciprocal pattern on stairs  ONGOING  5. Increase L LE strength so able to ambulate 20 min with minimal to no increase in pain ONGOING   Rehabilitation Potential For Stated Goals: Excellent  Regarding [facility] therapy, I certify that the treatment plan above will be carried out by a therapist or under their direction. Thank you for this referral,  Elfredia Marking, PT     Referring Physician Signature: Scar Melara., *              Date                    The information in this section was collected on 4/24/17 (except where otherwise noted). HISTORY:   History of Present Injury/Illness (Reason for Referral):    Pt is post L TKA on 4/5/2017 secondary to OA. Pt was at [place] [facility] 10 [facility] for rehab of L knee. Pt was then referred to outpatient PT for ROM and strengthening of L knee and LE. Pt presents with decreased motion of L knee, weakness of LE's and fair gait with rolling walker. Reports pain with increased activity. Not sleeping well. Past Medical History/Comorbidities:   Ms. Mary Carroll  has a past medical history of Arthritis; Chronic pain; Former smoker, stopped smoking in distant past; GERD (gastroesophageal reflux disease); High cholesterol; Hypertension; Morbid obesity ([address] Nausea & vomiting; Pre-diabetes; Status post total left knee replacement (4/5/2017); and Urinary incontinence. She also has no past medical history of Adverse effect of anesthesia; Aneurysm ([address] Arrhythmia; Asthma; Autoimmune disease ([address] CAD (coronary artery disease); Cancer ([address] Chronic kidney disease; Coagulation defects; COPD; Diabetes ([address] Difficult intubation; Endocarditis; Heart failure ([address] Liver disease; Malignant hyperthermia due to anesthesia;  Other ill-defined conditions; Pseudocholinesterase deficiency; Psychiatric disorder; PUD (peptic ulcer disease); Rheumatic fever; Seizures ([facility] [address] Stroke [facility]); Thromboembolus ([facility] [address] [facility] Thyroid disease; Unspecified adverse effect of anesthesia; or Unspecified sleep apnea. Ms. Laine Beth  has a past surgical history that includes gyn; heent; us guided core breast biopsy; rohit biopsy breast stereotactic; knee replacement (Right, 2010); and breast surgery procedure unlisted. Social History/Living Environment:     Lives in home without stairs but has stairs at [employer]  Prior Level of Function/Work/Activity:  Retired. Enjoys walking and traveling   Dominant Side:         RIGHT   Current Medications:       Current Outpatient Prescriptions:     oxyCODONE IR (ROXICODONE) 10 mg tab immediate release tablet, Take 1 Tab by mouth every four (4) hours as needed. Max Daily Amount: 60 mg., Disp: 60 Tab, Rfl: 0    HYDROmorphone (DILAUDID) 2 mg tablet, Take 1 Tab by mouth every three (3) hours as needed. Max Daily Amount: 16 mg., Disp: 40 Tab, Rfl: 0    mirabegron ER (MYRBETRIQ) 50 mg ER tablet, Take 50 mg by mouth daily. Take morning of surgery per anesthesia guidelines. Indications: URINARY URGE INCONTINENCE, Disp: , Rfl:     omeprazole (PRILOSEC) 40 mg capsule, Take 40 mg by mouth daily. Take morning of surgery per anesthesia guidelines. , Disp: , Rfl:     simvastatin (ZOCOR) 40 mg tablet, Take 40 mg by mouth daily. Per anesthesia protocol:instructed to take am of surgery. , Disp: , Rfl:     lisinopril-hydrochlorothiazide (PRINZIDE, ZESTORETIC) 20-12.5 mg per tablet, Take 1 Tab by mouth daily. , Disp: , Rfl:     docusate sodium (COLACE) 100 mg capsule, Take 100 mg by mouth daily. , Disp: , Rfl:    No longer taking oxycodone. Taking Motrin 800 mg as needed.   Taking motrin and benydril at night    Date Last Reviewed:  5/26/2017     Number of Personal Factors/Comorbidities that affect the Plan of Care: 1-2: MODERATE COMPLEXITY   EXAMINATION: Observation/Orthostatic Postural Assessment: To PT without assistive device. Reports that she is only using cane in a crowd. Good heel toe gait. Improved knee ext at heel strike and improved toe off. Still slight limp to L. Minimal edema. Less tight HS. In 90/90, HS are 75. Palpation:          Tender medial knee   ROM:     L knee 0-123  R knee  0- 125                                       Strength:     Knee ext  L 4/5, R 4+/5  Knee flex L 4/5,  R 5/5         Hip flexion  L 4+/5,  R 5/5  Hip abd  L 4-/5,  R 4/5              Neurological Screen: Intact to light touch   Balance:  Good with straight cane    Body Structures Involved:  1. Bones  2. Joints  3. Muscles  4. Ligaments Body Functions Affected:  1. Sensory/Pain  2. Neuromusculoskeletal  3. Movement Related Activities and Participation Affected:  1. Learning and Applying Knowledge  2. General Tasks and Demands  3. Mobility  4. Self Care  5. Community, Social and [place]   Number of elements (examined above) that affect the Plan of Care: 3: MODERATE COMPLEXITY   CLINICAL PRESENTATION:   Presentation: Evolving clinical presentation with changing clinical characteristics: MODERATE COMPLEXITY   CLINICAL DECISION MAKING:   Outcome Measure: Tool Used: Lower Extremity Functional Scale (LEFS)  Score:  Initial: 30/80 Most Recent: 45/80 (Date: 5/10/17 )   Interpretation of Score: 20 questions each scored on a 5 point scale with 0 representing \"extreme difficulty or unable to perform\" and 4 representing \"no difficulty\". The lower the score, the greater the functional disability. 80/80 represents no disability. Minimal detectable change is 9 points. Score 80 79-63 62-48 47-32 31-16 15-1 0   Modifier CH CI CJ CK CL CM CN     ?  Mobility - Walking and Moving Around:     - CURRENT STATUS: CK - 40%-59% impaired, limited or restricted    - GOAL STATUS: CJ - 20%-39% impaired, limited or restricted    - D/C STATUS:  ---------------To be determined---------------    Medical Necessity:   · Patient is expected to demonstrate progress in strength, range of motion, balance and gait to increase independence with home and community activities. Reason for Services/Other Comments:  · Patient continues to require skilled intervention due to decreased ROM and strength of L knee and LE. Use of outcome tool(s) and clinical judgement create a POC that gives a: Clear prediction of patient's progress: LOW COMPLEXITY            TREATMENT:   (In addition to Assessment/Re-Assessment sessions the following treatments were rendered)  Pre-treatment Symptoms/Complaints: Patient reports taping helped foot and knee. Knee is feeling better    Pain: Initial:   Pain Intensity 1: 3  Post Session:  2      THERAPEUTIC EXERCISE: (30  minutes):  Exercises per grid below to improve mobility, strength, coordination and gait. Required moderate verbal and manual cues to promote proper body alignment, promote proper body posture and promote proper body mechanics. Progressed resistance, range and repetitions as indicated. Performed  QS (quadriceps sets),  SAQ's (short arc quadriceps),and LAQ's x 20. . . HS and HC stretch. Sitting [address] at 25# x 4 sets of 10. Standing cable for hip flex and abd at 17# and hip ext at 37# x 2 sets of 20. Performed 6 in step up and over x 15 and on 8 in x 15. .  Worked on heel toe gait without assistive device. Good increase in knee ext and toe off. Improved weight shift. NuStep at level 4 x 10 min. Sit to stand from plinth x 3 sets of 10. Standing knee ext with pulley #25 x 10 Kinesiotape of medial knee pain and foot. Added taping of arch with decreased foot pain. Manual Therapy ( 30 min  ): Patella and patella tendon mobs. Soft tissue work to [employer] and HS. PROM for knee flex and extn. Sitting knee flex to 131. Full passive ext. Still lacks active ext in sitting. Therapeutic Modalities:    HEP: As directed.    Treatment/Session Assessment:  Pt is post L TKA on 4/5/17. Presented with decreased ROM and strength of L knee and LE. Good increase in ROM in flexion and ext. Full passive motion but still lacks full active ext. Excellent improvement in gait. Will continue to work on end range flexion and active ext. Pt with steady increase in strength with  increased activity level including some light yard work. . Patient indicated less overall pain. Trying to do more walking at home. Very motivated. Patient indicated that the tape seemed to help with both her foot and knee pain. Should continue to progress well. Instructed patient to continue home exercises and stretches as directed. · Response to Treatment:  Independent with out assistive device in home. Good increase in ROM Good understanding of exercises. .  · Compliance with Program/Exercises: Doing exercises at home . · Recommendations/Intent for next treatment session: \"Next visit will focus on ROM and strengtheing of L knee and LE.  \".   Total Treatment Duration:  PT Patient Time In/Time Out  Time In: 1200  Time Out: 0100  Treatment number  14  More Webb, PT,

## 2017-05-31 ENCOUNTER — HOSPITAL ENCOUNTER (OUTPATIENT)
Dept: PHYSICAL THERAPY | Age: 77
Discharge: HOME | End: 2017-05-31
Payer: MEDICARE

## 2017-05-31 PROCEDURE — 97140 MANUAL THERAPY 1/> REGIONS: CPT

## 2017-05-31 PROCEDURE — 97110 THERAPEUTIC EXERCISES: CPT

## 2017-05-31 NOTE — PROGRESS NOTES
[facility]   [address], [address]  [phone]   [phone]        OUTPATIENT PHYSICAL THERAPY:Daily Note 5/31/2017    ICD-10: Treatment Diagnosis:   R26.2 Difficulty in walking, not elsewhere classified     M25.662 Stiffness of left knee, not elsewhere classified     M25.562 Pain in left knee     Precautions/Allergies:   Latex; Adhesive; Pcn [penicillins]; and Sulfa (sulfonamide antibiotics)   Fall Risk Score: 1 (? 5 = High Risk)  MD Orders: Eval and treat MEDICAL/REFERRING DIAGNOSIS:  Presence of left artificial knee joint [Z96.652]   DATE OF ONSET: April 5, 2017  REFERRING PHYSICIAN: Nazanin Pitts., *  RETURN PHYSICIAN APPOINTMENT: April 27     INITIAL ASSESSMENT:  Ms. Laine Beth presents with pain and stiffness of L knee post L TKA on 4/5/17. Fair gait with rolling walker. Minimal edema. Very motivated and should progress well. PROBLEM LIST (Impacting functional limitations):  1. Decreased Strength  2. Decreased ADL/Functional Activities  3. Increased Pain  4. Decreased Flexibility/Joint Mobility INTERVENTIONS PLANNED:  1. Gait Training  2. Home Exercise Program (HEP)  3. Manual Therapy  4. Range of Motion (ROM)  5. Therapeutic Activites  6. Therapeutic Exercise/Strengthening   TREATMENT PLAN:  Effective Dates: 4/24/17 TO 7/21/17. Frequency/Duration: 3 times a week for 3 weeks then 2 x wek for 5 weeks and upon reassessment,  will adjust frequency and duration as progress indicates. GOALS: (Goals have been discussed and agreed upon with patient.)  Short-Term Functional Goals: Time Frame: 4 weeks   1. Establish independent HEP with no cueing. MET  2. Increase L knee ROM to 0-115 to increase ease of sitting and ambulation  MET  3. Increase L LE strength so able to initiate reciprocal pattern on stairs. MET  4. Independent gait with straight cane in home and community  MET  Discharge Goals: Time Frame:   1.  Improve score on LEFS by 9 points to enable prolonged sitting, standing and ambulation   2. Independent gait without assistive device. MET IN HOME  3. Increase L knee ROM to 0-125 to normalize gait   ONGOING  4. Increase strength so able to do reciprocal pattern on stairs  ONGOING  5. Increase L LE strength so able to ambulate 20 min with minimal to no increase in pain ONGOING   Rehabilitation Potential For Stated Goals: Excellent  Regarding Jun Rosado's therapy, I certify that the treatment plan above will be carried out by a therapist or under their direction. Thank you for this referral,  Radha Charles, PT     Referring Physician Signature: Yovany Aponte., *              Date                    The information in this section was collected on 4/24/17 (except where otherwise noted). HISTORY:   History of Present Injury/Illness (Reason for Referral):    Pt is post L TKA on 4/5/2017 secondary to OA. Pt was at [facility] for 10 days for rehab of L knee. Pt was then referred to outpatient PT for ROM and strengthening of L knee and LE. Pt presents with decreased motion of L knee, weakness of LE's and fair gait with rolling walker. Reports pain with increased activity. Not sleeping well. Past Medical History/Comorbidities:   Ms. Armando Flores  has a past medical history of Arthritis; Chronic pain; Former smoker, stopped smoking in distant past; GERD (gastroesophageal reflux disease); High cholesterol; Hypertension; Morbid obesity ([address] Nausea & vomiting; Pre-diabetes; Status post total left knee replacement (4/5/2017); and Urinary incontinence. She also has no past medical history of Adverse effect of anesthesia; Aneurysm ([address] Arrhythmia; Asthma; Autoimmune disease ([address] CAD (coronary artery disease); Cancer ([address] Chronic kidney disease; Coagulation defects; COPD; Diabetes ([address] Difficult intubation; Endocarditis; Heart failure ([address] Liver disease; Malignant hyperthermia due to anesthesia;  Other ill-defined conditions; Pseudocholinesterase deficiency; Psychiatric disorder; PUD (peptic ulcer disease); Rheumatic fever; Seizures ([facility] [address] Stroke [facility]); Thromboembolus ([facility] [address] Thyroid disease; Unspecified adverse effect of anesthesia; or Unspecified sleep apnea. Ms. Sidney Acevedo  has a past surgical history that includes gyn; heent; us guided core breast biopsy; rohit biopsy breast stereotactic; knee replacement (Right, 2010); and breast surgery procedure unlisted. Social History/Living Environment:     Lives in home without stairs but has stairs at [place]  Prior Level of Function/Work/Activity:  Retired. Enjoys walking and traveling   Dominant Side:         RIGHT   Current Medications:       Current Outpatient Prescriptions:     oxyCODONE IR (ROXICODONE) 10 mg tab immediate release tablet, Take 1 Tab by mouth every four (4) hours as needed. Max Daily Amount: 60 mg., Disp: 60 Tab, Rfl: 0    HYDROmorphone (DILAUDID) 2 mg tablet, Take 1 Tab by mouth every three (3) hours as needed. Max Daily Amount: 16 mg., Disp: 40 Tab, Rfl: 0    mirabegron ER (MYRBETRIQ) 50 mg ER tablet, Take 50 mg by mouth daily. Take morning of surgery per anesthesia guidelines. Indications: URINARY URGE INCONTINENCE, Disp: , Rfl:     omeprazole (PRILOSEC) 40 mg capsule, Take 40 mg by mouth daily. Take morning of surgery per anesthesia guidelines. , Disp: , Rfl:     simvastatin (ZOCOR) 40 mg tablet, Take 40 mg by mouth daily. Per anesthesia protocol:instructed to take am of surgery. , Disp: , Rfl:     lisinopril-hydrochlorothiazide (PRINZIDE, ZESTORETIC) 20-12.5 mg per tablet, Take 1 Tab by mouth daily. , Disp: , Rfl:     docusate sodium (COLACE) 100 mg capsule, Take 100 mg by mouth daily. , Disp: , Rfl:    No longer taking oxycodone. Taking Motrin 800 mg as needed.   Taking motrin and benydril at night    Date Last Reviewed:  5/31/2017     Number of Personal Factors/Comorbidities that affect the Plan of Care: 1-2: MODERATE COMPLEXITY   EXAMINATION: Observation/Orthostatic Postural Assessment: To PT without assistive device. Reports that she is only using cane in a crowd. Good heel toe gait. Improved knee ext at heel strike and improved toe off. Still slight limp to L. Minimal edema. Less tight HS. In 90/90, HS are 75. Palpation:          Tender medial knee   ROM:     L knee 0-123  R knee  0- 125                                       Strength:     Knee ext  L 4/5, R 4+/5  Knee flex L 4/5,  R 5/5         Hip flexion  L 4+/5,  R 5/5  Hip abd  L 4-/5,  R 4/5              Neurological Screen: Intact to light touch   Balance:  Good with straight cane    Body Structures Involved:  1. Bones  2. Joints  3. Muscles  4. Ligaments Body Functions Affected:  1. Sensory/Pain  2. Neuromusculoskeletal  3. Movement Related Activities and Participation Affected:  1. Learning and Applying Knowledge  2. General Tasks and Demands  3. Mobility  4. Self Care  5. Community, Social and [place]   Number of elements (examined above) that affect the Plan of Care: 3: MODERATE COMPLEXITY   CLINICAL PRESENTATION:   Presentation: Evolving clinical presentation with changing clinical characteristics: MODERATE COMPLEXITY   CLINICAL DECISION MAKING:   Outcome Measure: Tool Used: Lower Extremity Functional Scale (LEFS)  Score:  Initial: 30/80 Most Recent: 45/80 (Date: 5/10/17 )   Interpretation of Score: 20 questions each scored on a 5 point scale with 0 representing \"extreme difficulty or unable to perform\" and 4 representing \"no difficulty\". The lower the score, the greater the functional disability. 80/80 represents no disability. Minimal detectable change is 9 points. Score 80 79-63 62-48 47-32 31-16 15-1 0   Modifier CH CI CJ CK CL CM CN     ?  Mobility - Walking and Moving Around:     - CURRENT STATUS: CK - 40%-59% impaired, limited or restricted    - GOAL STATUS: CJ - 20%-39% impaired, limited or restricted    - D/C STATUS:  ---------------To be determined---------------    Medical Necessity:   · Patient is expected to demonstrate progress in strength, range of motion, balance and gait to increase independence with home and community activities. Reason for Services/Other Comments:  · Patient continues to require skilled intervention due to decreased ROM and strength of L knee and LE. Use of outcome tool(s) and clinical judgement create a POC that gives a: Clear prediction of patient's progress: LOW COMPLEXITY            TREATMENT:   (In addition to Assessment/Re-Assessment sessions the following treatments were rendered)  Pre-treatment Symptoms/Complaints: Patient reports taping helped foot and knee. I fell off a bag seat this weekend. I caught myself but I tweaked my knee and had some increased pain. It is better today and was able to walk some this morning. Pain: Initial:   Pain Intensity 1: 3  Post Session:  2      THERAPEUTIC EXERCISE: (30  minutes):  Exercises per grid below to improve mobility, strength, coordination and gait. Required moderate verbal and manual cues to promote proper body alignment, promote proper body posture and promote proper body mechanics. Progressed resistance, range and repetitions as indicated. Performed  QS (quadriceps sets),  SAQ's (short arc quadriceps),and LAQ's x 20. . . HS and HC stretch. Sitting [address] at 25# x 4 sets of 10. Standing cable for hip flex and abd at 17# and hip ext at 37# x 2 sets of 20. Performed 6 in step up and over x 15 and on 8 in x 15. .  Worked on heel toe gait without assistive device. Good increase in knee ext and toe off. Improved weight shift. NuStep at level 4 x 10 min. Sit to stand from plinth x 3 sets of 10. Standing knee ext with pulley #25 x 10 Kinesiotape of medial knee pain and foot. Added taping of arch with decreased foot pain. Manual Therapy ( 30 min  ): Patella and patella tendon mobs. Soft tissue work to [employer] and HS. PROM for knee flex and extn.  Sitting knee flex to 131. Full passive ext. Still lacks active ext in sitting. Therapeutic Modalities:    HEP: As directed. Treatment/Session Assessment:  Pt is post L TKA on 4/5/17. Presented with decreased ROM and strength of L knee and LE. Good increase in ROM in flexion and ext. Full passive motion but still lacks full active ext. Excellent improvement in gait. Will continue to work on end range flexion and active ext. Pt with steady increase in strength with  increased activity level including some light yard work. . Patient indicated less overall pain. Trying to do more walking at home. Very motivated. Patient indicated that the tape seemed to help with both her foot and knee pain. Should continue to progress well. Instructed patient to continue home exercises and stretches as directed. · Response to Treatment:  Independent with out assistive device in home. Good increase in ROM Good understanding of exercises. .  · Compliance with Program/Exercises: Doing exercises at home . · Recommendations/Intent for next treatment session: \"Next visit will focus on ROM and strengtheing of L knee and LE.  \".   Total Treatment Duration:  PT Patient Time In/Time Out  Time In: 1115  Time Out: 1215  Treatment number  [address] [facility], PT,

## 2017-06-02 ENCOUNTER — HOSPITAL ENCOUNTER (OUTPATIENT)
Dept: PHYSICAL THERAPY | Age: 77
Discharge: HOME | End: 2017-06-02
Payer: MEDICARE

## 2017-06-02 NOTE — PROGRESS NOTES
Derick Appiah  : 1940 [facility]  [address], [facility] [address]  [phone]   [phone]        OUTPATIENT DAILY NOTE    NAME/AGE/GENDER: Derick Appiah is a 78 y.o. female. DATE: 2017    Patient did not come  for appointment today due to family conflict. Will plan to follow up on next scheduled visit.     Elvira Chopra, PT

## 2017-06-05 ENCOUNTER — HOSPITAL ENCOUNTER (OUTPATIENT)
Dept: PHYSICAL THERAPY | Age: 77
Discharge: HOME | End: 2017-06-05
Payer: MEDICARE

## 2017-06-05 PROCEDURE — 97140 MANUAL THERAPY 1/> REGIONS: CPT

## 2017-06-05 PROCEDURE — 97110 THERAPEUTIC EXERCISES: CPT

## 2017-06-06 NOTE — PROGRESS NOTES
[facility]   [address], [address]  [phone]   [phone]        OUTPATIENT PHYSICAL THERAPY:Daily Note 6/5/2017    ICD-10: Treatment Diagnosis:   R26.2 Difficulty in walking, not elsewhere classified     M25.662 Stiffness of left knee, not elsewhere classified     M25.562 Pain in left knee     Precautions/Allergies:   Latex; Adhesive; Pcn [penicillins]; and Sulfa (sulfonamide antibiotics)   Fall Risk Score: 1 (? 5 = High Risk)  MD Orders: Eval and treat MEDICAL/REFERRING DIAGNOSIS:  Presence of left artificial knee joint [Z96.652]   DATE OF ONSET: April 5, 2017  REFERRING PHYSICIAN: Yeimy Barrett., *  RETURN PHYSICIAN APPOINTMENT: April 27     INITIAL ASSESSMENT:  Ms. Taz Hernandes presents with pain and stiffness of L knee post L TKA on 4/5/17. Fair gait with rolling walker. Minimal edema. Very motivated and should progress well. PROBLEM LIST (Impacting functional limitations):  1. Decreased Strength  2. Decreased ADL/Functional Activities  3. Increased Pain  4. Decreased Flexibility/Joint Mobility INTERVENTIONS PLANNED:  1. Gait Training  2. Home Exercise Program (HEP)  3. Manual Therapy  4. Range of Motion (ROM)  5. Therapeutic Activites  6. Therapeutic Exercise/Strengthening   TREATMENT PLAN:  Effective Dates: 4/24/17 TO 7/21/17. Frequency/Duration: 3 times a week for 3 weeks then 2 x wek for 5 weeks and upon reassessment,  will adjust frequency and duration as progress indicates. GOALS: (Goals have been discussed and agreed upon with patient.)  Short-Term Functional Goals: Time Frame: 4 weeks   1. Establish independent HEP with no cueing. MET  2. Increase L knee ROM to 0-115 to increase ease of sitting and ambulation  MET  3. Increase L LE strength so able to initiate reciprocal pattern on stairs. MET  4. Independent gait with straight cane in home and community  MET  Discharge Goals: Time Frame:   1.  Improve score on LEFS by 9 points to enable prolonged sitting, standing and ambulation   2. Independent gait without assistive device. MET IN HOME  3. Increase L knee ROM to 0-125 to normalize gait   ONGOING  4. Increase strength so able to do reciprocal pattern on stairs  ONGOING  5. Increase L LE strength so able to ambulate 20 min with minimal to no increase in pain ONGOING   Rehabilitation Potential For Stated Goals: Excellent  Regarding Leah Rosado's therapy, I certify that the treatment plan above will be carried out by a therapist or under their direction. Thank you for this referral,  Soumya Pelletier, PT     Referring Physician Signature: Kev Iraheta., *              Date                    The information in this section was collected on 4/24/17 (except where otherwise noted). HISTORY:   History of Present Injury/Illness (Reason for Referral):    Pt is post L TKA on 4/5/2017 secondary to OA. Pt was at [facility] for 10 days for rehab of L knee. Pt was then referred to outpatient PT for ROM and strengthening of L knee and LE. Pt presents with decreased motion of L knee, weakness of LE's and fair gait with rolling walker. Reports pain with increased activity. Not sleeping well. Past Medical History/Comorbidities:   Ms. Jennie Kc  has a past medical history of Arthritis; Chronic pain; Former smoker, stopped smoking in distant past; GERD (gastroesophageal reflux disease); High cholesterol; Hypertension; Morbid obesity ([address] Nausea & vomiting; Pre-diabetes; Status post total left knee replacement (4/5/2017); and Urinary incontinence. She also has no past medical history of Adverse effect of anesthesia; Aneurysm ([address] Arrhythmia; Asthma; Autoimmune disease ([address] CAD (coronary artery disease); Cancer ([address] Chronic kidney disease; Coagulation defects; COPD; Diabetes ([address] Difficult intubation; Endocarditis; Heart failure ([address] Liver disease; Malignant hyperthermia due to anesthesia;  Other ill-defined conditions; Pseudocholinesterase deficiency; Psychiatric disorder; PUD (peptic ulcer disease); Rheumatic fever; Seizures ([facility] [address] Stroke [facility]); Thromboembolus ([facility] [address] [facility] Thyroid disease; Unspecified adverse effect of anesthesia; or Unspecified sleep apnea. Ms. Marcelino Fernando  has a past surgical history that includes gyn; heent; us guided core breast biopsy; rohit biopsy breast stereotactic; knee replacement (Right, 2010); and breast surgery procedure unlisted. Social History/Living Environment:     Lives in home without stairs but has stairs at [employer]  Prior Level of Function/Work/Activity:  Retired. Enjoys walking and traveling   Dominant Side:         RIGHT   Current Medications:       Current Outpatient Prescriptions:     oxyCODONE IR (ROXICODONE) 10 mg tab immediate release tablet, Take 1 Tab by mouth every four (4) hours as needed. Max Daily Amount: 60 mg., Disp: 60 Tab, Rfl: 0    HYDROmorphone (DILAUDID) 2 mg tablet, Take 1 Tab by mouth every three (3) hours as needed. Max Daily Amount: 16 mg., Disp: 40 Tab, Rfl: 0    mirabegron ER (MYRBETRIQ) 50 mg ER tablet, Take 50 mg by mouth daily. Take morning of surgery per anesthesia guidelines. Indications: URINARY URGE INCONTINENCE, Disp: , Rfl:     omeprazole (PRILOSEC) 40 mg capsule, Take 40 mg by mouth daily. Take morning of surgery per anesthesia guidelines. , Disp: , Rfl:     simvastatin (ZOCOR) 40 mg tablet, Take 40 mg by mouth daily. Per anesthesia protocol:instructed to take am of surgery. , Disp: , Rfl:     lisinopril-hydrochlorothiazide (PRINZIDE, ZESTORETIC) 20-12.5 mg per tablet, Take 1 Tab by mouth daily. , Disp: , Rfl:     docusate sodium (COLACE) 100 mg capsule, Take 100 mg by mouth daily. , Disp: , Rfl:    No longer taking oxycodone. Taking Motrin 800 mg as needed.   Taking motrin and benydril at night    Date Last Reviewed:  6/5/2017     Number of Personal Factors/Comorbidities that affect the Plan of Care: 1-2: MODERATE COMPLEXITY   EXAMINATION: Observation/Orthostatic Postural Assessment: To PT without assistive device. Reports that she is only using cane in a crowd. Good heel toe gait. Improved knee ext at heel strike and improved toe off. Still slight limp to L. Minimal edema. Less tight HS. In 90/90, HS are 75. Palpation:          Tender medial knee   ROM:     L knee 0-123  R knee  0- 125                                       Strength:     Knee ext  L 4/5, R 4+/5  Knee flex L 4/5,  R 5/5         Hip flexion  L 4+/5,  R 5/5  Hip abd  L 4-/5,  R 4/5              Neurological Screen: Intact to light touch   Balance:  Good with straight cane    Body Structures Involved:  1. Bones  2. Joints  3. Muscles  4. Ligaments Body Functions Affected:  1. Sensory/Pain  2. Neuromusculoskeletal  3. Movement Related Activities and Participation Affected:  1. Learning and Applying Knowledge  2. General Tasks and Demands  3. Mobility  4. Self Care  5. Community, Social and [place]   Number of elements (examined above) that affect the Plan of Care: 3: MODERATE COMPLEXITY   CLINICAL PRESENTATION:   Presentation: Evolving clinical presentation with changing clinical characteristics: MODERATE COMPLEXITY   CLINICAL DECISION MAKING:   Outcome Measure: Tool Used: Lower Extremity Functional Scale (LEFS)  Score:  Initial: 30/80 Most Recent: 45/80 (Date: 5/10/17 )   Interpretation of Score: 20 questions each scored on a 5 point scale with 0 representing \"extreme difficulty or unable to perform\" and 4 representing \"no difficulty\". The lower the score, the greater the functional disability. 80/80 represents no disability. Minimal detectable change is 9 points. Score 80 79-63 62-48 47-32 31-16 15-1 0   Modifier CH CI CJ CK CL CM CN     ?  Mobility - Walking and Moving Around:     - CURRENT STATUS: CK - 40%-59% impaired, limited or restricted    - GOAL STATUS: CJ - 20%-39% impaired, limited or restricted    - D/C STATUS:  ---------------To be determined---------------    Medical Necessity:   · Patient is expected to demonstrate progress in strength, range of motion, balance and gait to increase independence with home and community activities. Reason for Services/Other Comments:  · Patient continues to require skilled intervention due to decreased ROM and strength of L knee and LE. Use of outcome tool(s) and clinical judgement create a POC that gives a: Clear prediction of patient's progress: LOW COMPLEXITY            TREATMENT:   (In addition to Assessment/Re-Assessment sessions the following treatments were rendered)  Pre-treatment Symptoms/Complaints: Patient reports she got an arch support for foot which is helping foot pain. Knee is feeling better though still some soreness with increased activity. Pleased with progress. ..     Pain: Initial:   Pain Intensity 1: 2  Post Session:  2      THERAPEUTIC EXERCISE: (30  minutes):  Exercises per grid below to improve mobility, strength, coordination and gait. Required moderate verbal and manual cues to promote proper body alignment, promote proper body posture and promote proper body mechanics. Progressed resistance, range and repetitions as indicated. Performed  QS (quadriceps sets),  SAQ's (short arc quadriceps),and LAQ's x 20. . . HS and HC stretch. Sitting [address] at 25# x 4 sets of 10. Standing cable for hip flex and abd at 17# and hip ext at 37# x 2 sets of 20. Performed 6 in step up and over x 15 and on 8 in x 15. . Sit ti stand x 20. Worked on heel toe gait without assistive device. Good increase in knee ext and toe off. Improved weight shift. NuStep at level 4 x 10 min. Sit to stand from plinth x 3 sets of 10. Standing knee ext with pulley #25 x 10 Kinesiotape of medial knee pain and foot. Added taping of arch with decreased foot pain. Manual Therapy ( 30 min  ): Patella and patella tendon mobs. Soft tissue work to [employer] and HS. PROM for knee flex and extn.  Sitting knee flex to 131.  Full passive ext. Still lacks active ext in sitting. Therapeutic Modalities:    HEP: As directed. Treatment/Session Assessment:  Pt is post L TKA on 4/5/17. Presented with decreased ROM and strength of L knee and LE. Good increase in ROM in flexion and ext. Full passive motion with good active extn. .  Excellent improvement in gait. Will continue to work on end range flexion and active ext. Discussed need to continue to work to increase quad strength and endurance. Progressing to HEP. Pt with steady increase in strength with  increased activity level including some light yard work and walking. .. Patient indicated less overall pain. Patient indicated that the tape seemed to help with both her foot and knee pain. As the tape increased support at arch, pt has gotten an arch support to use with walking and prolonged standing. Should continue to progress well. Instructed patient to continue home exercises and stretches as directed. · Response to Treatment:  Independent with out assistive device in home. Good increase in ROM Good understanding of exercises. .  · Compliance with Program/Exercises: Doing exercises at home . · Recommendations/Intent for next treatment session: \"Next visit will focus on ROM and strengtheing of L knee and LE.  \".   Total Treatment Duration:  PT Patient Time In/Time Out  Time In: 1100  Time Out: 1200  Treatment number  16  [place], PT,

## 2017-06-07 ENCOUNTER — HOSPITAL ENCOUNTER (OUTPATIENT)
Dept: PHYSICAL THERAPY | Age: 77
Discharge: HOME | End: 2017-06-07
Payer: MEDICARE

## 2017-06-07 PROCEDURE — 97140 MANUAL THERAPY 1/> REGIONS: CPT

## 2017-06-07 PROCEDURE — G8980 MOBILITY D/C STATUS: HCPCS

## 2017-06-07 PROCEDURE — G8979 MOBILITY GOAL STATUS: HCPCS

## 2017-06-07 PROCEDURE — 97110 THERAPEUTIC EXERCISES: CPT

## 2017-06-07 NOTE — PROGRESS NOTES
[facility] at [facility]   [address], [address]  [phone]   [phone]        OUTPATIENT PHYSICAL THERAPY:Daily Note and Discharge 6/7/2017    ICD-10: Treatment Diagnosis:   R26.2 Difficulty in walking, not elsewhere classified     M25.662 Stiffness of left knee, not elsewhere classified     M25.562 Pain in left knee     Precautions/Allergies:   Latex; Adhesive; Pcn [penicillins]; and Sulfa (sulfonamide antibiotics)   Fall Risk Score: 1 (? 5 = High Risk)  MD Orders: Eval and treat MEDICAL/REFERRING DIAGNOSIS:  Presence of left artificial knee joint [Z96.652]   DATE OF ONSET: April 5, 2017  REFERRING PHYSICIAN: Gavi Calderon., *  RETURN PHYSICIAN APPOINTMENT: April 27     INITIAL ASSESSMENT:  Ms. Sanchez Limb presents with pain and stiffness of L knee post L TKA on 4/5/17. Fair gait with rolling walker. Minimal edema. Very motivated and should progress well. PROBLEM LIST (Impacting functional limitations):  1. Decreased Strength  2. Decreased ADL/Functional Activities  3. Increased Pain  4. Decreased Flexibility/Joint Mobility INTERVENTIONS PLANNED:  1. Gait Training  2. Home Exercise Program (HEP)  3. Manual Therapy  4. Range of Motion (ROM)  5. Therapeutic Activites  6. Therapeutic Exercise/Strengthening   TREATMENT PLAN:  Effective Dates: 4/24/17 TO 7/21/17. Frequency/Duration: 3 times a week for 3 weeks then 2 x wek for 5 weeks and upon reassessment,  will adjust frequency and duration as progress indicates. GOALS: (Goals have been discussed and agreed upon with patient.)  Short-Term Functional Goals: Time Frame: 4 weeks   1. Establish independent HEP with no cueing. MET  2. Increase L knee ROM to 0-115 to increase ease of sitting and ambulation  MET  3. Increase L LE strength so able to initiate reciprocal pattern on stairs. MET  4.  Independent gait with straight cane in home and community  MET  Discharge Goals: Time Frame: 1. Improve score on LEFS by 9 points to enable prolonged sitting, standing and ambulation   MET  2. Independent gait without assistive device. MET   3. Increase L knee ROM to 0-125 to normalize gait   MET  4. Increase strength so able to do reciprocal pattern on stairs  MET WITH RAILING  5. Increase L LE strength so able to ambulate 20 min with minimal to no increase in pain  MET  Rehabilitation Potential For Stated Goals: Excellent  Regarding Tara Rosado's therapy, I certify that the treatment plan above will be carried out by a therapist or under their direction. Thank you for this referral,  Heather Donis, PT     Referring Physician Signature: Albina Webb., *              Date                    The information in this section was collected on 4/24/17 (except where otherwise noted). HISTORY:   History of Present Injury/Illness (Reason for Referral):    Pt is post L TKA on 4/5/2017 secondary to OA. Pt was at [facility] for 10 days for rehab of L knee. Pt was then referred to outpatient PT for ROM and strengthening of L knee and LE. Pt presents with decreased motion of L knee, weakness of LE's and fair gait with rolling walker. Reports pain with increased activity. Not sleeping well. Past Medical History/Comorbidities:   Ms. Roseanne Cazares  has a past medical history of Arthritis; Chronic pain; Former smoker, stopped smoking in distant past; GERD (gastroesophageal reflux disease); High cholesterol; Hypertension; Morbid obesity ([address] Nausea & vomiting; Pre-diabetes; Status post total left knee replacement (4/5/2017); and Urinary incontinence. She also has no past medical history of Adverse effect of anesthesia; Aneurysm ([address] Arrhythmia; Asthma; Autoimmune disease ([address] CAD (coronary artery disease); Cancer ([address] Chronic kidney disease; Coagulation defects; COPD; Diabetes ([address] Difficult intubation; Endocarditis; Heart failure ([address] Liver disease; Malignant hyperthermia due to anesthesia;  Other ill-defined conditions; Pseudocholinesterase deficiency; Psychiatric disorder; PUD (peptic ulcer disease); Rheumatic fever; Seizures ([facility] [facility] Stroke [facility]); Thromboembolus ([facility] [facility] Thyroid disease; Unspecified adverse effect of anesthesia; or Unspecified sleep apnea. Ms. Patricia Tadeo  has a past surgical history that includes gyn; heent; us guided core breast biopsy; rohit biopsy breast stereotactic; knee replacement (Right, 2010); and breast surgery procedure unlisted. Social History/Living Environment:     Lives in home without stairs but has stairs at [place]  Prior Level of Function/Work/Activity:  Retired. Enjoys walking and traveling   Dominant Side:         RIGHT   Current Medications:       Current Outpatient Prescriptions:     oxyCODONE IR (ROXICODONE) 10 mg tab immediate release tablet, Take 1 Tab by mouth every four (4) hours as needed. Max Daily Amount: 60 mg., Disp: 60 Tab, Rfl: 0    HYDROmorphone (DILAUDID) 2 mg tablet, Take 1 Tab by mouth every three (3) hours as needed. Max Daily Amount: 16 mg., Disp: 40 Tab, Rfl: 0    mirabegron ER (MYRBETRIQ) 50 mg ER tablet, Take 50 mg by mouth daily. Take morning of surgery per anesthesia guidelines. Indications: URINARY URGE INCONTINENCE, Disp: , Rfl:     omeprazole (PRILOSEC) 40 mg capsule, Take 40 mg by mouth daily. Take morning of surgery per anesthesia guidelines. , Disp: , Rfl:     simvastatin (ZOCOR) 40 mg tablet, Take 40 mg by mouth daily. Per anesthesia protocol:instructed to take am of surgery. , Disp: , Rfl:     lisinopril-hydrochlorothiazide (PRINZIDE, ZESTORETIC) 20-12.5 mg per tablet, Take 1 Tab by mouth daily. , Disp: , Rfl:     docusate sodium (COLACE) 100 mg capsule, Take 100 mg by mouth daily. , Disp: , Rfl:    No longer taking oxycodone. Taking Motrin 800 mg as needed.   Taking motrin and benydril at night    Date Last Reviewed:  6/7/2017     Number of Personal Factors/Comorbidities that affect the Plan of Care: 1-2: MODERATE COMPLEXITY EXAMINATION:   Observation/Orthostatic Postural Assessment: To PT without assistive device. Reports that she is only using cane in a crowd. Good heel toe gait. Improved knee ext at heel strike and improved toe off. Still slight limp to L. Minimal edema. Less tight HS. In 90/90, HS are 75. Palpation:          Tender medial knee   ROM:     L knee 0-130  R knee  0- 125                                       Strength:     Knee ext  L 4+/5, R 5/5  Knee flex L 4+/5,  R 5/5         Hip flexion  L 5/5,  R 5/5  Hip abd  L 4/5,  R 4/5              Neurological Screen: Intact to light touch   Balance:  Good with straight cane    Body Structures Involved:  1. Bones  2. Joints  3. Muscles  4. Ligaments Body Functions Affected:  1. Sensory/Pain  2. Neuromusculoskeletal  3. Movement Related Activities and Participation Affected:  1. Learning and Applying Knowledge  2. General Tasks and Demands  3. Mobility  4. Self Care  5. Community, Social and [place]   Number of elements (examined above) that affect the Plan of Care: 3: MODERATE COMPLEXITY   CLINICAL PRESENTATION:   Presentation: Evolving clinical presentation with changing clinical characteristics: MODERATE COMPLEXITY   CLINICAL DECISION MAKING:   Outcome Measure: Tool Used: Lower Extremity Functional Scale (LEFS)  Score:  Initial: 30/80 Most Recent: 62/80 (Date: 5/10/17 )   Interpretation of Score: 20 questions each scored on a 5 point scale with 0 representing \"extreme difficulty or unable to perform\" and 4 representing \"no difficulty\". The lower the score, the greater the functional disability. 80/80 represents no disability. Minimal detectable change is 9 points. Score 80 79-63 62-48 47-32 31-16 15-1 0   Modifier CH CI CJ CK CL CM CN     ?  Mobility - Walking and Moving Around:     - CURRENT STATUS: CJ - 20%-39% impaired, limited or restricted3    - GOAL STATUS: CJ - 20%-39% impaired, limited or restricted    - D/C STATUS:   - 20%-39% impaired, limited or restricted    Medical Necessity:   · Patient is expected to demonstrate progress in strength, range of motion, balance and gait to increase independence with home and community activities. Reason for Services/Other Comments:  · Patient continues to require skilled intervention due to decreased ROM and strength of L knee and LE. Use of outcome tool(s) and clinical judgement create a POC that gives a: Clear prediction of patient's progress: LOW COMPLEXITY            TREATMENT:   (In addition to Assessment/Re-Assessment sessions the following treatments were rendered)  Pre-treatment Symptoms/Complaints: Patient reports she got an arch support for foot which is helping foot pain. Knee is feeling better though still some soreness with increased activity. Pleased with progress. ..     Pain: Initial:   Pain Intensity 1: 2  Post Session:  2      THERAPEUTIC EXERCISE: (30  minutes):  Exercises per grid below to improve mobility, strength, coordination and gait. Required moderate verbal and manual cues to promote proper body alignment, promote proper body posture and promote proper body mechanics. Progressed resistance, range and repetitions as indicated. Performed  QS (quadriceps sets),  SAQ's (short arc quadriceps),and LAQ's x 20. . . HS and HC stretch. Sitting [address] at 25# x 4 sets of 10. Standing cable for hip flex and abd at 17# and hip ext at 37# x 2 sets of 20. Performed 6 in step up and over x 15 and on 8 in x 15. . Sit ti stand x 20. Worked on heel toe gait without assistive device. Good increase in knee ext and toe off. Improved weight shift. NuStep at level 4 x 10 min. Sit to stand from plinth x 3 sets of 10. Standing knee ext with pulley #25 x 10 Kinesiotape of medial knee pain and foot. Added taping of arch with decreased foot pain. Manual Therapy ( 30 min  ): Patella and patella tendon mobs. Soft tissue work to [employer] and HS. PROM for knee flex and extn.  Sitting knee flex to 131. Full passive ext. Still lacks active ext in sitting. Therapeutic Modalities:    HEP: As directed. Treatment/Session Assessment:  Pt is post L TKA on 4/5/17. Presented with decreased ROM and strength of L knee and LE. Good increase in ROM in flexion and ext. Full passive motion with good active extn. .  Excellent improvement in gait. Will continue to work on end range flexion and active ext. Discussed need to continue to work to increase quad strength and endurance. Progressing to HEP. Pt with steady increase in strength with  increased activity level including some light yard work and walking. .. Patient indicated less overall pain. Patient indicated that the tape seemed to help with both her foot and knee pain. As the tape increased support at arch, pt has gotten an arch support to use with walking and prolonged standing. Should continue to progress well. Instructed patient to continue home exercises and stretches as directed. · Response to Treatment:  Independent with out assistive device in home. Good increase in ROM Good understanding of exercises. .  · Compliance with Program/Exercises: Doing exercises at home . · Recommendations/Intent for next treatment session: \"Next visit will focus on ROM and strengtheing of L knee and LE.  \".   Total Treatment Duration:  PT Patient Time In/Time Out  Time In: 1100  Time Out: 1200  Treatment number  901 Bharti Zuniga PT,

## 2017-08-29 ENCOUNTER — HOSPITAL ENCOUNTER (OUTPATIENT)
Dept: MAMMOGRAPHY | Age: 77
Discharge: HOME | End: 2017-08-29
Attending: NURSE PRACTITIONER
Payer: MEDICARE

## 2017-08-29 DIAGNOSIS — Z12.31 VISIT FOR SCREENING MAMMOGRAM: ICD-10-CM

## 2017-08-29 PROCEDURE — 77067 SCR MAMMO BI INCL CAD: CPT

## 2018-01-22 ENCOUNTER — APPOINTMENT (RX ONLY)
Dept: URBAN - METROPOLITAN AREA CLINIC 24 | Facility: CLINIC | Age: 78
Setting detail: DERMATOLOGY
End: 2018-01-22

## 2018-01-22 DIAGNOSIS — D485 NEOPLASM OF UNCERTAIN BEHAVIOR OF SKIN: ICD-10-CM

## 2018-01-22 DIAGNOSIS — L82.0 INFLAMED SEBORRHEIC KERATOSIS: ICD-10-CM

## 2018-01-22 DIAGNOSIS — L30.4 ERYTHEMA INTERTRIGO: ICD-10-CM

## 2018-01-22 DIAGNOSIS — H01.13 ECZEMATOUS DERMATITIS OF EYELID: ICD-10-CM

## 2018-01-22 DIAGNOSIS — L82.1 OTHER SEBORRHEIC KERATOSIS: ICD-10-CM

## 2018-01-22 DIAGNOSIS — Z85.828 PERSONAL HISTORY OF OTHER MALIGNANT NEOPLASM OF SKIN: ICD-10-CM

## 2018-01-22 DIAGNOSIS — D22 MELANOCYTIC NEVI: ICD-10-CM

## 2018-01-22 PROBLEM — D22.5 MELANOCYTIC NEVI OF TRUNK: Status: ACTIVE | Noted: 2018-01-22

## 2018-01-22 PROBLEM — D48.5 NEOPLASM OF UNCERTAIN BEHAVIOR OF SKIN: Status: ACTIVE | Noted: 2018-01-22

## 2018-01-22 PROBLEM — E78.5 HYPERLIPIDEMIA, UNSPECIFIED: Status: ACTIVE | Noted: 2018-01-22

## 2018-01-22 PROBLEM — H01.134 ECZEMATOUS DERMATITIS OF LEFT UPPER EYELID: Status: ACTIVE | Noted: 2018-01-22

## 2018-01-22 PROBLEM — L55.1 SUNBURN OF SECOND DEGREE: Status: ACTIVE | Noted: 2018-01-22

## 2018-01-22 PROBLEM — H01.139 ECZEMATOUS DERMATITIS OF UNSPECIFIED EYE, UNSPECIFIED EYELID: Status: ACTIVE | Noted: 2018-01-22

## 2018-01-22 PROBLEM — M12.9 ARTHROPATHY, UNSPECIFIED: Status: ACTIVE | Noted: 2018-01-22

## 2018-01-22 PROCEDURE — ? BIOPSY BY SHAVE METHOD

## 2018-01-22 PROCEDURE — ? PRESCRIPTION

## 2018-01-22 PROCEDURE — 11310 SHAVE SKIN LESION 0.5 CM/<: CPT | Mod: 59

## 2018-01-22 PROCEDURE — ? LIQUID NITROGEN

## 2018-01-22 PROCEDURE — 99214 OFFICE O/P EST MOD 30 MIN: CPT | Mod: 25

## 2018-01-22 PROCEDURE — 11100: CPT | Mod: 59

## 2018-01-22 PROCEDURE — ? SHAVE REMOVAL

## 2018-01-22 PROCEDURE — 17110 DESTRUCTION B9 LES UP TO 14: CPT

## 2018-01-22 PROCEDURE — ? COUNSELING

## 2018-01-22 PROCEDURE — 11101: CPT

## 2018-01-22 PROCEDURE — ? TREATMENT REGIMEN

## 2018-01-22 RX ORDER — DESONIDE 0.5 MG/ML
LOTION TOPICAL
Qty: 1 | Refills: 2 | Status: ERX | COMMUNITY
Start: 2018-01-22

## 2018-01-22 RX ADMIN — DESONIDE: 0.5 LOTION TOPICAL at 00:00

## 2018-01-22 ASSESSMENT — LOCATION DETAILED DESCRIPTION DERM
LOCATION DETAILED: LEFT ANTERIOR PROXIMAL THIGH
LOCATION DETAILED: RIGHT DISTAL PRETIBIAL REGION
LOCATION DETAILED: RIGHT MEDIAL BREAST 5-6:00 REGION
LOCATION DETAILED: LEFT LATERAL SUPERIOR EYELID
LOCATION DETAILED: RIGHT RIB CAGE
LOCATION DETAILED: PERIANAL SKIN
LOCATION DETAILED: LEFT MEDIAL BREAST 6-7:00 REGION
LOCATION DETAILED: LEFT RIB CAGE
LOCATION DETAILED: RIGHT PROXIMAL PRETIBIAL REGION
LOCATION DETAILED: RIGHT MEDIAL UPPER BACK
LOCATION DETAILED: LEFT DISTAL PRETIBIAL REGION
LOCATION DETAILED: RIGHT CENTRAL FRONTAL SCALP
LOCATION DETAILED: RIGHT ANTERIOR PROXIMAL THIGH
LOCATION DETAILED: LEFT PROXIMAL PRETIBIAL REGION
LOCATION DETAILED: RIGHT INFERIOR LATERAL NECK
LOCATION DETAILED: SUPERIOR THORACIC SPINE
LOCATION DETAILED: LEFT SUPERIOR CENTRAL MALAR CHEEK
LOCATION DETAILED: SUBXIPHOID

## 2018-01-22 ASSESSMENT — LOCATION SIMPLE DESCRIPTION DERM
LOCATION SIMPLE: RIGHT ANTERIOR NECK
LOCATION SIMPLE: ABDOMEN
LOCATION SIMPLE: LEFT PRETIBIAL REGION
LOCATION SIMPLE: RIGHT UPPER BACK
LOCATION SIMPLE: RIGHT PRETIBIAL REGION
LOCATION SIMPLE: RIGHT THIGH
LOCATION SIMPLE: LEFT CHEEK
LOCATION SIMPLE: LEFT BREAST
LOCATION SIMPLE: LEFT SUPERIOR EYELID
LOCATION SIMPLE: PERIANAL SKIN
LOCATION SIMPLE: SCALP
LOCATION SIMPLE: RIGHT BREAST
LOCATION SIMPLE: UPPER BACK
LOCATION SIMPLE: LEFT THIGH

## 2018-01-22 ASSESSMENT — LOCATION ZONE DERM
LOCATION ZONE: EYELID
LOCATION ZONE: TRUNK
LOCATION ZONE: ANUS
LOCATION ZONE: NECK
LOCATION ZONE: LEG
LOCATION ZONE: SCALP
LOCATION ZONE: FACE

## 2018-01-22 NOTE — PROCEDURE: BIOPSY BY SHAVE METHOD
Dressing: bandage
Additional Anesthesia Volume In Cc (Will Not Render If 0): 0
Wound Care: Vaseline
Consent: Written consent was obtained and risks were reviewed including but not limited to scarring, infection, bleeding, scabbing, incomplete removal, and allergy to anesthesia.
Silver Nitrate Text: The wound bed was treated with silver nitrate after the biopsy was performed.
Hemostasis: Aluminum Chloride
Electrodesiccation And Curettage Text: The wound bed was treated with electrodesiccation and curettage after the biopsy was performed.
Cryotherapy Text: The wound bed was treated with cryotherapy after the biopsy was performed.
Biopsy Method: Double edge Personna blades
Destruction After The Procedure: No
Post-care instructions were reviewed in detail and written instructions are provided. Patient is to keep the biopsy site dry overnight, and then apply bacitracin twice daily until healed. Patient may apply hydrogen peroxide soaks to remove any crusting.
Accession #: PC
Anesthesia Type: 1% lidocaine without epinephrine and a 1:12 solution of 8.4% sodium bicarbonate
Billing Type: Third-Party Bill
Notification Instructions: Patient will be notified of biopsy results. However, patient instructed to call the office if not contacted within 2 weeks.
Detail Level: Detailed
Electrodesiccation Text: The wound bed was treated with electrodesiccation after the biopsy was performed.
Biopsy Type: H and E
Anesthesia Volume In Cc: 0.5
Type Of Destruction Used: Curettage

## 2018-01-22 NOTE — PROCEDURE: SHAVE REMOVAL
Consent was obtained from the patient. The risks and benefits to therapy were discussed in detail. Specifically, the risks of infection, scarring, bleeding, prolonged wound healing, incomplete removal, allergy to anesthesia, nerve injury and recurrence were addressed. Prior to the procedure, the treatment site was clearly identified and confirmed by the patient. All components of Universal Protocol/PAUSE Rule completed.
Notification Instructions: Patient will be notified of biopsy results. However, patient instructed to call the office if not contacted within 2 weeks.
Render Post-Care Instructions In Note?: no
Wound Care: Petrolatum
Medical Necessity Information: It is in your best interest to select a reason for this procedure from the list below. All of these items fulfill various CMS LCD requirements except the new and changing color options.
Post-Care Instructions: I reviewed with the patient in detail post-care instructions. Patient is to keep the biopsy site dry overnight, and then apply bacitracin twice daily until healed. Patient may apply hydrogen peroxide soaks to remove any crusting.
Medical Necessity Clause: This procedure was medically necessary because the lesion is
X Size Of Lesion In Cm (Optional): 0
Detail Level: Detailed
Hemostasis: Aluminum Chloride
Biopsy Method: Double edge Personna blades
Anesthesia Volume In Cc: 0.5
Anesthesia Type: 1% lidocaine without epinephrine and a 1:10 solution of 8.4% sodium bicarbonate
Accession #: PC
Size Of Lesion In Cm (Required): 0.4
Billing Type: Third-Party Bill
Path Notes (To The Dermatopathologist): Please check margins.
Size Of Margin In Cm (Margins Are Not Added To Billing Dimensions): -

## 2018-01-22 NOTE — PROCEDURE: LIQUID NITROGEN
Duration Of Freeze Thaw-Cycle (Seconds): 5
Post-Care Instructions: I reviewed with the patient in detail post-care instructions. Patient is to wear sunprotection, and avoid picking at any of the treated lesions. Pt may apply Vaseline to crusted or scabbing areas.
Total Number Of Lesions Treated: 10
Include Z78.9 (Other Specified Conditions Influencing Health Status) As An Associated Diagnosis?: No
Render Post Care In The Note?: yes
Consent: The patient's consent was obtained including but not limited to risks of crusting, scabbing, blistering, scarring, darker or lighter pigmentary change, recurrence, incomplete removal and infection.
Detail Level: Detailed
Medical Necessity Clause: This procedure was medically necessary because the lesions that were treated were: rubbed by bra
Medical Necessity Information: It is in your best interest to select a reason for this procedure from the list below. All of these items fulfill various CMS LCD requirements except the new and changing color options.

## 2018-01-30 ENCOUNTER — RX ONLY (OUTPATIENT)
Age: 78
Setting detail: RX ONLY
End: 2018-01-30

## 2018-01-30 RX ORDER — FLUOROURACIL 50 MG/G
CREAM TOPICAL
Qty: 1 | Refills: 1 | Status: ERX | COMMUNITY
Start: 2018-01-30

## 2018-01-30 RX ORDER — CLINDAMYCIN HYDROCHLORIDE 300 MG/1
CAPSULE ORAL
Qty: 2 | Refills: 0 | Status: ERX | COMMUNITY
Start: 2018-01-30

## 2018-02-20 ENCOUNTER — RX ONLY (OUTPATIENT)
Age: 78
Setting detail: RX ONLY
End: 2018-02-20

## 2018-02-20 RX ORDER — CLINDAMYCIN HYDROCHLORIDE 300 MG/1
CAPSULE ORAL
Qty: 4 | Refills: 0 | Status: ERX

## 2018-02-22 ENCOUNTER — APPOINTMENT (RX ONLY)
Dept: URBAN - METROPOLITAN AREA CLINIC 24 | Facility: CLINIC | Age: 78
Setting detail: DERMATOLOGY
End: 2018-02-22

## 2018-02-22 PROBLEM — L85.3 XEROSIS CUTIS: Status: ACTIVE | Noted: 2018-02-22

## 2018-02-22 PROBLEM — C44.729 SQUAMOUS CELL CARCINOMA OF SKIN OF LEFT LOWER LIMB, INCLUDING HIP: Status: ACTIVE | Noted: 2018-02-22

## 2018-02-22 PROCEDURE — A4550 SURGICAL TRAYS: HCPCS

## 2018-02-22 PROCEDURE — 17313 MOHS 1 STAGE T/A/L: CPT

## 2018-02-22 PROCEDURE — 17314 MOHS ADDL STAGE T/A/L: CPT

## 2018-02-22 PROCEDURE — ? MOHS SURGERY

## 2018-02-22 PROCEDURE — 12032 INTMD RPR S/A/T/EXT 2.6-7.5: CPT

## 2018-02-22 NOTE — PROCEDURE: MOHS SURGERY
Location Indication Override (Is Already Calculated Based On Selected Body Location): Area M
Stage 15: Number Of Blocks?: 0
No Residual Tumor Seen Histology Text: There were no malignant cells seen in the sections examined.
Rapid Mohs Report (Note: If The Tumor Is Complex, Or If Any Stage Is Divided Into More Than 5 Pieces Or If You Want A More Detailed Report, Select No And Proceed To The Individual Stages Below): yes
Consent (Lip)/Introductory Paragraph: The rationale for Mohs was explained to the patient and consent was obtained. The risks, benefits and alternatives to therapy were discussed in detail. Specifically, the risks of lip deformity, changes in the oral aperture, infection, scarring, bleeding, prolonged wound healing, incomplete removal, allergy to anesthesia, nerve injury and recurrence were addressed. Prior to the procedure, the treatment site was clearly identified and confirmed by the patient. All components of Universal Protocol/PAUSE Rule completed.
Additional Secondary Defect Length (In Cm): -
Mauc Instructions: By selecting yes to the question below the MAUC number will be added into the note.  This will be calculated automatically based on the diagnosis chosen, the size entered, the body zone selected (H,M,L) and the specific indications you chose. You will also have the option to override the Mohs AUC if you disagree with the automatically calculated number and this option is found in the Case Summary tab.
Hemostasis: Electrocautery
Alternatives Discussed Intro (Do Not Add Period): I discussed alternative treatments to Mohs surgery and specifically discussed the risks and benefits of
Island Pedicle Flap Text: The defect edges were debeveled with a #15 scalpel blade.  Given the location of the defect, shape of the defect and the proximity to free margins an island pedicle advancement flap was deemed most appropriate.  Using a sterile surgical marker, an appropriate advancement flap was drawn incorporating the defect, outlining the appropriate donor tissue and placing the expected incisions within the relaxed skin tension lines where possible.    The area thus outlined was incised deep to adipose tissue with a #15 scalpel blade.  The skin margins were undermined to an appropriate distance in all directions around the primary defect and laterally outward around the island pedicle utilizing iris scissors.  There was minimal undermining beneath the pedicle flap.
Repair Performed By Another Provider Text (Leave Blank If You Do Not Want): After obtaining clear surgical margins the defect was repaired by another provider.
Lazy S Intermediate Repair Preamble Text (Leave Blank If You Do Not Want): Undermining was performed with blunt dissection.
Keystone Flap Text: The defect edges were debeveled with a #15 scalpel blade.  Given the location of the defect, shape of the defect a keystone flap was deemed most appropriate.  Using a sterile surgical marker, an appropriate keystone flap was drawn incorporating the defect, outlining the appropriate donor tissue and placing the expected incisions within the relaxed skin tension lines where possible. The area thus outlined was incised deep to adipose tissue with a #15 scalpel blade.  The skin margins were undermined to an appropriate distance in all directions around the primary defect and laterally outward around the flap utilizing iris scissors.
Integrate Histology Into Note?: No
No Repair - Repaired With Adjacent Surgical Defect Text (Leave Blank If You Do Not Want): After obtaining clear surgical margins the defect was repaired concurrently with another surgical defect which was in close approximation.
Closure 3 Information: This tab is for additional flaps and grafts above and beyond our usual structured repairs.  Please note if you enter information here it will not currently bill and you will need to add the billing information manually.
Stage 9: Additional Anesthesia Type: 1% lidocaine with epinephrine
Partial Purse String (Simple) Text: Given the location of the defect and the characteristics of the surrounding skin a simple purse string closure was deemed most appropriate.  Undermining was performed circumfirentially around the surgical defect.  A purse string suture was then placed and tightened. Wound tension only allowed a partial closure of the circular defect.
Same Histology In Subsequent Stages Text: The pattern and morphology of the tumor is as described in the first stage.
Anesthesia Volume In Cc: 4.5
Ftsg Text: The defect edges were debeveled with a #15 scalpel blade.  Given the location of the defect, shape of the defect and the proximity to free margins a full thickness skin graft was deemed most appropriate.  Using a sterile surgical marker, the primary defect shape was transferred to the donor site. The area thus outlined was incised deep to adipose tissue with a #15 scalpel blade.  The harvested graft was then trimmed of adipose tissue until only dermis and epidermis was left.  The skin margins of the secondary defect were undermined to an appropriate distance in all directions utilizing iris scissors.  The secondary defect was closed with interrupted buried subcutaneous sutures.  The skin edges were then re-apposed with running  sutures.  The skin graft was then placed in the primary defect and oriented appropriately.
Interpolation Flap Text: A decision was made to reconstruct the defect utilizing an interpolation axial flap and a staged reconstruction.  A telfa template was made of the defect.  This telfa template was then used to outline the interpolation flap.  The donor area for the pedicle flap was then injected with anesthesia.  The flap was excised through the skin and subcutaneous tissue down to the layer of the underlying musculature.  The interpolation flap was carefully excised within this deep plane to maintain its blood supply.  The edges of the donor site were undermined.   The donor site was closed in a primary fashion.  The pedicle was then rotated into position and sutured.  Once the tube was sutured into place, adequate blood supply was confirmed with blanching and refill.  The pedicle was then wrapped with xeroform gauze and dressed appropriately with a telfa and gauze bandage to ensure continued blood supply and protect the attached pedicle.
Referred To Plastics For Closure Text (Leave Blank If You Do Not Want): After obtaining clear surgical margins the patient was sent to plastics for surgical repair.  The patient understands they will receive post-surgical care and follow-up from the referring physician's office.
Home Suture Removal Text: Patient was provided instructions on removing sutures and will remove their sutures at home.  If they have any questions or difficulties they will call the office.
Closure 2 Information: This tab is for additional flaps and grafts, including complex repair and grafts and complex repair and flaps. You can also specify a different location for the additional defect, if the location is the same you do not need to select a new one. We will insert the automated text for the repair you select below just as we do for solitary flaps and grafts. Please note that at this time if you select a location with a different insurance zone you will need to override the ICD10 and CPT if appropriate.
Partial Purse String (Intermediate) Text: Given the location of the defect and the characteristics of the surrounding skin an intermediate purse string closure was deemed most appropriate.  Undermining was performed circumfirentially around the surgical defect.  A purse string suture was then placed and tightened. Wound tension only allowed a partial closure of the circular defect.
Cheek Interpolation Flap Text: A decision was made to reconstruct the defect utilizing an interpolation axial flap and a staged reconstruction.  A telfa template was made of the defect.  This telfa template was then used to outline the Cheek Interpolation flap.  The donor area for the pedicle flap was then injected with anesthesia.  The flap was excised through the skin and subcutaneous tissue down to the layer of the underlying musculature.  The interpolation flap was carefully excised within this deep plane to maintain its blood supply.  The edges of the donor site were undermined.   The donor site was closed in a primary fashion.  The pedicle was then rotated into position and sutured.  Once the tube was sutured into place, adequate blood supply was confirmed with blanching and refill.  The pedicle was then wrapped with xeroform gauze and dressed appropriately with a telfa and gauze bandage to ensure continued blood supply and protect the attached pedicle.
Trilobed Flap Text: The defect edges were debeveled with a #15 scalpel blade.  Given the location of the defect and the proximity to free margins a trilobed flap was deemed most appropriate.  Using a sterile surgical marker, an appropriate trilobed flap drawn around the defect.    The area thus outlined was incised deep to adipose tissue with a #15 scalpel blade.  The skin margins were undermined to an appropriate distance in all directions utilizing iris scissors.
Consent (Nose)/Introductory Paragraph: The rationale for Mohs was explained to the patient and consent was obtained. The risks, benefits and alternatives to therapy were discussed in detail. Specifically, the risks of nasal deformity, changes in the flow of air through the nose, infection, scarring, bleeding, prolonged wound healing, incomplete removal, allergy to anesthesia, nerve injury and recurrence were addressed. Prior to the procedure, the treatment site was clearly identified and confirmed by the patient. All components of Universal Protocol/PAUSE Rule completed.
W Plasty Text: The lesion was extirpated to the level of the fat with a #15 scalpel blade.  Given the location of the defect, shape of the defect and the proximity to free margins a W-plasty was deemed most appropriate for repair.  Using a sterile surgical marker, the appropriate transposition arms of the W-plasty were drawn incorporating the defect and placing the expected incisions within the relaxed skin tension lines where possible.    The area thus outlined was incised deep to adipose tissue with a #15 scalpel blade.  The skin margins were undermined to an appropriate distance in all directions utilizing iris scissors.  The opposing transposition arms were then transposed into place in opposite direction and anchored with interrupted buried subcutaneous sutures.
Mohs Histo Method Verbiage: The section(s) were then chromacoded and processed in the Mohs lab using the Mohs protocol and submitted for frozen section.
V-Y Plasty Text: The defect edges were debeveled with a #15 scalpel blade.  Given the location of the defect, shape of the defect and the proximity to free margins an V-Y advancement flap was deemed most appropriate.  Using a sterile surgical marker, an appropriate advancement flap was drawn incorporating the defect and placing the expected incisions within the relaxed skin tension lines where possible.    The area thus outlined was incised deep to adipose tissue with a #15 scalpel blade.  The skin margins were undermined to an appropriate distance in all directions utilizing iris scissors.
Consent 3/Introductory Paragraph: I gave the patient a chance to ask questions they had about the procedure.  Following this I explained the Mohs procedure and consent was obtained. The risks, benefits and alternatives to therapy were discussed in detail. Specifically, the risks of infection, scarring, bleeding, prolonged wound healing, incomplete removal, allergy to anesthesia, nerve injury and recurrence were addressed. Prior to the procedure, the treatment site was clearly identified and confirmed by the patient. All components of Universal Protocol/PAUSE Rule completed.
Postop Diagnosis: same
Consent (Marginal Mandibular)/Introductory Paragraph: The rationale for Mohs was explained to the patient and consent was obtained. The risks, benefits and alternatives to therapy were discussed in detail. Specifically, the risks of damage to the marginal mandibular branch of the facial nerve, infection, scarring, bleeding, prolonged wound healing, incomplete removal, allergy to anesthesia, and recurrence were addressed. Prior to the procedure, the treatment site was clearly identified and confirmed by the patient. All components of Universal Protocol/PAUSE Rule completed.
S Plasty Text: Given the location and shape of the defect, and the orientation of relaxed skin tension lines, an S-plasty was deemed most appropriate for repair.  Using a sterile surgical marker, the appropriate outline of the S-plasty was drawn, incorporating the defect and placing the expected incisions within the relaxed skin tension lines where possible.  The area thus outlined was incised deep to adipose tissue with a #15 scalpel blade.  The skin margins were undermined to an appropriate distance in all directions utilizing iris scissors. The skin flaps were advanced over the defect.  The opposing margins were then approximated with interrupted buried subcutaneous sutures.
Bcc Histology Text: There were numerous aggregates of basaloid cells.
Dorsal Nasal Flap Text: The defect edges were debeveled with a #15 scalpel blade.  Given the location of the defect and the proximity to free margins a dorsal nasal flap was deemed most appropriate.  Using a sterile surgical marker, an appropriate dorsal nasal flap was drawn around the defect.    The area thus outlined was incised deep to adipose tissue with a #15 scalpel blade.  The skin margins were undermined to an appropriate distance in all directions utilizing iris scissors.
Subsequent Stages Histo Method Verbiage: Using a similar technique to that described above, a thin layer of tissue was removed from all areas where tumor was visible on the previous stage.  The tissue was again oriented, mapped, dyed, and processed as above.
Advancement Flap (Double) Text: The defect edges were debeveled with a #15 scalpel blade.  Given the location of the defect and the proximity to free margins a double advancement flap was deemed most appropriate.  Using a sterile surgical marker, the appropriate advancement flaps were drawn incorporating the defect and placing the expected incisions within the relaxed skin tension lines where possible.    The area thus outlined was incised deep to adipose tissue with a #15 scalpel blade.  The skin margins were undermined to an appropriate distance in all directions utilizing iris scissors.
Epidermal Sutures: 4-0 Prolene
Area L Indication Text: Tumors in this location are included in Area L (trunk and extremities).  Mohs surgery is indicated for larger tumors, 2 cm or larger, in these anatomic locations.
Referred To Oculoplastics For Closure Text (Leave Blank If You Do Not Want): After obtaining clear surgical margins the patient was sent to oculoplastics for surgical repair.  The patient understands they will receive post-surgical care and follow-up from the referring physician's office.
Advancement Flap (Single) Text: The defect edges were debeveled with a #15 scalpel blade.  Given the location of the defect and the proximity to free margins a single advancement flap was deemed most appropriate.  Using a sterile surgical marker, an appropriate advancement flap was drawn incorporating the defect and placing the expected incisions within the relaxed skin tension lines where possible.    The area thus outlined was incised deep to adipose tissue with a #15 scalpel blade.  The skin margins were undermined to an appropriate distance in all directions utilizing iris scissors.
Mohs Method Verbiage: An incision at a 45 degree angle following the standard Mohs approach was done and the specimen was harvested as a microscopic controlled layer.
Bilobed Flap Text: The defect edges were debeveled with a #15 scalpel blade.  Given the location of the defect and the proximity to free margins a bilobe flap was deemed most appropriate.  Using a sterile surgical marker, an appropriate bilobe flap drawn around the defect.    The area thus outlined was incised deep to adipose tissue with a #15 scalpel blade.  The skin margins were undermined to an appropriate distance in all directions utilizing iris scissors.
Helical Rim Advancement Flap Text: The defect edges were debeveled with a #15 blade scalpel.  Given the location of the defect and the proximity to free margins (helical rim) a double helical rim advancement flap was deemed most appropriate.  Using a sterile surgical marker, the appropriate advancement flaps were drawn incorporating the defect and placing the expected incisions between the helical rim and antihelix where possible.  The area thus outlined was incised through and through with a #15 scalpel blade.  With a skin hook and iris scissors, the flaps were gently and sharply undermined and freed up.
Tissue Cultured Epidermal Autograft Text: The defect edges were debeveled with a #15 scalpel blade.  Given the location of the defect, shape of the defect and the proximity to free margins a tissue cultured epidermal autograft was deemed most appropriate.  The graft was then trimmed to fit the size of the defect.  The graft was then placed in the primary defect and oriented appropriately.
Anesthesia Type: 1% lidocaine without epinephrine and a 1:10 solution of 8.4% sodium bicarbonate
Tarsorrhaphy Text: A tarsorrhaphy was performed using Frost sutures.
Crescentic Complex Repair Preamble Text (Leave Blank If You Do Not Want): Extensive wide undermining was performed.
Repair Type: Intermediate Layered Repair
Localized Dermabrasion With Wire Brush Text: The patient was draped in routine manner.  Localized dermabrasion using 3 x 17 mm wire brush was performed in routine manner to papillary dermis. This spot dermabrasion is being performed to complete skin cancer reconstruction. It also will eliminate the other sun damaged precancerous cells that are known to be part of the regional effect of a lifetime's worth of sun exposure. This localized dermabrasion is therapeutic and should not be considered cosmetic in any regard.
Epidermal Autograft Text: The defect edges were debeveled with a #15 scalpel blade.  Given the location of the defect, shape of the defect and the proximity to free margins an epidermal autograft was deemed most appropriate.  Using a sterile surgical marker, the primary defect shape was transferred to the donor site. The epidermal graft was then harvested.  The skin graft was then placed in the primary defect and oriented appropriately.
O-T Plasty Text: The defect edges were debeveled with a #15 scalpel blade.  Given the location of the defect, shape of the defect and the proximity to free margins an O-T plasty was deemed most appropriate.  Using a sterile surgical marker, an appropriate O-T plasty was drawn incorporating the defect and placing the expected incisions within the relaxed skin tension lines where possible.    The area thus outlined was incised deep to adipose tissue with a #15 scalpel blade.  The skin margins were undermined to an appropriate distance in all directions utilizing iris scissors.
O-L Flap Text: The defect edges were debeveled with a #15 scalpel blade.  Given the location of the defect, shape of the defect and the proximity to free margins an O-L flap was deemed most appropriate.  Using a sterile surgical marker, an appropriate advancement flap was drawn incorporating the defect and placing the expected incisions within the relaxed skin tension lines where possible.    The area thus outlined was incised deep to adipose tissue with a #15 scalpel blade.  The skin margins were undermined to an appropriate distance in all directions utilizing iris scissors.
Anesthesia Volume In Cc: 2
Cheek-To-Nose Interpolation Flap Text: A decision was made to reconstruct the defect utilizing an interpolation axial flap and a staged reconstruction.  A telfa template was made of the defect.  This telfa template was then used to outline the Cheek-To-Nose Interpolation flap.  The donor area for the pedicle flap was then injected with anesthesia.  The flap was excised through the skin and subcutaneous tissue down to the layer of the underlying musculature.  The interpolation flap was carefully excised within this deep plane to maintain its blood supply.  The edges of the donor site were undermined.   The donor site was closed in a primary fashion.  The pedicle was then rotated into position and sutured.  Once the tube was sutured into place, adequate blood supply was confirmed with blanching and refill.  The pedicle was then wrapped with xeroform gauze and dressed appropriately with a telfa and gauze bandage to ensure continued blood supply and protect the attached pedicle.
Inflammation Suggestive Of Cancer Camouflage Histology Text: There was a dense lymphocytic infiltrate which prevented adequate histologic evaluation of adjacent structures.
Donor Site Anesthesia Type: same as repair anesthesia
Consent (Temporal Branch)/Introductory Paragraph: The rationale for Mohs was explained to the patient and consent was obtained. The risks, benefits and alternatives to therapy were discussed in detail. Specifically, the risks of damage to the temporal branch of the facial nerve, infection, scarring, bleeding, prolonged wound healing, incomplete removal, allergy to anesthesia, and recurrence were addressed. Prior to the procedure, the treatment site was clearly identified and confirmed by the patient. All components of Universal Protocol/PAUSE Rule completed.
Alar Island Pedicle Flap Text: The defect edges were debeveled with a #15 scalpel blade.  Given the location of the defect, shape of the defect and the proximity to the alar rim an island pedicle advancement flap was deemed most appropriate.  Using a sterile surgical marker, an appropriate advancement flap was drawn incorporating the defect, outlining the appropriate donor tissue and placing the expected incisions within the nasal ala running parallel to the alar rim. The area thus outlined was incised with a #15 scalpel blade.  The skin margins were undermined minimally to an appropriate distance in all directions around the primary defect and laterally outward around the island pedicle utilizing iris scissors.  There was minimal undermining beneath the pedicle flap.
Mucosal Advancement Flap Text: Given the location of the defect, shape of the defect and the proximity to free margins a mucosal advancement flap was deemed most appropriate. Incisions were made with a 15 blade scalpel in the appropriate fashion along the cutaneous vermillion border and the mucosal lip. The remaining actinically damaged mucosal tissue was excised.  The mucosal advancement flap was then elevated to the gingival sulcus with care taken to preserve the neurovascular structures and advanced into the primary defect. Care was taken to ensure that precise realignment of the vermillion border was achieved.
Area M Indication Text: Tumors in this location are included in Area M (cheek, forehead, scalp, neck, jawline and pretibial skin).  Mohs surgery is indicated for tumors 1 cm or larger in these anatomic locations.
Initial Size Of Lesion: 0.8
Additional Epidermal Closure (Optional): vertical mattress
Consent (Near Eyelid Margin)/Introductory Paragraph: The rationale for Mohs was explained to the patient and consent was obtained. The risks, benefits and alternatives to therapy were discussed in detail. Specifically, the risks of ectropion or eyelid deformity, infection, scarring, bleeding, prolonged wound healing, incomplete removal, allergy to anesthesia, nerve injury and recurrence were addressed. Prior to the procedure, the treatment site was clearly identified and confirmed by the patient. All components of Universal Protocol/PAUSE Rule completed.
Secondary Intention Text (Leave Blank If You Do Not Want): The defect will heal with secondary intention.
Wound Care: Bacitracin
Referred To Otolaryngology For Closure Text (Leave Blank If You Do Not Want): After obtaining clear surgical margins the patient was sent to otolaryngology for surgical repair.  The patient understands they will receive post-surgical care and follow-up from the referring physician's office.
Star Wedge Flap Text: The defect edges were debeveled with a #15 scalpel blade.  Given the location of the defect, shape of the defect and the proximity to free margins a star wedge flap was deemed most appropriate.  Using a sterile surgical marker, an appropriate rotation flap was drawn incorporating the defect and placing the expected incisions within the relaxed skin tension lines where possible. The area thus outlined was incised deep to adipose tissue with a #15 scalpel blade.  The skin margins were undermined to an appropriate distance in all directions utilizing iris scissors.
Bi-Rhombic Flap Text: The defect edges were debeveled with a #15 scalpel blade.  Given the location of the defect and the proximity to free margins a bi-rhombic flap was deemed most appropriate.  Using a sterile surgical marker, an appropriate rhombic flap was drawn incorporating the defect. The area thus outlined was incised deep to adipose tissue with a #15 scalpel blade.  The skin margins were undermined to an appropriate distance in all directions utilizing iris scissors.
Cartilage Graft Text: The defect edges were debeveled with a #15 scalpel blade.  Given the location of the defect, shape of the defect, the fact the defect involved a full thickness cartilage defect a cartilage graft was deemed most appropriate.  An appropriate donor site was identified, cleansed, and anesthetized. The cartilage graft was then harvested and transferred to the recipient site, oriented appropriately and then sutured into place.  The secondary defect was then repaired using a primary closure.
Paramedian Forehead Flap Text: A decision was made to reconstruct the defect utilizing an interpolation axial flap and a staged reconstruction.  A telfa template was made of the defect.  This telfa template was then used to outline the paramedian forehead pedicle flap.  The donor area for the pedicle flap was then injected with anesthesia.  The flap was excised through the skin and subcutaneous tissue down to the layer of the underlying musculature.  The pedicle flap was carefully excised within this deep plane to maintain its blood supply.  The edges of the donor site were undermined.   The donor site was closed in a primary fashion.  The pedicle was then rotated into position and sutured.  Once the tube was sutured into place, adequate blood supply was confirmed with blanching and refill.  The pedicle was then wrapped with xeroform gauze and dressed appropriately with a telfa and gauze bandage to ensure continued blood supply and protect the attached pedicle.
Bilobed Transposition Flap Text: The defect edges were debeveled with a #15 scalpel blade.  Given the location of the defect and the proximity to free margins a bilobed transposition flap was deemed most appropriate.  Using a sterile surgical marker, an appropriate bilobe flap drawn around the defect.    The area thus outlined was incised deep to adipose tissue with a #15 scalpel blade.  The skin margins were undermined to an appropriate distance in all directions utilizing iris scissors.
Advancement-Rotation Flap Text: The defect edges were debeveled with a #15 scalpel blade.  Given the location of the defect, shape of the defect and the proximity to free margins an advancement-rotation flap was deemed most appropriate.  Using a sterile surgical marker, an appropriate flap was drawn incorporating the defect and placing the expected incisions within the relaxed skin tension lines where possible. The area thus outlined was incised deep to adipose tissue with a #15 scalpel blade.  The skin margins were undermined to an appropriate distance in all directions utilizing iris scissors.
Dressing (No Sutures): dry sterile dressing
Consent (Scalp)/Introductory Paragraph: The rationale for Mohs was explained to the patient and consent was obtained. The risks, benefits and alternatives to therapy were discussed in detail. Specifically, the risks of changes in hair growth pattern secondary to repair, infection, scarring, bleeding, prolonged wound healing, incomplete removal, allergy to anesthesia, nerve injury and recurrence were addressed. Prior to the procedure, the treatment site was clearly identified and confirmed by the patient. All components of Universal Protocol/PAUSE Rule completed.
Complex Repair And Graft Additional Text (Will Appearing After The Standard Complex Repair Text): The complex repair was not sufficient to completely close the primary defect. The remaining additional defect was repaired with the graft mentioned below.
Ear Star Wedge Flap Text: The defect edges were debeveled with a #15 blade scalpel.  Given the location of the defect and the proximity to free margins (helical rim) an ear star wedge flap was deemed most appropriate.  Using a sterile surgical marker, the appropriate flap was drawn incorporating the defect and placing the expected incisions between the helical rim and antihelix where possible.  The area thus outlined was incised through and through with a #15 scalpel blade.
Xenograft Text: The defect edges were debeveled with a #15 scalpel blade.  Given the location of the defect, shape of the defect and the proximity to free margins a xenograft was deemed most appropriate.  The graft was then trimmed to fit the size of the defect.  The graft was then placed in the primary defect and oriented appropriately.
Spiral Flap Text: The defect edges were debeveled with a #15 scalpel blade.  Given the location of the defect, shape of the defect and the proximity to free margins a spiral flap was deemed most appropriate.  Using a sterile surgical marker, an appropriate rotation flap was drawn incorporating the defect and placing the expected incisions within the relaxed skin tension lines where possible. The area thus outlined was incised deep to adipose tissue with a #15 scalpel blade.  The skin margins were undermined to an appropriate distance in all directions utilizing iris scissors.
Island Pedicle Flap-Requiring Vessel Identification Text: The defect edges were debeveled with a #15 scalpel blade.  Given the location of the defect, shape of the defect and the proximity to free margins an island pedicle advancement flap was deemed most appropriate.  Using a sterile surgical marker, an appropriate advancement flap was drawn, based on the axial vessel mentioned above, incorporating the defect, outlining the appropriate donor tissue and placing the expected incisions within the relaxed skin tension lines where possible.    The area thus outlined was incised deep to adipose tissue with a #15 scalpel blade.  The skin margins were undermined to an appropriate distance in all directions around the primary defect and laterally outward around the island pedicle utilizing iris scissors.  There was minimal undermining beneath the pedicle flap.
Z Plasty Text: The lesion was extirpated to the level of the fat with a #15 scalpel blade.  Given the location of the defect, shape of the defect and the proximity to free margins a Z-plasty was deemed most appropriate for repair.  Using a sterile surgical marker, the appropriate transposition arms of the Z-plasty were drawn incorporating the defect and placing the expected incisions within the relaxed skin tension lines where possible.    The area thus outlined was incised deep to adipose tissue with a #15 scalpel blade.  The skin margins were undermined to an appropriate distance in all directions utilizing iris scissors.  The opposing transposition arms were then transposed into place in opposite direction and anchored with interrupted buried subcutaneous sutures.
Cheiloplasty (Complex) Text: A decision was made to reconstruct the defect with a  cheiloplasty.  The defect was undermined extensively.  Additional obicularis oris muscle was excised with a 15 blade scalpel.  The defect was converted into a full thickness wedge to facilite a better cosmetic result.  Small vessels were then tied off with 5-0 monocyrl. The obicularis oris, superficial fascia, adipose and dermis were then reapproximated.  After the deeper layers were approximated the epidermis was reapproximated with particular care given to realign the vermillion border.
Skin Substitute Text: The defect edges were debeveled with a #15 scalpel blade.  Given the location of the defect, shape of the defect and the proximity to free margins a skin substitute graft was deemed most appropriate.  The graft material was trimmed to fit the size of the defect. The graft was then placed in the primary defect and oriented appropriately.
Melolabial Transposition Flap Text: The defect edges were debeveled with a #15 scalpel blade.  Given the location of the defect and the proximity to free margins a melolabial flap was deemed most appropriate.  Using a sterile surgical marker, an appropriate melolabial transposition flap was drawn incorporating the defect.    The area thus outlined was incised deep to adipose tissue with a #15 scalpel blade.  The skin margins were undermined to an appropriate distance in all directions utilizing iris scissors.
Double Island Pedicle Flap Text: The defect edges were debeveled with a #15 scalpel blade.  Given the location of the defect, shape of the defect and the proximity to free margins a double island pedicle advancement flap was deemed most appropriate.  Using a sterile surgical marker, an appropriate advancement flap was drawn incorporating the defect, outlining the appropriate donor tissue and placing the expected incisions within the relaxed skin tension lines where possible.    The area thus outlined was incised deep to adipose tissue with a #15 scalpel blade.  The skin margins were undermined to an appropriate distance in all directions around the primary defect and laterally outward around the island pedicle utilizing iris scissors.  There was minimal undermining beneath the pedicle flap.
Muscle Hinge Flap Text: The defect edges were debeveled with a #15 scalpel blade.  Given the size, depth and location of the defect and the proximity to free margins a muscle hinge flap was deemed most appropriate.  Using a sterile surgical marker, an appropriate hinge flap was drawn incorporating the defect. The area thus outlined was incised with a #15 scalpel blade.  The skin margins were undermined to an appropriate distance in all directions utilizing iris scissors.
Anesthesia Type: 1% lidocaine without epinephrine
Surgeon/Pathologist Verbiage (Will Incorporate Name Of Surgeon From Intro If Not Blank): operated in two distinct and integrated capacities as the surgeon and pathologist.
Melolabial Interpolation Flap Text: A decision was made to reconstruct the defect utilizing an interpolation axial flap and a staged reconstruction.  A telfa template was made of the defect.  This telfa template was then used to outline the melolabial interpolation flap.  The donor area for the pedicle flap was then injected with anesthesia.  The flap was excised through the skin and subcutaneous tissue down to the layer of the underlying musculature.  The pedicle flap was carefully excised within this deep plane to maintain its blood supply.  The edges of the donor site were undermined.   The donor site was closed in a primary fashion.  The pedicle was then rotated into position and sutured.  Once the tube was sutured into place, adequate blood supply was confirmed with blanching and refill.  The pedicle was then wrapped with xeroform gauze and dressed appropriately with a telfa and gauze bandage to ensure continued blood supply and protect the attached pedicle.
Bcc Infiltrative Histology Text: There were numerous aggregates of basaloid cells demonstrating an infiltrative pattern.
Wound Care (No Sutures): Petrolatum
Stage 3: Additional Anesthesia Type: 0.5% lidocaine with 1:200,000 epinephrine and a 1:10 solution of 8.4% sodium bicarbonate
Purse String (Simple) Text: Given the location of the defect and the characteristics of the surrounding skin a pursestring closure was deemed most appropriate.  Undermining was performed circumfirentially around the surgical defect.  A purstring suture was then placed and tightened.
Mohs Rapid Report Verbiage: The area of clinically evident tumor was marked with skin marking ink and appropriately hatched.  The initial incision was made following the Mohs approach through the skin.  The specimen was taken to the lab, divided into the necessary number of pieces, chromacoded and processed according to the Mohs protocol.  This was repeated in successive stages until a tumor free defect was achieved.
Split-Thickness Skin Graft Text: The defect edges were debeveled with a #15 scalpel blade.  Given the location of the defect, shape of the defect and the proximity to free margins a split thickness skin graft was deemed most appropriate.  Using a sterile surgical marker, the primary defect shape was transferred to the donor site. The split thickness graft was then harvested.  The skin graft was then placed in the primary defect and oriented appropriately.
Referred To Mid-Level For Closure Text (Leave Blank If You Do Not Want): After obtaining clear surgical margins the patient was sent to a mid-level provider for surgical repair.  The patient understands they will receive post-surgical care and follow-up from the mid-level provider.
Island Pedicle Flap With Canthal Suspension Text: The defect edges were debeveled with a #15 scalpel blade.  Given the location of the defect, shape of the defect and the proximity to free margins an island pedicle advancement flap was deemed most appropriate.  Using a sterile surgical marker, an appropriate advancement flap was drawn incorporating the defect, outlining the appropriate donor tissue and placing the expected incisions within the relaxed skin tension lines where possible. The area thus outlined was incised deep to adipose tissue with a #15 scalpel blade.  The skin margins were undermined to an appropriate distance in all directions around the primary defect and laterally outward around the island pedicle utilizing iris scissors.  There was minimal undermining beneath the pedicle flap. A suspension suture was placed in the canthal tendon to prevent tension and prevent ectropion.
Graft Donor Site Epidermal Sutures (Optional): 6-0 Prolene
O-Z Plasty Text: The defect edges were debeveled with a #15 scalpel blade.  Given the location of the defect, shape of the defect and the proximity to free margins an O-Z plasty (double transposition flap) was deemed most appropriate.  Using a sterile surgical marker, the appropriate transposition flaps were drawn incorporating the defect and placing the expected incisions within the relaxed skin tension lines where possible.    The area thus outlined was incised deep to adipose tissue with a #15 scalpel blade.  The skin margins were undermined to an appropriate distance in all directions utilizing iris scissors.  Hemostasis was achieved with electrocautery.  The flaps were then transposed into place, one clockwise and the other counterclockwise, and anchored with interrupted buried subcutaneous sutures.
H Plasty Text: Given the location of the defect, shape of the defect and the proximity to free margins a H-plasty was deemed most appropriate for repair.  Using a sterile surgical marker, the appropriate advancement arms of the H-plasty were drawn incorporating the defect and placing the expected incisions within the relaxed skin tension lines where possible. The area thus outlined was incised deep to adipose tissue with a #15 scalpel blade. The skin margins were undermined to an appropriate distance in all directions utilizing iris scissors.  The opposing advancement arms were then advanced into place in opposite direction and anchored with interrupted buried subcutaneous sutures.
Bilateral Helical Rim Advancement Flap Text: The defect edges were debeveled with a #15 blade scalpel.  Given the location of the defect and the proximity to free margins (helical rim) a bilateral helical rim advancement flap was deemed most appropriate.  Using a sterile surgical marker, the appropriate advancement flaps were drawn incorporating the defect and placing the expected incisions between the helical rim and antihelix where possible.  The area thus outlined was incised through and through with a #15 scalpel blade.  With a skin hook and iris scissors, the flaps were gently and sharply undermined and freed up.
Consent (Spinal Accessory)/Introductory Paragraph: The rationale for Mohs was explained to the patient and consent was obtained. The risks, benefits and alternatives to therapy were discussed in detail. Specifically, the risks of damage to the spinal accessory nerve, infection, scarring, bleeding, prolonged wound healing, incomplete removal, allergy to anesthesia, and recurrence were addressed. Prior to the procedure, the treatment site was clearly identified and confirmed by the patient. All components of Universal Protocol/PAUSE Rule completed.
V-Y Flap Text: The defect edges were debeveled with a #15 scalpel blade.  Given the location of the defect, shape of the defect and the proximity to free margins a V-Y flap was deemed most appropriate.  Using a sterile surgical marker, an appropriate advancement flap was drawn incorporating the defect and placing the expected incisions within the relaxed skin tension lines where possible.    The area thus outlined was incised deep to adipose tissue with a #15 scalpel blade.  The skin margins were undermined to an appropriate distance in all directions utilizing iris scissors.
Epidermal Closure: running
Deep Sutures: 4-0 PDS
Graft Basting Suture (Optional): 5-0 PDS
Surgeon: Romaine [email or web address]
Purse String (Intermediate) Text: Given the location of the defect and the characteristics of the surrounding skin a pursestring intermediate closure was deemed most appropriate.  Undermining was performed circumfirentially around the surgical defect.  A purstring suture was then placed and tightened.
Stage 2: Additional Anesthesia Type: 1% lidocaine with 1:100,000 epinephrine
Mastoid Interpolation Flap Text: A decision was made to reconstruct the defect utilizing an interpolation axial flap and a staged reconstruction.  A telfa template was made of the defect.  This telfa template was then used to outline the mastoid interpolation flap.  The donor area for the pedicle flap was then injected with anesthesia.  The flap was excised through the skin and subcutaneous tissue down to the layer of the underlying musculature.  The pedicle flap was carefully excised within this deep plane to maintain its blood supply.  The edges of the donor site were undermined.   The donor site was closed in a primary fashion.  The pedicle was then rotated into position and sutured.  Once the tube was sutured into place, adequate blood supply was confirmed with blanching and refill.  The pedicle was then wrapped with xeroform gauze and dressed appropriately with a telfa and gauze bandage to ensure continued blood supply and protect the attached pedicle.
Manual Repair Warning Statement: We plan on removing the manually selected variable below in favor of our much easier automatic structured text blocks found in the previous tab. We decided to do this to help make the flow better and give you the full power of structured data. Manual selection is never going to be ideal in our platform and I would encourage you to avoid using manual selection from this point on, especially since I will be sunsetting this feature. It is important that you do one of two things with the customized text below. First, you can save all of the text in a word file so you can have it for future reference. Second, transfer the text to the appropriate area in the Library tab. Lastly, if there is a flap or graft type which we do not have you need to let us know right away so I can add it in before the variable is hidden. No need to panic, we plan to give you roughly 6 months to make the change.
Full Thickness Lip Wedge Repair (Flap) Text: Given the location of the defect and the proximity to free margins a full thickness wedge repair was deemed most appropriate.  Using a sterile surgical marker, the appropriate repair was drawn incorporating the defect and placing the expected incisions perpendicular to the vermillion border.  The vermillion border was also meticulously outlined to ensure appropriate reapproximation during the repair.  The area thus outlined was incised through and through with a #15 scalpel blade.  The muscularis and dermis were reaproximated with deep sutures following hemostasis. Care was taken to realign the vermillion border before proceeding with the superficial closure.  Once the vermillion was realigned the superfical and mucosal closure was finished.
Estimated Blood Loss (Cc): minimal
Area H Indication Text: Tumors in this location are included in Area H (eyelids, eyebrows, nose, lips, chin, ear, pre-auricular, post-auricular, temple, genitalia, hands, feet, ankles and areola).  Tissue conservation is critical in these anatomic locations.
Eye Protection Verbiage: Before proceeding with the stage, a plastic scleral shield was inserted. The globe was anesthetized with a few drops of 1% lidocaine with 1:100,000 epinephrine. Then, an appropriate sized scleral shield was chosen and coated with lacrilube ointment. The shield was gently inserted and left in place for the duration of each stage. After the stage was completed, the shield was gently removed.
Cheiloplasty (Less Than 50%) Text: A decision was made to reconstruct the defect with a  cheiloplasty.  The defect was undermined extensively.  Additional obicularis oris muscle was excised with a 15 blade scalpel.  The defect was converted into a full thickness wedge, of less than 50% of the vertical height of the lip, to facilite a better cosmetic result.  Small vessels were then tied off with 5-0 monocyrl. The obicularis oris, superficial fascia, adipose and dermis were then reapproximated.  After the deeper layers were approximated the epidermis was reapproximated with particular care given to realign the vermillion border.
Modified Advancement Flap Text: The defect edges were debeveled with a #15 scalpel blade.  Given the location of the defect, shape of the defect and the proximity to free margins a modified advancement flap was deemed most appropriate.  Using a sterile surgical marker, an appropriate advancement flap was drawn incorporating the defect and placing the expected incisions within the relaxed skin tension lines where possible.    The area thus outlined was incised deep to adipose tissue with a #15 scalpel blade.  The skin margins were undermined to an appropriate distance in all directions utilizing iris scissors.
Repair Anesthesia Method: local infiltration
Tumor Debulked?: curette
Simple / Intermediate / Complex Repair - Final Wound Length In Cm: 4
Posterior Auricular Interpolation Flap Text: A decision was made to reconstruct the defect utilizing an interpolation axial flap and a staged reconstruction.  A telfa template was made of the defect.  This telfa template was then used to outline the posterior auricular interpolation flap.  The donor area for the pedicle flap was then injected with anesthesia.  The flap was excised through the skin and subcutaneous tissue down to the layer of the underlying musculature.  The pedicle flap was carefully excised within this deep plane to maintain its blood supply.  The edges of the donor site were undermined.   The donor site was closed in a primary fashion.  The pedicle was then rotated into position and sutured.  Once the tube was sutured into place, adequate blood supply was confirmed with blanching and refill.  The pedicle was then wrapped with xeroform gauze and dressed appropriately with a telfa and gauze bandage to ensure continued blood supply and protect the attached pedicle.
Dermal Autograft Text: The defect edges were debeveled with a #15 scalpel blade.  Given the location of the defect, shape of the defect and the proximity to free margins a dermal autograft was deemed most appropriate.  Using a sterile surgical marker, the primary defect shape was transferred to the donor site. The area thus outlined was incised deep to adipose tissue with a #15 scalpel blade.  The harvested graft was then trimmed of adipose and epidermal tissue until only dermis was left.  The skin graft was then placed in the primary defect and oriented appropriately.
Ear Wedge Repair Text: A wedge excision was completed by carrying down an excision through the full thickness of the ear and cartilage with an inward facing Burow's triangle. The wound was then closed in a layered fashion.
Burow's Advancement Flap Text: The defect edges were debeveled with a #15 scalpel blade.  Given the location of the defect and the proximity to free margins a Burow's advancement flap was deemed most appropriate.  Using a sterile surgical marker, the appropriate advancement flap was drawn incorporating the defect and placing the expected incisions within the relaxed skin tension lines where possible.    The area thus outlined was incised deep to adipose tissue with a #15 scalpel blade.  The skin margins were undermined to an appropriate distance in all directions utilizing iris scissors.
Consent 1/Introductory Paragraph: The rationale for Mohs was explained to the patient and consent was obtained. The risks, benefits and alternatives to therapy were discussed in detail. Specifically, the risks of infection, scarring, bleeding, prolonged wound healing, incomplete removal, allergy to anesthesia, nerve injury and recurrence were addressed. Prior to the procedure, the treatment site was clearly identified and confirmed by the patient. All components of Universal Protocol/PAUSE Rule completed.
Rotation Flap Text: The defect edges were debeveled with a #15 scalpel blade.  Given the location of the defect, shape of the defect and the proximity to free margins a rotation flap was deemed most appropriate.  Using a sterile surgical marker, an appropriate rotation flap was drawn incorporating the defect and placing the expected incisions within the relaxed skin tension lines where possible.    The area thus outlined was incised deep to adipose tissue with a #15 scalpel blade.  The skin margins were undermined to an appropriate distance in all directions utilizing iris scissors.
Graft Donor Site Bandage (Optional-Leave Blank If You Don't Want In Note): Steri-strips and a pressure bandage were applied to the donor site.
Detail Level: Detailed
Unna Boot Text: An Unna boot was placed to help immobilize the limb and facilitate more rapid healing.
Mohs Case Number: [id number]
Post-Care Instructions: I reviewed with the patient in detail post-care instructions. Patient is not to engage in any heavy lifting, exercise, or swimming for the next 14 days. Should the patient develop any fevers, chills, bleeding, severe pain patient will contact the office immediately..\\n.
Rhombic Flap Text: The defect edges were debeveled with a #15 scalpel blade.  Given the location of the defect and the proximity to free margins a rhombic flap was deemed most appropriate.  Using a sterile surgical marker, an appropriate rhombic flap was drawn incorporating the defect.    The area thus outlined was incised deep to adipose tissue with a #15 scalpel blade.  The skin margins were undermined to an appropriate distance in all directions utilizing iris scissors.
A-T Advancement Flap Text: The defect edges were debeveled with a #15 scalpel blade.  Given the location of the defect, shape of the defect and the proximity to free margins an A-T advancement flap was deemed most appropriate.  Using a sterile surgical marker, an appropriate advancement flap was drawn incorporating the defect and placing the expected incisions within the relaxed skin tension lines where possible.    The area thus outlined was incised deep to adipose tissue with a #15 scalpel blade.  The skin margins were undermined to an appropriate distance in all directions utilizing iris scissors.
Consent (Ear)/Introductory Paragraph: The rationale for Mohs was explained to the patient and consent was obtained. The risks, benefits and alternatives to therapy were discussed in detail. Specifically, the risks of ear deformity, infection, scarring, bleeding, prolonged wound healing, incomplete removal, allergy to anesthesia, nerve injury and recurrence were addressed. Prior to the procedure, the treatment site was clearly identified and confirmed by the patient. All components of Universal Protocol/PAUSE Rule completed.
Composite Graft Text: The defect edges were debeveled with a #15 scalpel blade.  Given the location of the defect, shape of the defect, the proximity to free margins and the fact the defect was full thickness a composite graft was deemed most appropriate.  The defect was outline and then transferred to the donor site.  A full thickness graft was then excised from the donor site. The graft was then placed in the primary defect, oriented appropriately and then sutured into place.  The secondary defect was then repaired using a primary closure.
Hatchet Flap Text: The defect edges were debeveled with a #15 scalpel blade.  Given the location of the defect, shape of the defect and the proximity to free margins a hatchet flap was deemed most appropriate.  Using a sterile surgical marker, an appropriate hatchet flap was drawn incorporating the defect and placing the expected incisions within the relaxed skin tension lines where possible.    The area thus outlined was incised deep to adipose tissue with a #15 scalpel blade.  The skin margins were undermined to an appropriate distance in all directions utilizing iris scissors.
O-T Advancement Flap Text: The defect edges were debeveled with a #15 scalpel blade.  Given the location of the defect, shape of the defect and the proximity to free margins an O-T advancement flap was deemed most appropriate.  Using a sterile surgical marker, an appropriate advancement flap was drawn incorporating the defect and placing the expected incisions within the relaxed skin tension lines where possible.    The area thus outlined was incised deep to adipose tissue with a #15 scalpel blade.  The skin margins were undermined to an appropriate distance in all directions utilizing iris scissors.
Complex Repair And Flap Additional Text (Will Appearing After The Standard Complex Repair Text): The complex repair was not sufficient to completely close the primary defect. The remaining additional defect was repaired with the flap mentioned below.
Crescentic Advancement Flap Text: The defect edges were debeveled with a #15 scalpel blade.  Given the location of the defect and the proximity to free margins a crescentic advancement flap was deemed most appropriate.  Using a sterile surgical marker, the appropriate advancement flap was drawn incorporating the defect and placing the expected incisions within the relaxed skin tension lines where possible.    The area thus outlined was incised deep to adipose tissue with a #15 scalpel blade.  The skin margins were undermined to an appropriate distance in all directions utilizing iris scissors.
Medical Necessity Statement: Based on my medical judgement, Mohs surgery is the most appropriate treatment for this cancer compared to other treatments.
Referred To Asc For Closure Text (Leave Blank If You Do Not Want): After obtaining clear surgical margins the patient was sent to an ASC for surgical repair.  The patient understands they will receive post-surgical care and follow-up from the ASC physician.
Consent Type: Consent 1 (Standard)
Consent 2/Introductory Paragraph: Mohs surgery was explained to the patient and consent was obtained. The risks, benefits and alternatives to therapy were discussed in detail. Specifically, the risks of infection, scarring, bleeding, prolonged wound healing, incomplete removal, allergy to anesthesia, nerve injury and recurrence were addressed. Prior to the procedure, the treatment site was clearly identified and confirmed by the patient. All components of Universal Protocol/PAUSE Rule completed.
Transposition Flap Text: The defect edges were debeveled with a #15 scalpel blade.  Given the location of the defect and the proximity to free margins a transposition flap was deemed most appropriate.  Using a sterile surgical marker, an appropriate transposition flap was drawn incorporating the defect.    The area thus outlined was incised deep to adipose tissue with a #15 scalpel blade.  The skin margins were undermined to an appropriate distance in all directions utilizing iris scissors.

## 2018-03-08 ENCOUNTER — APPOINTMENT (RX ONLY)
Dept: URBAN - METROPOLITAN AREA CLINIC 24 | Facility: CLINIC | Age: 78
Setting detail: DERMATOLOGY
End: 2018-03-08

## 2018-03-08 DIAGNOSIS — Z48.01 ENCOUNTER FOR CHANGE OR REMOVAL OF SURGICAL WOUND DRESSING: ICD-10-CM

## 2018-03-08 PROCEDURE — ? SUTURE REMOVAL (GLOBAL PERIOD)

## 2018-03-08 ASSESSMENT — LOCATION ZONE DERM: LOCATION ZONE: LEG

## 2018-03-08 ASSESSMENT — LOCATION DETAILED DESCRIPTION DERM: LOCATION DETAILED: LEFT DISTAL PRETIBIAL REGION

## 2018-03-08 ASSESSMENT — LOCATION SIMPLE DESCRIPTION DERM: LOCATION SIMPLE: LEFT PRETIBIAL REGION

## 2018-03-08 NOTE — PROCEDURE: SUTURE REMOVAL (GLOBAL PERIOD)
Detail Level: Detailed
Add 79085 Cpt? (Important Note: In 2017 The Use Of 41002 Is Being Tracked By Cms To Determine Future Global Period Reimbursement For Global Periods): no

## 2018-04-30 ENCOUNTER — APPOINTMENT (RX ONLY)
Dept: URBAN - METROPOLITAN AREA CLINIC 24 | Facility: CLINIC | Age: 78
Setting detail: DERMATOLOGY
End: 2018-04-30

## 2018-04-30 DIAGNOSIS — L82.0 INFLAMED SEBORRHEIC KERATOSIS: ICD-10-CM

## 2018-04-30 DIAGNOSIS — H01.13 ECZEMATOUS DERMATITIS OF EYELID: ICD-10-CM | Status: WELL CONTROLLED

## 2018-04-30 DIAGNOSIS — L30.4 ERYTHEMA INTERTRIGO: ICD-10-CM | Status: WELL CONTROLLED

## 2018-04-30 DIAGNOSIS — Z85.828 PERSONAL HISTORY OF OTHER MALIGNANT NEOPLASM OF SKIN: ICD-10-CM

## 2018-04-30 PROBLEM — H01.139 ECZEMATOUS DERMATITIS OF UNSPECIFIED EYE, UNSPECIFIED EYELID: Status: ACTIVE | Noted: 2018-04-30

## 2018-04-30 PROBLEM — H01.134 ECZEMATOUS DERMATITIS OF LEFT UPPER EYELID: Status: ACTIVE | Noted: 2018-04-30

## 2018-04-30 PROBLEM — L55.1 SUNBURN OF SECOND DEGREE: Status: ACTIVE | Noted: 2018-04-30

## 2018-04-30 PROCEDURE — ? OTHER

## 2018-04-30 PROCEDURE — 17110 DESTRUCTION B9 LES UP TO 14: CPT

## 2018-04-30 PROCEDURE — ? LIQUID NITROGEN

## 2018-04-30 PROCEDURE — 99213 OFFICE O/P EST LOW 20 MIN: CPT | Mod: 25

## 2018-04-30 PROCEDURE — ? COUNSELING

## 2018-04-30 PROCEDURE — ? PRESCRIPTION

## 2018-04-30 RX ORDER — IODOQUINOL, HYDROCORTISONE ACETATE AND ALOE VERA LEAF 10; 20; 10 MG/G; MG/G; MG/G
GEL TOPICAL
Qty: 1 | Refills: 2 | Status: ERX | COMMUNITY
Start: 2018-04-30

## 2018-04-30 RX ADMIN — IODOQUINOL, HYDROCORTISONE ACETATE AND ALOE VERA LEAF: 10; 20; 10 GEL TOPICAL at 00:00

## 2018-04-30 ASSESSMENT — LOCATION SIMPLE DESCRIPTION DERM
LOCATION SIMPLE: LEFT THIGH
LOCATION SIMPLE: LEFT SUPERIOR EYELID
LOCATION SIMPLE: RIGHT BUTTOCK
LOCATION SIMPLE: LEFT CHEEK
LOCATION SIMPLE: ABDOMEN
LOCATION SIMPLE: LEFT PRETIBIAL REGION
LOCATION SIMPLE: LEFT BUTTOCK
LOCATION SIMPLE: GROIN
LOCATION SIMPLE: RIGHT THIGH
LOCATION SIMPLE: RIGHT UPPER BACK

## 2018-04-30 ASSESSMENT — LOCATION DETAILED DESCRIPTION DERM
LOCATION DETAILED: LEFT SUPRAPUBIC SKIN
LOCATION DETAILED: LEFT MEDIAL SUPERIOR EYELID
LOCATION DETAILED: RIGHT RIB CAGE
LOCATION DETAILED: LEFT PROXIMAL PRETIBIAL REGION
LOCATION DETAILED: LEFT BUTTOCK
LOCATION DETAILED: LEFT SUPERIOR MEDIAL MALAR CHEEK
LOCATION DETAILED: LEFT ANTERIOR MEDIAL PROXIMAL THIGH
LOCATION DETAILED: RIGHT ANTERIOR MEDIAL DISTAL THIGH
LOCATION DETAILED: RIGHT BUTTOCK
LOCATION DETAILED: RIGHT INFERIOR LATERAL UPPER BACK
LOCATION DETAILED: RIGHT LATERAL UPPER BACK
LOCATION DETAILED: LEFT LATERAL BUTTOCK
LOCATION DETAILED: LEFT DISTAL PRETIBIAL REGION
LOCATION DETAILED: LEFT RIB CAGE

## 2018-04-30 ASSESSMENT — LOCATION ZONE DERM
LOCATION ZONE: LEG
LOCATION ZONE: FACE
LOCATION ZONE: EYELID
LOCATION ZONE: TRUNK

## 2018-04-30 NOTE — PROCEDURE: LIQUID NITROGEN
Include Z78.9 (Other Specified Conditions Influencing Health Status) As An Associated Diagnosis?: No
Consent: The patient's verbal consent was obtained including but not limited to risks of crusting, scabbing, blistering, scarring, darker or lighter pigmentary change, recurrence, incomplete removal and infection.
Post-Care Instructions: I reviewed with the patient in detail post-care instructions. Patient is to wear sunprotection, and avoid picking at any of the treated lesions. Pt may apply Vaseline to crusted or scabbing areas.
Number Of Freeze-Thaw Cycles: 1 freeze-thaw cycle
Medical Necessity Information: It is in your best interest to select a reason for this procedure from the list below. All of these items fulfill various CMS LCD requirements except the new and changing color options.
Detail Level: Detailed
Medical Necessity Clause: Treatment was medically necessary because the spot was

## 2018-04-30 NOTE — PROCEDURE: OTHER
Other (Free Text): Apply OTC sparingly hydrocortisone/ prn
Note Text (......Xxx Chief Complaint.): This diagnosis correlates with the
Detail Level: Simple

## 2018-07-30 ENCOUNTER — APPOINTMENT (RX ONLY)
Dept: URBAN - METROPOLITAN AREA CLINIC 24 | Facility: CLINIC | Age: 78
Setting detail: DERMATOLOGY
End: 2018-07-30

## 2018-07-30 DIAGNOSIS — Z85.828 PERSONAL HISTORY OF OTHER MALIGNANT NEOPLASM OF SKIN: ICD-10-CM

## 2018-07-30 DIAGNOSIS — D22 MELANOCYTIC NEVI: ICD-10-CM

## 2018-07-30 DIAGNOSIS — L82.1 OTHER SEBORRHEIC KERATOSIS: ICD-10-CM

## 2018-07-30 PROBLEM — E78.5 HYPERLIPIDEMIA, UNSPECIFIED: Status: ACTIVE | Noted: 2018-07-30

## 2018-07-30 PROBLEM — D22.72 MELANOCYTIC NEVI OF LEFT LOWER LIMB, INCLUDING HIP: Status: ACTIVE | Noted: 2018-07-30

## 2018-07-30 PROBLEM — L70.0 ACNE VULGARIS: Status: ACTIVE | Noted: 2018-07-30

## 2018-07-30 PROCEDURE — ? SHAVE REMOVAL

## 2018-07-30 PROCEDURE — 11301 SHAVE SKIN LESION 0.6-1.0 CM: CPT

## 2018-07-30 PROCEDURE — 99213 OFFICE O/P EST LOW 20 MIN: CPT | Mod: 25

## 2018-07-30 ASSESSMENT — LOCATION ZONE DERM
LOCATION ZONE: LEG
LOCATION ZONE: TRUNK

## 2018-07-30 ASSESSMENT — LOCATION DETAILED DESCRIPTION DERM
LOCATION DETAILED: LEFT LATERAL PROXIMAL PRETIBIAL REGION
LOCATION DETAILED: RIGHT RIB CAGE
LOCATION DETAILED: LEFT RIB CAGE
LOCATION DETAILED: LEFT DISTAL PRETIBIAL REGION
LOCATION DETAILED: INFERIOR THORACIC SPINE
LOCATION DETAILED: LEFT PROXIMAL PRETIBIAL REGION

## 2018-07-30 ASSESSMENT — LOCATION SIMPLE DESCRIPTION DERM
LOCATION SIMPLE: UPPER BACK
LOCATION SIMPLE: ABDOMEN
LOCATION SIMPLE: LEFT PRETIBIAL REGION

## 2018-07-30 NOTE — PROCEDURE: SHAVE REMOVAL
Medical Necessity Clause: This procedure was medically necessary because the lesion is
Accession #: pc
Anesthesia Volume In Cc: 0.5
X Size Of Lesion In Cm (Optional): 0
Medical Necessity Information: It is in your best interest to select a reason for this procedure from the list below. All of these items fulfill various CMS LCD requirements except the new and changing color options.
Was A Bandage Applied: Yes
Size Of Lesion In Cm (Required): 1
Detail Level: Detailed
Post-Care Instructions: I reviewed with the patient in detail post-care instructions. Patient is to keep the biopsy site dry overnight, and then apply bacitracin twice daily until healed. Patient may apply hydrogen peroxide soaks to remove any crusting.
Size Of Margin In Cm (Margins Are Not Added To Billing Dimensions): -
Render Post-Care Instructions In Note?: no
Biopsy Method: double edge Personna blade
Billing Type: Third-Party Bill
Path Notes (To The Dermatopathologist): Please check margins.
Hemostasis: Aluminum Chloride
Anesthesia Type: 1% lidocaine without epinephrine and a 1:10 solution of 8.4% sodium bicarbonate
Consent was obtained from the patient. The risks and benefits to therapy were discussed in detail. Specifically, the risks of infection, scarring, bleeding, prolonged wound healing, incomplete removal, allergy to anesthesia, nerve injury and recurrence were addressed. Prior to the procedure, the treatment site was clearly identified and confirmed by the patient. All components of Universal Protocol/PAUSE Rule completed.
Wound Care: Petrolatum
Notification Instructions: Patient will be notified of biopsy results. However, patient instructed to call the office if not contacted within 2 weeks.

## 2018-09-20 ENCOUNTER — HOSPITAL ENCOUNTER (OUTPATIENT)
Dept: MAMMOGRAPHY | Age: 78
Discharge: HOME | End: 2018-09-20
Attending: INTERNAL MEDICINE
Payer: MEDICARE

## 2018-09-20 DIAGNOSIS — Z12.31 VISIT FOR SCREENING MAMMOGRAM: ICD-10-CM

## 2018-09-20 PROCEDURE — 77067 SCR MAMMO BI INCL CAD: CPT

## 2018-09-25 ENCOUNTER — HOSPITAL ENCOUNTER (OUTPATIENT)
Dept: MAMMOGRAPHY | Age: 78
Discharge: HOME | End: 2018-09-25
Attending: INTERNAL MEDICINE
Payer: MEDICARE

## 2018-09-25 DIAGNOSIS — R92.8 ABNORMAL SCREENING MAMMOGRAM: ICD-10-CM

## 2018-09-25 PROCEDURE — 77065 DX MAMMO INCL CAD UNI: CPT

## 2018-09-25 PROCEDURE — 76642 ULTRASOUND BREAST LIMITED: CPT

## 2019-01-28 ENCOUNTER — APPOINTMENT (RX ONLY)
Dept: URBAN - METROPOLITAN AREA CLINIC 24 | Facility: CLINIC | Age: 79
Setting detail: DERMATOLOGY
End: 2019-01-28

## 2019-01-28 DIAGNOSIS — L57.0 ACTINIC KERATOSIS: ICD-10-CM

## 2019-01-28 DIAGNOSIS — L82.1 OTHER SEBORRHEIC KERATOSIS: ICD-10-CM

## 2019-01-28 DIAGNOSIS — D22 MELANOCYTIC NEVI: ICD-10-CM

## 2019-01-28 DIAGNOSIS — Z85.828 PERSONAL HISTORY OF OTHER MALIGNANT NEOPLASM OF SKIN: ICD-10-CM

## 2019-01-28 DIAGNOSIS — L82.0 INFLAMED SEBORRHEIC KERATOSIS: ICD-10-CM

## 2019-01-28 PROBLEM — L85.3 XEROSIS CUTIS: Status: ACTIVE | Noted: 2019-01-28

## 2019-01-28 PROBLEM — M12.9 ARTHROPATHY, UNSPECIFIED: Status: ACTIVE | Noted: 2019-01-28

## 2019-01-28 PROBLEM — L70.0 ACNE VULGARIS: Status: ACTIVE | Noted: 2019-01-28

## 2019-01-28 PROBLEM — D22.5 MELANOCYTIC NEVI OF TRUNK: Status: ACTIVE | Noted: 2019-01-28

## 2019-01-28 PROCEDURE — 99213 OFFICE O/P EST LOW 20 MIN: CPT | Mod: 25

## 2019-01-28 PROCEDURE — 17003 DESTRUCT PREMALG LES 2-14: CPT

## 2019-01-28 PROCEDURE — ? LIQUID NITROGEN

## 2019-01-28 PROCEDURE — ? COUNSELING

## 2019-01-28 PROCEDURE — 17110 DESTRUCTION B9 LES UP TO 14: CPT

## 2019-01-28 PROCEDURE — ? SHAVE REMOVAL

## 2019-01-28 PROCEDURE — 11300 SHAVE SKIN LESION 0.5 CM/<: CPT | Mod: 59

## 2019-01-28 PROCEDURE — 17000 DESTRUCT PREMALG LESION: CPT | Mod: 59

## 2019-01-28 ASSESSMENT — LOCATION ZONE DERM
LOCATION ZONE: TRUNK
LOCATION ZONE: LEG
LOCATION ZONE: ARM
LOCATION ZONE: NOSE
LOCATION ZONE: FEET

## 2019-01-28 ASSESSMENT — LOCATION SIMPLE DESCRIPTION DERM
LOCATION SIMPLE: LEFT UPPER BACK
LOCATION SIMPLE: RIGHT SHOULDER
LOCATION SIMPLE: LEFT LOWER BACK
LOCATION SIMPLE: RIGHT UPPER BACK
LOCATION SIMPLE: LEFT HEEL
LOCATION SIMPLE: NOSE
LOCATION SIMPLE: RIGHT HEEL
LOCATION SIMPLE: ABDOMEN
LOCATION SIMPLE: LEFT PRETIBIAL REGION
LOCATION SIMPLE: LEFT FOOT
LOCATION SIMPLE: UPPER BACK
LOCATION SIMPLE: LEFT CALF
LOCATION SIMPLE: RIGHT FOOT

## 2019-01-28 ASSESSMENT — LOCATION DETAILED DESCRIPTION DERM
LOCATION DETAILED: RIGHT RIB CAGE
LOCATION DETAILED: LEFT DISTAL PRETIBIAL REGION
LOCATION DETAILED: LEFT INFERIOR MEDIAL UPPER BACK
LOCATION DETAILED: LEFT PROXIMAL PRETIBIAL REGION
LOCATION DETAILED: LEFT RIB CAGE
LOCATION DETAILED: RIGHT ANTERIOR SHOULDER
LOCATION DETAILED: RIGHT HEEL
LOCATION DETAILED: LEFT SUPERIOR MEDIAL MIDBACK
LOCATION DETAILED: NASAL SUPRATIP
LOCATION DETAILED: NASAL DORSUM
LOCATION DETAILED: RIGHT SUPERIOR UPPER BACK
LOCATION DETAILED: LEFT HEEL
LOCATION DETAILED: LEFT LATERAL HEEL
LOCATION DETAILED: INFERIOR THORACIC SPINE
LOCATION DETAILED: LEFT DISTAL CALF
LOCATION DETAILED: RIGHT LATERAL DORSAL FOOT

## 2019-01-28 NOTE — PROCEDURE: SHAVE REMOVAL
Anesthesia Volume In Cc: 0.5
Wound Care: Petrolatum
Bill For Surgical Tray: no
Medical Necessity Clause: This procedure was medically necessary because the lesion is
Accession #: PC
Notification Instructions: Patient will be notified of biopsy results. However, patient instructed to call the office if not contacted within 2 weeks.
Biopsy Method: double edge Personna blade
Post-Care Instructions: I reviewed with the patient in detail post-care instructions. Patient is to keep the biopsy site dry overnight, and then apply bacitracin twice daily until healed. Patient may apply hydrogen peroxide soaks to remove any crusting.
Path Notes (To The Dermatopathologist): Please check margins.
Anesthesia Type: 1% lidocaine with 1:100,000 epinephrine and a 1:10 solution of 8.4% sodium bicarbonate
Was A Bandage Applied: Yes
Size Of Lesion In Cm (Required): 0.4
Billing Type: Third-Party Bill
Medical Necessity Information: It is in your best interest to select a reason for this procedure from the list below. All of these items fulfill various CMS LCD requirements except the new and changing color options.
X Size Of Lesion In Cm (Optional): 0
Detail Level: Detailed
Consent was obtained from the patient. The risks and benefits to therapy were discussed in detail. Specifically, the risks of infection, scarring, bleeding, prolonged wound healing, incomplete removal, allergy to anesthesia, nerve injury and recurrence were addressed. Prior to the procedure, the treatment site was clearly identified and confirmed by the patient. All components of Universal Protocol/PAUSE Rule completed.
Hemostasis: Aluminum Chloride

## 2019-01-28 NOTE — PROCEDURE: LIQUID NITROGEN
Consent: The patient's verbal  consent was obtained including but not limited to risks of crusting, scabbing, blistering, scarring, darker or lighter pigmentary change, recurrence, incomplete removal and infection.
Detail Level: Detailed
Detail Level: Simple
Duration Of Freeze Thaw-Cycle (Seconds): 3
Number Of Freeze-Thaw Cycles: 1 freeze-thaw cycle
Medical Necessity Clause: Treatment was medically necessary because the spot was
Render Post-Care Instructions In Note?: no
Medical Necessity Information: It is in your best interest to select a reason for this procedure from the list below. All of these items fulfill various CMS LCD requirements except the new and changing color options.
Post-Care Instructions: I reviewed with the patient in detail post-care instructions. Patient is to wear sunprotection, and avoid picking at any of the treated lesions. Pt may apply Vaseline to crusted or scabbing areas.
Consent: The patient's verbal consent was obtained including but not limited to risks of crusting, scabbing, blistering, scarring, darker or lighter pigmentary change, recurrence, incomplete removal and infection.

## 2019-02-15 ENCOUNTER — APPOINTMENT (RX ONLY)
Dept: URBAN - METROPOLITAN AREA CLINIC 24 | Facility: CLINIC | Age: 79
Setting detail: DERMATOLOGY
End: 2019-02-15

## 2019-02-15 DIAGNOSIS — D22 MELANOCYTIC NEVI: ICD-10-CM

## 2019-02-15 PROBLEM — D48.5 NEOPLASM OF UNCERTAIN BEHAVIOR OF SKIN: Status: ACTIVE | Noted: 2019-02-15

## 2019-02-15 PROCEDURE — 12032 INTMD RPR S/A/T/EXT 2.6-7.5: CPT

## 2019-02-15 PROCEDURE — 11402 EXC TR-EXT B9+MARG 1.1-2 CM: CPT

## 2019-02-15 PROCEDURE — ? EXCISION

## 2019-02-15 ASSESSMENT — LOCATION DETAILED DESCRIPTION DERM: LOCATION DETAILED: RIGHT SUPERIOR UPPER BACK

## 2019-02-15 ASSESSMENT — LOCATION ZONE DERM: LOCATION ZONE: TRUNK

## 2019-02-15 ASSESSMENT — LOCATION SIMPLE DESCRIPTION DERM: LOCATION SIMPLE: RIGHT UPPER BACK

## 2019-02-15 NOTE — PROCEDURE: EXCISION
Rhomboid Transposition Flap Text: The defect edges were debeveled with a #15 scalpel blade.  Given the location of the defect and the proximity to free margins a rhomboid transposition flap was deemed most appropriate.  Using a sterile surgical marker, an appropriate rhomboid flap was drawn incorporating the defect.    The area thus outlined was incised deep to adipose tissue with a #15 scalpel blade.  The skin margins were undermined to an appropriate distance in all directions utilizing iris scissors.
Slit Excision Additional Text (Leave Blank If You Do Not Want): A linear line was drawn on the skin overlying the lesion. An incision was made slowly until the lesion was visualized.  Once visualized, the lesion was removed with blunt dissection.
Complex Repair And Ftsg Text: The defect edges were debeveled with a #15 scalpel blade.  The primary defect was closed partially with a complex linear closure.  Given the location of the defect, shape of the defect and the proximity to free margins a full thickness skin graft was deemed most appropriate to repair the remaining defect.  The graft was trimmed to fit the size of the remaining defect.  The graft was then placed in the primary defect, oriented appropriately, and sutured into place.
O-Z Plasty Text: The defect edges were debeveled with a #15 scalpel blade.  Given the location of the defect, shape of the defect and the proximity to free margins an O-Z plasty (double transposition flap) was deemed most appropriate.  Using a sterile surgical marker, the appropriate transposition flaps were drawn incorporating the defect and placing the expected incisions within the relaxed skin tension lines where possible.    The area thus outlined was incised deep to adipose tissue with a #15 scalpel blade.  The skin margins were undermined to an appropriate distance in all directions utilizing iris scissors.  Hemostasis was achieved with electrocautery.  The flaps were then transposed into place, one clockwise and the other counterclockwise, and anchored with interrupted buried subcutaneous sutures.
Trilobed Flap Text: The defect edges were debeveled with a #15 scalpel blade.  Given the location of the defect and the proximity to free margins a trilobed flap was deemed most appropriate.  Using a sterile surgical marker, an appropriate trilobed flap drawn around the defect.    The area thus outlined was incised deep to adipose tissue with a #15 scalpel blade.  The skin margins were undermined to an appropriate distance in all directions utilizing iris scissors.
Island Pedicle Flap With Canthal Suspension Text: The defect edges were debeveled with a #15 scalpel blade.  Given the location of the defect, shape of the defect and the proximity to free margins an island pedicle advancement flap was deemed most appropriate.  Using a sterile surgical marker, an appropriate advancement flap was drawn incorporating the defect, outlining the appropriate donor tissue and placing the expected incisions within the relaxed skin tension lines where possible. The area thus outlined was incised deep to adipose tissue with a #15 scalpel blade.  The skin margins were undermined to an appropriate distance in all directions around the primary defect and laterally outward around the island pedicle utilizing iris scissors.  There was minimal undermining beneath the pedicle flap. A suspension suture was placed in the canthal tendon to prevent tension and prevent ectropion.
Complex Repair And Rotation Flap Text: The defect edges were debeveled with a #15 scalpel blade.  The primary defect was closed partially with a complex linear closure.  Given the location of the remaining defect, shape of the defect and the proximity to free margins a rotation flap was deemed most appropriate for complete closure of the defect.  Using a sterile surgical marker, an appropriate advancement flap was drawn incorporating the defect and placing the expected incisions within the relaxed skin tension lines where possible.    The area thus outlined was incised deep to adipose tissue with a #15 scalpel blade.  The skin margins were undermined to an appropriate distance in all directions utilizing iris scissors.
Epidermal Closure Graft Donor Site (Optional): simple interrupted
Complex Repair And V-Y Plasty Text: The defect edges were debeveled with a #15 scalpel blade.  The primary defect was closed partially with a complex linear closure.  Given the location of the remaining defect, shape of the defect and the proximity to free margins a V-Y plasty was deemed most appropriate for complete closure of the defect.  Using a sterile surgical marker, an appropriate advancement flap was drawn incorporating the defect and placing the expected incisions within the relaxed skin tension lines where possible.    The area thus outlined was incised deep to adipose tissue with a #15 scalpel blade.  The skin margins were undermined to an appropriate distance in all directions utilizing iris scissors.
V-Y Flap Text: The defect edges were debeveled with a #15 scalpel blade.  Given the location of the defect, shape of the defect and the proximity to free margins a V-Y flap was deemed most appropriate.  Using a sterile surgical marker, an appropriate advancement flap was drawn incorporating the defect and placing the expected incisions within the relaxed skin tension lines where possible.    The area thus outlined was incised deep to adipose tissue with a #15 scalpel blade.  The skin margins were undermined to an appropriate distance in all directions utilizing iris scissors.
Intermediate Repair Preamble Text (Leave Blank If You Do Not Want): Undermining was performed with blunt dissection.
Split-Thickness Skin Graft Text: The defect edges were debeveled with a #15 scalpel blade.  Given the location of the defect, shape of the defect and the proximity to free margins a split thickness skin graft was deemed most appropriate.  Using a sterile surgical marker, the primary defect shape was transferred to the donor site. The split thickness graft was then harvested.  The skin graft was then placed in the primary defect and oriented appropriately.
No Repair - Repaired With Adjacent Surgical Defect Text (Leave Blank If You Do Not Want): After the excision the defect was repaired concurrently with another surgical defect which was in close approximation.
Helical Rim Advancement Flap Text: The defect edges were debeveled with a #15 blade scalpel.  Given the location of the defect and the proximity to free margins (helical rim) a double helical rim advancement flap was deemed most appropriate.  Using a sterile surgical marker, the appropriate advancement flaps were drawn incorporating the defect and placing the expected incisions between the helical rim and antihelix where possible.  The area thus outlined was incised through and through with a #15 scalpel blade.  With a skin hook and iris scissors, the flaps were gently and sharply undermined and freed up.
Complex Repair And Double Advancement Flap Text: The defect edges were debeveled with a #15 scalpel blade.  The primary defect was closed partially with a complex linear closure.  Given the location of the remaining defect, shape of the defect and the proximity to free margins a double advancement flap was deemed most appropriate for complete closure of the defect.  Using a sterile surgical marker, an appropriate advancement flap was drawn incorporating the defect and placing the expected incisions within the relaxed skin tension lines where possible.    The area thus outlined was incised deep to adipose tissue with a #15 scalpel blade.  The skin margins were undermined to an appropriate distance in all directions utilizing iris scissors.
Cartilage Graft Text: The defect edges were debeveled with a #15 scalpel blade.  Given the location of the defect, shape of the defect, the fact the defect involved a full thickness cartilage defect a cartilage graft was deemed most appropriate.  An appropriate donor site was identified, cleansed, and anesthetized. The cartilage graft was then harvested and transferred to the recipient site, oriented appropriately and then sutured into place.  The secondary defect was then repaired using a primary closure.
Spiral Flap Text: The defect edges were debeveled with a #15 scalpel blade.  Given the location of the defect, shape of the defect and the proximity to free margins a spiral flap was deemed most appropriate.  Using a sterile surgical marker, an appropriate rotation flap was drawn incorporating the defect and placing the expected incisions within the relaxed skin tension lines where possible. The area thus outlined was incised deep to adipose tissue with a #15 scalpel blade.  The skin margins were undermined to an appropriate distance in all directions utilizing iris scissors.
Medical Necessity Information: It is in your best interest to select a reason for this procedure from the list below. All of these items fulfill various CMS LCD requirements except lesion extends to a margin.
Posterior Auricular Interpolation Flap Text: A decision was made to reconstruct the defect utilizing an interpolation axial flap and a staged reconstruction.  A telfa template was made of the defect.  This telfa template was then used to outline the posterior auricular interpolation flap.  The donor area for the pedicle flap was then injected with anesthesia.  The flap was excised through the skin and subcutaneous tissue down to the layer of the underlying musculature.  The pedicle flap was carefully excised within this deep plane to maintain its blood supply.  The edges of the donor site were undermined.   The donor site was closed in a primary fashion.  The pedicle was then rotated into position and sutured.  Once the tube was sutured into place, adequate blood supply was confirmed with blanching and refill.  The pedicle was then wrapped with xeroform gauze and dressed appropriately with a telfa and gauze bandage to ensure continued blood supply and protect the attached pedicle.
Patient Will Remove Sutures At Home?: No
Rhombic Flap Text: The defect edges were debeveled with a #15 scalpel blade.  Given the location of the defect and the proximity to free margins a rhombic flap was deemed most appropriate.  Using a sterile surgical marker, an appropriate rhombic flap was drawn incorporating the defect.    The area thus outlined was incised deep to adipose tissue with a #15 scalpel blade.  The skin margins were undermined to an appropriate distance in all directions utilizing iris scissors.
Composite Graft Text: The defect edges were debeveled with a #15 scalpel blade.  Given the location of the defect, shape of the defect, the proximity to free margins and the fact the defect was full thickness a composite graft was deemed most appropriate.  The defect was outline and then transferred to the donor site.  A full thickness graft was then excised from the donor site. The graft was then placed in the primary defect, oriented appropriately and then sutured into place.  The secondary defect was then repaired using a primary closure.
Secondary Defect Width (In Cm): 0
Excision Depth: adipose tissue
Accession #: [id number]
Perilesional Excision Additional Text (Leave Blank If You Do Not Want): The margin was drawn around the clinically apparent lesion. Incisions were then made along these lines to the appropriate tissue plane and the lesion was extirpated.
Show Anatomic Location From Referring Provider Variable: Yes
Saucerization Excision Additional Text (Leave Blank If You Do Not Want): The margin was drawn around the clinically apparent lesion.  Incisions were then made along these lines, in a tangential fashion, to the appropriate tissue plane and the lesion was extirpated.
Anesthesia Type: 1% lidocaine with epinephrine
Melolabial Transposition Flap Text: The defect edges were debeveled with a #15 scalpel blade.  Given the location of the defect and the proximity to free margins a melolabial flap was deemed most appropriate.  Using a sterile surgical marker, an appropriate melolabial transposition flap was drawn incorporating the defect.    The area thus outlined was incised deep to adipose tissue with a #15 scalpel blade.  The skin margins were undermined to an appropriate distance in all directions utilizing iris scissors.
Deep Sutures: 3-0 PDS
Billing Type: Third-Party Bill
Dermal Autograft Text: The defect edges were debeveled with a #15 scalpel blade.  Given the location of the defect, shape of the defect and the proximity to free margins a dermal autograft was deemed most appropriate.  Using a sterile surgical marker, the primary defect shape was transferred to the donor site. The area thus outlined was incised deep to adipose tissue with a #15 scalpel blade.  The harvested graft was then trimmed of adipose and epidermal tissue until only dermis was left.  The skin graft was then placed in the primary defect and oriented appropriately.
Dressing: steri-strips and pressure dressing
Purse String (Simple) Text: Given the location of the defect and the characteristics of the surrounding skin a purse string simple closure was deemed most appropriate.  Undermining was performed circumferentially around the surgical defect.  A purse string suture was then placed and tightened.
Complex Repair Preamble Text (Leave Blank If You Do Not Want): Extensive wide undermining was performed.
Complex Repair And Single Advancement Flap Text: The defect edges were debeveled with a #15 scalpel blade.  The primary defect was closed partially with a complex linear closure.  Given the location of the remaining defect, shape of the defect and the proximity to free margins a single advancement flap was deemed most appropriate for complete closure of the defect.  Using a sterile surgical marker, an appropriate advancement flap was drawn incorporating the defect and placing the expected incisions within the relaxed skin tension lines where possible.    The area thus outlined was incised deep to adipose tissue with a #15 scalpel blade.  The skin margins were undermined to an appropriate distance in all directions utilizing iris scissors.
Cheek Interpolation Flap Text: A decision was made to reconstruct the defect utilizing an interpolation axial flap and a staged reconstruction.  A telfa template was made of the defect.  This telfa template was then used to outline the Cheek Interpolation flap.  The donor area for the pedicle flap was then injected with anesthesia.  The flap was excised through the skin and subcutaneous tissue down to the layer of the underlying musculature.  The interpolation flap was carefully excised within this deep plane to maintain its blood supply.  The edges of the donor site were undermined.   The donor site was closed in a primary fashion.  The pedicle was then rotated into position and sutured.  Once the tube was sutured into place, adequate blood supply was confirmed with blanching and refill.  The pedicle was then wrapped with xeroform gauze and dressed appropriately with a telfa and gauze bandage to ensure continued blood supply and protect the attached pedicle.
Eliptical Excision Additional Text (Leave Blank If You Do Not Want): The margin was drawn around the clinically apparent lesion.  An elliptical shape was then drawn on the skin incorporating the lesion and margins.  Incisions were then made along these lines to the appropriate tissue plane and the lesion was extirpated.
Scalpel Size: 10 blade
Hatchet Flap Text: The defect edges were debeveled with a #15 scalpel blade.  Given the location of the defect, shape of the defect and the proximity to free margins a hatchet flap was deemed most appropriate.  Using a sterile surgical marker, an appropriate hatchet flap was drawn incorporating the defect and placing the expected incisions within the relaxed skin tension lines where possible.    The area thus outlined was incised deep to adipose tissue with a #15 scalpel blade.  The skin margins were undermined to an appropriate distance in all directions utilizing iris scissors.
Medical Necessity Clause: This procedure was medically necessary because the lesion that was treated was: changing colors with peripheral depigmentation.
Estimated Blood Loss (Cc): minimal
O-L Flap Text: The defect edges were debeveled with a #15 scalpel blade.  Given the location of the defect, shape of the defect and the proximity to free margins an O-L flap was deemed most appropriate.  Using a sterile surgical marker, an appropriate advancement flap was drawn incorporating the defect and placing the expected incisions within the relaxed skin tension lines where possible.    The area thus outlined was incised deep to adipose tissue with a #15 scalpel blade.  The skin margins were undermined to an appropriate distance in all directions utilizing iris scissors.
Cheek-To-Nose Interpolation Flap Text: A decision was made to reconstruct the defect utilizing an interpolation axial flap and a staged reconstruction.  A telfa template was made of the defect.  This telfa template was then used to outline the Cheek-To-Nose Interpolation flap.  The donor area for the pedicle flap was then injected with anesthesia.  The flap was excised through the skin and subcutaneous tissue down to the layer of the underlying musculature.  The interpolation flap was carefully excised within this deep plane to maintain its blood supply.  The edges of the donor site were undermined.   The donor site was closed in a primary fashion.  The pedicle was then rotated into position and sutured.  Once the tube was sutured into place, adequate blood supply was confirmed with blanching and refill.  The pedicle was then wrapped with xeroform gauze and dressed appropriately with a telfa and gauze bandage to ensure continued blood supply and protect the attached pedicle.
Xenograft Text: The defect edges were debeveled with a #15 scalpel blade.  Given the location of the defect, shape of the defect and the proximity to free margins a xenograft was deemed most appropriate.  The graft was then trimmed to fit the size of the defect.  The graft was then placed in the primary defect and oriented appropriately.
Complex Repair And M Plasty Text: The defect edges were debeveled with a #15 scalpel blade.  The primary defect was closed partially with a complex linear closure.  Given the location of the remaining defect, shape of the defect and the proximity to free margins an M plasty was deemed most appropriate for complete closure of the defect.  Using a sterile surgical marker, an appropriate advancement flap was drawn incorporating the defect and placing the expected incisions within the relaxed skin tension lines where possible.    The area thus outlined was incised deep to adipose tissue with a #15 scalpel blade.  The skin margins were undermined to an appropriate distance in all directions utilizing iris scissors.
Keystone Flap Text: The defect edges were debeveled with a #15 scalpel blade.  Given the location of the defect, shape of the defect a keystone flap was deemed most appropriate.  Using a sterile surgical marker, an appropriate keystone flap was drawn incorporating the defect, outlining the appropriate donor tissue and placing the expected incisions within the relaxed skin tension lines where possible. The area thus outlined was incised deep to adipose tissue with a #15 scalpel blade.  The skin margins were undermined to an appropriate distance in all directions around the primary defect and laterally outward around the flap utilizing iris scissors.
Hemostasis: Electrocautery
Complex Repair And A-T Advancement Flap Text: The defect edges were debeveled with a #15 scalpel blade.  The primary defect was closed partially with a complex linear closure.  Given the location of the remaining defect, shape of the defect and the proximity to free margins an A-T advancement flap was deemed most appropriate for complete closure of the defect.  Using a sterile surgical marker, an appropriate advancement flap was drawn incorporating the defect and placing the expected incisions within the relaxed skin tension lines where possible.    The area thus outlined was incised deep to adipose tissue with a #15 scalpel blade.  The skin margins were undermined to an appropriate distance in all directions utilizing iris scissors.
Complex Repair And Melolabial Flap Text: The defect edges were debeveled with a #15 scalpel blade.  The primary defect was closed partially with a complex linear closure.  Given the location of the remaining defect, shape of the defect and the proximity to free margins a melolabial flap was deemed most appropriate for complete closure of the defect.  Using a sterile surgical marker, an appropriate advancement flap was drawn incorporating the defect and placing the expected incisions within the relaxed skin tension lines where possible.    The area thus outlined was incised deep to adipose tissue with a #15 scalpel blade.  The skin margins were undermined to an appropriate distance in all directions utilizing iris scissors.
Excisional Biopsy Additional Text (Leave Blank If You Do Not Want): The margin was drawn around the clinically apparent lesion. An elliptical shape was then drawn on the skin incorporating the lesion and margins.  Incisions were then made along these lines to the appropriate tissue plane and the lesion was extirpated.
Repair Type: Intermediate
Suture Removal: 14 days
Complex Repair And Tissue Cultured Epidermal Autograft Text: The defect edges were debeveled with a #15 scalpel blade.  The primary defect was closed partially with a complex linear closure.  Given the location of the defect, shape of the defect and the proximity to free margins an tissue cultured epidermal autograft was deemed most appropriate to repair the remaining defect.  The graft was trimmed to fit the size of the remaining defect.  The graft was then placed in the primary defect, oriented appropriately, and sutured into place.
Modified Advancement Flap Text: The defect edges were debeveled with a #15 scalpel blade.  Given the location of the defect, shape of the defect and the proximity to free margins a modified advancement flap was deemed most appropriate.  Using a sterile surgical marker, an appropriate advancement flap was drawn incorporating the defect and placing the expected incisions within the relaxed skin tension lines where possible.    The area thus outlined was incised deep to adipose tissue with a #15 scalpel blade.  The skin margins were undermined to an appropriate distance in all directions utilizing iris scissors.
Complex Repair And Split-Thickness Skin Graft Text: The defect edges were debeveled with a #15 scalpel blade.  The primary defect was closed partially with a complex linear closure.  Given the location of the defect, shape of the defect and the proximity to free margins a split thickness skin graft was deemed most appropriate to repair the remaining defect.  The graft was trimmed to fit the size of the remaining defect.  The graft was then placed in the primary defect, oriented appropriately, and sutured into place.
Complex Repair And Epidermal Autograft Text: The defect edges were debeveled with a #15 scalpel blade.  The primary defect was closed partially with a complex linear closure.  Given the location of the defect, shape of the defect and the proximity to free margins an epidermal autograft was deemed most appropriate to repair the remaining defect.  The graft was trimmed to fit the size of the remaining defect.  The graft was then placed in the primary defect, oriented appropriately, and sutured into place.
Banner Transposition Flap Text: The defect edges were debeveled with a #15 scalpel blade.  Given the location of the defect and the proximity to free margins a Banner transposition flap was deemed most appropriate.  Using a sterile surgical marker, an appropriate flap drawn around the defect. The area thus outlined was incised deep to adipose tissue with a #15 scalpel blade.  The skin margins were undermined to an appropriate distance in all directions utilizing iris scissors.
Mercedes Flap Text: The defect edges were debeveled with a #15 scalpel blade.  Given the location of the defect, shape of the defect and the proximity to free margins a Mercedes flap was deemed most appropriate.  Using a sterile surgical marker, an appropriate advancement flap was drawn incorporating the defect and placing the expected incisions within the relaxed skin tension lines where possible. The area thus outlined was incised deep to adipose tissue with a #15 scalpel blade.  The skin margins were undermined to an appropriate distance in all directions utilizing iris scissors.
Additional Anesthesia Volume In Cc: 6
H Plasty Text: Given the location of the defect, shape of the defect and the proximity to free margins a H-plasty was deemed most appropriate for repair.  Using a sterile surgical marker, the appropriate advancement arms of the H-plasty were drawn incorporating the defect and placing the expected incisions within the relaxed skin tension lines where possible. The area thus outlined was incised deep to adipose tissue with a #15 scalpel blade. The skin margins were undermined to an appropriate distance in all directions utilizing iris scissors.  The opposing advancement arms were then advanced into place in opposite direction and anchored with interrupted buried subcutaneous sutures.
Complex Repair And O-T Advancement Flap Text: The defect edges were debeveled with a #15 scalpel blade.  The primary defect was closed partially with a complex linear closure.  Given the location of the remaining defect, shape of the defect and the proximity to free margins an O-T advancement flap was deemed most appropriate for complete closure of the defect.  Using a sterile surgical marker, an appropriate advancement flap was drawn incorporating the defect and placing the expected incisions within the relaxed skin tension lines where possible.    The area thus outlined was incised deep to adipose tissue with a #15 scalpel blade.  The skin margins were undermined to an appropriate distance in all directions utilizing iris scissors.
Complex Repair And O-L Flap Text: The defect edges were debeveled with a #15 scalpel blade.  The primary defect was closed partially with a complex linear closure.  Given the location of the remaining defect, shape of the defect and the proximity to free margins an O-L flap was deemed most appropriate for complete closure of the defect.  Using a sterile surgical marker, an appropriate flap was drawn incorporating the defect and placing the expected incisions within the relaxed skin tension lines where possible.    The area thus outlined was incised deep to adipose tissue with a #15 scalpel blade.  The skin margins were undermined to an appropriate distance in all directions utilizing iris scissors.
Consent was obtained from the patient. The risks and benefits to therapy were discussed in detail. Specifically, the risks of infection, scarring, bleeding, prolonged wound healing, incomplete removal, allergy to anesthesia, nerve injury and recurrence were addressed. Prior to the procedure, the treatment site was clearly identified and confirmed by the patient. All components of Universal Protocol/PAUSE Rule completed.
Lip Wedge Excision Repair Text: Given the location of the defect and the proximity to free margins a full thickness wedge repair was deemed most appropriate.  Using a sterile surgical marker, the appropriate repair was drawn incorporating the defect and placing the expected incisions perpendicular to the vermilion border.  The vermilion border was also meticulously outlined to ensure appropriate reapproximation during the repair.  The area thus outlined was incised through and through with a #15 scalpel blade.  The muscularis and dermis were reaproximated with deep sutures following hemostasis. Care was taken to realign the vermilion border before proceeding with the superficial closure.  Once the vermilion was realigned the superfical and mucosal closure was finished.
Complex Repair And Skin Substitute Graft Text: The defect edges were debeveled with a #15 scalpel blade.  The primary defect was closed partially with a complex linear closure.  Given the location of the remaining defect, shape of the defect and the proximity to free margins a skin substitute graft was deemed most appropriate to repair the remaining defect.  The graft was trimmed to fit the size of the remaining defect.  The graft was then placed in the primary defect, oriented appropriately, and sutured into place.
W Plasty Text: The lesion was extirpated to the level of the fat with a #15 scalpel blade.  Given the location of the defect, shape of the defect and the proximity to free margins a W-plasty was deemed most appropriate for repair.  Using a sterile surgical marker, the appropriate transposition arms of the W-plasty were drawn incorporating the defect and placing the expected incisions within the relaxed skin tension lines where possible.    The area thus outlined was incised deep to adipose tissue with a #15 scalpel blade.  The skin margins were undermined to an appropriate distance in all directions utilizing iris scissors.  The opposing transposition arms were then transposed into place in opposite direction and anchored with interrupted buried subcutaneous sutures.
O-T Plasty Text: The defect edges were debeveled with a #15 scalpel blade.  Given the location of the defect, shape of the defect and the proximity to free margins an O-T plasty was deemed most appropriate.  Using a sterile surgical marker, an appropriate O-T plasty was drawn incorporating the defect and placing the expected incisions within the relaxed skin tension lines where possible.    The area thus outlined was incised deep to adipose tissue with a #15 scalpel blade.  The skin margins were undermined to an appropriate distance in all directions utilizing iris scissors.
Skin Substitute Text: The defect edges were debeveled with a #15 scalpel blade.  Given the location of the defect, shape of the defect and the proximity to free margins a skin substitute graft was deemed most appropriate.  The graft material was trimmed to fit the size of the defect. The graft was then placed in the primary defect and oriented appropriately.
Complex Repair And Bilobe Flap Text: The defect edges were debeveled with a #15 scalpel blade.  The primary defect was closed partially with a complex linear closure.  Given the location of the remaining defect, shape of the defect and the proximity to free margins a bilobe flap was deemed most appropriate for complete closure of the defect.  Using a sterile surgical marker, an appropriate advancement flap was drawn incorporating the defect and placing the expected incisions within the relaxed skin tension lines where possible.    The area thus outlined was incised deep to adipose tissue with a #15 scalpel blade.  The skin margins were undermined to an appropriate distance in all directions utilizing iris scissors.
Size Of Margin In Cm: 0.3
S Plasty Text: Given the location and shape of the defect, and the orientation of relaxed skin tension lines, an S-plasty was deemed most appropriate for repair.  Using a sterile surgical marker, the appropriate outline of the S-plasty was drawn, incorporating the defect and placing the expected incisions within the relaxed skin tension lines where possible.  The area thus outlined was incised deep to adipose tissue with a #15 scalpel blade.  The skin margins were undermined to an appropriate distance in all directions utilizing iris scissors. The skin flaps were advanced over the defect.  The opposing margins were then approximated with interrupted buried subcutaneous sutures.
Double Island Pedicle Flap Text: The defect edges were debeveled with a #15 scalpel blade.  Given the location of the defect, shape of the defect and the proximity to free margins a double island pedicle advancement flap was deemed most appropriate.  Using a sterile surgical marker, an appropriate advancement flap was drawn incorporating the defect, outlining the appropriate donor tissue and placing the expected incisions within the relaxed skin tension lines where possible.    The area thus outlined was incised deep to adipose tissue with a #15 scalpel blade.  The skin margins were undermined to an appropriate distance in all directions around the primary defect and laterally outward around the island pedicle utilizing iris scissors.  There was minimal undermining beneath the pedicle flap.
V-Y Plasty Text: The defect edges were debeveled with a #15 scalpel blade.  Given the location of the defect, shape of the defect and the proximity to free margins an V-Y advancement flap was deemed most appropriate.  Using a sterile surgical marker, an appropriate advancement flap was drawn incorporating the defect and placing the expected incisions within the relaxed skin tension lines where possible.    The area thus outlined was incised deep to adipose tissue with a #15 scalpel blade.  The skin margins were undermined to an appropriate distance in all directions utilizing iris scissors.
Graft Donor Site Bandage (Optional-Leave Blank If You Don't Want In Note): Steri-strips and a pressure bandage were applied to the donor site.
Complex Repair And W Plasty Text: The defect edges were debeveled with a #15 scalpel blade.  The primary defect was closed partially with a complex linear closure.  Given the location of the remaining defect, shape of the defect and the proximity to free margins a W plasty was deemed most appropriate for complete closure of the defect.  Using a sterile surgical marker, an appropriate advancement flap was drawn incorporating the defect and placing the expected incisions within the relaxed skin tension lines where possible.    The area thus outlined was incised deep to adipose tissue with a #15 scalpel blade.  The skin margins were undermined to an appropriate distance in all directions utilizing iris scissors.
Z Plasty Text: The lesion was extirpated to the level of the fat with a #15 scalpel blade.  Given the location of the defect, shape of the defect and the proximity to free margins a Z-plasty was deemed most appropriate for repair.  Using a sterile surgical marker, the appropriate transposition arms of the Z-plasty were drawn incorporating the defect and placing the expected incisions within the relaxed skin tension lines where possible.    The area thus outlined was incised deep to adipose tissue with a #15 scalpel blade.  The skin margins were undermined to an appropriate distance in all directions utilizing iris scissors.  The opposing transposition arms were then transposed into place in opposite direction and anchored with interrupted buried subcutaneous sutures.
Mucosal Advancement Flap Text: Given the location of the defect, shape of the defect and the proximity to free margins a mucosal advancement flap was deemed most appropriate. Incisions were made with a 15 blade scalpel in the appropriate fashion along the cutaneous vermillion border and the mucosal lip. The remaining actinically damaged mucosal tissue was excised.  The mucosal advancement flap was then elevated to the gingival sulcus with care taken to preserve the neurovascular structures and advanced into the primary defect. Care was taken to ensure that precise realignment of the vermilion border was achieved.
Fusiform Excision Additional Text (Leave Blank If You Do Not Want): The margin was drawn around the clinically apparent lesion.  A fusiform shape was then drawn on the skin incorporating the lesion and margins.  Incisions were then made along these lines to the appropriate tissue plane and the lesion was extirpated.
Purse String (Intermediate) Text: Given the location of the defect and the characteristics of the surrounding skin a purse string intermediate closure was deemed most appropriate.  Undermining was performed circumferentially around the surgical defect.  A purse string suture was then placed and tightened.
Home Suture Removal Text: Patient was provided a home suture removal kit and will remove their sutures at home.  If they have any questions or difficulties they will call the office.
Size Of Lesion In Cm: 0.7
Bilobed Flap Text: The defect edges were debeveled with a #15 scalpel blade.  Given the location of the defect and the proximity to free margins a bilobe flap was deemed most appropriate.  Using a sterile surgical marker, an appropriate bilobe flap drawn around the defect.    The area thus outlined was incised deep to adipose tissue with a #15 scalpel blade.  The skin margins were undermined to an appropriate distance in all directions utilizing iris scissors.
Repair Performed By Another Provider Text (Leave Blank If You Do Not Want): After the tissue was excised the defect was repaired by another provider.
Ftsg Text: The defect edges were debeveled with a #15 scalpel blade.  Given the location of the defect, shape of the defect and the proximity to free margins a full thickness skin graft was deemed most appropriate.  Using a sterile surgical marker, the primary defect shape was transferred to the donor site. The area thus outlined was incised deep to adipose tissue with a #15 scalpel blade.  The harvested graft was then trimmed of adipose tissue until only dermis and epidermis was left.  The skin margins of the secondary defect were undermined to an appropriate distance in all directions utilizing iris scissors.  The secondary defect was closed with interrupted buried subcutaneous sutures.  The skin edges were then re-apposed with running  sutures.  The skin graft was then placed in the primary defect and oriented appropriately.
Melolabial Interpolation Flap Text: A decision was made to reconstruct the defect utilizing an interpolation axial flap and a staged reconstruction.  A telfa template was made of the defect.  This telfa template was then used to outline the melolabial interpolation flap.  The donor area for the pedicle flap was then injected with anesthesia.  The flap was excised through the skin and subcutaneous tissue down to the layer of the underlying musculature.  The pedicle flap was carefully excised within this deep plane to maintain its blood supply.  The edges of the donor site were undermined.   The donor site was closed in a primary fashion.  The pedicle was then rotated into position and sutured.  Once the tube was sutured into place, adequate blood supply was confirmed with blanching and refill.  The pedicle was then wrapped with xeroform gauze and dressed appropriately with a telfa and gauze bandage to ensure continued blood supply and protect the attached pedicle.
Paramedian Forehead Flap Text: A decision was made to reconstruct the defect utilizing an interpolation axial flap and a staged reconstruction.  A telfa template was made of the defect.  This telfa template was then used to outline the paramedian forehead pedicle flap.  The donor area for the pedicle flap was then injected with anesthesia.  The flap was excised through the skin and subcutaneous tissue down to the layer of the underlying musculature.  The pedicle flap was carefully excised within this deep plane to maintain its blood supply.  The edges of the donor site were undermined.   The donor site was closed in a primary fashion.  The pedicle was then rotated into position and sutured.  Once the tube was sutured into place, adequate blood supply was confirmed with blanching and refill.  The pedicle was then wrapped with xeroform gauze and dressed appropriately with a telfa and gauze bandage to ensure continued blood supply and protect the attached pedicle.
Advancement Flap (Double) Text: The defect edges were debeveled with a #15 scalpel blade.  Given the location of the defect and the proximity to free margins a double advancement flap was deemed most appropriate.  Using a sterile surgical marker, the appropriate advancement flaps were drawn incorporating the defect and placing the expected incisions within the relaxed skin tension lines where possible.    The area thus outlined was incised deep to adipose tissue with a #15 scalpel blade.  The skin margins were undermined to an appropriate distance in all directions utilizing iris scissors.
Alar Island Pedicle Flap Text: The defect edges were debeveled with a #15 scalpel blade.  Given the location of the defect, shape of the defect and the proximity to the alar rim an island pedicle advancement flap was deemed most appropriate.  Using a sterile surgical marker, an appropriate advancement flap was drawn incorporating the defect, outlining the appropriate donor tissue and placing the expected incisions within the nasal ala running parallel to the alar rim. The area thus outlined was incised with a #15 scalpel blade.  The skin margins were undermined minimally to an appropriate distance in all directions around the primary defect and laterally outward around the island pedicle utilizing iris scissors.  There was minimal undermining beneath the pedicle flap.
Anesthesia Type: 1% lidocaine with 1:100,000 epinephrine and 408mcg clindamycin/ml and a 1:10 solution of 8.4% sodium bicarbonate
Intermediate / Complex Repair - Final Wound Length In Cm: 4.5
Bilateral Helical Rim Advancement Flap Text: The defect edges were debeveled with a #15 blade scalpel.  Given the location of the defect and the proximity to free margins (helical rim) a bilateral helical rim advancement flap was deemed most appropriate.  Using a sterile surgical marker, the appropriate advancement flaps were drawn incorporating the defect and placing the expected incisions between the helical rim and antihelix where possible.  The area thus outlined was incised through and through with a #15 scalpel blade.  With a skin hook and iris scissors, the flaps were gently and sharply undermined and freed up.
Dorsal Nasal Flap Text: The defect edges were debeveled with a #15 scalpel blade.  Given the location of the defect and the proximity to free margins a dorsal nasal flap was deemed most appropriate.  Using a sterile surgical marker, an appropriate dorsal nasal flap was drawn around the defect.    The area thus outlined was incised deep to adipose tissue with a #15 scalpel blade.  The skin margins were undermined to an appropriate distance in all directions utilizing iris scissors.
Complex Repair And Z Plasty Text: The defect edges were debeveled with a #15 scalpel blade.  The primary defect was closed partially with a complex linear closure.  Given the location of the remaining defect, shape of the defect and the proximity to free margins a Z plasty was deemed most appropriate for complete closure of the defect.  Using a sterile surgical marker, an appropriate advancement flap was drawn incorporating the defect and placing the expected incisions within the relaxed skin tension lines where possible.    The area thus outlined was incised deep to adipose tissue with a #15 scalpel blade.  The skin margins were undermined to an appropriate distance in all directions utilizing iris scissors.
Interpolation Flap Text: A decision was made to reconstruct the defect utilizing an interpolation axial flap and a staged reconstruction.  A telfa template was made of the defect.  This telfa template was then used to outline the interpolation flap.  The donor area for the pedicle flap was then injected with anesthesia.  The flap was excised through the skin and subcutaneous tissue down to the layer of the underlying musculature.  The interpolation flap was carefully excised within this deep plane to maintain its blood supply.  The edges of the donor site were undermined.   The donor site was closed in a primary fashion.  The pedicle was then rotated into position and sutured.  Once the tube was sutured into place, adequate blood supply was confirmed with blanching and refill.  The pedicle was then wrapped with xeroform gauze and dressed appropriately with a telfa and gauze bandage to ensure continued blood supply and protect the attached pedicle.
Mastoid Interpolation Flap Text: A decision was made to reconstruct the defect utilizing an interpolation axial flap and a staged reconstruction.  A telfa template was made of the defect.  This telfa template was then used to outline the mastoid interpolation flap.  The donor area for the pedicle flap was then injected with anesthesia.  The flap was excised through the skin and subcutaneous tissue down to the layer of the underlying musculature.  The pedicle flap was carefully excised within this deep plane to maintain its blood supply.  The edges of the donor site were undermined.   The donor site was closed in a primary fashion.  The pedicle was then rotated into position and sutured.  Once the tube was sutured into place, adequate blood supply was confirmed with blanching and refill.  The pedicle was then wrapped with xeroform gauze and dressed appropriately with a telfa and gauze bandage to ensure continued blood supply and protect the attached pedicle.
Complex Repair And Xenograft Text: The defect edges were debeveled with a #15 scalpel blade.  The primary defect was closed partially with a complex linear closure.  Given the location of the defect, shape of the defect and the proximity to free margins a xenograft was deemed most appropriate to repair the remaining defect.  The graft was trimmed to fit the size of the remaining defect.  The graft was then placed in the primary defect, oriented appropriately, and sutured into place.
Complex Repair And Dorsal Nasal Flap Text: The defect edges were debeveled with a #15 scalpel blade.  The primary defect was closed partially with a complex linear closure.  Given the location of the remaining defect, shape of the defect and the proximity to free margins a dorsal nasal flap was deemed most appropriate for complete closure of the defect.  Using a sterile surgical marker, an appropriate flap was drawn incorporating the defect and placing the expected incisions within the relaxed skin tension lines where possible.    The area thus outlined was incised deep to adipose tissue with a #15 scalpel blade.  The skin margins were undermined to an appropriate distance in all directions utilizing iris scissors.
Island Pedicle Flap-Requiring Vessel Identification Text: The defect edges were debeveled with a #15 scalpel blade.  Given the location of the defect, shape of the defect and the proximity to free margins an island pedicle advancement flap was deemed most appropriate.  Using a sterile surgical marker, an appropriate advancement flap was drawn, based on the axial vessel mentioned above, incorporating the defect, outlining the appropriate donor tissue and placing the expected incisions within the relaxed skin tension lines where possible.    The area thus outlined was incised deep to adipose tissue with a #15 scalpel blade.  The skin margins were undermined to an appropriate distance in all directions around the primary defect and laterally outward around the island pedicle utilizing iris scissors.  There was minimal undermining beneath the pedicle flap.
Path Notes (To The Dermatopathologist): Please check margins.
Information: Selecting Yes will display possible errors in your note based on the variables you have selected. This validation is only offered as a suggestion for you. PLEASE NOTE THAT THE VALIDATION TEXT WILL BE REMOVED WHEN YOU FINALIZE YOUR NOTE. IF YOU WANT TO FAX A PRELIMINARY NOTE YOU WILL NEED TO TOGGLE THIS TO 'NO' IF YOU DO NOT WANT IT IN YOUR FAXED NOTE.
Epidermal Closure: running
Complex Repair And Transposition Flap Text: The defect edges were debeveled with a #15 scalpel blade.  The primary defect was closed partially with a complex linear closure.  Given the location of the remaining defect, shape of the defect and the proximity to free margins a transposition flap was deemed most appropriate for complete closure of the defect.  Using a sterile surgical marker, an appropriate advancement flap was drawn incorporating the defect and placing the expected incisions within the relaxed skin tension lines where possible.    The area thus outlined was incised deep to adipose tissue with a #15 scalpel blade.  The skin margins were undermined to an appropriate distance in all directions utilizing iris scissors.
Detail Level: Detailed
Tissue Cultured Epidermal Autograft Text: The defect edges were debeveled with a #15 scalpel blade.  Given the location of the defect, shape of the defect and the proximity to free margins a tissue cultured epidermal autograft was deemed most appropriate.  The graft was then trimmed to fit the size of the defect.  The graft was then placed in the primary defect and oriented appropriately.
Advancement Flap (Single) Text: The defect edges were debeveled with a #15 scalpel blade.  Given the location of the defect and the proximity to free margins a single advancement flap was deemed most appropriate.  Using a sterile surgical marker, an appropriate advancement flap was drawn incorporating the defect and placing the expected incisions within the relaxed skin tension lines where possible.    The area thus outlined was incised deep to adipose tissue with a #15 scalpel blade.  The skin margins were undermined to an appropriate distance in all directions utilizing iris scissors.
Complex Repair And Dermal Autograft Text: The defect edges were debeveled with a #15 scalpel blade.  The primary defect was closed partially with a complex linear closure.  Given the location of the defect, shape of the defect and the proximity to free margins an dermal autograft was deemed most appropriate to repair the remaining defect.  The graft was trimmed to fit the size of the remaining defect.  The graft was then placed in the primary defect, oriented appropriately, and sutured into place.
Ear Star Wedge Flap Text: The defect edges were debeveled with a #15 blade scalpel.  Given the location of the defect and the proximity to free margins (helical rim) an ear star wedge flap was deemed most appropriate.  Using a sterile surgical marker, the appropriate flap was drawn incorporating the defect and placing the expected incisions between the helical rim and antihelix where possible.  The area thus outlined was incised through and through with a #15 scalpel blade.
Epidermal Autograft Text: The defect edges were debeveled with a #15 scalpel blade.  Given the location of the defect, shape of the defect and the proximity to free margins an epidermal autograft was deemed most appropriate.  Using a sterile surgical marker, the primary defect shape was transferred to the donor site. The epidermal graft was then harvested.  The skin graft was then placed in the primary defect and oriented appropriately.
Muscle Hinge Flap Text: The defect edges were debeveled with a #15 scalpel blade.  Given the size, depth and location of the defect and the proximity to free margins a muscle hinge flap was deemed most appropriate.  Using a sterile surgical marker, an appropriate hinge flap was drawn incorporating the defect. The area thus outlined was incised with a #15 scalpel blade.  The skin margins were undermined to an appropriate distance in all directions utilizing iris scissors.
Post-Care Instructions: I reviewed with the patient in detail post-care instructions. Patient is not to engage in any heavy lifting, exercise, or swimming for the next 14 days. Should the patient develop any fevers, chills, bleeding, severe pain patient will contact the office immediately.
Complex Repair And Rhombic Flap Text: The defect edges were debeveled with a #15 scalpel blade.  The primary defect was closed partially with a complex linear closure.  Given the location of the remaining defect, shape of the defect and the proximity to free margins a rhombic flap was deemed most appropriate for complete closure of the defect.  Using a sterile surgical marker, an appropriate advancement flap was drawn incorporating the defect and placing the expected incisions within the relaxed skin tension lines where possible.    The area thus outlined was incised deep to adipose tissue with a #15 scalpel blade.  The skin margins were undermined to an appropriate distance in all directions utilizing iris scissors.
Bilobed Transposition Flap Text: The defect edges were debeveled with a #15 scalpel blade.  Given the location of the defect and the proximity to free margins a bilobed transposition flap was deemed most appropriate.  Using a sterile surgical marker, an appropriate bilobe flap drawn around the defect.    The area thus outlined was incised deep to adipose tissue with a #15 scalpel blade.  The skin margins were undermined to an appropriate distance in all directions utilizing iris scissors.
Star Wedge Flap Text: The defect edges were debeveled with a #15 scalpel blade.  Given the location of the defect, shape of the defect and the proximity to free margins a star wedge flap was deemed most appropriate.  Using a sterile surgical marker, an appropriate rotation flap was drawn incorporating the defect and placing the expected incisions within the relaxed skin tension lines where possible. The area thus outlined was incised deep to adipose tissue with a #15 scalpel blade.  The skin margins were undermined to an appropriate distance in all directions utilizing iris scissors.
Complex Repair And Modified Advancement Flap Text: The defect edges were debeveled with a #15 scalpel blade.  The primary defect was closed partially with a complex linear closure.  Given the location of the remaining defect, shape of the defect and the proximity to free margins a modified advancement flap was deemed most appropriate for complete closure of the defect.  Using a sterile surgical marker, an appropriate advancement flap was drawn incorporating the defect and placing the expected incisions within the relaxed skin tension lines where possible.    The area thus outlined was incised deep to adipose tissue with a #15 scalpel blade.  The skin margins were undermined to an appropriate distance in all directions utilizing iris scissors.
A-T Advancement Flap Text: The defect edges were debeveled with a #15 scalpel blade.  Given the location of the defect, shape of the defect and the proximity to free margins an A-T advancement flap was deemed most appropriate.  Using a sterile surgical marker, an appropriate advancement flap was drawn incorporating the defect and placing the expected incisions within the relaxed skin tension lines where possible.    The area thus outlined was incised deep to adipose tissue with a #15 scalpel blade.  The skin margins were undermined to an appropriate distance in all directions utilizing iris scissors.
Crescentic Advancement Flap Text: The defect edges were debeveled with a #15 scalpel blade.  Given the location of the defect and the proximity to free margins a crescentic advancement flap was deemed most appropriate.  Using a sterile surgical marker, the appropriate advancement flap was drawn incorporating the defect and placing the expected incisions within the relaxed skin tension lines where possible.    The area thus outlined was incised deep to adipose tissue with a #15 scalpel blade.  The skin margins were undermined to an appropriate distance in all directions utilizing iris scissors.
O-T Advancement Flap Text: The defect edges were debeveled with a #15 scalpel blade.  Given the location of the defect, shape of the defect and the proximity to free margins an O-T advancement flap was deemed most appropriate.  Using a sterile surgical marker, an appropriate advancement flap was drawn incorporating the defect and placing the expected incisions within the relaxed skin tension lines where possible.    The area thus outlined was incised deep to adipose tissue with a #15 scalpel blade.  The skin margins were undermined to an appropriate distance in all directions utilizing iris scissors.
Island Pedicle Flap Text: The defect edges were debeveled with a #15 scalpel blade.  Given the location of the defect, shape of the defect and the proximity to free margins an island pedicle advancement flap was deemed most appropriate.  Using a sterile surgical marker, an appropriate advancement flap was drawn incorporating the defect, outlining the appropriate donor tissue and placing the expected incisions within the relaxed skin tension lines where possible.    The area thus outlined was incised deep to adipose tissue with a #15 scalpel blade.  The skin margins were undermined to an appropriate distance in all directions around the primary defect and laterally outward around the island pedicle utilizing iris scissors.  There was minimal undermining beneath the pedicle flap.
Burow's Advancement Flap Text: The defect edges were debeveled with a #15 scalpel blade.  Given the location of the defect and the proximity to free margins a Burow's advancement flap was deemed most appropriate.  Using a sterile surgical marker, the appropriate advancement flap was drawn incorporating the defect and placing the expected incisions within the relaxed skin tension lines where possible.    The area thus outlined was incised deep to adipose tissue with a #15 scalpel blade.  The skin margins were undermined to an appropriate distance in all directions utilizing iris scissors.
Transposition Flap Text: The defect edges were debeveled with a #15 scalpel blade.  Given the location of the defect and the proximity to free margins a transposition flap was deemed most appropriate.  Using a sterile surgical marker, an appropriate transposition flap was drawn incorporating the defect.    The area thus outlined was incised deep to adipose tissue with a #15 scalpel blade.  The skin margins were undermined to an appropriate distance in all directions utilizing iris scissors.
Rotation Flap Text: The defect edges were debeveled with a #15 scalpel blade.  Given the location of the defect, shape of the defect and the proximity to free margins a rotation flap was deemed most appropriate.  Using a sterile surgical marker, an appropriate rotation flap was drawn incorporating the defect and placing the expected incisions within the relaxed skin tension lines where possible.    The area thus outlined was incised deep to adipose tissue with a #15 scalpel blade.  The skin margins were undermined to an appropriate distance in all directions utilizing iris scissors.
Wound Care: Waterproof dressing
Complex Repair And Double M Plasty Text: The defect edges were debeveled with a #15 scalpel blade.  The primary defect was closed partially with a complex linear closure.  Given the location of the remaining defect, shape of the defect and the proximity to free margins a double M plasty was deemed most appropriate for complete closure of the defect.  Using a sterile surgical marker, an appropriate advancement flap was drawn incorporating the defect and placing the expected incisions within the relaxed skin tension lines where possible.    The area thus outlined was incised deep to adipose tissue with a #15 scalpel blade.  The skin margins were undermined to an appropriate distance in all directions utilizing iris scissors.
Bi-Rhombic Flap Text: The defect edges were debeveled with a #15 scalpel blade.  Given the location of the defect and the proximity to free margins a bi-rhombic flap was deemed most appropriate.  Using a sterile surgical marker, an appropriate rhombic flap was drawn incorporating the defect. The area thus outlined was incised deep to adipose tissue with a #15 scalpel blade.  The skin margins were undermined to an appropriate distance in all directions utilizing iris scissors.
Excision Method: Elliptical
Double O-Z Plasty Text: The defect edges were debeveled with a #15 scalpel blade.  Given the location of the defect, shape of the defect and the proximity to free margins a Double O-Z plasty (double transposition flap) was deemed most appropriate.  Using a sterile surgical marker, the appropriate transposition flaps were drawn incorporating the defect and placing the expected incisions within the relaxed skin tension lines where possible. The area thus outlined was incised deep to adipose tissue with a #15 scalpel blade.  The skin margins were undermined to an appropriate distance in all directions utilizing iris scissors.  Hemostasis was achieved with electrocautery.  The flaps were then transposed into place, one clockwise and the other counterclockwise, and anchored with interrupted buried subcutaneous sutures.

## 2019-03-01 ENCOUNTER — APPOINTMENT (RX ONLY)
Dept: URBAN - METROPOLITAN AREA CLINIC 24 | Facility: CLINIC | Age: 79
Setting detail: DERMATOLOGY
End: 2019-03-01

## 2019-03-01 DIAGNOSIS — Z48.01 ENCOUNTER FOR CHANGE OR REMOVAL OF SURGICAL WOUND DRESSING: ICD-10-CM

## 2019-03-01 PROBLEM — I10 ESSENTIAL (PRIMARY) HYPERTENSION: Status: ACTIVE | Noted: 2019-03-01

## 2019-03-01 PROCEDURE — ? SUTURE REMOVAL (GLOBAL PERIOD)

## 2019-03-01 PROCEDURE — 99024 POSTOP FOLLOW-UP VISIT: CPT

## 2019-03-01 ASSESSMENT — LOCATION SIMPLE DESCRIPTION DERM: LOCATION SIMPLE: RIGHT UPPER BACK

## 2019-03-01 ASSESSMENT — LOCATION ZONE DERM: LOCATION ZONE: TRUNK

## 2019-03-01 ASSESSMENT — LOCATION DETAILED DESCRIPTION DERM: LOCATION DETAILED: RIGHT SUPERIOR UPPER BACK

## 2019-10-07 ENCOUNTER — HOSPITAL ENCOUNTER (OUTPATIENT)
Dept: MAMMOGRAPHY | Age: 79
Discharge: HOME | End: 2019-10-07
Attending: INTERNAL MEDICINE
Payer: MEDICARE

## 2019-10-07 DIAGNOSIS — Z12.39 BREAST SCREENING: ICD-10-CM

## 2019-10-07 PROCEDURE — 77067 SCR MAMMO BI INCL CAD: CPT

## 2020-02-03 ENCOUNTER — APPOINTMENT (RX ONLY)
Dept: URBAN - METROPOLITAN AREA CLINIC 23 | Facility: CLINIC | Age: 80
Setting detail: DERMATOLOGY
End: 2020-02-03

## 2020-02-03 DIAGNOSIS — L82.0 INFLAMED SEBORRHEIC KERATOSIS: ICD-10-CM

## 2020-02-03 DIAGNOSIS — L82.1 OTHER SEBORRHEIC KERATOSIS: ICD-10-CM

## 2020-02-03 DIAGNOSIS — Z87.2 PERSONAL HISTORY OF DISEASES OF THE SKIN AND SUBCUTANEOUS TISSUE: ICD-10-CM

## 2020-02-03 DIAGNOSIS — Z85.828 PERSONAL HISTORY OF OTHER MALIGNANT NEOPLASM OF SKIN: ICD-10-CM

## 2020-02-03 DIAGNOSIS — L57.0 ACTINIC KERATOSIS: ICD-10-CM

## 2020-02-03 PROCEDURE — 17000 DESTRUCT PREMALG LESION: CPT | Mod: 59

## 2020-02-03 PROCEDURE — 17110 DESTRUCTION B9 LES UP TO 14: CPT

## 2020-02-03 PROCEDURE — 17003 DESTRUCT PREMALG LES 2-14: CPT | Mod: 59

## 2020-02-03 PROCEDURE — ? LIQUID NITROGEN

## 2020-02-03 PROCEDURE — 99214 OFFICE O/P EST MOD 30 MIN: CPT | Mod: 25

## 2020-02-03 ASSESSMENT — LOCATION DETAILED DESCRIPTION DERM
LOCATION DETAILED: LEFT PROXIMAL PRETIBIAL REGION
LOCATION DETAILED: EPIGASTRIC SKIN
LOCATION DETAILED: RIGHT SUPERIOR UPPER BACK
LOCATION DETAILED: LEFT ANTERIOR DISTAL THIGH
LOCATION DETAILED: RIGHT DISTAL PRETIBIAL REGION
LOCATION DETAILED: NASAL SUPRATIP
LOCATION DETAILED: LEFT PROXIMAL CALF
LOCATION DETAILED: LEFT ANTERIOR PROXIMAL THIGH
LOCATION DETAILED: RIGHT ANTERIOR DISTAL THIGH
LOCATION DETAILED: NASAL DORSUM
LOCATION DETAILED: LEFT DISTAL PRETIBIAL REGION
LOCATION DETAILED: RIGHT RIB CAGE

## 2020-02-03 ASSESSMENT — LOCATION SIMPLE DESCRIPTION DERM
LOCATION SIMPLE: LEFT PRETIBIAL REGION
LOCATION SIMPLE: RIGHT THIGH
LOCATION SIMPLE: ABDOMEN
LOCATION SIMPLE: LEFT THIGH
LOCATION SIMPLE: NOSE
LOCATION SIMPLE: RIGHT PRETIBIAL REGION
LOCATION SIMPLE: RIGHT UPPER BACK
LOCATION SIMPLE: LEFT CALF

## 2020-02-03 ASSESSMENT — LOCATION ZONE DERM
LOCATION ZONE: LEG
LOCATION ZONE: NOSE
LOCATION ZONE: TRUNK

## 2020-02-03 NOTE — PROCEDURE: LIQUID NITROGEN
Render Post-Care Instructions In Note?: no
Post-Care Instructions: I reviewed with the patient in detail post-care instructions. Patient is to wear sunprotection, and avoid picking at any of the treated lesions. Pt may apply Vaseline to crusted or scabbing areas.
Total Number Of Lesions Treated: 12
Consent: The patient's verbal  consent was obtained including but not limited to risks of crusting, scabbing, blistering, scarring, darker or lighter pigmentary change, recurrence, incomplete removal and infection.
Detail Level: Simple
Duration Of Freeze Thaw-Cycle (Seconds): 5
Detail Level: Detailed
Render Post Care In The Note?: yes
Duration Of Freeze Thaw-Cycle (Seconds): 3
Medical Necessity Information: It is in your best interest to select a reason for this procedure from the list below. All of these items fulfill various CMS LCD requirements except the new and changing color options.
Medical Necessity Clause: This procedure was medically necessary because the lesions that were treated were:
Consent: The patient's consent was obtained including but not limited to risks of crusting, scabbing, blistering, scarring, darker or lighter pigmentary change, recurrence, incomplete removal and infection.

## 2020-03-12 ENCOUNTER — APPOINTMENT (RX ONLY)
Dept: URBAN - METROPOLITAN AREA CLINIC 23 | Facility: CLINIC | Age: 80
Setting detail: DERMATOLOGY
End: 2020-03-12

## 2020-03-12 DIAGNOSIS — L57.0 ACTINIC KERATOSIS: ICD-10-CM

## 2020-03-12 DIAGNOSIS — L82.0 INFLAMED SEBORRHEIC KERATOSIS: ICD-10-CM

## 2020-03-12 PROBLEM — L85.3 XEROSIS CUTIS: Status: ACTIVE | Noted: 2020-03-12

## 2020-03-12 PROBLEM — E78.5 HYPERLIPIDEMIA, UNSPECIFIED: Status: ACTIVE | Noted: 2020-03-12

## 2020-03-12 PROCEDURE — 17000 DESTRUCT PREMALG LESION: CPT | Mod: 59

## 2020-03-12 PROCEDURE — ? LIQUID NITROGEN

## 2020-03-12 PROCEDURE — ? COUNSELING

## 2020-03-12 PROCEDURE — 17111 DESTRUCTION B9 LESIONS 15/>: CPT

## 2020-03-12 ASSESSMENT — LOCATION SIMPLE DESCRIPTION DERM
LOCATION SIMPLE: LEFT CALF
LOCATION SIMPLE: LEFT THIGH
LOCATION SIMPLE: RIGHT KNEE
LOCATION SIMPLE: RIGHT UPPER BACK
LOCATION SIMPLE: LEFT UPPER BACK
LOCATION SIMPLE: LEFT BREAST
LOCATION SIMPLE: ABDOMEN
LOCATION SIMPLE: RIGHT BREAST
LOCATION SIMPLE: LEFT LOWER BACK
LOCATION SIMPLE: NOSE
LOCATION SIMPLE: RIGHT LOWER BACK

## 2020-03-12 ASSESSMENT — LOCATION DETAILED DESCRIPTION DERM
LOCATION DETAILED: RIGHT INFRAMAMMARY CREASE (OUTER QUADRANT)
LOCATION DETAILED: LEFT PROXIMAL CALF
LOCATION DETAILED: LEFT INFERIOR UPPER BACK
LOCATION DETAILED: LEFT ANTERIOR MEDIAL DISTAL THIGH
LOCATION DETAILED: RIGHT KNEE
LOCATION DETAILED: LEFT MEDIAL BREAST 9-10:00 REGION
LOCATION DETAILED: LEFT SUPERIOR UPPER BACK
LOCATION DETAILED: RIGHT RIB CAGE
LOCATION DETAILED: LEFT MEDIAL BREAST 6-7:00 REGION
LOCATION DETAILED: RIGHT INFERIOR UPPER BACK
LOCATION DETAILED: NASAL DORSUM
LOCATION DETAILED: RIGHT INFERIOR LATERAL UPPER BACK
LOCATION DETAILED: LEFT SUPERIOR LATERAL MIDBACK
LOCATION DETAILED: LEFT RIB CAGE
LOCATION DETAILED: RIGHT MEDIAL BREAST 2-3:00 REGION
LOCATION DETAILED: LEFT MEDIAL BREAST 10-11:00 REGION
LOCATION DETAILED: RIGHT INFERIOR LATERAL MIDBACK

## 2020-03-12 ASSESSMENT — LOCATION ZONE DERM
LOCATION ZONE: NOSE
LOCATION ZONE: LEG
LOCATION ZONE: TRUNK

## 2020-03-12 NOTE — PROCEDURE: LIQUID NITROGEN
Post-Care Instructions: I reviewed with the patient in detail post-care instructions. Patient is to wear sunprotection, and avoid picking at any of the treated lesions. Pt may apply Vaseline to crusted or scabbing areas.
Render Post-Care Instructions In Note?: no
Detail Level: Detailed
Duration Of Freeze Thaw-Cycle (Seconds): 5
Duration Of Freeze Thaw-Cycle (Seconds): 3
Medical Necessity Information: It is in your best interest to select a reason for this procedure from the list below. All of these items fulfill various CMS LCD requirements except the new and changing color options.
Consent: The patient's consent was obtained including but not limited to risks of crusting, scabbing, blistering, scarring, darker or lighter pigmentary change, recurrence, incomplete removal and infection.
Total Number Of Lesions Treated: 29
Consent: The patient's verbal  consent was obtained including but not limited to risks of crusting, scabbing, blistering, scarring, darker or lighter pigmentary change, recurrence, incomplete removal and infection.
Detail Level: Simple
Medical Necessity Clause: This procedure was medically necessary because the lesions that were treated were:
Render Post Care In The Note?: yes

## 2020-10-23 ENCOUNTER — HOSPITAL ENCOUNTER (OUTPATIENT)
Dept: MAMMOGRAPHY | Age: 80
Discharge: HOME | End: 2020-10-23
Attending: INTERNAL MEDICINE
Payer: MEDICARE

## 2020-10-23 DIAGNOSIS — Z12.31 VISIT FOR SCREENING MAMMOGRAM: ICD-10-CM

## 2020-10-23 PROCEDURE — 77063 BREAST TOMOSYNTHESIS BI: CPT

## 2021-01-28 ENCOUNTER — APPOINTMENT (RX ONLY)
Dept: URBAN - METROPOLITAN AREA CLINIC 23 | Facility: CLINIC | Age: 81
Setting detail: DERMATOLOGY
End: 2021-01-28

## 2021-01-28 DIAGNOSIS — Z85.828 PERSONAL HISTORY OF OTHER MALIGNANT NEOPLASM OF SKIN: ICD-10-CM

## 2021-01-28 DIAGNOSIS — Z71.89 OTHER SPECIFIED COUNSELING: ICD-10-CM

## 2021-01-28 DIAGNOSIS — Z87.2 PERSONAL HISTORY OF DISEASES OF THE SKIN AND SUBCUTANEOUS TISSUE: ICD-10-CM

## 2021-01-28 DIAGNOSIS — D485 NEOPLASM OF UNCERTAIN BEHAVIOR OF SKIN: ICD-10-CM

## 2021-01-28 DIAGNOSIS — L57.8 OTHER SKIN CHANGES DUE TO CHRONIC EXPOSURE TO NONIONIZING RADIATION: ICD-10-CM

## 2021-01-28 DIAGNOSIS — L82.0 INFLAMED SEBORRHEIC KERATOSIS: ICD-10-CM

## 2021-01-28 PROBLEM — I10 ESSENTIAL (PRIMARY) HYPERTENSION: Status: ACTIVE | Noted: 2021-01-28

## 2021-01-28 PROBLEM — D48.5 NEOPLASM OF UNCERTAIN BEHAVIOR OF SKIN: Status: ACTIVE | Noted: 2021-01-28

## 2021-01-28 PROBLEM — L85.3 XEROSIS CUTIS: Status: ACTIVE | Noted: 2021-01-28

## 2021-01-28 PROCEDURE — ? LIQUID NITROGEN

## 2021-01-28 PROCEDURE — 11102 TANGNTL BX SKIN SINGLE LES: CPT | Mod: 59

## 2021-01-28 PROCEDURE — ? BIOPSY BY SHAVE METHOD

## 2021-01-28 PROCEDURE — ? SUNSCREEN RECOMMENDATIONS

## 2021-01-28 PROCEDURE — 17110 DESTRUCTION B9 LES UP TO 14: CPT

## 2021-01-28 PROCEDURE — ? COUNSELING

## 2021-01-28 PROCEDURE — 99213 OFFICE O/P EST LOW 20 MIN: CPT | Mod: 25

## 2021-01-28 ASSESSMENT — LOCATION SIMPLE DESCRIPTION DERM
LOCATION SIMPLE: RIGHT LOWER BACK
LOCATION SIMPLE: RIGHT BREAST
LOCATION SIMPLE: RIGHT UPPER BACK
LOCATION SIMPLE: LEFT UPPER ARM
LOCATION SIMPLE: CHEST
LOCATION SIMPLE: ABDOMEN
LOCATION SIMPLE: LEFT BREAST
LOCATION SIMPLE: LEFT PRETIBIAL REGION
LOCATION SIMPLE: LEFT LOWER BACK

## 2021-01-28 ASSESSMENT — LOCATION DETAILED DESCRIPTION DERM
LOCATION DETAILED: LEFT LATERAL PROXIMAL UPPER ARM
LOCATION DETAILED: UPPER STERNUM
LOCATION DETAILED: RIGHT SUPERIOR LATERAL LOWER BACK
LOCATION DETAILED: LEFT PROXIMAL PRETIBIAL REGION
LOCATION DETAILED: RIGHT SUPERIOR UPPER BACK
LOCATION DETAILED: LEFT LATERAL BREAST 5-6:00 REGION
LOCATION DETAILED: LEFT SUPERIOR LATERAL LOWER BACK
LOCATION DETAILED: LEFT DISTAL PRETIBIAL REGION
LOCATION DETAILED: RIGHT RIB CAGE
LOCATION DETAILED: RIGHT LATERAL BREAST 7-8:00 REGION
LOCATION DETAILED: LEFT RIB CAGE

## 2021-01-28 ASSESSMENT — LOCATION ZONE DERM
LOCATION ZONE: LEG
LOCATION ZONE: TRUNK
LOCATION ZONE: ARM

## 2021-01-28 NOTE — PROCEDURE: LIQUID NITROGEN
Add 52 Modifier (Optional): no
Render Post Care In The Note?: yes
Consent: The patient's consent was obtained including but not limited to risks of crusting, scabbing, blistering, scarring, darker or lighter pigmentary change, recurrence, incomplete removal and infection.
Medical Necessity Information: It is in your best interest to select a reason for this procedure from the list below. All of these items fulfill various CMS LCD requirements except the new and changing color options.
Total Number Of Lesions Treated: 14
Detail Level: Detailed
Post-Care Instructions: I reviewed with the patient in detail post-care instructions. Patient is to wear sunprotection, and avoid picking at any of the treated lesions. Pt may apply Vaseline to crusted or scabbing areas.
Duration Of Freeze Thaw-Cycle (Seconds): 5
Medical Necessity Clause: This procedure was medically necessary because the lesions that were treated were:

## 2021-01-28 NOTE — PROCEDURE: BIOPSY BY SHAVE METHOD
Validate Triangulation: No
Information: Selecting Yes will display possible errors in your note based on the variables you have selected. This validation is only offered as a suggestion for you. PLEASE NOTE THAT THE VALIDATION TEXT WILL BE REMOVED WHEN YOU FINALIZE YOUR NOTE. IF YOU WANT TO FAX A PRELIMINARY NOTE YOU WILL NEED TO TOGGLE THIS TO 'NO' IF YOU DO NOT WANT IT IN YOUR FAXED NOTE.
Depth Of Biopsy: dermis
Cryotherapy Text: The wound bed was treated with cryotherapy after the biopsy was performed.
Silver Nitrate Text: The wound bed was treated with silver nitrate after the biopsy was performed.
Type Of Destruction Used: Curettage
Notification Instructions: Patient will be notified of biopsy results. However, patient instructed to call the office if not contacted within 2 weeks.
Dressing: bandage
Accession #: [id number]
Electrodesiccation Text: The wound bed was treated with electrodesiccation after the biopsy was performed.
Biopsy Method: double edge Personna blade
Size Of Lesion In Cm: 0
Hemostasis: Aluminum Chloride
Consent: Verbal consent was obtained and risks were reviewed including but not limited to scarring, infection, bleeding, scabbing, incomplete removal, and allergy to anesthesia. ROSENDO Ashton, CMA
Anesthesia Type: 1% lidocaine without epinephrine and a 1:10 solution of 8.4% sodium bicarbonate
Validate Note Data (See Information Below): Yes
Detail Level: Detailed
Electrodesiccation And Curettage Text: The wound bed was treated with electrodesiccation and curettage after the biopsy was performed.
Wound Care: Vaseline
Anesthesia Volume In Cc: 0.5
Biopsy Type: H and E
Billing Type: Third-Party Bill
Post-care instructions were reviewed in detail and written instructions are provided. Patient is to keep the biopsy site dry overnight, and then apply bacitracin twice daily until healed. Patient may apply hydrogen peroxide soaks to remove any crusting.

## 2021-03-03 ENCOUNTER — APPOINTMENT (RX ONLY)
Dept: URBAN - METROPOLITAN AREA CLINIC 23 | Facility: CLINIC | Age: 81
Setting detail: DERMATOLOGY
End: 2021-03-03

## 2021-03-03 PROBLEM — C44.619 BASAL CELL CARCINOMA OF SKIN OF LEFT UPPER LIMB, INCLUDING SHOULDER: Status: ACTIVE | Noted: 2021-03-03

## 2021-03-03 PROBLEM — L70.0 ACNE VULGARIS: Status: ACTIVE | Noted: 2021-03-03

## 2021-03-03 PROBLEM — I10 ESSENTIAL (PRIMARY) HYPERTENSION: Status: ACTIVE | Noted: 2021-03-03

## 2021-03-03 PROCEDURE — 17262 DSTRJ MAL LES T/A/L 1.1-2.0: CPT

## 2021-03-03 PROCEDURE — ? CURETTAGE AND DESTRUCTION

## 2021-03-03 NOTE — PROCEDURE: CURETTAGE AND DESTRUCTION
Size Of Lesion After Curettage: 1.1
Bill For Surgical Tray: no
Anesthesia Volume In Cc: 1.3
Total Volume (Ccs): 1
Biopsy Photograph Reviewed: Yes
Additional Information: (Optional): The wound was cleaned, and a dressing was applied.  The patient received detailed post-op instructions.
Number Of Curettages: 3
Detail Level: Detailed
What Was Performed First?: Curettage
Anesthesia Type: 1% lidocaine with epinephrine and a 1:10 solution of 8.4% sodium bicarbonate
Bill As A Line Item Or As Units: Line Item
Final Size Statement: The size of the lesion after curettage was
Cautery Type: electrodesiccation
Post-Care Instructions: I reviewed with the patient in detail post-care instructions. Patient is to keep the area dry for 48 hours, and not to engage in any swimming until the area is healed. Should the patient develop any fevers, chills, bleeding, severe pain patient will contact the office immediately.
Consent was obtained from the patient. The risks, benefits and alternatives to therapy were discussed in detail. Specifically, the risks of infection, scarring, bleeding, prolonged wound healing, nerve injury, incomplete removal, allergy to anesthesia and recurrence were addressed. Alternatives to ED&C, such as: surgical removal and XRT were also discussed.  Prior to the procedure, the treatment site was clearly identified and confirmed by the patient.
Size Of Lesion In Cm: 0.7

## 2021-06-21 ENCOUNTER — APPOINTMENT (OUTPATIENT)
Dept: GENERAL RADIOLOGY | Age: 81
End: 2021-06-21
Attending: PHYSICIAN ASSISTANT
Payer: MEDICARE

## 2021-06-21 ENCOUNTER — HOSPITAL ENCOUNTER (EMERGENCY)
Age: 81
Discharge: HOME | End: 2021-06-21
Attending: EMERGENCY MEDICINE
Payer: MEDICARE

## 2021-06-21 VITALS
BODY MASS INDEX: 30.53 KG/M2 | HEIGHT: 66 IN | TEMPERATURE: 98.6 F | SYSTOLIC BLOOD PRESSURE: 148 MMHG | DIASTOLIC BLOOD PRESSURE: 61 MMHG | OXYGEN SATURATION: 95 % | RESPIRATION RATE: 18 BRPM | HEART RATE: 78 BPM | WEIGHT: 190 LBS

## 2021-06-21 DIAGNOSIS — S42.291A HUMERAL HEAD FRACTURE, RIGHT, CLOSED, INITIAL ENCOUNTER: Primary | ICD-10-CM

## 2021-06-21 PROCEDURE — 99283 EMERGENCY DEPT VISIT LOW MDM: CPT

## 2021-06-21 PROCEDURE — 73030 X-RAY EXAM OF SHOULDER: CPT

## 2021-06-21 PROCEDURE — 73080 X-RAY EXAM OF ELBOW: CPT

## 2021-06-21 RX ORDER — OXYCODONE HYDROCHLORIDE 5 MG/1
5 TABLET ORAL
Qty: 12 TABLET | Refills: 0 | Status: SHIPPED | OUTPATIENT
Start: 2021-06-21 | End: 2021-06-23 | Stop reason: ALTCHOICE

## 2021-06-22 NOTE — ED PROVIDER NOTES
77-year-old female patient presents to the emergency department with right shoulder and arm pain after a mechanical fall while walking her grand puppies. Ms. Pamela Ruiz denies hitting her head, denies any loss of consciousness, states that she has some abrasions to her right fifth digit as well. She has elbow abrasion as well. The history is provided by the patient. Fall  The accident occurred less than 1 hour ago. She fell from a height of 3 - 5 ft. She landed on concrete. The volume of blood lost was minimal. The pain is at a severity of 2/10. The pain is mild. She was ambulatory at the scene. There was no entrapment after the fall. There was no drug use involved in the accident. There was no alcohol use involved in the accident. Associated symptoms include tingling. Pertinent negatives include no visual change, no fever, no numbness, no abdominal pain, no bowel incontinence, no nausea, no vomiting, no hematuria, no headaches, no extremity weakness, no hearing loss, no loss of consciousness and no laceration. The symptoms are aggravated by activity, use of injured limb and pressure on injury.         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 breast bx    US GUIDED CORE BREAST BIOPSY  over 20 yrs ago         Family History:   Problem Relation Age of Onset    Elevated Lipids Mother     Heart Disease Mother     Hypertension Mother     Cancer Father    [facility] Elevated Lipids Father     Hypertension Father     Cancer Sister     Malignant Hyperthermia Neg Hx     Pseudocholinesterase Deficiency Neg Hx     Delayed Awakening Neg Hx     Post-op Nausea/Vomiting Neg Hx     Emergence Delirium Neg Hx     Post-op Cognitive Dysfunction Neg Hx     Other Neg Hx     Breast Cancer Neg Hx        Social History     Socioeconomic History    Marital status:      Spouse name: Not on file    Number of children: Not on file    Years of education: Not on file    Highest education level: Not on file   Occupational History    Not on file   Tobacco Use    Smoking status: Former Smoker    Smokeless tobacco: Never Used    Tobacco comment: \"40 years ago\"   Substance and Sexual Activity    Alcohol use: Yes     Comment: socially    Drug use: No    Sexual activity: Not on file   Other Topics Concern    Not on file   Social History Narrative    Not on file     Social Determinants of Health     Financial Resource Strain:     Difficulty of Paying Living Expenses:    Food Insecurity:     Worried About Running Out of Food in the Last Year:     Ran Out of Food in the Last Year:    Transportation Needs:     Lack of Transportation (Medical):  Lack of Transportation (Non-Medical):    Physical Activity:     Days of Exercise per Week:     Minutes of Exercise per Session:    Stress:     Feeling of Stress :    Social Connections:     Frequency of Communication with Friends and Family:     Frequency of Social Gatherings with Friends and Family:     Attends Caodaism Services:     Active Member of Clubs or Organizations:     Attends Club or Organization Meetings:     Marital Status:    Intimate Partner Violence:     Fear of Current or Ex-Partner:     Emotionally Abused:     Physically Abused:     Sexually Abused:           ALLERGIES: Latex, Adhesive, Pcn [penicillins], and Sulfa (sulfonamide antibiotics)    Review of Systems   Constitutional: Negative. Negative for activity change, appetite change, chills and fever. HENT: Negative. Respiratory: Negative. Negative for cough and shortness of breath. Cardiovascular: Negative. Gastrointestinal: Negative. Negative for abdominal pain, bowel incontinence, nausea and vomiting. Genitourinary: Negative. Negative for hematuria. Musculoskeletal: Negative. Negative for extremity weakness and gait problem. Neurological: Positive for tingling. Negative for loss of consciousness, numbness and headaches. All other systems reviewed and are negative. Vitals:    06/21/21 2138   BP: (!) 145/89   Pulse: 87   Resp: 20   SpO2: 96%   Weight: 86.2 kg (190 lb)   Height: 5' 6\" (1.676 m)            Physical Exam  Vitals and nursing note reviewed. Constitutional:       General: She is not in acute distress. Appearance: Normal appearance. She is not ill-appearing, toxic-appearing or diaphoretic. HENT:      Head: Normocephalic and atraumatic. Eyes:      Conjunctiva/sclera: Conjunctivae normal.   Cardiovascular:      Rate and Rhythm: Normal rate and regular rhythm. Pulses: Normal pulses. Heart sounds: Normal heart sounds. Pulmonary:      Effort: Pulmonary effort is normal. No respiratory distress. Breath sounds: Normal breath sounds. No stridor. No wheezing, rhonchi or rales. Chest:      Chest wall: No tenderness. Musculoskeletal:      Right shoulder: Tenderness and bony tenderness present. No swelling, deformity, effusion, laceration or crepitus. Decreased range of motion. Normal strength. Normal pulse. Left shoulder: Normal.      Right upper arm: Tenderness and bony tenderness present. No swelling, edema, deformity or lacerations. Right elbow: Swelling present. Normal range of motion. Tenderness present in lateral epicondyle and olecranon process. Left elbow: Normal.      Comments: Abrasion to right elbow lateral epicondyles. Swelling noted as well. Patient does have full range of motion of right elbow. Wrist flexion and extension supination pronation intact but does cause pain. Skin:     General: Skin is warm and dry. Findings: No laceration. Neurological:      General: No focal deficit present. Mental Status: She is alert and oriented to person, place, and time. Mental status is at baseline. MDM  Number of Diagnoses or Management Options  Humeral head fracture, right, closed, initial encounter: new and requires workup  Diagnosis management comments: X-rays show a humeral head fracture as well as a humeral neck fracture of the right shoulder. Patient placed in sling and notified Dr. Katie Mirza who is on-call for neurosurgery for her tomorrow. Patient stable for discharge home with pain medication. Patient is neurovascularly intact.        Amount and/or Complexity of Data Reviewed  Tests in the radiology section of CPT®: ordered and reviewed  Discuss the patient with other providers: yes    Risk of Complications, Morbidity, and/or Mortality  Presenting problems: moderate  Diagnostic procedures: low  Management options: low    Patient Progress  Patient progress: stable         Procedures

## 2021-06-22 NOTE — ED NOTES
I have reviewed discharge instructions with the patient. The patient verbalized understanding. Patient left ED via Discharge Method: ambulatory to Home with son, Lyly Mcconnell. Opportunity for questions and clarification provided. Patient given 1 scripts. To continue your aftercare when you leave the hospital, you may receive an automated call from our care team to check in on how you are doing. This is a free service and part of our promise to provide the best care and service to meet your aftercare needs.  If you have questions, or wish to unsubscribe from this service please call [phone]. Thank you for Choosing our [facility] Emergency Department.

## 2021-06-22 NOTE — DISCHARGE INSTRUCTIONS
Please call and make an appointment tomorrow make an appointment to see Dr. Aden Doyle.   [phone]

## 2021-07-18 ENCOUNTER — HOSPITAL ENCOUNTER (EMERGENCY)
Age: 81
Discharge: HOME | End: 2021-07-18
Attending: EMERGENCY MEDICINE
Payer: MEDICARE

## 2021-07-18 VITALS
OXYGEN SATURATION: 98 % | WEIGHT: 185 LBS | SYSTOLIC BLOOD PRESSURE: 151 MMHG | DIASTOLIC BLOOD PRESSURE: 66 MMHG | RESPIRATION RATE: 16 BRPM | BODY MASS INDEX: 30.82 KG/M2 | HEART RATE: 92 BPM | HEIGHT: 65 IN | TEMPERATURE: 98 F

## 2021-07-18 DIAGNOSIS — M77.8 WRIST TENDONITIS: Primary | ICD-10-CM

## 2021-07-18 PROCEDURE — 99283 EMERGENCY DEPT VISIT LOW MDM: CPT

## 2021-07-18 NOTE — DISCHARGE INSTRUCTIONS
Apply ice. Do not perform wrist exercises until inflammation and swelling improves and further recommendations are provided by orthopedic surgeon. Use wrist splint for comfort and remove at least twice daily to prevent complete stiffness.

## 2021-07-18 NOTE — ED PROVIDER NOTES
30-year-old female presents with pain and swelling in her right wrist and hand. She suffered a right humerus fracture about 3 weeks ago. She has been immobilized, and recently started new hand and wrist exercises last week. She developed increased burning pain in her hand and wrist with swelling and redness a few days ago. She went to urgent care today and had normal x-rays of her right hand and wrist.  They sent her to the emergency department because they were concerned about red streaking up her arm. Denies fevers or chills. No new injuries or skin defects. She has been taking Motrin 800. Also has oxycodone at home for breakthrough pain. Arm Pain   Pertinent negatives include no back pain.         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 breast bx    US GUIDED CORE BREAST BIOPSY  over 20 yrs ago         Family History:   Problem Relation Age of Onset    Elevated Lipids Mother     Heart Disease Mother     Hypertension Mother     Cancer Father     Elevated Lipids Father     Hypertension Father     Cancer Sister     Malignant Hyperthermia Neg Hx     Pseudocholinesterase Deficiency Neg Hx     Delayed Awakening Neg Hx     Post-op Nausea/Vomiting Neg Hx     Emergence Delirium Neg Hx     Post-op Cognitive Dysfunction Neg Hx     Other Neg Hx     Breast Cancer Neg Hx        Social History     Socioeconomic History    Marital status:      Spouse name: Not on file    Number of children: Not on file    Years of education: Not on file    Highest education level: Not on file   Occupational History    Not on file   Tobacco Use    Smoking status: Former Smoker    Smokeless tobacco: Never Used    Tobacco comment: \"40 years ago\"   Substance and Sexual Activity    Alcohol use: Yes     Comment: socially    Drug use: No    Sexual activity: Not on file   Other Topics Concern    Not on file   Social History Narrative    Not on file     Social Determinants of Health     Financial Resource Strain:     Difficulty of Paying Living Expenses:    Food Insecurity:     Worried About [address] in the Last Year:     [address] [employer] in the Last Year:    Transportation Needs:     Lack of Transportation (Medical):  Lack of Transportation (Non-Medical):    Physical Activity:     Days of Exercise per Week:     Minutes of Exercise per Session:    Stress:     Feeling of Stress :    Social Connections:     Frequency of Communication with Friends and Family:     Frequency of Social Gatherings with Friends and Family:     Attends Restorationist Services:     Active Member of Clubs or Organizations:     Attends Club or Organization Meetings:     Marital Status:    Intimate Partner Violence:     Fear of Current or Ex-Partner:     Emotionally Abused:     Physically Abused:     Sexually Abused: ALLERGIES: Latex, Adhesive, Pcn [penicillins], and Sulfa (sulfonamide antibiotics)    Review of Systems   Constitutional: Negative for fever. HENT: Negative for hearing loss. Eyes: Negative for visual disturbance. Respiratory: Negative for cough and shortness of breath. Cardiovascular: Negative for chest pain. Gastrointestinal: Negative for abdominal pain, diarrhea, nausea and vomiting. Musculoskeletal: Positive for arthralgias and joint swelling. Negative for back pain. Skin: Negative for rash. Neurological: Negative for headaches. Psychiatric/Behavioral: Negative for confusion. All other systems reviewed and are negative. Vitals:    07/18/21 1507   BP: (!) 151/66   Pulse: 92   Resp: 16   Temp: 98 °F (36.7 °C)   SpO2: 98%   Weight: 83.9 kg (185 lb)   Height: 5' 5\" (1.651 m)            Physical Exam  Vitals and nursing note reviewed. Constitutional:       Appearance: Normal appearance. HENT:      Head: Normocephalic and atraumatic. Nose: Nose normal.      Mouth/Throat:      Mouth: Mucous membranes are moist.   Eyes:      Pupils: Pupils are equal, round, and reactive to light. Cardiovascular:      Rate and Rhythm: Normal rate and regular rhythm. Pulmonary:      Effort: Pulmonary effort is normal.      Breath sounds: No stridor. Abdominal:      General: Abdomen is flat. Musculoskeletal:         General: No deformity. Normal range of motion. Right wrist: Swelling and tenderness present. No crepitus. Normal pulse. Arms:       Cervical back: Normal range of motion and neck supple. Skin:     General: Skin is warm and dry. Neurological:      General: No focal deficit present. Mental Status: She is alert. Mental status is at baseline. Psychiatric:         Mood and Affect: Mood normal.         Behavior: Behavior normal.          MDM  Number of Diagnoses or Management Options  Wrist tendonitis  Diagnosis management comments: Parts of this document were created using dragon voice recognition software. The chart has been reviewed but errors may still be present. I wore appropriate PPE throughout this patient's ED visit. Bridget Washburn MD, 3:24 PM    No streaking or sign of infection. Swelling appears inflammatory from recent exercises. Given response for comfort. Advised orthopedic follow-up. I discussed the results of all labs, procedures, radiographs, and treatments with the patient and available family.   Treatment plan is agreed upon and the patient is ready for discharge. Questions about treatment in the ED and differential diagnosis of presenting condition were answered. Patient was given verbal discharge instructions including, but not limited to, importance of returning to the emergency department for any concern of worsening or continued symptoms. Instructions were given to follow up with a primary care provider or specialist within 1-2 days. Adverse effects of medications, if prescribed, were discussed and patient was advised to refrain from significant physical activity until followed up by primary care physician and to not drive or operate heavy machinery after taking any sedating substances.              Procedures

## 2021-07-18 NOTE — ED NOTES
I have reviewed discharge instructions with the patient and caregiver. The patient and caregiver verbalized understanding. Patient left ED via Discharge Method: ambulatory to Home with her son Elodia Marti. Opportunity for questions and clarification provided. Patient given 0 scripts. To continue your aftercare when you leave the hospital, you may receive an automated call from our care team to check in on how you are doing. This is a free service and part of our promise to provide the best care and service to meet your aftercare needs.  If you have questions, or wish to unsubscribe from this service please call [phone]. Thank you for Choosing our [address] [place] [address] Emergency Department.

## 2021-07-18 NOTE — ED TRIAGE NOTES
Pt reports she fell and injured right humerus about 3 weeks ago. Pt states she has been having home health and small amounts of PT due to having to be immobilized. Pt reports pain has become worse as well as swelling and streaks to arm.     Daniel Clay RN

## 2021-08-25 ENCOUNTER — APPOINTMENT (RX ONLY)
Dept: URBAN - METROPOLITAN AREA CLINIC 23 | Facility: CLINIC | Age: 81
Setting detail: DERMATOLOGY
End: 2021-08-25

## 2021-08-25 DIAGNOSIS — L57.0 ACTINIC KERATOSIS: ICD-10-CM

## 2021-08-25 DIAGNOSIS — L57.8 OTHER SKIN CHANGES DUE TO CHRONIC EXPOSURE TO NONIONIZING RADIATION: ICD-10-CM | Status: INADEQUATELY CONTROLLED

## 2021-08-25 DIAGNOSIS — Z85.828 PERSONAL HISTORY OF OTHER MALIGNANT NEOPLASM OF SKIN: ICD-10-CM

## 2021-08-25 DIAGNOSIS — Z87.2 PERSONAL HISTORY OF DISEASES OF THE SKIN AND SUBCUTANEOUS TISSUE: ICD-10-CM

## 2021-08-25 DIAGNOSIS — Z71.89 OTHER SPECIFIED COUNSELING: ICD-10-CM

## 2021-08-25 DIAGNOSIS — L82.0 INFLAMED SEBORRHEIC KERATOSIS: ICD-10-CM

## 2021-08-25 PROBLEM — L55.1 SUNBURN OF SECOND DEGREE: Status: ACTIVE | Noted: 2021-08-25

## 2021-08-25 PROBLEM — E78.5 HYPERLIPIDEMIA, UNSPECIFIED: Status: ACTIVE | Noted: 2021-08-25

## 2021-08-25 PROBLEM — M12.9 ARTHROPATHY, UNSPECIFIED: Status: ACTIVE | Noted: 2021-08-25

## 2021-08-25 PROCEDURE — ? LIQUID NITROGEN

## 2021-08-25 PROCEDURE — ? COUNSELING

## 2021-08-25 PROCEDURE — 17000 DESTRUCT PREMALG LESION: CPT | Mod: 59

## 2021-08-25 PROCEDURE — 99213 OFFICE O/P EST LOW 20 MIN: CPT | Mod: 25

## 2021-08-25 PROCEDURE — 17110 DESTRUCTION B9 LES UP TO 14: CPT

## 2021-08-25 PROCEDURE — 17003 DESTRUCT PREMALG LES 2-14: CPT | Mod: 59

## 2021-08-25 ASSESSMENT — LOCATION DETAILED DESCRIPTION DERM
LOCATION DETAILED: MIDDLE STERNUM
LOCATION DETAILED: RIGHT MEDIAL SUPERIOR CHEST
LOCATION DETAILED: LEFT DISTAL PRETIBIAL REGION
LOCATION DETAILED: RIGHT SUPERIOR UPPER BACK
LOCATION DETAILED: LEFT LATERAL PROXIMAL UPPER ARM
LOCATION DETAILED: NASAL DORSUM
LOCATION DETAILED: RIGHT PROXIMAL PRETIBIAL REGION
LOCATION DETAILED: LEFT MEDIAL SUPERIOR CHEST
LOCATION DETAILED: LEFT SUPERIOR CENTRAL MALAR CHEEK
LOCATION DETAILED: LEFT PROXIMAL PRETIBIAL REGION
LOCATION DETAILED: RIGHT DISTAL PRETIBIAL REGION

## 2021-08-25 ASSESSMENT — LOCATION ZONE DERM
LOCATION ZONE: LEG
LOCATION ZONE: FACE
LOCATION ZONE: ARM
LOCATION ZONE: TRUNK
LOCATION ZONE: NOSE

## 2021-08-25 ASSESSMENT — LOCATION SIMPLE DESCRIPTION DERM
LOCATION SIMPLE: LEFT PRETIBIAL REGION
LOCATION SIMPLE: LEFT CHEEK
LOCATION SIMPLE: CHEST
LOCATION SIMPLE: RIGHT UPPER BACK
LOCATION SIMPLE: LEFT UPPER ARM
LOCATION SIMPLE: RIGHT PRETIBIAL REGION
LOCATION SIMPLE: NOSE

## 2021-08-25 NOTE — PROCEDURE: COUNSELING
Detail Level: Detailed
Detail Level: Zone
Detail Level: Generalized
Sunscreen Recommendations: Broad Spectrum

## 2021-08-25 NOTE — PROCEDURE: LIQUID NITROGEN
Show Applicator Variable?: Yes
Render Note In Bullet Format When Appropriate: No
Post-Care Instructions: I reviewed with the patient in detail post-care instructions. Patient is to wear sunprotection, and avoid picking at any of the treated lesions. Pt may apply Vaseline to crusted or scabbing areas.
Detail Level: Detailed
Medical Necessity Clause: Treatment was medically necessary because the spot was
Consent: The patient's verbal consent was obtained including but not limited to risks of crusting, scabbing, blistering, scarring, darker or lighter pigmentary change, recurrence, incomplete removal and infection.
Consent: The patient's verbal  consent was obtained including but not limited to risks of crusting, scabbing, blistering, scarring, darker or lighter pigmentary change, recurrence, incomplete removal and infection.
Medical Necessity Information: It is in your best interest to select a reason for this procedure from the list below. All of these items fulfill various CMS LCD requirements except the new and changing color options.
Duration Of Freeze Thaw-Cycle (Seconds): 3
Number Of Freeze-Thaw Cycles: 1 freeze-thaw cycle

## 2021-09-03 ENCOUNTER — HOSPITAL ENCOUNTER (OUTPATIENT)
Dept: PHYSICAL THERAPY | Age: 81
Discharge: HOME | End: 2021-09-03
Payer: MEDICARE

## 2021-09-03 DIAGNOSIS — M25.611 SHOULDER STIFFNESS, RIGHT: ICD-10-CM

## 2021-09-03 DIAGNOSIS — S42.291A OTHER CLOSED DISPLACED FRACTURE OF PROXIMAL END OF RIGHT HUMERUS, INITIAL ENCOUNTER: ICD-10-CM

## 2021-09-03 DIAGNOSIS — M25.511 RIGHT SHOULDER PAIN, UNSPECIFIED CHRONICITY: ICD-10-CM

## 2021-09-03 PROCEDURE — 97110 THERAPEUTIC EXERCISES: CPT

## 2021-09-03 PROCEDURE — 97161 PT EVAL LOW COMPLEX 20 MIN: CPT

## 2021-09-03 NOTE — PROGRESS NOTES
Su Madrid  : 1940  [place]  Secondary: [place] [employer] [id number] [facility] Therapy  [address], [facility], [address]  [phone]   [phone]         OUTPATIENT PHYSICAL THERAPY:Daily Note 9/3/2021      TREATMENT:   PT Patient Time In/Time Out  Time In: 1145  Time Out: 1230      Total Time: 45min  Visit Count:  1     ICD-10: Treatment Diagnosis: M25.511, M25.611, S42.291A  Medication Last Reviewed: 21      TREATMENT PLAN  Effective Dates: 9/3/2021 TO 2021 (90 days). Frequency/Duration: 2 times a week for 90 Day(s)         Subjective: See Evaluation Note dated 9/3/2021 for details   Pain:     Objective: See Evaluation Note dated 9/3/2021 for details      Therapeutic Exercise: (35min) Done in order to improve strength, ROM and understanding of current condition.     Date:  9/3 Date:   Date:   Date:     Activity/Exercise Parameters      Education Discussed examination findings, HEP, plan of care      Pulleys x6min      Pendulums x6min      Shoulder PROM x15min                        Manual Therapy: (0min) Done in order to improve joint and soft tissue mobility,reduce muscle guarding, and decrease muscle tone   Date:   Date:   Date:   Date:     Type Parameters      Joint Mobilization       Soft Tissue Mobilization           Modalities: (-) Done in order to reduce swelling and pain    Assessment: See Evaluation Note dated 9/3/2021 for details    Plan: See Evaluation Note dated 9/3/2021 for details    Future Appointments   Date Time Provider Edda March   2021  9:30 AM Morrie Asp, PT [facility]   9/10/2021 10:15 AM Estephanie Nielson [facility]   2021 10:15 AM Morrie Asp, PT [facility]   9/15/2021 10:15 AM Morrie Asp, PT [facility]   2021 10:15 AM Morrie Asp, PT [facility]   2021 10:15 AM Morrie Asp, PT [facility]   9/24/2021  9:00 AM Ann Espinosa MD [facility]   9/27/2021 10:15 AM Ramond Bodily, PT [facility]   9/29/2021 10:15 AM Ramond Bodily, PT [facility]   10/4/2021 10:15 AM Ramond Bodily, PT [facility]   10/6/2021 10:15 AM Ramond Bodily, PT [facility]   10/11/2021 10:15 AM Ramond Bodily, PT [facility]   10/13/2021 10:15 AM Ramond Bodily, PT [facility]       Lazaro Edwards Time: 10min eval  Units: 1 eval low/ 2TE      Queen Davi, PT, DPT, OCS    Visit Approval Visit # Therapist initials Date A NS / Cx < 24 hr >24 hr Cx Comments    1 WJ 9/3 [x]  [] [] Initial evaluation       [] [] []        [] [] []        [] [] []        [] [] []        [] [] []        [] [] []        [] [] []        [] [] []        [] [] []        [] [] []        [] [] []        [] [] []        [] [] []        [] [] []        [] [] []        [] [] []        [] [] []

## 2021-09-03 NOTE — THERAPY EVALUATION
Anival Squires  : 1940  [place]  Secondary: Cass Beal Senior Medicare [address] [facility] Therapy  [address] [facility], [address]  [phone]   [phone]          OUTPATIENT PHYSICAL THERAPY:Initial Assessment 9/3/2021   ICD-10: Treatment Diagnosis: M25.511, M25.611, S42.291A  Precautions/Allergies:   Latex, Adhesive, Pcn [penicillins], and Sulfa (sulfonamide antibiotics)   TREATMENT PLAN:  Effective Dates: 9/3/2021 TO 2021 (90 days). Frequency/Duration: 2 times a week for 90 Day(s) MEDICAL/REFERRING DIAGNOSIS:  Other closed displaced fracture of proximal end of right humerus, initial encounter [S42.291A]  Right shoulder pain, unspecified chronicity [M25.511]  Shoulder stiffness, right [M25.611]   DATE OF ONSET: 2021  REFERRING PHYSICIAN: Dameon Tanner MD MD Orders: Bucky Ayala and treat  Return MD Appointment:      INITIAL ASSESSMENT:  Ms. Danitza Ventura presents to physical therapy with MD diagnosis of a left shoulder pain and stiffness following fracture. Pt demonstrated signs and symptoms consistent with this diagnosis. On objective examination, the patient demonstrated deficits in ROM, strength, joint mobility, soft tissue mobility and functional ability. The patient also had increased pain, stiffness. The patient is limited in the following activities: reaching, driving, lifting, ADLs, functional tasks, house work, doing hair. The patient has a good  prognosis for recovery based on the examination findings and may be limited by: N/A. Patient requires skilled physical therapy services in order to return to prior level of function. PROBLEM LIST (Impacting functional limitations):  1. Decreased Strength  2. Decreased ADL/Functional Activities  3. Increased Pain  4. Decreased Activity Tolerance  5. Decreased Flexibility/Joint Mobility  6. Decreased Knowledge of Precautions  7.  Decreased [place] with Home Exercise Program INTERVENTIONS PLANNED: (Treatment may consist of any combination of the following)  1. Cold  2. Heat  3. Home Exercise Program (HEP)  4. Manual Therapy  5. Neuromuscular Re-education/Strengthening  6. Range of Motion (ROM)  7. Therapeutic Activites  8. Therapeutic Exercise/Strengthening     GOALS: (Goals have been discussed and agreed upon with patient.)  Short-Term Functional Goals: Time Frame: 5 weeks  1. Pt will be compliant with progressive HEP  2. Pt will improve flexion AROM to > 100deg  Discharge Goals: Time Frame: 12 weeks  1. Pt will improve flexion AROM to > 125deg  2. Pt will improve ER AROM to > L3  3. Pt will have flexion MMT > 4/5    OUTCOME MEASURE:   Tool Used: Disabilities of the Arm, Shoulder and Hand (DASH) Questionnaire - Quick Version  Score:  Initial: 31/55  Most Recent: X/55 (Date: -- )   Interpretation of Score: The DASH is designed to measure the activities of daily living in person's with upper extremity dysfunction or pain. Each section is scored on a 1-5 scale, 5 representing the greatest disability. The scores of each section are added together for a total score of 55. MEDICAL NECESSITY:   · Patient is expected to demonstrate progress in strength, range of motion and symptom levels to return to full function. REASON FOR SERVICES/OTHER COMMENTS:  · Patient requires skilled physical therapy in order to return to prior level of function      Rehabilitation Potential For Stated Goals: Good  Regarding Benjamin Rosado's therapy, I certify that the treatment plan above will be carried out by a therapist or under their direction. Thank you for this referral,  Pratima Carrington PT, DPT, OCS  Referring Physician Signature: Remy Meraz MD                 PAIN/SUBJECTIVE:   Initial:   3/10 Post Session:  1/10   HISTORY:   History of Injury/Illness (Reason for Referral):  Pt fell on 6/28/2021 while walking her dog. Fractured proximal humerus.  Fx is healing w/o surgery and pt is doing well overall. Has significant stiffness and loss of function in the R arm due to extended immobility and inactivity. Dull aching pain, denies any numbness/tingling and any pain elsewhere in the arm or neck  Past Medical History/Comorbidities:   Ms. 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 She also has no past medical history of Adverse effect of anesthesia, Aneurysm ([address] Arrhythmia, Asthma, Autoimmune disease ([address] CAD (coronary artery disease), Cancer ([address] Chronic kidney disease, Coagulation defects, COPD, Diabetes ([address] Difficult intubation, Endocarditis, Heart failure ([address] Liver disease, Malignant hyperthermia due to anesthesia, Other ill-defined conditions(799.89), Pseudocholinesterase deficiency, Psychiatric disorder, PUD (peptic ulcer disease), Rheumatic fever, Seizures ([address] Stroke ([address] Thromboembolus ([address] Thyroid disease, Unspecified adverse effect of anesthesia, or Unspecified sleep apnea. Ms. Kaleigh Peña  has a past surgical history that includes hx gyn; hx heent; hx knee replacement (Right, 2010); pr breast surgery procedure unlisted; us guided core breast biopsy (over 20 yrs ago); and [facility] biopsy breast stereotactic (over 20 yrs ago). Social History/Living Environment:     Lives alone  Prior Level of Function/Work/Activity:  Walking, driving, house work, yard work  Dominant Side:         RIGHT  Personal Factors:          Sex:  female        Age:  68 y.o.    Ambulatory/Rehab Services H2 Model Falls Risk Assessment   Risk Factors:       (5)  History of Recent Falls [w/in 3 months] Ability to Rise from Chair:       (0)  Ability to rise in a single movement   Falls Prevention Plan:       No modifications necessary   Total: (5 or greater = High Risk): 5   ©2010 [facility] Patent #[id number]. Federal Law prohibits the replication, distribution or use without written permission from [facility] [employer]   Current Medications:       Current Outpatient Medications:     colchicine 0.6 mg tablet, Pt to take one po every hour until symptoms improved or pt has diarrhea, Disp: 10 Tablet, Rfl: 1    mirabegron ER (MYRBETRIQ) 50 mg ER tablet, Take 50 mg by mouth daily. Take morning of surgery per anesthesia guidelines. Indications: URINARY URGE INCONTINENCE, Disp: , Rfl:     omeprazole (PRILOSEC) 40 mg capsule, Take 40 mg by mouth daily. Take morning of surgery per anesthesia guidelines. , Disp: , Rfl:     simvastatin (ZOCOR) 40 mg tablet, Take 40 mg by mouth daily. Per anesthesia protocol:instructed to take am of surgery. , Disp: , Rfl:     lisinopril-hydrochlorothiazide (PRINZIDE, ZESTORETIC) 20-12.5 mg per tablet, Take 1 Tab by mouth daily. , Disp: , Rfl:     docusate sodium (COLACE) 100 mg capsule, Take 100 mg by mouth daily. , Disp: , Rfl:    Date Last Reviewed:  09/03/21     Number of Personal Factors/Comorbidities that affect the Plan of Care: 1-2: MODERATE COMPLEXITY   EXAMINATION:   *= Painful     WNL= within normal limits     NT= not tested    Observation  Posture: Pt keeps R arm by side while walking    ROM  Shoulder   Right Left   Flexion 71 162   ABD 70 165   IR S3 T10   ER C6 T4       Strength (measured on MMT scale from 0-5/5)   Right Left   Shoulder         Flexion 3 5       ABD 3 5       IR 3 5       ER 3 5       Joint Mobility/Soft Tissue   Description   Joint Mobility Not assessed today   Soft Tissue Mobility TTP around posterior RTC muscles and UT          Body Structures Involved:  1. Bones  2. Joints  3. Muscles  4. Ligaments Body Functions Affected:  1. Sensory/Pain  2. Neuromusculoskeletal  3.  Movement Related Activities and Participation Affected:  1. General Tasks and Demands  2. Self Care  3. Domestic Life  4. Interpersonal Interactions and Relationships  5.  Community, Social and [place]   Number of elements (examined above) that affect the Plan of Care: 3: MODERATE COMPLEXITY   CLINICAL PRESENTATION:   Presentation: Stable and uncomplicated: LOW COMPLEXITY   CLINICAL DECISION MAKING:   Use of outcome tool(s) and clinical judgement create a POC that gives a: Clear prediction of patient's progress: LOW COMPLEXITY     Vandana Console PT, DPT, OCS

## 2021-09-03 NOTE — PROGRESS NOTES
Miriam Cornell  : 1940  [place]  Secondary: Linnea Jameson [facility] Medicare [address] [facility] Therapy  [address] [place] [address], [facility] [address]  [phone]   [phone]        OUTPATIENT DAILY NOTE    NAME/AGE/GENDER: Miriam Cornell is a 68 y.o. female. DATE: 9/3/2021    Ms. Juan Mckoy for today's appointment due to child tested positive for COVID and she was exposed. Is waiting to find out she does not have it.     Jamie Catalan, PT

## 2021-09-08 ENCOUNTER — HOSPITAL ENCOUNTER (OUTPATIENT)
Dept: PHYSICAL THERAPY | Age: 81
Discharge: HOME | End: 2021-09-08
Payer: MEDICARE

## 2021-09-08 PROCEDURE — 97110 THERAPEUTIC EXERCISES: CPT

## 2021-09-08 NOTE — PROGRESS NOTES
Kelechi Soliz  : 1940  [place]  Secondary: Sukhjinder Parrish Senior Medicare 64[address] [facility] Therapy  [address], [facility], [address]  [phone]   [phone]         OUTPATIENT PHYSICAL THERAPY:Daily Note 2021      TREATMENT:   PT Patient Time In/Time Out  Time In: 930  Time Out: 1015      Total Time: 45min  Visit Count:  2     ICD-10: Treatment Diagnosis: M25.511, M25.611, S42.291A  Medication Last Reviewed: 21      TREATMENT PLAN  Effective Dates: 9/3/2021 TO 2021 (90 days). Frequency/Duration: 2 times a week for 90 Day(s)         Subjective: Pt states she is feeling better today   Pain: 3/10    Objective: Therapeutic Exercise: (45min) Done in order to improve strength, ROM and understanding of current condition.     Date:  9/3 Date:   Date:   Date:     Activity/Exercise Parameters      Education Discussed examination findings, HEP, plan of care      Pulleys x6min x10min     Pendulums x6min      Shoulder PROM x15min x25min     Rows  3x15 red TB     S/L Shoulder Flexion  3x10                                            Manual Therapy: (0min) Done in order to improve joint and soft tissue mobility,reduce muscle guarding, and decrease muscle tone   Date:   Date:   Date:   Date:     Type Parameters      Joint Mobilization       Soft Tissue Mobilization           Modalities: (-) Done in order to reduce swelling and pain    Assessment: Pt had improved ROM following PROM and pulleys    Plan: continue per POC    Future Appointments   Date Time Provider Edda March   9/10/2021 10:15 AM [facility]   2021 10:15 AM Mishel Ravi, PT [facility]   9/15/2021 10:15 AM Mishel Ravi, PT [facility]   2021 10:15 AM Mishel Ravi, PT [facility]   2021 10:15 AM Mishel Ravi, PT [facility]   2021  9:00 AM Leydi Mora MD [facility]   9/27/2021 10:15 AM Deastevie Hernandez, PT [facility]   9/29/2021 10:15 AM Deastevie Hernandez, PT [facility]   10/4/2021 10:15 AM Deadavide David, PT [facility]   10/6/2021 10:15 AM Deastevie Hernandez, PT [facility]   10/11/2021 10:15 AM Grace Hernandez, PT [facility]   10/13/2021 10:15 AM Glendy Guerra [facility]       Radha Zeng Time:   Units: 3TE      Yeimy Flores, PT, DPT, OCS    Visit Approval Visit # Therapist initials Date A NS / Cx < 24 hr >24 hr Cx Comments    1 WJ 9/3 [x]  [] [] Initial evaluation    2 WJ 9/8 [x] [] []        [] [] []        [] [] []        [] [] []        [] [] []        [] [] []        [] [] []        [] [] []        [] [] []        [] [] []        [] [] []        [] [] []        [] [] []        [] [] []        [] [] []        [] [] []        [] [] []

## 2021-09-10 ENCOUNTER — HOSPITAL ENCOUNTER (OUTPATIENT)
Dept: PHYSICAL THERAPY | Age: 81
Discharge: HOME | End: 2021-09-10
Payer: MEDICARE

## 2021-09-10 PROCEDURE — 97110 THERAPEUTIC EXERCISES: CPT

## 2021-09-10 NOTE — PROGRESS NOTES
Young Irvin  : 1940  [place]  Secondary: Becky Betts Senior Medicare [address] [facility] Therapy  [address], [address]  [phone]   [phone]         OUTPATIENT PHYSICAL THERAPY:Daily Note 9/10/2021      TREATMENT:   PT Patient Time In/Time Out  Time In: 1015  Time Out: 1100      Total Time: 45min  Visit Count:  3     ICD-10: Treatment Diagnosis: M25.511, M25.611, S42.291A  Medication Last Reviewed: 09/10/21      TREATMENT PLAN  Effective Dates: 9/3/2021 TO 2021 (90 days). Frequency/Duration: 2 times a week for 90 Day(s)         Subjective: Patient reports shoulder is sore today.  Pain: 3/10    Objective: Therapeutic Exercise: (45min) Done in order to improve strength, ROM and understanding of current condition. Date:  9/3 Date:   Date:  9/10 Date:     Activity/Exercise Parameters      Education Discussed examination findings, HEP, plan of care      Pulleys x6min x10min X 10 min    Pendulums x6min      Shoulder PROM x15min x25min X 25 min    Rows  3x15 red TB 3 x 15 red TB    S/L Shoulder Flexion  3x10 3 x 10    Wand exercises                                       Manual Therapy: (0min) Done in order to improve joint and soft tissue mobility,reduce muscle guarding, and decrease muscle tone   Date:   Date:   Date:   Date:     Type Parameters      Joint Mobilization       Soft Tissue Mobilization           Modalities: (-) Done in order to reduce swelling and pain    Assessment: Patient tolerated treatment well today. Better ROM following treatment today.     Plan: continue per POC    Future Appointments   Date Time Provider Edda March   2021 10:15 AM Lodema Nab, PT [facility]   9/15/2021 10:15 AM Lodema Nab, PT [facility]   2021 10:15 AM Lodema Nab, PT [facility]   2021 10:15 AM Lodema Nab, PT [facility]   2021 9:00 AM Ann Espinosa MD [facility]   9/27/2021 10:15 AM Ramond Bodily, PT [facility]   9/29/2021 10:15 AM Ramond Bodily, PT [facility]   10/4/2021 10:15 AM Ramond Bodily, PT [facility]   10/6/2021 10:15 AM Ramond Bodily, PT [facility]   10/11/2021 10:15 AM Ramond Bodily, PT [facility]   10/13/2021 10:15 AM Esperanza Been [facility]       Lazaro Edwards Time:   Units: Jaymie Frank, PTA    Visit Approval Visit # Therapist initials Date A NS / Cx < 24 hr >24 hr Cx Comments    1 W 9/3 [x]  [] [] Initial evaluation    2  9/8 [x] [] []     3 [facility] [address] 9/10 [x] [] []        [] [] []        [] [] []        [] [] []        [] [] []        [] [] []        [] [] []        [] [] []        [] [] []        [] [] []        [] [] []        [] [] []        [] [] []        [] [] []        [] [] []        [] [] []

## 2021-09-13 ENCOUNTER — HOSPITAL ENCOUNTER (OUTPATIENT)
Dept: PHYSICAL THERAPY | Age: 81
Discharge: HOME | End: 2021-09-13
Payer: MEDICARE

## 2021-09-13 PROCEDURE — 97110 THERAPEUTIC EXERCISES: CPT

## 2021-09-13 NOTE — PROGRESS NOTES
Eliceo Lawson  : 1940  [place]  Secondary: Cris Or Senior Medicare [address] [facility] Therapy  [address], [facility], [address]  [phone]   [phone]         OUTPATIENT PHYSICAL THERAPY:Daily Note 2021      TREATMENT:   PT Patient Time In/Time Out  Time In: 1015  Time Out: 1100      Total Time: 45min  Visit Count:  4     ICD-10: Treatment Diagnosis: M25.511, M25.611, S42.291A  Medication Last Reviewed: 21      TREATMENT PLAN  Effective Dates: 9/3/2021 TO 2021 (90 days). Frequency/Duration: 2 times a week for 90 Day(s)         Subjective: Patient reports she is feeling good today   Pain: 3/10    Objective: Therapeutic Exercise: (45min) Done in order to improve strength, ROM and understanding of current condition.     Date:  9/3 Date:   Date:  9/10 Date:     Activity/Exercise Parameters      Education Discussed examination findings, HEP, plan of care      Pulleys x6min x10min X 10 min x10min   Pendulums x6min      Shoulder PROM x15min x25min X 25 min x20min   Rows  3x15 red TB 3 x 15 red TB 3x10 green TB   S/L Shoulder Flexion  3x10 3 x 10    Wand exercises       Punches    3x10 4lbs   Alphabets    x2                     Manual Therapy: (0min) Done in order to improve joint and soft tissue mobility,reduce muscle guarding, and decrease muscle tone   Date:   Date:   Date:   Date:     Type Parameters      Joint Mobilization       Soft Tissue Mobilization           Modalities: (-) Done in order to reduce swelling and pain    Assessment: Patient had improvements in shoulder flexion AROM following PROM and pulleys    Plan: continue per POC    Future Appointments   Date Time Provider Edda March   9/15/2021 10:15 AM [facility]   2021 10:15 AM LU[facility] ENRIQUE [facility]   2021 10:15 AM LUIS ENRIQUE [facility]   2021  9:00 ITZ Bañuelos MD [facility]   9/27/2021 10:15 AM Ever Sandy, PT [facility]   9/29/2021 10:15 AM Ever Sandy, PT [facility]   10/4/2021 10:15 AM Ever Sandy, PT [facility]   10/6/2021 10:15 AM Ever Sandy, PT [facility]   10/11/2021 10:15 AM Ever Sandy, PT [facility]   10/13/2021 10:15 AM Tacey Duverney [facility]       Flavio Arredondo Time:   Units: 3TE      Stanley Cradle, PT, DPT, OCS    Visit Approval Visit # Therapist initials Date A NS / Cx < 24 hr >24 hr Cx Comments    1  9/3 [x]  [] [] Initial evaluation    2  9/8 [x] [] []     3 [facility] [address] 9/10 [x] [] []     4  9/13 [x] [] []        [] [] []        [] [] []        [] [] []        [] [] []        [] [] []        [] [] []        [] [] []        [] [] []        [] [] []        [] [] []        [] [] []        [] [] []        [] [] []        [] [] []

## 2021-09-15 ENCOUNTER — HOSPITAL ENCOUNTER (OUTPATIENT)
Dept: PHYSICAL THERAPY | Age: 81
Discharge: HOME | End: 2021-09-15
Payer: MEDICARE

## 2021-09-15 PROCEDURE — 97140 MANUAL THERAPY 1/> REGIONS: CPT

## 2021-09-15 PROCEDURE — 97110 THERAPEUTIC EXERCISES: CPT

## 2021-09-15 NOTE — PROGRESS NOTES
Henny Sanchez  : 1940  [place]  Secondary: Azael Solo Senior Medicare 64[address] [facility] Therapy  [address] [facility], [address]  [phone]   [phone]         OUTPATIENT PHYSICAL THERAPY:Daily Note 9/15/2021      TREATMENT:   PT Patient Time In/Time Out  Time In: 1015  Time Out: 1100      Total Time: 45min  Visit Count:  5     ICD-10: Treatment Diagnosis: M25.511, M25.611, S42.291A  Medication Last Reviewed: 09/15/21      TREATMENT PLAN  Effective Dates: 9/3/2021 TO 2021 (90 days). Frequency/Duration: 2 times a week for 90 Day(s)         Subjective: Patient reports she is doing well, shoulder is feeling about the same   Pain: 3/10    Objective: Therapeutic Exercise: (30min) Done in order to improve strength, ROM and understanding of current condition.     Date:   Date:  9/10 Date:   Date:  9/15   Activity/Exercise       Education       Pulleys x10min X 10 min x10min x6min   Pendulums       Shoulder PROM x25min X 25 min x20min x8min   Rows 3x15 red TB 3 x 15 red TB 3x10 green TB 3x10 blue TB   S/L Shoulder Flexion 3x10 3 x 10     Wand exercises    3x10 (flexion AAROM)   Punches   3x10 4lbs    Alphabets   x2    ER/IR Iso    [id number]   Deltoid Iso    [id number]                                          Manual Therapy: (15min) Done in order to improve joint and soft tissue mobility,reduce muscle guarding, and decrease muscle tone   Date:  9/15 Date:   Date:   Date:     Type Parameters      Joint Mobilization GHJ: A-P/traction grades I-III      Soft Tissue Mobilization           Modalities: (-) Done in order to reduce swelling and pain    Assessment: Patient had improvements in flexion AROM, able to get > 100deg following MT and pulleys    Plan: continue per POC    Future Appointments   Date Time Provider Edda March   2021 10:15 AM Garry Adams PT [facility]   2021 10:15 AM Johnny Rice, PT [facility]   9/24/2021  9:00 AM Sharad Pal MD [facility]   9/27/2021 10:15 AM Johnny Rice, PT [facility]   9/29/2021 10:15 AM Seriveth Rice, PT [facility]   10/4/2021 10:15 AM Seriveth Rice, PT [facility]   10/6/2021 10:15 AM Seriveth Rice, PT [facility]   10/11/2021 10:15 AM Seriveth Rice, PT [facility]   10/13/2021 10:15 AM Ashanti Vasquez [facility]       Beka Slejayro Time:   Units: 3TE      Bismark Mings, PT, DPT, OCS    Visit Approval Visit # Therapist initials Date A NS / Cx < 24 hr >24 hr Cx Comments    1  9/3 [x]  [] [] Initial evaluation    2  9/8 [x] [] []     3 [address] [place] [address] 9/10 [x] [] []     4  9/13 [x] [] []     5  9/15 [x] [] []        [] [] []        [] [] []        [] [] []        [] [] []        [] [] []        [] [] []        [] [] []        [] [] []        [] [] []        [] [] []        [] [] []        [] [] []        [] [] []

## 2021-09-20 ENCOUNTER — HOSPITAL ENCOUNTER (OUTPATIENT)
Dept: PHYSICAL THERAPY | Age: 81
Discharge: HOME | End: 2021-09-20
Payer: MEDICARE

## 2021-09-20 PROCEDURE — 97140 MANUAL THERAPY 1/> REGIONS: CPT

## 2021-09-20 PROCEDURE — 97110 THERAPEUTIC EXERCISES: CPT

## 2021-09-20 NOTE — PROGRESS NOTES
Kiley Brice  : 1940  [place]  Secondary: Abhishek Jara Senior Medicare 6420 [facility] at [address]  [address] [place] [address], [address]  [phone]   [phone]         OUTPATIENT PHYSICAL THERAPY:Daily Note 2021      TREATMENT:   PT Patient Time In/Time Out  Time In: 1020  Time Out: 1115      Total Time: 55min  Visit Count:  6     ICD-10: Treatment Diagnosis: M25.511, M25.611, S42.291A  Medication Last Reviewed: 21      TREATMENT PLAN  Effective Dates: 9/3/2021 TO 2021 (90 days). Frequency/Duration: 2 times a week for 90 Day(s)         Subjective: Patient reports she is doing fine today, no major changes   Pain: 3/10    Objective: Therapeutic Exercise: (40min) Done in order to improve strength, ROM and understanding of current condition.     Date:  9/10 Date:   Date:  9/15 Date:     Activity/Exercise       Education       Pulleys X 10 min x10min x6min x10min   Pendulums       Shoulder PROM X 25 min x20min x8min x5min   Rows 3 x 15 red TB 3x10 green TB 3x10 blue TB 3x10 35lbs   S/L Shoulder Flexion 3 x 10      Wand exercises   3x10 (flexion AAROM)    Punches  3x10 4lbs     Alphabets  x2     ER/IR Iso   [id number]   Deltoid Iso   [id number]   Wall Walks    x10min                                   Manual Therapy: (15min) Done in order to improve joint and soft tissue mobility,reduce muscle guarding, and decrease muscle tone   Date:  9/15 Date:   Date:   Date:     Type Parameters      Joint Mobilization GHJ: A-P/traction grades I-III GHJ: A-P/traction grades I-III     Soft Tissue Mobilization           Modalities: (-) Done in order to reduce swelling and pain    Assessment: Patient able to achieve > 100deg flexion AROM    Plan: continue per POC    Future Appointments   Date Time Provider Edda March   2021 10:15 AM Ayaan Crisostomo PT [facility]   2021  9:15 AM MEHDI Ochoa [facility]   9/27/2021 10:15 AM Sylvester Choid, PT [facility]   9/29/2021 10:15 AM Sylvester Choid, PT [facility]   10/4/2021 10:15 AM Sylvester Choid, PT [facility]   10/6/2021 10:15 AM Sylvester Fletcher, PT [facility]   10/11/2021 10:15 AM Sylvester Fletcher, PT [facility]   10/13/2021 10:15 AM Moriah Minor [facility]       Quintella Calamity Time:   Units: 2TE/ 1MT      Malik Honey, PT, DPT, OCS    Visit Approval Visit # Therapist initials Date A NS / Cx < 24 hr >24 hr Cx Comments    1  9/3 [x]  [] [] Initial evaluation    2  9/8 [x] [] []     3 [facility] [address] 9/10 [x] [] []     4  9/13 [x] [] []     5  9/15 [x] [] []     6  9/20 [x] [] []        [] [] []        [] [] []        [] [] []        [] [] []        [] [] []        [] [] []        [] [] []        [] [] []        [] [] []        [] [] []        [] [] []        [] [] []

## 2021-09-22 ENCOUNTER — HOSPITAL ENCOUNTER (OUTPATIENT)
Dept: PHYSICAL THERAPY | Age: 81
Discharge: HOME | End: 2021-09-22
Payer: MEDICARE

## 2021-09-22 PROCEDURE — 97110 THERAPEUTIC EXERCISES: CPT

## 2021-09-22 NOTE — PROGRESS NOTES
Saul Omid  : 1940  [place]  Secondary: Ham Jernigan [facility] Medicare [address] [facility] at [address], [address]  [phone]   [phone]         OUTPATIENT PHYSICAL THERAPY:Daily Note 2021      TREATMENT:   PT Patient Time In/Time Out  Time In: 1015  Time Out: 1100      Total Time: 45min  Visit Count:  7     ICD-10: Treatment Diagnosis: M25.511, M25.611, S42.291A  Medication Last Reviewed: 21      TREATMENT PLAN  Effective Dates: 9/3/2021 TO 2021 (90 days). Frequency/Duration: 2 times a week for 90 Day(s)         Subjective: Patient reports she is about the same today   Pain: 3/10    Objective: Therapeutic Exercise: (45min) Done in order to improve strength, ROM and understanding of current condition.     Date:   Date:  9/15 Date:   Date:     Activity/Exercise       Education       Pulleys x10min x6min x10min x10min   UBE    x6min   Pendulums       Shoulder PROM x20min x8min x5min x20min   Rows 3x10 green TB 3x10 blue TB 3x10 35lbs 3x10 35lbs   S/L Shoulder Flexion       Wand exercises  3x10 (flexion AAROM)     Punches 3x10 4lbs      Alphabets x2      ER/IR Iso  [id number]    Deltoid Iso  [id number]    Wall Walks   x10min x5min                                   Manual Therapy: (0min) Done in order to improve joint and soft tissue mobility,reduce muscle guarding, and decrease muscle tone   Date:  9/15 Date:   Date:   Date:     Type Parameters      Joint Mobilization GHJ: A-P/traction grades I-III GHJ: A-P/traction grades I-III     Soft Tissue Mobilization           Modalities: (-) Done in order to reduce swelling and pain    Assessment: Patient continues to make small improvements in flexion, ER and IR ROM passively and actively, but still struggles with normal scapular kinematics     Plan: continue per POC    Future Appointments   Date Time Provider [place]   9/24/2021  9:15 AM MEHDI Martin [facility]   9/27/2021 10:15 AM Luwana Evelio, PT [facility]   9/29/2021 10:15 AM Luwana Evelio, PT [facility]   10/4/2021 10:15 AM Luwana Evelio, PT [facility]   10/6/2021 10:15 AM Luwana Evelio, PT [facility]   10/11/2021 10:15 AM Luwana Evelio, PT [facility]   10/13/2021 10:15 AM Janus Hun [facility]       [facility] Time:   Units: 3TE      Silver Merl, PT, DPT, OCS    Visit Approval Visit # Therapist initials Date A NS / Cx < 24 hr >24 hr Cx Comments    1  9/3 [x]  [] [] Initial evaluation    2  9/8 [x] [] []     3 [facility] [address] 9/10 [x] [] []     4  9/13 [x] [] []     5  9/15 [x] [] []     6  9/20 [x] [] []     7  9/22 [x] [] []        [] [] []        [] [] []        [] [] []        [] [] []        [] [] []        [] [] []        [] [] []        [] [] []        [] [] []        [] [] []        [] [] []

## 2021-09-27 ENCOUNTER — HOSPITAL ENCOUNTER (OUTPATIENT)
Dept: PHYSICAL THERAPY | Age: 81
Discharge: HOME | End: 2021-09-27
Payer: MEDICARE

## 2021-09-27 PROCEDURE — 97110 THERAPEUTIC EXERCISES: CPT

## 2021-09-27 PROCEDURE — 97140 MANUAL THERAPY 1/> REGIONS: CPT

## 2021-09-27 NOTE — PROGRESS NOTES
Nicholas Newsome  : 1940  [place]  Secondary: Juan Stevens Senior Medicare [address] [facility] Therapy  [address], [address]  [phone]   [phone]         OUTPATIENT PHYSICAL THERAPY:Daily Note 2021      TREATMENT:   PT Patient Time In/Time Out  Time In: 1015  Time Out: 1110      Total Time: 55min  Visit Count:  8     ICD-10: Treatment Diagnosis: M25.511, M25.611, S42.291A  Medication Last Reviewed: 21      TREATMENT PLAN  Effective Dates: 9/3/2021 TO 2021 (90 days). Frequency/Duration: 2 times a week for 90 Day(s)         Subjective: Patient reports she is a little better today, got cleared by the MD   Pain: 3/10    Objective: Therapeutic Exercise: (30min) Done in order to improve strength, ROM and understanding of current condition.     Date:  9/15 Date:   Date:   Date:     Activity/Exercise       Education       Pulleys x6min x10min x10min x8min   UBE   x6min x6min   Pendulums       Shoulder PROM x8min x5min x20min x4min   Rows 3x10 blue TB 3x10 35lbs 3x10 35lbs 3x10 45lbs   S/L Shoulder Flexion       Wand exercises 3x10 (flexion AAROM)      Punches    3x10 yellow TB (standing)   Alphabets       ER/IR Iso [id number]     Deltoid Iso [id number]     Wall Walks  x10min x5min    Low Row    3x10 green TB   Shoulder ER/IR    3x10 yellow TB                     Manual Therapy: (25min) Done in order to improve joint and soft tissue mobility,reduce muscle guarding, and decrease muscle tone   Date:  9/15 Date:   Date:   Date:     Type Parameters      Joint Mobilization GHJ: A-P/traction grades I-III GHJ: A-P/traction grades I-III GHJ: A-P/traction grades I-III    Soft Tissue Mobilization   Anterior shoulder        Modalities: (-) Done in order to reduce swelling and pain    Assessment: Patient had improvements in ROM following MT and had better mechanics following exercise    Plan: continue per POC    Future Appointments   Date Time Provider Edda March   9/29/2021 10:15 AM Conchita Navarro, PT [facility]   10/4/2021 10:15 AM Conchita Navarro, PT [facility]   10/6/2021 10:15 AM Conchita Navarro, PT [facility]   10/11/2021 10:15 AM Conchita Navarro, PT [facility]   10/13/2021 10:15 AM Robinson Ends [facility]       Rumalda Roch Time:   Units: 2TE/ 2MT      Arlene Arandaer, PT, DPT, OCS    Visit Approval Visit # Therapist initials Date A NS / Cx < 24 hr >24 hr Cx Comments    1  9/3 [x]  [] [] Initial evaluation    2  9/8 [x] [] []     3 [address] 9/10 [x] [] []     4 W 9/13 [x] [] []     5  9/15 [x] [] []     6  9/20 [x] [] []     7  9/22 [x] [] []     8 W 9/27 [x] [] []        [] [] []        [] [] []        [] [] []        [] [] []        [] [] []        [] [] []        [] [] []        [] [] []        [] [] []        [] [] []

## 2021-09-29 ENCOUNTER — HOSPITAL ENCOUNTER (OUTPATIENT)
Dept: PHYSICAL THERAPY | Age: 81
Discharge: HOME | End: 2021-09-29
Payer: MEDICARE

## 2021-09-29 PROCEDURE — 97140 MANUAL THERAPY 1/> REGIONS: CPT

## 2021-09-29 PROCEDURE — 97110 THERAPEUTIC EXERCISES: CPT

## 2021-09-29 NOTE — PROGRESS NOTES
Dinorah Hernandez  : 1940  [place]  Secondary: Sofia Chcaon Senior Medicare [address] [facility] Therapy  [address], [address]  [phone]   [phone]         OUTPATIENT PHYSICAL THERAPY:Daily Note 2021      TREATMENT:   PT Patient Time In/Time Out  Time In: 1024  Time Out: 1105      Total Time: 41min  Visit Count:  9     ICD-10: Treatment Diagnosis: M25.511, M25.611, S42.291A  Medication Last Reviewed: 21      TREATMENT PLAN  Effective Dates: 9/3/2021 TO 2021 (90 days). Frequency/Duration: 2 times a week for 90 Day(s)         Subjective: Patient states she is a little sore today   Pain: 3/10    Objective: Therapeutic Exercise: (18min) Done in order to improve strength, ROM and understanding of current condition.     Date:   Date:   Date:   Date:     Activity/Exercise       Education       Pulleys x10min x10min x8min x5min   UBE  x6min x6min x5mimn   Pendulums       Shoulder PROM x5min x20min x4min    Rows 3x10 35lbs 3x10 35lbs 3x10 45lbs 3x10 45lbs   S/L Shoulder Flexion       Wand exercises       Punches   3x10 yellow TB (standing)    Alphabets       ER/IR Iso [id number]      Deltoid Iso [id number]      Wall Walks x10min x5min     Low Row   3x10 green TB 3x10 green TB   Shoulder ER/IR   3x10 yellow TB ER: 3x10 green TB                     Manual Therapy: (23min) Done in order to improve joint and soft tissue mobility,reduce muscle guarding, and decrease muscle tone   Date:  9/15 Date:   Date:   Date:     Type Parameters      Joint Mobilization GHJ: A-P/traction grades I-III GHJ: A-P/traction grades I-III GHJ: A-P/traction grades I-III GHJ: A-P/traction grades I-III   Soft Tissue Mobilization   Anterior shoulder Ant/Post shoulder       Modalities: (-) Done in order to reduce swelling and pain    Assessment: Patient had improvements in ROM following MT and had better mechanics following exercise    Plan: continue per POC    Future Appointments   Date Time Provider Edda [place]   10/4/2021 10:15 AM Bella Christie, PT [facility]   10/6/2021 10:15 AM Bella Christie, PT [facility]   10/11/2021 10:15 AM Bella Christie, PT [facility]   10/13/2021 10:15 [facility]       Zuly Cantu Time:   Units: 1TE/ 2MT      Shayne Lam, PT, DPT, OCS    Visit Approval Visit # Therapist initials Date A NS / Cx < 24 hr >24 hr Cx Comments    1  9/3 [x]  [] [] Initial evaluation    2  9/8 [x] [] []     3 [facility] 9/10 [x] [] []     4  9/13 [x] [] []     5  9/15 [x] [] []     6  9/20 [x] [] []     7  9/22 [x] [] []     8  9/27 [x] [] []     9  9/29 [x] [] []        [] [] []        [] [] []        [] [] []        [] [] []        [] [] []        [] [] []        [] [] []        [] [] []        [] [] []

## 2021-10-04 ENCOUNTER — HOSPITAL ENCOUNTER (OUTPATIENT)
Dept: PHYSICAL THERAPY | Age: 81
Discharge: HOME | End: 2021-10-04
Payer: MEDICARE

## 2021-10-04 PROCEDURE — 97110 THERAPEUTIC EXERCISES: CPT

## 2021-10-04 NOTE — PROGRESS NOTES
Anastacio Maguire  : 1940  [place]  Secondary: Nichola Carrel Senior Medicare [address] [facility] Therapy  [address], [address]  [phone]   [phone]         OUTPATIENT PHYSICAL THERAPY:Daily Note 10/4/2021      TREATMENT:   PT Patient Time In/Time Out  Time In: 1015  Time Out: 1100      Total Time: 45min  Visit Count:  10     ICD-10: Treatment Diagnosis: M25.511, M25.611, S42.291A  Medication Last Reviewed: 10/04/21      TREATMENT PLAN  Effective Dates: 10/4/2021 TO 2022 (90 days). Frequency/Duration: 2 times a week for 90 Day(s)         Subjective: See Reassessment Note dated 10/4/2021 for details   Pain: 310    Objective: See Reassessment Note dated 10/4/2021 for details      Therapeutic Exercise: (45min) Done in order to improve strength, ROM and understanding of current condition.     Date:   Date:   Date:   Date:  10/4   Activity/Exercise       Education    Reassessment   Pulleys x10min x8min x5min x6min   UBE x6min x6min x5mimn x5min   Pendulums       Shoulder PROM x20min x4min  x10min (w/ traction)  x4min weighted traction 5lbs   Rows 3x10 35lbs 3x10 45lbs 3x10 45lbs    S/L Shoulder Flexion       Wand exercises       Punches  3x10 yellow TB (standing)     Alphabets       ER/IR Iso       Deltoid Iso       Wall Walks x5min      Low Row  3x10 green TB 3x10 green TB 3x10 green TB   Shoulder ER/IR  3x10 yellow TB ER: 3x10 green TB                      Manual Therapy: (0min) Done in order to improve joint and soft tissue mobility,reduce muscle guarding, and decrease muscle tone   Date:  9/15 Date:   Date:   Date:     Type Parameters      Joint Mobilization GHJ: A-P/traction grades I-III GHJ: A-P/traction grades I-III GHJ: A-P/traction grades I-III GHJ: A-P/traction grades I-III   Soft Tissue Mobilization   Anterior shoulder Ant/Post shoulder       Modalities: (-) Done in order to reduce swelling and pain    Assessment: See Reassessment Note dated 10/4/2021 for details    Plan: continue per POC    Future Appointments   Date Time Provider Edda Anca   10/6/2021 10:15 AM Evangelina Nunezt, PT [facility]   10/11/2021 10:15 AM Evangelina Cameron, PT [facility]   10/13/2021 10:15 AM Keny Singletary [facility]       Kamaljit Samples Time:   Units: 3TE      Olimpia Sherman, PT, DPT, OCS    Visit Approval Visit # Therapist initials Date A NS / Cx < 24 hr >24 hr Cx Comments    1 W 9/3 [x]  [] [] Initial evaluation    2  9/8 [x] [] []     3 [address] 9/10 [x] [] []     4 W 9/13 [x] [] []     5 W 9/15 [x] [] []     6  9/20 [x] [] []     7  9/22 [x] [] []     8  9/27 [x] [] []     9 W 9/29 [x] [] []     10 W 10/4 [x] [] [] Reassessment, recert 33/9-4/2/6977       [] [] []        [] [] []        [] [] []        [] [] []        [] [] []        [] [] []        [] [] []        [] [] []

## 2021-10-04 NOTE — PROGRESS NOTES
Annmarie Bynum  : 1940  [place]  Secondary: Gertrude Dias [facility] Medicare [address] [facility] Therapy  [address] [facility], [address]  [phone]   [phone]          OUTPATIENT PHYSICAL THERAPY:Reassessment & Recertification 4629   ICD-10: Treatment Diagnosis: M25.511, M25.611, S42.291A  Precautions/Allergies:   Latex, Adhesive, Pcn [penicillins], and Sulfa (sulfonamide antibiotics)   TREATMENT PLAN:  Effective Dates: 10/4/2021 TO 2022 (90 days). Frequency/Duration: 2 times a week for 90 Day(s) MEDICAL/REFERRING DIAGNOSIS:  Other displaced fracture of upper end of right humerus, initial encounter for closed fracture [S42.291A]  Pain in right shoulder [M25.511]  Stiffness of right shoulder, not elsewhere classified [M25.611]   DATE OF ONSET: 2021  REFERRING PHYSICIAN: Ai Hardwick MD MD Orders: Marko Robert and treat  Return MD Appointment:      INITIAL ASSESSMENT:  Ms. Narciso Meckel presents to physical therapy with MD diagnosis of a left shoulder pain and stiffness following fracture. Pt demonstrated signs and symptoms consistent with this diagnosis. On objective examination, the patient demonstrated deficits in ROM, strength, joint mobility, soft tissue mobility and functional ability. The patient also had increased pain, stiffness. The patient is limited in the following activities: reaching, driving, lifting, ADLs, functional tasks, house work, doing hair. The patient has a good  prognosis for recovery based on the examination findings and may be limited by: N/A. Patient requires skilled physical therapy services in order to return to prior level of function. REASSESSMENT: Mrs. Narciso Meckel presented to physical therapy with MD diagnosis of a left shoulder pain and stiffness following fracture. Pt demonstrated signs and symptoms consistent with this diagnosis.  On objective examination, the patient demonstrated improvements in ROM, strength, joint mobility, soft tissue mobility, functional ability. The patient also has decreased pain These improvements have increased the patient's ability to: reach, lift, dress, drive, ADLs, functional tasks, house work. The patient is still limited in the following activities: doing hair, functional tasks, heavy lifting. The patient has a good prognosis for recovery based on the examination findings and may be limited by: N/A. Patient continues to require skilled physical therapy services in order to return to prior level of function. PROBLEM LIST (Impacting functional limitations):  1. Decreased Strength  2. Decreased ADL/Functional Activities  3. Increased Pain  4. Decreased Activity Tolerance  5. Decreased Flexibility/Joint Mobility  6. Decreased Knowledge of Precautions  7. Decreased Johnson with Home Exercise Program INTERVENTIONS PLANNED: (Treatment may consist of any combination of the following)  1. Cold  2. Heat  3. Home Exercise Program (HEP)  4. Manual Therapy  5. Neuromuscular Re-education/Strengthening  6. Range of Motion (ROM)  7. Therapeutic Activites  8. Therapeutic Exercise/Strengthening     GOALS: (Goals have been discussed and agreed upon with patient.)  Short-Term Functional Goals: Time Frame: 5 weeks  1. Pt will be compliant with progressive HEP-- goal met  2. Pt will improve flexion AROM to > 100deg-- goal met  Discharge Goals: Time Frame: 12 weeks  1. Pt will improve flexion AROM to > 125deg  2. Pt will improve ER AROM to > L3  3. Pt will have flexion MMT > 4/5    OUTCOME MEASURE:   Tool Used: Disabilities of the Arm, Shoulder and Hand (DASH) Questionnaire - Quick Version  Score:  Initial: 31/55  Most Recent: 24/55 (Date: 10/4/2021 )   Interpretation of Score: The DASH is designed to measure the activities of daily living in person's with upper extremity dysfunction or pain. Each section is scored on a 1-5 scale, 5 representing the greatest disability. The scores of each section are added together for a total score of 55. MEDICAL NECESSITY:   · Patient is expected to demonstrate progress in strength, range of motion and symptom levels to return to full function. · Patient continues to require skilled therapy in order to reach goals  REASON FOR SERVICES/OTHER COMMENTS:  · Patient continues to require skilled physical therapy in order to return to prior level of function      Rehabilitation Potential For Stated Goals: Good  Regarding Dat Rosado's therapy, I certify that the treatment plan above will be carried out by a therapist or under their direction. Thank you for this referral,  Sathish Brian PT, DPT, OCS  Referring Physician Signature: Jaqueline Farnsworth MD                 PAIN/SUBJECTIVE:   Initial:   3/10 Post Session:  1/10   HISTORY:   History of Injury/Illness (Reason for Referral):  Pt fell on 6/28/2021 while walking her dog. Fractured proximal humerus. Fx is healing w/o surgery and pt is doing well overall. Has significant stiffness and loss of function in the R arm due to extended immobility and inactivity. Dull aching pain, denies any numbness/tingling and any pain elsewhere in the arm or neck  Past Medical History/Comorbidities:   Ms. 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  She also has no past medical history of Adverse effect of anesthesia, Aneurysm ([address] Arrhythmia, Asthma, Autoimmune disease ([address] CAD (coronary artery disease), Cancer ([address] Chronic kidney disease, Coagulation defects, COPD, Diabetes ([address] Difficult intubation, Endocarditis, Heart failure ([address] Liver disease, Malignant hyperthermia due to anesthesia, Other ill-defined conditions(799.89), Pseudocholinesterase deficiency, Psychiatric disorder, PUD (peptic ulcer disease), Rheumatic fever, Seizures ([facility] [address] Stroke ([facility] [address] Thromboembolus ([facility] [address] Thyroid disease, Unspecified adverse effect of anesthesia, or Unspecified sleep apnea. Ms. Hopkins Monday  has a past surgical history that includes hx gyn; hx heent; hx knee replacement (Right, 2010); pr breast surgery procedure unlisted; us guided core breast biopsy (over 20 yrs ago); and rohit biopsy breast stereotactic (over 20 yrs ago). Social History/Living Environment:     Lives alone  Prior Level of Function/Work/Activity:  Walking, driving, house work, yard work  Dominant Side:         RIGHT  Personal Factors:          Sex:  female        Age:  68 y.o. Ambulatory/Rehab Services H2 Model Falls Risk Assessment   Risk Factors:       (5)  History of Recent Falls [w/in 3 months] Ability to Rise from Chair:       (0)  Ability to rise in a single movement   Falls Prevention Plan:       No modifications necessary   Total: (5 or greater = High Risk): 5   ©2010 [facility] of [employer]. All Rights Reserved. [facility] Patent #[id number]. Federal Law prohibits the replication, distribution or use without written permission from [facility] [employer]   Current Medications:       Current Outpatient Medications:     colchicine 0.6 mg tablet, Pt to take one po every hour until symptoms improved or pt has diarrhea, Disp: 10 Tablet, Rfl: 1    mirabegron ER (MYRBETRIQ) 50 mg ER tablet, Take 50 mg by mouth daily. Take morning of surgery per anesthesia guidelines. Indications: URINARY URGE INCONTINENCE, Disp: , Rfl:     omeprazole (PRILOSEC) 40 mg capsule, Take 40 mg by mouth daily. Take morning of surgery per anesthesia guidelines. , Disp: , Rfl:     simvastatin (ZOCOR) 40 mg tablet, Take 40 mg by mouth daily. Per anesthesia protocol:instructed to take am of surgery. , Disp: , Rfl:     lisinopril-hydrochlorothiazide (PRINZIDE, ZESTORETIC) 20-12.5 mg per tablet, Take 1 Tab by mouth daily. , Disp: , Rfl:     docusate sodium (COLACE) 100 mg capsule, Take 100 mg by mouth daily. , Disp: , Rfl:    Date Last Reviewed:  10/04/21     Number of Personal Factors/Comorbidities that affect the Plan of Care: 1-2: MODERATE COMPLEXITY   EXAMINATION:   *= Painful     WNL= within normal limits     NT= not tested    Observation  Posture: Pt swings arm while walking    ROM  Shoulder   Right Left   Flexion 108 162   ABD 95 165   IR L4 T10   ER T1 T4       Strength (measured on MMT scale from 0-5/5)   Right Left   Shoulder         Flexion 4* 5       ABD 4-* 5       IR 4+ 5       ER 4-* 5       Joint Mobility/Soft Tissue   Description   Joint Mobility Normal A-P, hypomobile inferior   Soft Tissue Mobility TTP around posterior RTC muscles and UT          Body Structures Involved:  1. Bones  2. Joints  3. Muscles  4. Ligaments Body Functions Affected:  1. Sensory/Pain  2. Neuromusculoskeletal  3. Movement Related Activities and Participation Affected:  1. General Tasks and Demands  2. Self Care  3. Domestic Life  4. Interpersonal Interactions and Relationships  5.  Community, Social and [place]   Number of elements (examined above) that affect the Plan of Care: 3: MODERATE COMPLEXITY   CLINICAL PRESENTATION:   Presentation: Stable and uncomplicated: LOW COMPLEXITY   CLINICAL DECISION MAKING:   Use of outcome tool(s) and clinical judgement create a POC that gives a: Clear prediction of patient's progress: LOW COMPLEXITY     Lindsay Or PT, DPT, OCS

## 2021-10-05 ENCOUNTER — TRANSCRIBE ORDER (OUTPATIENT)
Dept: SCHEDULING | Age: 81
End: 2021-10-05

## 2021-10-05 DIAGNOSIS — Z12.31 VISIT FOR SCREENING MAMMOGRAM: Primary | ICD-10-CM

## 2021-10-06 ENCOUNTER — HOSPITAL ENCOUNTER (OUTPATIENT)
Dept: PHYSICAL THERAPY | Age: 81
Discharge: HOME | End: 2021-10-06
Payer: MEDICARE

## 2021-10-06 PROCEDURE — 97110 THERAPEUTIC EXERCISES: CPT

## 2021-10-06 NOTE — PROGRESS NOTES
[facility]  : 1940  [place]  Secondary: Jody Gusman [facility] Medicare [address] [facility] at [address]  [address] [place] [address], [address]  [phone]   [phone]         OUTPATIENT PHYSICAL THERAPY:Daily Note 10/6/2021      TREATMENT:   PT Patient Time In/Time Out  Time In: 1020  Time Out: 1115      Total Time: 55min  Visit Count:  11     ICD-10: Treatment Diagnosis: M25.511, M25.611, S42.291A  Medication Last Reviewed: 10/06/21      TREATMENT PLAN  Effective Dates: 10/4/2021 TO 2022 (90 days). Frequency/Duration: 2 times a week for 90 Day(s)         Subjective: Pt states she is doing ok today   Pain: 3/10    Objective: Therapeutic Exercise: (55min) Done in order to improve strength, ROM and understanding of current condition.     Date:   Date:   Date:  10/4 Date:  10/6   Activity/Exercise       Education   Reassessment    Pulleys x8min x5min x6min x7min   UBE x6min x5mimn x5min    Pendulums       Shoulder PROM x4min  x10min (w/ traction)  x4min weighted traction 5lbs    Rows 3x10 45lbs 3x10 45lbs  3x15 45lbs   Cross Body ADD Stretch    x4min   Wall Washing    x4min   Punches 3x10 yellow TB (standing)      Alphabets       ER/IR Iso       Shoulder Flexion    3x10 red TB   Wall Walks       Low Row 3x10 green TB 3x10 green TB 3x10 green TB 3x15 green TB   Shoulder ER/IR 3x10 yellow TB ER: 3x10 green TB  · ER: 3x15 red TB  · IR: 3x15 green TB                     Manual Therapy: (0min) Done in order to improve joint and soft tissue mobility,reduce muscle guarding, and decrease muscle tone   Date:  9/15 Date:   Date:   Date:     Type Parameters      Joint Mobilization GHJ: A-P/traction grades I-III GHJ: A-P/traction grades I-III GHJ: A-P/traction grades I-III GHJ: A-P/traction grades I-III   Soft Tissue Mobilization   Anterior shoulder Ant/Post shoulder       Modalities: (-) Done in order to reduce swelling and pain    Assessment: Pt able to increase reps or resistance on several exercises.  Has improved ROM into ER    Plan: continue per POC    Future Appointments   Date Time Provider Edda Anca   10/11/2021 10:15 AM [facility]   10/13/2021 10:15 AM [facility]   9/30/2022 10:15 AM S[facility] BI ROOM 4 [facility]       Unbilled Time:   Units: Sepideh Freeman, PT, DPT, OCS    Visit Approval Visit # Therapist initials Date A NS / Cx < 24 hr >24 hr Cx Comments    1 W 9/3 [x]  [] [] Initial evaluation    2 W 9/8 [x] [] []     3 [address] 9/10 [x] [] []     4 W 9/13 [x] [] []     5 W 9/15 [x] [] []     6 W 9/20 [x] [] []     7 W 9/22 [x] [] []     8 W 9/27 [x] [] []     9 W 9/29 [x] [] []     10 W 10/4 [x] [] [] Reassessment, recert 87/4-5/6/5364    11 W 10/6 [x] [] []        [] [] []        [] [] []        [] [] []        [] [] []        [] [] []        [] [] []        [] [] []

## 2021-10-11 ENCOUNTER — HOSPITAL ENCOUNTER (OUTPATIENT)
Dept: PHYSICAL THERAPY | Age: 81
Discharge: HOME | End: 2021-10-11
Payer: MEDICARE

## 2021-10-11 PROCEDURE — 97140 MANUAL THERAPY 1/> REGIONS: CPT

## 2021-10-11 PROCEDURE — 97110 THERAPEUTIC EXERCISES: CPT

## 2021-10-11 NOTE — PROGRESS NOTES
Lise Butterfieldbailey  : 1940  [place]  Secondary: [employer] Floor Senior Medicare [address] [facility] Therapy  [address], [address]  [phone]   [phone]         OUTPATIENT PHYSICAL THERAPY:Daily Note 10/11/2021      TREATMENT:   PT Patient Time In/Time Out  Time In: 1015  Time Out: 1100      Total Time: 45min  Visit Count:  12     ICD-10: Treatment Diagnosis: M25.511, M25.611, S42.291A  Medication Last Reviewed: 10/11/21      TREATMENT PLAN  Effective Dates: 10/4/2021 TO 2022 (90 days). Frequency/Duration: 2 times a week for 90 Day(s)         Subjective: Pt states she is feeling fine today   Pain: 3/10    Objective: Therapeutic Exercise: (15min) Done in order to improve strength, ROM and understanding of current condition.     Date:   Date:  10/4 Date:  10/6    Activity/Exercise       Education  Reassessment     Pulleys x5min x6min x7min    UBE x5mimn x5min  x6min   Pendulums       Shoulder PROM  x10min (w/ traction)  x4min weighted traction 5lbs     Rows 3x10 45lbs  3x15 45lbs 4x10 45lbs   Cross Body ADD Stretch   x4min    Wall Washing   x4min    Punches       Alphabets       ER/IR Iso       Shoulder Flexion   3x10 red TB 3x10 red TB   Wall Walks       Low Row 3x10 green TB 3x10 green TB 3x15 green TB    Shoulder ER/IR ER: 3x10 green TB  · ER: 3x15 red TB  · IR: 3x15 green TB · ER: 3x10 red TB                     Manual Therapy: (30min) Done in order to improve joint and soft tissue mobility,reduce muscle guarding, and decrease muscle tone   Date:   Date:   Date:   Date:  10/11   Type       Joint Mobilization GHJ: A-P/traction grades I-III GHJ: A-P/traction grades I-III GHJ: A-P/traction grades I-III GHJ: A-P/traction grades I-III   Soft Tissue Mobilization  Anterior shoulder Ant/Post shoulder        Modalities: (-) Done in order to reduce swelling and pain    Assessment: Pt continues to progress ROM slightly, less pain    Plan: continue per POC    Future Appointments   Date Time Provider Edda Anca   10/13/2021 10:15 AM Adrianne [facility]   10/20/2021 10:15 AM [facility]   10/22/2021 11:00 AM Tyron Lesch, PT [facility]   9/30/2022 10:15 AM SFE F[facility] BI ROOM 4 [facility] SFE       Unbilled Time:   Units: 2MT/ 1TE      Ma Sandra, PT, DPT, OCS    Visit Approval Visit # Therapist initials Date A NS / Cx < 24 hr >24 hr Cx Comments    1  9/3 [x]  [] [] Initial evaluation    2  9/8 [x] [] []     3 [address] 9/10 [x] [] []     4  9/13 [x] [] []     5  9/15 [x] [] []     6  9/20 [x] [] []     7  9/22 [x] [] []     8  9/27 [x] [] []     9  9/29 [x] [] []     10 W 10/4 [x] [] [] Reassessment, recert 71/3-7/0/4854    11 W 10/6 [x] [] []     12 W 10/11 [x] [] []        [] [] []        [] [] []        [] [] []        [] [] []        [] [] []        [] [] []

## 2021-10-13 ENCOUNTER — HOSPITAL ENCOUNTER (OUTPATIENT)
Dept: PHYSICAL THERAPY | Age: 81
Discharge: HOME | End: 2021-10-13
Payer: MEDICARE

## 2021-10-13 PROCEDURE — 97110 THERAPEUTIC EXERCISES: CPT

## 2021-10-13 PROCEDURE — 97140 MANUAL THERAPY 1/> REGIONS: CPT

## 2021-10-13 NOTE — PROGRESS NOTES
Sierra Hernandes  : 1940  [place]  Secondary: Bekah Escamilla Senior Medicare 6420 [facility] at [place] [address] Therapy  [address], [address]  [phone]   [phone]         OUTPATIENT PHYSICAL THERAPY:Daily Note 10/13/2021      TREATMENT:   PT Patient Time In/Time Out  Time In: 1015  Time Out: 1100      Total Time: 45min  Visit Count:  13     ICD-10: Treatment Diagnosis: M25.511, M25.611, S42.291A  Medication Last Reviewed: 10/13/21      TREATMENT PLAN  Effective Dates: 10/4/2021 TO 2022 (90 days). Frequency/Duration: 2 times a week for 90 Day(s)         Subjective: Pt states she is feeling still having some discomfort with certain movements.  Pain: 3/10    Objective: Therapeutic Exercise: (15min) Done in order to improve strength, ROM and understanding of current condition.     Date:  10/4 Date:  10/6  Date  10/13   Activity/Exercise       Education Reassessment      Pulleys x6min x7min  X 5 min   UBE x5min  x6min X 6 min   Pendulums       Shoulder PROM x10min (w/ traction)  x4min weighted traction 5lbs      Rows  3x15 45lbs 4x10 45lbs 4x10 45lbs   Cross Body ADD Stretch  x4min     Wall Washing  x4min     Punches       Alphabets       ER/IR Iso       Shoulder Flexion  3x10 red TB 3x10 red TB 3x10 red TB   Wall Walks       Low Row 3x10 green TB 3x15 green TB     Shoulder ER/IR  · ER: 3x15 red TB  · IR: 3x15 green TB · ER: 3x10 red TB · ER: 3x10 red TB                     Manual Therapy: (30min) Done in order to improve joint and soft tissue mobility,reduce muscle guarding, and decrease muscle tone   Date:   Date:  10/11 Date  10/13   Type      Joint Mobilization GHJ: A-P/traction grades I-III GHJ: A-P/traction grades I-III GHJ: A-P/traction grades I-III   Soft Tissue Mobilization Ant/Post shoulder  Ant/Post shoulder       Modalities: (-) Done in order to reduce swelling and pain    Assessment: Pt continues to progress ROM slightly, less pain. Instructed patient to continue home exercises as directed.     Plan: continue per POC    Future Appointments   Date Time Provider Edda Anca   10/20/2021 10:15 AM Estephanie Kline, PT [facility]   10/22/2021 11:00 AM Estephanie Kline, PT [facility]   12/2/2021 10:15 AM Estephanie Kline, PT [facility]   9/30/2022 10:15 AM [facility] ROOM 4 [facility]       Unbilled Time:   Units: 2MT/ [id number] [place] [address]    Visit Approval Visit # Therapist initials Date A NS / Cx < 24 hr >24 hr Cx Comments    1  9/3 [x]  [] [] Initial evaluation    2  9/8 [x] [] []     3 [address] [place] [address] 9/10 [x] [] []     4  9/13 [x] [] []     5  9/15 [x] [] []     6  9/20 [x] [] []     7  9/22 [x] [] []     8  9/27 [x] [] []     9  9/29 [x] [] []     10 W 10/4 [x] [] [] Reassessment, recert 03/1-7/5/3574    11  10/6 [x] [] []     12  10/11 [x] [] []     13 [address] [place] [address] 10/13 [x] [] []        [] [] []        [] [] []        [] [] []        [] [] []        [] [] []

## 2021-10-20 ENCOUNTER — HOSPITAL ENCOUNTER (OUTPATIENT)
Dept: PHYSICAL THERAPY | Age: 81
Discharge: HOME | End: 2021-10-20
Payer: MEDICARE

## 2021-10-20 PROCEDURE — 97110 THERAPEUTIC EXERCISES: CPT

## 2021-10-20 PROCEDURE — 97140 MANUAL THERAPY 1/> REGIONS: CPT

## 2021-10-20 NOTE — PROGRESS NOTES
Ramez Castillo  : 1940  [place]  Secondary: Meaghan Hawkins Senior Medicare 64[address] [facility] at [facility] Therapy  [address], [address]  [phone]   [phone]         OUTPATIENT PHYSICAL THERAPY:Daily Note 10/20/2021      TREATMENT:   PT Patient Time In/Time Out  Time In: 1015  Time Out: 1100      Total Time: 45min  Visit Count:  14     ICD-10: Treatment Diagnosis: M25.511, M25.611, S42.291A  Medication Last Reviewed: 10/20/21      TREATMENT PLAN  Effective Dates: 10/4/2021 TO 2022 (90 days). Frequency/Duration: 2 times a week for 90 Day(s)         Subjective: Patient reports feeling pretty good today.  Pain: 2/10    Objective: Therapeutic Exercise: (15min) Done in order to improve strength, ROM and understanding of current condition. Date  10/13 Date  10/20   Activity/Exercise      Education      Pulleys  X 5 min X 5 min   UBE x6min X 6 min X 6 min   Pendulums      Shoulder PROM      Rows 4x10 45lbs 4x10 45lbs 4x10 45lbs   Cross Body ADD Stretch      Wall Washing      Punches      Alphabets      ER/IR Iso      Shoulder Flexion 3x10 red TB 3x10 red TB 3x10 red TB   Wall Walks      Low Row      Shoulder ER/IR · ER: 3x10 red TB · ER: 3x10 red TB · ER: 3x10 red TB                   Manual Therapy: (30min) Done in order to improve joint and soft tissue mobility,reduce muscle guarding, and decrease muscle tone   Date:  10/11 Date  10/13 Date  10/20   Type      Joint Mobilization GHJ: A-P/traction grades I-III GHJ: A-P/traction grades I-III GHJ: A-P/traction grades I-III   Soft Tissue Mobilization  Ant/Post shoulder Ant/Post shoulder       Modalities: (-) Done in order to reduce swelling and pain    Assessment: Pt continues to progress ROM slightly, less pain. Patient is scheduled to be out of town in the next couple of weeks. Instructed patient to continue home exercises as directed.     Plan: continue per POC    Future Appointments   Date Time Provider Edda March   10/22/2021 11:00 AM Conchita Navarro, PT [facility]   12/2/2021 10:15 AM Conchita Navarro, PT [facility]   9/30/2022 10:15 AM SFE [facility] BI ROOM 4 [facility]FE       Unbilled Time:   Units: 2MT/ [address] [facility], [facility]    Visit Approval Visit # Therapist initials Date A NS / Cx < 24 hr >24 hr Cx Comments    1 W 9/3 [x]  [] [] Initial evaluation    2 W 9/8 [x] [] []     3 [address] 9/10 [x] [] []     4  9/13 [x] [] []     5  9/15 [x] [] []     6  9/20 [x] [] []     7  9/22 [x] [] []     8  9/27 [x] [] []     9  9/29 [x] [] []     10  10/4 [x] [] [] Reassessment, recert 03/3-5/5/5366    11  10/6 [x] [] []     12  10/11 [x] [] []     13 [address] 10/13 [x] [] []     14 [address] 10/20 [x] [] []        [] [] []        [] [] []        [] [] []        [] [] []

## 2021-10-22 ENCOUNTER — HOSPITAL ENCOUNTER (OUTPATIENT)
Dept: PHYSICAL THERAPY | Age: 81
Discharge: HOME | End: 2021-10-22
Payer: MEDICARE

## 2021-10-22 PROCEDURE — 97110 THERAPEUTIC EXERCISES: CPT

## 2021-10-22 PROCEDURE — 97140 MANUAL THERAPY 1/> REGIONS: CPT

## 2021-10-22 NOTE — PROGRESS NOTES
Isaiah Villa  : 1940  [place]  Secondary: Raheem Moreno Senior Medicare 6420 [facility] Therapy  [address], [address]  [phone]   [phone]         OUTPATIENT PHYSICAL THERAPY:Daily Note 10/22/2021      TREATMENT:   PT Patient Time In/Time Out  Time In: 1015  Time Out: 1100      Total Time: 45min  Visit Count:  15     ICD-10: Treatment Diagnosis: M25.511, M25.611, S42.291A  Medication Last Reviewed: 10/22/21      TREATMENT PLAN  Effective Dates: 10/4/2021 TO 2022 (90 days). Frequency/Duration: 2 times a week for 90 Day(s)         Subjective: Patient reports feeling pretty good still some tightness with certain movements.  Pain: 2/10    Objective: Therapeutic Exercise: (15min) Done in order to improve strength, ROM and understanding of current condition. Date  10/13 Date  10/20 Date  10/22   Activity/Exercise      Education      Pulleys X 5 min X 5 min X 5 min   UBE X 6 min X 6 min X 6 min   Pendulums      Shoulder PROM      Rows 4x10 45lbs 4x10 45lbs 4x10 45lbs   Cross Body ADD Stretch   2 x 12 red TB   Wall Washing   2 x 12    Punches      Alphabets   2 x 12 red TB   ER/IR Iso      Shoulder Flexion 3x10 red TB 3x10 red TB 3 x 10 red TB   Wall Walks      Low Row      Shoulder ER/IR · ER: 3x10 red TB · ER: 3x10 red TB · ER: 3x10 red TB                   Manual Therapy: (15min) Done in order to improve joint and soft tissue mobility,reduce muscle guarding, and decrease muscle tone   Date  10/13 Date  10/20 Date  10/22   Type      Joint Mobilization GHJ: A-P/traction grades I-III GHJ: A-P/traction grades I-III GHJ: A-P/traction grades I-III   Soft Tissue Mobilization Ant/Post shoulder Ant/Post shoulder        Modalities: (-) Done in order to reduce swelling and pain    Assessment: Pt continues to progress ROM slightly, less pain.  Reviewed home exercises and suggested she do them at least once a day while she is out of town to maintain therapy gains.     Plan: continue per POC    Future Appointments   Date Time Provider Edda March   12/2/2021 10:15 AM Angelika Masters, PT [facility]   9/30/2022 10:15 AM SANJAY ELIZALDE BI ROOM 4 [facility]       Unbilled Time:   Units: 2MT/ [address] [facility], [facility]    Visit Approval Visit # Therapist initials Date A NS / Cx < 24 hr >24 hr Cx Comments    1  9/3 [x]  [] [] Initial evaluation    2  9/8 [x] [] []     3 1970 [facility] 9/10 [x] [] []     4  9/13 [x] [] []     5  9/15 [x] [] []     6  9/20 [x] [] []     7  9/22 [x] [] []     8  9/27 [x] [] []     9  9/29 [x] [] []     10  10/4 [x] [] [] Reassessment, recert 98/6-9/9/6856    11  10/6 [x] [] []     12  10/11 [x] [] []     13 [address] [facility] 10/13 [x] [] []     14 1970 [facility] 10/20 [x] [] []     15 1970 [facility] 10/22 [x] [] []        [] [] []        [] [] []        [] [] []

## 2021-12-02 ENCOUNTER — HOSPITAL ENCOUNTER (OUTPATIENT)
Dept: PHYSICAL THERAPY | Age: 81
Discharge: HOME | End: 2021-12-02
Payer: MEDICARE

## 2021-12-02 PROCEDURE — 97110 THERAPEUTIC EXERCISES: CPT

## 2021-12-02 NOTE — PROGRESS NOTES
Roxana Spicer  : 1940  [place]  Secondary: Courtney Parikh Senior Medicare 64[address] [facility] at [facility] Therapy  [address] [place] [address], [address]  [phone]   [phone]         OUTPATIENT PHYSICAL THERAPY:Daily Note 2021      TREATMENT:   PT Patient Time In/Time Out  Time In: 1015  Time Out: 1045      Total Time: 30min  Visit Count:  16     ICD-10: Treatment Diagnosis: M25.511, M25.611, S42.291A  Medication Last Reviewed: 21      TREATMENT PLAN  Effective Dates: 10/4/2021 TO 2022 (90 days). Frequency/Duration: 2 times a week for 90 Day(s)         Subjective: See Discharge Summary dated 2021 for details   Pain: 2/10    Objective: See Discharge Summary dated 2021 for details      Therapeutic Exercise: (30min) Done in order to improve strength, ROM and understanding of current condition.     Date  10/13 Date  10/20 Date  10/22 Date:     Activity/Exercise       Education    D/C assessment, HEP demo and review   Pulleys X 5 min X 5 min X 5 min    UBE X 6 min X 6 min X 6 min    Pendulums       Shoulder PROM       Rows 4x10 45lbs 4x10 45lbs 4x10 45lbs    Cross Body ADD Stretch   2 x 12 red TB    Wall Washing   2 x 12     Punches       Alphabets   2 x 12 red TB    ER/IR Iso       Shoulder Flexion 3x10 red TB 3x10 red TB 3 x 10 red TB    Wall Walks       Low Row       Shoulder ER/IR · ER: 3x10 red TB · ER: 3x10 red TB · ER: 3x10 red TB ·                      Manual Therapy: (15min) Done in order to improve joint and soft tissue mobility,reduce muscle guarding, and decrease muscle tone   Date  10/13 Date  10/20 Date  10/22   Type      Joint Mobilization GHJ: A-P/traction grades I-III GHJ: A-P/traction grades I-III GHJ: A-P/traction grades I-III   Soft Tissue Mobilization Ant/Post shoulder Ant/Post shoulder        Modalities: (-) Done in order to reduce swelling and pain    Assessment: See Discharge Summary dated 12/2/2021 for details.     Plan: See Discharge Summary dated 12/2/2021 for details    Future Appointments   Date Time Provider Edda March   9/30/2022 10:15 AM [facility] ROOM 4 [facility]       Unbilled Time:   Units: 2TE      Tang Garciaford, PT, PTA    Visit Approval Visit # Therapist initials Date A NS / Cx < 24 hr >24 hr Cx Comments    1 WJ 9/3 [x]  [] [] Initial evaluation    2 WJ 9/8 [x] [] []     3 [address] 9/10 [x] [] []     4 WJ 9/13 [x] [] []     5 WJ 9/15 [x] [] []     6 WJ 9/20 [x] [] []     7 J 9/22 [x] [] []     8 WJ 9/27 [x] [] []     9 WJ 9/29 [x] [] []     10 WJ 10/4 [x] [] [] Reassessment, recert 70/8-9/0/3157    11 WJ 10/6 [x] [] []     12 WJ 10/11 [x] [] []     13 [address] 10/13 [x] [] []     14 [address] 10/20 [x] [] []     15 [address] 10/22 [x] [] []     12 WJ 12/2 [x] [] []        [] [] []        [] [] []

## 2021-12-02 NOTE — THERAPY DISCHARGE
Priya Crenshaw  : 1940  [place]  Secondary: [place] [employer] [id number] [address] [facility]  [address], [address]  [phone]   [phone]          OUTPATIENT PHYSICAL THERAPY:Discharge  2021   ICD-10: Treatment Diagnosis: M25.511, M25.611, S42.291A  Precautions/Allergies:   Latex, Adhesive, Pcn [penicillins], and Sulfa (sulfonamide antibiotics)   TREATMENT PLAN:  Effective Dates: Discharge MEDICAL/REFERRING DIAGNOSIS:  Other displaced fracture of upper end of right humerus, initial encounter for closed fracture [S42.291A]  Pain in right shoulder [M25.511]  Stiffness of right shoulder, not elsewhere classified [M25.611]   DATE OF ONSET: 2021  REFERRING PHYSICIAN: María Elena Milton MD MD Orders: Jn Moody and treat  Return MD Appointment:      INITIAL ASSESSMENT:  Ms. Jero Mcfarland presents to physical therapy with MD diagnosis of a left shoulder pain and stiffness following fracture. Pt demonstrated signs and symptoms consistent with this diagnosis. On objective examination, the patient demonstrated deficits in ROM, strength, joint mobility, soft tissue mobility and functional ability. The patient also had increased pain, stiffness. The patient is limited in the following activities: reaching, driving, lifting, ADLs, functional tasks, house work, doing hair. The patient has a good  prognosis for recovery based on the examination findings and may be limited by: N/A. Patient requires skilled physical therapy services in order to return to prior level of function. REASSESSMENT: Mrs. Jero Mcfarland presented to physical therapy with MD diagnosis of a left shoulder pain and stiffness following fracture. Pt demonstrated signs and symptoms consistent with this diagnosis. On objective examination, the patient demonstrated improvements in ROM, strength, joint mobility, soft tissue mobility, functional ability.  The patient also has decreased pain These improvements have increased the patient's ability to: reach, lift, dress, drive, ADLs, functional tasks, house work. The patient is still limited in the following activities: doing hair, functional tasks, heavy lifting. The patient has a good prognosis for recovery based on the examination findings and may be limited by: N/A. Patient continues to require skilled physical therapy services in order to return to prior level of function. DISCHARGE SUMMARY: Mrs. Hopkins Monday presented to physical therapy with MD diagnosis of a left shoulder pain and stiffness. Pt demonstrated signs and symptoms consistent with this diagnosis. On objective examination, the patient demonstrated improvements in ROM, strength, joint mobility, soft tissue mobility, functional ability. The patient also has decreased pain These improvements have increased the patient's ability to: ADLs, functional tasks, dress, drive, house work. The patient is still limited in the following activities: heavy lifting. Patient is being discharged from therapy due to achieving goals and being independent with HEP        PROBLEM LIST (Impacting functional limitations):  1. Decreased Strength  2. Decreased ADL/Functional Activities  3. Increased Pain  4. Decreased Activity Tolerance  5. Decreased Flexibility/Joint Mobility  6. Decreased Knowledge of Precautions  7. Decreased [place] with Home Exercise Program INTERVENTIONS PLANNED: (Treatment may consist of any combination of the following)  1. Cold  2. Heat  3. Home Exercise Program (HEP)  4. Manual Therapy  5. Neuromuscular Re-education/Strengthening  6. Range of Motion (ROM)  7. Therapeutic Activites  8. Therapeutic Exercise/Strengthening     GOALS: (Goals have been discussed and agreed upon with patient.)  Short-Term Functional Goals: Time Frame: 5 weeks  1. Pt will be compliant with progressive HEP-- goal met  2.  Pt will improve flexion AROM to > 100deg-- goal met  Discharge Goals: Time Frame: 12 weeks  1. Pt will improve flexion AROM to > 125deg-- goal met  2. Pt will improve ER AROM to > L3-- goal met  3. Pt will have flexion MMT > 4/5-- goal met    OUTCOME MEASURE:   Tool Used: Disabilities of the Arm, Shoulder and Hand (DASH) Questionnaire - Quick Version  Score:  Initial: 31/55  Most Recent: 18/55 (Date: 12/2/2021 )   Interpretation of Score: The DASH is designed to measure the activities of daily living in person's with upper extremity dysfunction or pain. Each section is scored on a 1-5 scale, 5 representing the greatest disability. The scores of each section are added together for a total score of 55. MEDICAL NECESSITY:   · Patient is being discharged  REASON FOR SERVICES/OTHER COMMENTS:  · Patient is being discharged      Rehabilitation Potential For Stated Goals: Good  Regarding Norleen Goodell Calvert's therapy, I certify that the treatment plan above will be carried out by a therapist or under their direction. Thank you for this referral,  Jethro Houser PT, DPT, OCS  Referring Physician Signature: Sabina Salcido MD                 PAIN/SUBJECTIVE:   Initial:   3/10 Post Session:  1/10   HISTORY:   History of Injury/Illness (Reason for Referral):  Pt fell on 6/28/2021 while walking her dog. Fractured proximal humerus. Fx is healing w/o surgery and pt is doing well overall. Has significant stiffness and loss of function in the R arm due to extended immobility and inactivity. Dull aching pain, denies any numbness/tingling and any pain elsewhere in the arm or neck  Past Medical History/Comorbidities:   Ms. 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  She also has no past medical history of Adverse effect of anesthesia, Aneurysm ([address] Arrhythmia, Asthma, Autoimmune disease ([address] CAD (coronary artery disease), Cancer ([address] Chronic kidney disease, Coagulation defects, COPD, Diabetes ([address] Difficult intubation, Endocarditis, Heart failure ([address] Liver disease, Malignant hyperthermia due to anesthesia, Other ill-defined conditions(799.89), Pseudocholinesterase deficiency, Psychiatric disorder, PUD (peptic ulcer disease), Rheumatic fever, Seizures ([address] Stroke ([address] Thromboembolus ([address] Thyroid disease, Unspecified adverse effect of anesthesia, or Unspecified sleep apnea. Ms. Doreen Buck  has a past surgical history that includes hx gyn; hx heent; hx knee replacement (Right, 2010); pr breast surgery procedure unlisted; us guided core breast biopsy (over 20 yrs ago); and rohit biopsy breast stereotactic (over 20 yrs ago). Social History/Living Environment:     Lives alone  Prior Level of Function/Work/Activity:  Walking, driving, house work, yard work  Dominant Side:         RIGHT  Personal Factors:          Sex:  female        Age:  80 y.o. Ambulatory/Rehab Services H2 Model Falls Risk Assessment   Risk Factors:       (5)  History of Recent Falls [w/in 3 months] Ability to Rise from Chair:       (0)  Ability to rise in a single movement   Falls Prevention Plan:       No modifications necessary   Total: (5 or greater = High Risk): 5   ©2010 [facility] of [employer]. All Rights Reserved. [facility] Patent #[id number]. Federal Law prohibits the replication, distribution or use without written permission from [facility] [employer]   Current Medications:       Current Outpatient Medications:     colchicine 0.6 mg tablet, Pt to take one po every hour until symptoms improved or pt has diarrhea, Disp: 10 Tablet, Rfl: 1    mirabegron ER (MYRBETRIQ) 50 mg ER tablet, Take 50 mg by mouth daily. Take morning of surgery per anesthesia guidelines.   Indications: URINARY URGE INCONTINENCE, Disp: , Rfl:    omeprazole (PRILOSEC) 40 mg capsule, Take 40 mg by mouth daily. Take morning of surgery per anesthesia guidelines. , Disp: , Rfl:     simvastatin (ZOCOR) 40 mg tablet, Take 40 mg by mouth daily. Per anesthesia protocol:instructed to take am of surgery. , Disp: , Rfl:     lisinopril-hydrochlorothiazide (PRINZIDE, ZESTORETIC) 20-12.5 mg per tablet, Take 1 Tab by mouth daily. , Disp: , Rfl:     docusate sodium (COLACE) 100 mg capsule, Take 100 mg by mouth daily. , Disp: , Rfl:    Date Last Reviewed:  12/02/21     Number of Personal Factors/Comorbidities that affect the Plan of Care: 1-2: MODERATE COMPLEXITY   EXAMINATION:   *= Painful     WNL= within normal limits     NT= not tested    Observation  Posture: Pt swings arm while walking    ROM  Shoulder   Right Left   Flexion 130 162    165   IR L2 T10   ER T1 T4       Strength (measured on MMT scale from 0-5/5)   Right Left   Shoulder         Flexion 4+ 5       ABD 4* 5       IR 5 5       ER 4 5       Joint Mobility/Soft Tissue   Description   Joint Mobility Normal A-P, hypomobile inferior   Soft Tissue Mobility No TTP          Body Structures Involved:  1. Bones  2. Joints  3. Muscles  4. Ligaments Body Functions Affected:  1. Sensory/Pain  2. Neuromusculoskeletal  3. Movement Related Activities and Participation Affected:  1. General Tasks and Demands  2. Self Care  3. Domestic Life  4. Interpersonal Interactions and Relationships  5.  Community, Social and Rochelle Croghan   Number of elements (examined above) that affect the Plan of Care: 3: MODERATE COMPLEXITY   CLINICAL PRESENTATION:   Presentation: Stable and uncomplicated: LOW COMPLEXITY   CLINICAL DECISION MAKING:   Use of outcome tool(s) and clinical judgement create a POC that gives a: Clear prediction of patient's progress: LOW COMPLEXITY     Yany Wisdom PT, DPT, OCS

## 2021-12-21 NOTE — PROCEDURE: TREATMENT REGIMEN
Continue Regimen: Alcortin samples
Samples Given: Will come by the office tomorrow to  samples of Alcortin
Detail Level: Detailed
Fall with Harm Risk

## 2021-12-22 NOTE — PROCEDURE: SUTURE REMOVAL (GLOBAL PERIOD)
Add 66978 Cpt? (Important Note: In 2017 The Use Of 65961 Is Being Tracked By Cms To Determine Future Global Period Reimbursement For Global Periods): yes
Patient to ER for SOB that began three days ago, when he tested positive for Covid. He had worsening SOB that began last night. Mid sternal chest pain. Dyspnea with minimal exertion. On arrival pulse ox on his fingers registered at 7%. A new pulse ox was applied to the forehead and saturations were 80% on room air.  Titrated to 6L O2 and saturations improved to 95%
Detail Level: Detailed

## 2022-02-03 ENCOUNTER — APPOINTMENT (RX ONLY)
Dept: URBAN - METROPOLITAN AREA CLINIC 24 | Facility: CLINIC | Age: 82
Setting detail: DERMATOLOGY
End: 2022-02-03

## 2022-02-03 DIAGNOSIS — L57.8 OTHER SKIN CHANGES DUE TO CHRONIC EXPOSURE TO NONIONIZING RADIATION: ICD-10-CM

## 2022-02-03 DIAGNOSIS — L82.0 INFLAMED SEBORRHEIC KERATOSIS: ICD-10-CM

## 2022-02-03 DIAGNOSIS — Z71.89 OTHER SPECIFIED COUNSELING: ICD-10-CM

## 2022-02-03 DIAGNOSIS — L57.0 ACTINIC KERATOSIS: ICD-10-CM

## 2022-02-03 DIAGNOSIS — D485 NEOPLASM OF UNCERTAIN BEHAVIOR OF SKIN: ICD-10-CM

## 2022-02-03 DIAGNOSIS — Z85.828 PERSONAL HISTORY OF OTHER MALIGNANT NEOPLASM OF SKIN: ICD-10-CM

## 2022-02-03 DIAGNOSIS — Z87.2 PERSONAL HISTORY OF DISEASES OF THE SKIN AND SUBCUTANEOUS TISSUE: ICD-10-CM

## 2022-02-03 DIAGNOSIS — L82.1 OTHER SEBORRHEIC KERATOSIS: ICD-10-CM

## 2022-02-03 PROBLEM — E78.5 HYPERLIPIDEMIA, UNSPECIFIED: Status: ACTIVE | Noted: 2022-02-03

## 2022-02-03 PROBLEM — L85.3 XEROSIS CUTIS: Status: ACTIVE | Noted: 2022-02-03

## 2022-02-03 PROBLEM — D48.5 NEOPLASM OF UNCERTAIN BEHAVIOR OF SKIN: Status: ACTIVE | Noted: 2022-02-03

## 2022-02-03 PROCEDURE — 11102 TANGNTL BX SKIN SINGLE LES: CPT | Mod: 59

## 2022-02-03 PROCEDURE — ? SUNSCREEN RECOMMENDATIONS

## 2022-02-03 PROCEDURE — ? COUNSELING

## 2022-02-03 PROCEDURE — 17110 DESTRUCTION B9 LES UP TO 14: CPT

## 2022-02-03 PROCEDURE — ? BIOPSY BY SHAVE METHOD

## 2022-02-03 PROCEDURE — 11103 TANGNTL BX SKIN EA SEP/ADDL: CPT | Mod: 59

## 2022-02-03 PROCEDURE — ? LIQUID NITROGEN

## 2022-02-03 PROCEDURE — 99213 OFFICE O/P EST LOW 20 MIN: CPT | Mod: 25

## 2022-02-03 PROCEDURE — 17000 DESTRUCT PREMALG LESION: CPT | Mod: 59

## 2022-02-03 ASSESSMENT — LOCATION SIMPLE DESCRIPTION DERM
LOCATION SIMPLE: RIGHT FOREARM
LOCATION SIMPLE: LEFT ANKLE
LOCATION SIMPLE: LEFT FOREHEAD
LOCATION SIMPLE: RIGHT UPPER BACK
LOCATION SIMPLE: CHEST
LOCATION SIMPLE: UPPER BACK
LOCATION SIMPLE: NOSE
LOCATION SIMPLE: RIGHT BREAST
LOCATION SIMPLE: RIGHT ANKLE
LOCATION SIMPLE: RIGHT PRETIBIAL REGION
LOCATION SIMPLE: LEFT PRETIBIAL REGION
LOCATION SIMPLE: LEFT UPPER ARM
LOCATION SIMPLE: LEFT EAR
LOCATION SIMPLE: RIGHT FOREHEAD

## 2022-02-03 ASSESSMENT — LOCATION ZONE DERM
LOCATION ZONE: FACE
LOCATION ZONE: NOSE
LOCATION ZONE: TRUNK
LOCATION ZONE: EAR
LOCATION ZONE: ARM
LOCATION ZONE: LEG

## 2022-02-03 ASSESSMENT — LOCATION DETAILED DESCRIPTION DERM
LOCATION DETAILED: RIGHT SUPERIOR MEDIAL UPPER BACK
LOCATION DETAILED: RIGHT AXILLARY TAIL OF BREAST
LOCATION DETAILED: NASAL SUPRATIP
LOCATION DETAILED: RIGHT SUPERIOR UPPER BACK
LOCATION DETAILED: LEFT LATERAL PROXIMAL UPPER ARM
LOCATION DETAILED: SUPERIOR THORACIC SPINE
LOCATION DETAILED: LEFT PROXIMAL PRETIBIAL REGION
LOCATION DETAILED: RIGHT DISTAL DORSAL FOREARM
LOCATION DETAILED: LEFT POSTERIOR ANKLE
LOCATION DETAILED: MIDDLE STERNUM
LOCATION DETAILED: RIGHT DISTAL PRETIBIAL REGION
LOCATION DETAILED: LEFT SUPERIOR LATERAL FOREHEAD
LOCATION DETAILED: LEFT DISTAL PRETIBIAL REGION
LOCATION DETAILED: RIGHT SUPERIOR LATERAL FOREHEAD
LOCATION DETAILED: RIGHT POSTERIOR ANKLE
LOCATION DETAILED: LEFT CRUS OF HELIX

## 2022-02-03 NOTE — PROCEDURE: LIQUID NITROGEN
Spray Paint Technique: No
Consent: The patient's verbal consent was obtained including but not limited to risks of crusting, scabbing, blistering, scarring, darker or lighter pigmentary change, recurrence, incomplete removal and infection.
Spray Paint Text: The liquid nitrogen was applied to the skin utilizing a spray paint frosting technique.
Show Applicator Variable?: Yes
Post-Care Instructions: I reviewed with the patient in detail post-care instructions. Patient is to wear sunprotection, and avoid picking at any of the treated lesions. Pt may apply Vaseline to crusted or scabbing areas.
Detail Level: Detailed
Medical Necessity Clause: Treatment was medically necessary because the spot was
Consent: The patient's verbal  consent was obtained including but not limited to risks of crusting, scabbing, blistering, scarring, darker or lighter pigmentary change, recurrence, incomplete removal and infection.
Medical Necessity Information: It is in your best interest to select a reason for this procedure from the list below. All of these items fulfill various CMS LCD requirements except the new and changing color options.
Number Of Freeze-Thaw Cycles: 1 freeze-thaw cycle
Duration Of Freeze Thaw-Cycle (Seconds): 3

## 2022-02-03 NOTE — HPI: FULL BODY SKIN EXAMINATION
What Type Of Note Output Would You Prefer (Optional)?: Standard Output
What Is The Reason For Today's Visit?: Full Body Skin Examination
What Is The Reason For Today's Visit? (Being Monitored For X): concerning skin lesions on an annual basis
How Severe Are Your Spot(S)?: mild
Additional History: Pt gives verbal consent for biopsy.Corey

## 2022-02-03 NOTE — PROCEDURE: BIOPSY BY SHAVE METHOD
Biopsy Type: H and E
Depth Of Biopsy: dermis
Was A Bandage Applied: Yes
Hemostasis: Aluminum Chloride
X Size Of Lesion In Cm: 0
Render Post-Care Instructions In Note?: no
Anesthesia Volume In Cc: 0.5
Billing Type: Third-Party Bill
Silver Nitrate Text: The wound bed was treated with silver nitrate after the biopsy was performed.
Wound Care: Vaseline
Accession #: [id number]
Electrodesiccation And Curettage Text: The wound bed was treated with electrodesiccation and curettage after the biopsy was performed.
Notification Instructions: Patient will be notified of biopsy results. However, patient instructed to call the office if not contacted within 2 weeks.
Type Of Destruction Used: Curettage
Electrodesiccation Text: The wound bed was treated with electrodesiccation after the biopsy was performed.
Cryotherapy Text: The wound bed was treated with cryotherapy after the biopsy was performed.
Consent: Verbal consent was obtained and risks were reviewed including but not limited to scarring, infection, bleeding, scabbing, incomplete removal, and allergy to anesthesia. ZIGGY Ashton.
Anesthesia Type: 1% lidocaine without epinephrine and a 1:10 solution of 8.4% sodium bicarbonate
Biopsy Method: double edge Personna blade
Dressing: bandage
Detail Level: Detailed
Post-care instructions were reviewed in detail and written instructions are provided. Patient is to keep the biopsy site dry overnight, and then apply bacitracin twice daily until healed. Patient may apply hydrogen peroxide soaks to remove any crusting.
Information: Selecting Yes will display possible errors in your note based on the variables you have selected. This validation is only offered as a suggestion for you. PLEASE NOTE THAT THE VALIDATION TEXT WILL BE REMOVED WHEN YOU FINALIZE YOUR NOTE. IF YOU WANT TO FAX A PRELIMINARY NOTE YOU WILL NEED TO TOGGLE THIS TO 'NO' IF YOU DO NOT WANT IT IN YOUR FAXED NOTE.

## 2022-03-17 ENCOUNTER — APPOINTMENT (RX ONLY)
Dept: URBAN - METROPOLITAN AREA CLINIC 24 | Facility: CLINIC | Age: 82
Setting detail: DERMATOLOGY
End: 2022-03-17

## 2022-03-17 DIAGNOSIS — L82.0 INFLAMED SEBORRHEIC KERATOSIS: ICD-10-CM

## 2022-03-17 PROBLEM — L55.1 SUNBURN OF SECOND DEGREE: Status: ACTIVE | Noted: 2022-03-17

## 2022-03-17 PROBLEM — C44.519 BASAL CELL CARCINOMA OF SKIN OF OTHER PART OF TRUNK: Status: ACTIVE | Noted: 2022-03-17

## 2022-03-17 PROCEDURE — 17261 DSTRJ MAL LES T/A/L .6-1.0CM: CPT

## 2022-03-17 PROCEDURE — 17110 DESTRUCTION B9 LES UP TO 14: CPT | Mod: 59

## 2022-03-17 PROCEDURE — ? COUNSELING

## 2022-03-17 PROCEDURE — ? CURETTAGE AND DESTRUCTION

## 2022-03-17 PROCEDURE — ? LIQUID NITROGEN

## 2022-03-17 PROCEDURE — 17261 DSTRJ MAL LES T/A/L .6-1.0CM: CPT | Mod: 76

## 2022-03-17 ASSESSMENT — LOCATION ZONE DERM: LOCATION ZONE: TRUNK

## 2022-03-17 ASSESSMENT — LOCATION DETAILED DESCRIPTION DERM: LOCATION DETAILED: RIGHT AXILLARY TAIL OF BREAST

## 2022-03-17 ASSESSMENT — LOCATION SIMPLE DESCRIPTION DERM: LOCATION SIMPLE: RIGHT BREAST

## 2022-03-17 NOTE — PROCEDURE: CURETTAGE AND DESTRUCTION
Hide Accession Number?: No
Size Of Lesion In Cm: 0.6
Final Size Statement: The size of the lesion after curettage was
Size Of Lesion After Curettage: 0.8
Anesthesia Volume In Cc: 1.3
What Was Performed First?: Curettage
Biopsy Photograph Reviewed: Yes
Size Of Lesion After Curettage: 0.9
Total Volume (Ccs): 1
Number Of Curettages: 3
Consent was obtained from the patient. The risks, benefits and alternatives to therapy were discussed in detail. Specifically, the risks of infection, scarring, bleeding, prolonged wound healing, nerve injury, incomplete removal, allergy to anesthesia and recurrence were addressed. Alternatives to ED&C, such as: surgical removal and XRT were also discussed.  Prior to the procedure, the treatment site was clearly identified and confirmed by the patient.
Anesthesia Type: 1% lidocaine with epinephrine and a 1:10 solution of 8.4% sodium bicarbonate
Detail Level: Detailed
Bill As A Line Item Or As Units: Line Item
Post-Care Instructions: I reviewed with the patient in detail post-care instructions. Patient is to keep the area dry for 48 hours, and not to engage in any swimming until the area is healed. Should the patient develop any fevers, chills, bleeding, severe pain patient will contact the office immediately.
Additional Information: (Optional): The wound was cleaned, and a dressing was applied.  The patient received detailed post-op instructions.
Cautery Type: electrodesiccation
Size Of Lesion In Cm: 0.7

## 2022-03-17 NOTE — PROCEDURE: LIQUID NITROGEN
Show Spray Paint Technique Variable?: Yes
Render Post-Care Instructions In Note?: no
Spray Paint Text: The liquid nitrogen was applied to the skin utilizing a spray paint frosting technique.
Medical Necessity Information: It is in your best interest to select a reason for this procedure from the list below. All of these items fulfill various CMS LCD requirements except the new and changing color options.
Number Of Freeze-Thaw Cycles: 1 freeze-thaw cycle
Consent: The patient's verbal consent was obtained including but not limited to risks of crusting, scabbing, blistering, scarring, darker or lighter pigmentary change, recurrence, incomplete removal and infection.
Post-Care Instructions: I reviewed with the patient in detail post-care instructions. Patient is to wear sunprotection, and avoid picking at any of the treated lesions. Pt may apply Vaseline to crusted or scabbing areas.
Medical Necessity Clause: Treatment was medically necessary because the spot was
Detail Level: Detailed

## 2022-03-19 PROBLEM — Z96.652 STATUS POST TOTAL LEFT KNEE REPLACEMENT: Status: ACTIVE | Noted: 2017-04-05

## 2022-08-25 ENCOUNTER — APPOINTMENT (RX ONLY)
Dept: URBAN - METROPOLITAN AREA CLINIC 23 | Facility: CLINIC | Age: 82
Setting detail: DERMATOLOGY
End: 2022-08-25

## 2022-08-25 DIAGNOSIS — Z85.828 PERSONAL HISTORY OF OTHER MALIGNANT NEOPLASM OF SKIN: ICD-10-CM

## 2022-08-25 DIAGNOSIS — L57.8 OTHER SKIN CHANGES DUE TO CHRONIC EXPOSURE TO NONIONIZING RADIATION: ICD-10-CM

## 2022-08-25 DIAGNOSIS — Z87.2 PERSONAL HISTORY OF DISEASES OF THE SKIN AND SUBCUTANEOUS TISSUE: ICD-10-CM

## 2022-08-25 DIAGNOSIS — D485 NEOPLASM OF UNCERTAIN BEHAVIOR OF SKIN: ICD-10-CM

## 2022-08-25 DIAGNOSIS — Z71.89 OTHER SPECIFIED COUNSELING: ICD-10-CM

## 2022-08-25 DIAGNOSIS — L82.1 OTHER SEBORRHEIC KERATOSIS: ICD-10-CM

## 2022-08-25 PROBLEM — L70.0 ACNE VULGARIS: Status: ACTIVE | Noted: 2022-08-25

## 2022-08-25 PROBLEM — M12.9 ARTHROPATHY, UNSPECIFIED: Status: ACTIVE | Noted: 2022-08-25

## 2022-08-25 PROBLEM — D48.5 NEOPLASM OF UNCERTAIN BEHAVIOR OF SKIN: Status: ACTIVE | Noted: 2022-08-25

## 2022-08-25 PROBLEM — I10 ESSENTIAL (PRIMARY) HYPERTENSION: Status: ACTIVE | Noted: 2022-08-25

## 2022-08-25 PROCEDURE — 99213 OFFICE O/P EST LOW 20 MIN: CPT | Mod: 25

## 2022-08-25 PROCEDURE — 11102 TANGNTL BX SKIN SINGLE LES: CPT

## 2022-08-25 PROCEDURE — ? COUNSELING

## 2022-08-25 PROCEDURE — ? BIOPSY BY SHAVE METHOD

## 2022-08-25 PROCEDURE — ? SUNSCREEN RECOMMENDATIONS

## 2022-08-25 ASSESSMENT — LOCATION SIMPLE DESCRIPTION DERM
LOCATION SIMPLE: LEFT UPPER BACK
LOCATION SIMPLE: UPPER BACK
LOCATION SIMPLE: CHEST
LOCATION SIMPLE: RIGHT PRETIBIAL REGION
LOCATION SIMPLE: RIGHT FOREHEAD
LOCATION SIMPLE: RIGHT UPPER BACK
LOCATION SIMPLE: LEFT UPPER ARM
LOCATION SIMPLE: LEFT PRETIBIAL REGION

## 2022-08-25 ASSESSMENT — LOCATION DETAILED DESCRIPTION DERM
LOCATION DETAILED: RIGHT LATERAL FOREHEAD
LOCATION DETAILED: LEFT PROXIMAL PRETIBIAL REGION
LOCATION DETAILED: LEFT MEDIAL SUPERIOR CHEST
LOCATION DETAILED: RIGHT SUPERIOR UPPER BACK
LOCATION DETAILED: LEFT DISTAL PRETIBIAL REGION
LOCATION DETAILED: LEFT SUPERIOR MEDIAL UPPER BACK
LOCATION DETAILED: RIGHT SUPERIOR MEDIAL UPPER BACK
LOCATION DETAILED: RIGHT DISTAL PRETIBIAL REGION
LOCATION DETAILED: LEFT LATERAL PROXIMAL UPPER ARM
LOCATION DETAILED: MIDDLE STERNUM
LOCATION DETAILED: SUPERIOR THORACIC SPINE

## 2022-08-25 ASSESSMENT — LOCATION ZONE DERM
LOCATION ZONE: LEG
LOCATION ZONE: TRUNK
LOCATION ZONE: FACE
LOCATION ZONE: ARM

## 2022-08-25 NOTE — PROCEDURE: COUNSELING
Detail Level: Generalized
Detail Level: Detailed
Sunscreen Recommendations: Broad Spectrum
Detail Level: Zone
Detail Level: Simple

## 2022-08-25 NOTE — PROCEDURE: BIOPSY BY SHAVE METHOD
Hide Additional Anticipated Plan?: No
X Size Of Lesion In Cm: 0
Notification Instructions: Patient will be notified of biopsy results. However, patient instructed to call the office if not contacted within 2 weeks.
Wound Care: Vaseline
Validate Note Data (See Information Below): Yes
Anesthesia Volume In Cc: 0.5
Information: Selecting Yes will display possible errors in your note based on the variables you have selected. This validation is only offered as a suggestion for you. PLEASE NOTE THAT THE VALIDATION TEXT WILL BE REMOVED WHEN YOU FINALIZE YOUR NOTE. IF YOU WANT TO FAX A PRELIMINARY NOTE YOU WILL NEED TO TOGGLE THIS TO 'NO' IF YOU DO NOT WANT IT IN YOUR FAXED NOTE.
Depth Of Biopsy: dermis
Billing Type: Third-Party Bill
Silver Nitrate Text: The wound bed was treated with silver nitrate after the biopsy was performed.
Hemostasis: Aluminum Chloride
Accession #: [id number]
Consent: Verbal consent was obtained and risks were reviewed including but not limited to scarring, infection, bleeding, scabbing, incomplete removal, and allergy to anesthesia. ZIGGY Ashton.
Detail Level: Detailed
Electrodesiccation And Curettage Text: The wound bed was treated with electrodesiccation and curettage after the biopsy was performed.
Anesthesia Type: 1% lidocaine without epinephrine and a 1:10 solution of 8.4% sodium bicarbonate
Biopsy Method: double edge Personna blade
Post-care instructions were reviewed in detail and written instructions are provided. Patient is to keep the biopsy site dry overnight, and then apply bacitracin twice daily until healed. Patient may apply hydrogen peroxide soaks to remove any crusting.
Dressing: bandage
Electrodesiccation Text: The wound bed was treated with electrodesiccation after the biopsy was performed.
Cryotherapy Text: The wound bed was treated with cryotherapy after the biopsy was performed.
Biopsy Type: H and E
Type Of Destruction Used: Curettage

## 2022-10-19 ENCOUNTER — HOSPITAL ENCOUNTER (OUTPATIENT)
Dept: MAMMOGRAPHY | Age: 82
Discharge: HOME | End: 2022-10-22
Payer: MEDICARE

## 2022-10-19 DIAGNOSIS — Z12.31 VISIT FOR SCREENING MAMMOGRAM: ICD-10-CM

## 2022-10-19 PROCEDURE — 77063 BREAST TOMOSYNTHESIS BI: CPT

## 2022-11-21 ENCOUNTER — HOSPITAL ENCOUNTER (EMERGENCY)
Dept: CT IMAGING | Age: 82
Discharge: HOME | End: 2022-11-24
Payer: MEDICARE

## 2022-11-21 ENCOUNTER — HOSPITAL ENCOUNTER (EMERGENCY)
Age: 82
Discharge: HOME | End: 2022-11-22
Attending: EMERGENCY MEDICINE | Admitting: EMERGENCY MEDICINE
Payer: MEDICARE

## 2022-11-21 ENCOUNTER — HOSPITAL ENCOUNTER (EMERGENCY)
Dept: GENERAL RADIOLOGY | Age: 82
Discharge: HOME | End: 2022-11-24
Payer: MEDICARE

## 2022-11-21 DIAGNOSIS — J20.9 ACUTE BRONCHITIS, UNSPECIFIED ORGANISM: Primary | ICD-10-CM

## 2022-11-21 DIAGNOSIS — R06.2 WHEEZING: ICD-10-CM

## 2022-11-21 LAB
ALBUMIN SERPL-MCNC: 3.6 G/DL (ref 3.2–4.6)
ALBUMIN/GLOB SERPL: 0.8 {RATIO} (ref 0.4–1.6)
ALP SERPL-CCNC: 108 U/L (ref 50–136)
ALT SERPL-CCNC: 29 U/L (ref 12–65)
ANION GAP SERPL CALC-SCNC: 7 MMOL/L (ref 2–11)
AST SERPL-CCNC: 49 U/L (ref 15–37)
BASOPHILS # BLD: 0.1 K/UL (ref 0–0.2)
BASOPHILS NFR BLD: 1 % (ref 0–2)
BILIRUB SERPL-MCNC: 0.8 MG/DL (ref 0.2–1.1)
BUN SERPL-MCNC: 16 MG/DL (ref 8–23)
CALCIUM SERPL-MCNC: 9.1 MG/DL (ref 8.3–10.4)
CHLORIDE SERPL-SCNC: 102 MMOL/L (ref 101–110)
CO2 SERPL-SCNC: 25 MMOL/L (ref 21–32)
CREAT SERPL-MCNC: 1.07 MG/DL (ref 0.6–1)
D DIMER PPP FEU-MCNC: 1.5 UG/ML(FEU)
DIFFERENTIAL METHOD BLD: ABNORMAL
EOSINOPHIL # BLD: 0.1 K/UL (ref 0–0.8)
EOSINOPHIL NFR BLD: 1 % (ref 0.5–7.8)
ERYTHROCYTE [DISTWIDTH] IN BLOOD BY AUTOMATED COUNT: 15.9 % (ref 11.9–14.6)
GLOBULIN SER CALC-MCNC: 4.7 G/DL (ref 2.8–4.5)
GLUCOSE SERPL-MCNC: 133 MG/DL (ref 65–100)
HCT VFR BLD AUTO: 34.6 % (ref 35.8–46.3)
HGB BLD-MCNC: 10.6 G/DL (ref 11.7–15.4)
IMM GRANULOCYTES # BLD AUTO: 0 K/UL (ref 0–0.5)
IMM GRANULOCYTES NFR BLD AUTO: 0 % (ref 0–5)
LYMPHOCYTES # BLD: 1.1 K/UL (ref 0.5–4.6)
LYMPHOCYTES NFR BLD: 11 % (ref 13–44)
MAGNESIUM SERPL-MCNC: 1.7 MG/DL (ref 1.8–2.4)
MCH RBC QN AUTO: 23.8 PG (ref 26.1–32.9)
MCHC RBC AUTO-ENTMCNC: 30.6 G/DL (ref 31.4–35)
MCV RBC AUTO: 77.8 FL (ref 82–102)
MONOCYTES # BLD: 1 K/UL (ref 0.1–1.3)
MONOCYTES NFR BLD: 10 % (ref 4–12)
NEUTS SEG # BLD: 7.7 K/UL (ref 1.7–8.2)
NEUTS SEG NFR BLD: 78 % (ref 43–78)
NRBC # BLD: 0 K/UL (ref 0–0.2)
PLATELET # BLD AUTO: 239 K/UL (ref 150–450)
PMV BLD AUTO: 9.9 FL (ref 9.4–12.3)
POTASSIUM SERPL-SCNC: 4.4 MMOL/L (ref 3.5–5.1)
PROT SERPL-MCNC: 8.3 G/DL (ref 6.3–8.2)
RBC # BLD AUTO: 4.45 M/UL (ref 4.05–5.2)
SODIUM SERPL-SCNC: 134 MMOL/L (ref 133–143)
TROPONIN I SERPL HS-MCNC: 19.3 PG/ML (ref 0–14)
WBC # BLD AUTO: 9.9 K/UL (ref 4.3–11.1)

## 2022-11-21 PROCEDURE — 93005 ELECTROCARDIOGRAM TRACING: CPT | Performed by: EMERGENCY MEDICINE

## 2022-11-21 PROCEDURE — 85025 COMPLETE CBC W/AUTO DIFF WBC: CPT

## 2022-11-21 PROCEDURE — 83735 ASSAY OF MAGNESIUM: CPT

## 2022-11-21 PROCEDURE — 80053 COMPREHEN METABOLIC PANEL: CPT

## 2022-11-21 PROCEDURE — 99285 EMERGENCY DEPT VISIT HI MDM: CPT

## 2022-11-21 PROCEDURE — 84484 ASSAY OF TROPONIN QUANT: CPT

## 2022-11-21 PROCEDURE — 96374 THER/PROPH/DIAG INJ IV PUSH: CPT

## 2022-11-21 PROCEDURE — 85379 FIBRIN DEGRADATION QUANT: CPT

## 2022-11-21 PROCEDURE — 2580000003 HC RX 258: Performed by: EMERGENCY MEDICINE

## 2022-11-21 PROCEDURE — 71260 CT THORAX DX C+: CPT | Performed by: EMERGENCY MEDICINE

## 2022-11-21 PROCEDURE — 6370000000 HC RX 637 (ALT 250 FOR IP): Performed by: EMERGENCY MEDICINE

## 2022-11-21 PROCEDURE — 6360000004 HC RX CONTRAST MEDICATION: Performed by: EMERGENCY MEDICINE

## 2022-11-21 PROCEDURE — 6360000002 HC RX W HCPCS: Performed by: EMERGENCY MEDICINE

## 2022-11-21 PROCEDURE — 94640 AIRWAY INHALATION TREATMENT: CPT

## 2022-11-21 PROCEDURE — 71045 X-RAY EXAM CHEST 1 VIEW: CPT

## 2022-11-21 RX ORDER — IPRATROPIUM BROMIDE AND ALBUTEROL SULFATE 2.5; .5 MG/3ML; MG/3ML
1 SOLUTION RESPIRATORY (INHALATION)
Status: COMPLETED | OUTPATIENT
Start: 2022-11-21 | End: 2022-11-21

## 2022-11-21 RX ORDER — 0.9 % SODIUM CHLORIDE 0.9 %
100 INTRAVENOUS SOLUTION INTRAVENOUS ONCE
Status: COMPLETED | OUTPATIENT
Start: 2022-11-21 | End: 2022-11-22

## 2022-11-21 RX ORDER — METHYLPREDNISOLONE SODIUM SUCCINATE 125 MG/2ML
125 INJECTION, POWDER, LYOPHILIZED, FOR SOLUTION INTRAMUSCULAR; INTRAVENOUS
Status: COMPLETED | OUTPATIENT
Start: 2022-11-21 | End: 2022-11-21

## 2022-11-21 RX ADMIN — IOPAMIDOL 100 ML: 755 INJECTION, SOLUTION INTRAVENOUS at 23:12

## 2022-11-21 RX ADMIN — IPRATROPIUM BROMIDE AND ALBUTEROL SULFATE 1 AMPULE: .5; 3 SOLUTION RESPIRATORY (INHALATION) at 22:02

## 2022-11-21 RX ADMIN — METHYLPREDNISOLONE SODIUM SUCCINATE 125 MG: 125 INJECTION, POWDER, FOR SOLUTION INTRAMUSCULAR; INTRAVENOUS at 22:02

## 2022-11-21 RX ADMIN — SODIUM CHLORIDE 100 ML: 9 INJECTION, SOLUTION INTRAVENOUS at 23:12

## 2022-11-21 RX ADMIN — IPRATROPIUM BROMIDE AND ALBUTEROL SULFATE 1 AMPULE: .5; 3 SOLUTION RESPIRATORY (INHALATION) at 22:52

## 2022-11-21 ASSESSMENT — PAIN DESCRIPTION - LOCATION: LOCATION: RIB CAGE

## 2022-11-21 ASSESSMENT — ENCOUNTER SYMPTOMS
HEMOPTYSIS: 0
EYE ITCHING: 0
WHEEZING: 1
SPUTUM PRODUCTION: 1
CONSTIPATION: 0
BACK PAIN: 0
EYE REDNESS: 0
COUGH: 1
SINUS PAIN: 0
VOMITING: 0
NAUSEA: 0
SHORTNESS OF BREATH: 1
ABDOMINAL PAIN: 0
TROUBLE SWALLOWING: 0
EYE PAIN: 0
DIARRHEA: 0

## 2022-11-21 ASSESSMENT — PAIN SCALES - GENERAL: PAINLEVEL_OUTOF10: 7

## 2022-11-21 ASSESSMENT — PAIN - FUNCTIONAL ASSESSMENT: PAIN_FUNCTIONAL_ASSESSMENT: 0-10

## 2022-11-21 ASSESSMENT — PAIN DESCRIPTION - FREQUENCY: FREQUENCY: INTERMITTENT

## 2022-11-22 VITALS
OXYGEN SATURATION: 98 % | TEMPERATURE: 98.9 F | BODY MASS INDEX: 30.37 KG/M2 | DIASTOLIC BLOOD PRESSURE: 80 MMHG | SYSTOLIC BLOOD PRESSURE: 157 MMHG | HEIGHT: 66 IN | RESPIRATION RATE: 16 BRPM | WEIGHT: 189 LBS | HEART RATE: 80 BPM

## 2022-11-22 RX ORDER — METHYLPREDNISOLONE 4 MG/1
TABLET ORAL
Qty: 1 KIT | Refills: 0 | Status: ON HOLD | OUTPATIENT
Start: 2022-11-22 | End: 2022-11-28 | Stop reason: HOSPADM

## 2022-11-22 RX ORDER — ALBUTEROL SULFATE 90 UG/1
2 AEROSOL, METERED RESPIRATORY (INHALATION) 4 TIMES DAILY PRN
Qty: 18 G | Refills: 1 | Status: SHIPPED | OUTPATIENT
Start: 2022-11-22

## 2022-11-22 RX ORDER — GUAIFENESIN AND DEXTROMETHORPHAN HYDROBROMIDE 1200; 60 MG/1; MG/1
1 TABLET, EXTENDED RELEASE ORAL 2 TIMES DAILY
Qty: 28 TABLET | Refills: 0 | Status: SHIPPED | OUTPATIENT
Start: 2022-11-22

## 2022-11-22 RX ORDER — DOXYCYCLINE HYCLATE 100 MG
100 TABLET ORAL 2 TIMES DAILY
Qty: 14 TABLET | Refills: 0 | Status: ON HOLD | OUTPATIENT
Start: 2022-11-22 | End: 2022-11-28 | Stop reason: HOSPADM

## 2022-11-22 NOTE — ED PROVIDER NOTES
Emergency Department Provider Note                   PCP:                Robert Painter MD               Age: [de-identified] y.o. Sex: female       ICD-10-CM    1. Acute bronchitis, unspecified organism  J20.9       2. Wheezing  R06.2           DISPOSITION Decision To Discharge 11/21/2022 11:59:23 PM       MDM  Number of Diagnoses or Management Options  Acute bronchitis, unspecified organism: new, needed workup  Wheezing: new, needed workup  Diagnosis management comments: 62-year-old female patient here with cough exertional dyspnea  Initial labs unremarkable  Chest x-ray reveals increased interstitial markings in both lungs but no other acute findings    Patient had COVID and flu swabs at urgent care which were negative    Will check a troponin and D-dimer  Steroids and DuoNeb    12:03 AM  CT PE protocol negative for pulmonary embolism or other acute findings  Patient ambulated after treatments and has maintain her saturation 95 to 96% with no significant dyspnea    Patient will be discharged home  Start antibiotics inhaler steroids  Will provide AeroChamber with teaching for inhaler use    Urged close follow-up with her primary care provider       Amount and/or Complexity of Data Reviewed  Clinical lab tests: ordered and reviewed  Tests in the radiology section of CPT®: ordered and reviewed  Tests in the medicine section of CPT®: ordered and reviewed  Decide to obtain previous medical records or to obtain history from someone other than the patient: yes  Obtain history from someone other than the patient: yes  Review and summarize past medical records: yes  Independent visualization of images, tracings, or specimens: yes    Risk of Complications, Morbidity, and/or Mortality  Presenting problems: moderate  Diagnostic procedures: moderate  Management options: moderate  General comments: Elements of this note have been dictated via voice recognition software.   Text and phrases may be limited by the accuracy of the software. The chart has been reviewed, but errors may still be present. Patient Progress  Patient progress: improved             Orders Placed This Encounter   Procedures    XR CHEST PORTABLE    CT CHEST PULMONARY EMBOLISM W CONTRAST    CBC with Auto Differential    Comprehensive Metabolic Panel    Magnesium    Troponin    D-Dimer, Quantitative    Continuous Pulse Oximetry    Cardiac Monitor - ED Only    RT--AERO CHAMBER    EKG 12 Lead    Saline lock IV        Medications   ipratropium-albuterol (DUONEB) nebulizer solution 1 ampule (1 ampule Inhalation Given 11/21/22 2202)   methylPREDNISolone sodium (SOLU-MEDROL) injection 125 mg (125 mg IntraVENous Given 11/21/22 2202)   ipratropium-albuterol (DUONEB) nebulizer solution 1 ampule (1 ampule Inhalation Given 11/21/22 2252)       New Prescriptions    ALBUTEROL SULFATE HFA (VENTOLIN HFA) 108 (90 BASE) MCG/ACT INHALER    Inhale 2 puffs into the lungs 4 times daily as needed for Wheezing    DEXTROMETHORPHAN-GUAIFENESIN (MUCINEX DM MAXIMUM STRENGTH)  MG TB12    Take 1 tablet by mouth in the morning and at bedtime    DOXYCYCLINE HYCLATE (VIBRA-TABS) 100 MG TABLET    Take 1 tablet by mouth 2 times daily for 7 days    METHYLPREDNISOLONE (MEDROL DOSEPACK) 4 MG TABLET    Take by mouth as directed on pack        Tor Thomason is a [de-identified] y.o. female who presents to the Emergency Department with chief complaint of    Chief Complaint   Patient presents with    Shortness of Breath      82-year-old female patient presents to the ER with complaints of cough congestion and dyspnea on exertion  Patient onset of symptoms was Saturday  No significant ill contacts  Patient states she gets bronchitis on a yearly basis  Patient has not smoked in 50+ years  No history of asthma or other chronic lung disease  Patient states she feels very congested and has noticed some \"wheezing. \"    The history is provided by the patient.    Shortness of Breath  Severity:  Moderate  Onset quality:  Gradual  Duration:  3 days  Timing:  Constant  Progression:  Worsening  Chronicity:  New  Context: activity and URI    Relieved by:  Rest  Worsened by: Activity and exertion  Ineffective treatments: Over-the-counter cough syrup. Associated symptoms: cough, sputum production and wheezing    Associated symptoms: no abdominal pain, no chest pain, no ear pain, no fever, no headaches, no hemoptysis, no PND, no rash and no vomiting      All other systems reviewed and are negative unless otherwise stated in the history of present illness section. Review of Systems   Constitutional:  Negative for chills, fatigue and fever. HENT:  Negative for ear pain, hearing loss, sinus pain, sneezing and trouble swallowing. Eyes:  Negative for pain, redness and itching. Respiratory:  Positive for cough, sputum production, shortness of breath and wheezing. Negative for hemoptysis. Cardiovascular:  Negative for chest pain, palpitations and PND. Gastrointestinal:  Negative for abdominal pain, constipation, diarrhea, nausea and vomiting. Endocrine: Negative for polydipsia, polyphagia and polyuria. Genitourinary:  Negative for dysuria, flank pain, frequency and hematuria. Musculoskeletal:  Positive for arthralgias. Negative for back pain and myalgias. Skin:  Negative for rash and wound. Allergic/Immunologic: Negative for food allergies and immunocompromised state. Neurological:  Negative for dizziness, syncope, speech difficulty, light-headedness and headaches. Hematological:  Negative for adenopathy. Does not bruise/bleed easily. Psychiatric/Behavioral:  Negative for dysphoric mood and self-injury. The patient is not nervous/anxious. All other systems reviewed and are negative.     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 breast bx    GYN      Hysterectomy    HEENT      T&A    TAMIA BIOPSY BREAST STEREOTACTIC  over 20 yrs ago    TOTAL KNEE ARTHROPLASTY Right 2010    US GUIDED CORE BREAST BIOPSY  over 20 yrs ago        Family History   Problem Relation Age of Onset    Delayed Awakening Neg Hx     Post-op Nausea/Vomiting Neg Hx     Emergence Delirium Neg Hx     Post-op Cognitive Dysfunction Neg Hx     Other Neg Hx     Breast Cancer Neg Hx     Malig Hypertherm Neg Hx     Pseudochol. Deficiency Neg Hx     Cancer Sister     Hypertension Father     Elevated Lipids Father     Cancer Father     Elevated Lipids Mother     Heart Disease Mother     Hypertension Mother         Social History     Socioeconomic History    Marital status:       Spouse name: None    Number of children: None    Years of education: None    Highest education level: None   Tobacco Use    Smoking status: Former    Smokeless tobacco: Never    Tobacco comments:     Quit smoking: \"40 years ago\"   Vaping Use    Vaping Use: Never used   Substance and Sexual Activity    Alcohol use: Yes    Drug use: No        Allergies: Latex, Penicillins, and Sulfa antibiotics    Previous Medications    COLCHICINE (COLCRYS) 0.6 MG TABLET    Pt to take one po every hour until symptoms improved or pt has diarrhea    DOCUSATE (COLACE, DULCOLAX) 100 MG CAPS    Take 100 mg by mouth daily    LISINOPRIL-HYDROCHLOROTHIAZIDE (PRINZIDE;ZESTORETIC) 20-12.5 MG PER TABLET    Take 1 tablet by mouth daily    MIRABEGRON (MYRBETRIQ) 50 MG TB24    Take 50 mg by mouth daily    OMEPRAZOLE (PRILOSEC) 40 MG DELAYED RELEASE CAPSULE    Take 40 mg by mouth daily    SIMVASTATIN (ZOCOR) 40 MG TABLET    Take 40 mg by mouth daily Vitals signs and nursing note reviewed. Patient Vitals for the past 4 hrs:   Pulse Resp BP SpO2   11/21/22 2252 94 18 -- 99 %   11/21/22 2245 98 15 (!) 164/70 95 %   11/21/22 2206 83 16 -- 98 %          Physical Exam  Vitals and nursing note reviewed. Constitutional:       General: She is in acute distress. Appearance: Normal appearance. She is well-developed and normal weight. HENT:      Head: Normocephalic and atraumatic. Right Ear: External ear normal.      Left Ear: External ear normal.      Nose: Nose normal.      Mouth/Throat:      Mouth: Mucous membranes are moist.      Pharynx: Oropharynx is clear. Eyes:      General: No scleral icterus. Extraocular Movements: Extraocular movements intact. Conjunctiva/sclera: Conjunctivae normal.      Pupils: Pupils are equal, round, and reactive to light. Cardiovascular:      Rate and Rhythm: Normal rate and regular rhythm. Pulses: Normal pulses. Heart sounds: Normal heart sounds. Pulmonary:      Effort: Pulmonary effort is normal. No respiratory distress. Breath sounds: Wheezing and rhonchi present. Abdominal:      General: Abdomen is flat. Bowel sounds are normal. There is no distension. Palpations: Abdomen is soft. There is no mass. Tenderness: There is no abdominal tenderness. Musculoskeletal:         General: No deformity or signs of injury. Normal range of motion. Cervical back: Normal range of motion and neck supple. Skin:     General: Skin is warm and dry. Capillary Refill: Capillary refill takes less than 2 seconds. Neurological:      General: No focal deficit present. Mental Status: She is alert and oriented to person, place, and time. Psychiatric:         Mood and Affect: Mood normal.         Behavior: Behavior normal.         Thought Content:  Thought content normal.         Judgment: Judgment normal.        EKG 12 Lead    Date/Time: 11/22/2022 12:06 AM  Performed by: Robert Akbar MD Mirna  Authorized by: Tawny Georges MD     ECG reviewed by ED Physician in the absence of a cardiologist: yes    Previous ECG:     Previous ECG:  Unavailable  Interpretation:     Interpretation: non-specific    Rate:     ECG rate:  88    ECG rate assessment: normal    Rhythm:     Rhythm: sinus rhythm    Ectopy:     Ectopy: none    QRS:     QRS axis:  Normal    QRS intervals:  Normal  ST segments:     ST segments:  Normal  Comments:      No acute ischemic changes    ED EKG Interpretation  EKG was interpreted in the absence of a cardiologist.        Results for orders placed or performed during the hospital encounter of 11/21/22   XR CHEST PORTABLE    Narrative    SINGLE-VIEW CHEST    CLINICAL HISTORY:  Cough, congestion, shortness of breath since Friday. COMPARISON:  None. FINDINGS:  PA erect image demonstrates no confluent infiltrate or significant  pleural fluid. There is mild atelectasis/infiltrate at both lung bases. The  heart size is within normal limits without evidence of congestive heart failure  or pneumothorax. The bony thorax appears intact. Impression    Mild bibasilar atelectasis/infiltrate. CT CHEST PULMONARY EMBOLISM W CONTRAST    Narrative    EXAM: CT Chest with IV contrast - PE protocol. INDICATION: Dyspnea. COMPARISON: None. TECHNIQUE: Axial CT images of the chest were obtained after the intravenous  injection of 100 mL Isovue 370 CT contrast. Radiation dose reduction techniques  were used for this study. Our CT scanners use one or all of the following:   Automated exposure control, adjustment of the mA and/or kV according to patient  size, iterative reconstruction. FINDINGS:  - Pleura/pericardium: Within normal limits. - Lungs: The lungs are clear except for an old calcified right lower lobe  granuloma. - Rosenda/Mediastinum: There are old calcified lymph nodes in the mediastinum and  right hilum. - Tracheobronchial tree: Within normal limits.   - Aorta/pulmonary arteries: Within normal limits. - Heart: Borderline cardiomegaly. - Coronary arteries: There are coronary artery calcifications. - Chest wall: Within normal limits. - Spine/bones: No acute process. There is an old fracture in the proximal right  humerus.  - Additional comments: None. Impression    1. No acute process or pulmonary embolism. 2. Borderline cardiomegaly and coronary artery disease. 3. Changes of old granulomatous disease.      CBC with Auto Differential   Result Value Ref Range    WBC 9.9 4.3 - 11.1 K/uL    RBC 4.45 4.05 - 5.2 M/uL    Hemoglobin 10.6 (L) 11.7 - 15.4 g/dL    Hematocrit 34.6 (L) 35.8 - 46.3 %    MCV 77.8 (L) 82.0 - 102.0 FL    MCH 23.8 (L) 26.1 - 32.9 PG    MCHC 30.6 (L) 31.4 - 35.0 g/dL    RDW 15.9 (H) 11.9 - 14.6 %    Platelets 412 518 - 153 K/uL    MPV 9.9 9.4 - 12.3 FL    nRBC 0.00 0.0 - 0.2 K/uL    Differential Type AUTOMATED      Seg Neutrophils 78 43 - 78 %    Lymphocytes 11 (L) 13 - 44 %    Monocytes 10 4.0 - 12.0 %    Eosinophils % 1 0.5 - 7.8 %    Basophils 1 0.0 - 2.0 %    Immature Granulocytes 0 0.0 - 5.0 %    Segs Absolute 7.7 1.7 - 8.2 K/UL    Absolute Lymph # 1.1 0.5 - 4.6 K/UL    Absolute Mono # 1.0 0.1 - 1.3 K/UL    Absolute Eos # 0.1 0.0 - 0.8 K/UL    Basophils Absolute 0.1 0.0 - 0.2 K/UL    Absolute Immature Granulocyte 0.0 0.0 - 0.5 K/UL   Comprehensive Metabolic Panel   Result Value Ref Range    Sodium 134 133 - 143 mmol/L    Potassium 4.4 3.5 - 5.1 mmol/L    Chloride 102 101 - 110 mmol/L    CO2 25 21 - 32 mmol/L    Anion Gap 7 2 - 11 mmol/L    Glucose 133 (H) 65 - 100 mg/dL    BUN 16 8 - 23 MG/DL    Creatinine 1.07 (H) 0.6 - 1.0 MG/DL    Est, Glom Filt Rate 53 (L) >60 ml/min/1.73m2    Calcium 9.1 8.3 - 10.4 MG/DL    Total Bilirubin 0.8 0.2 - 1.1 MG/DL    ALT 29 12 - 65 U/L    AST 49 (H) 15 - 37 U/L    Alk Phosphatase 108 50 - 136 U/L    Total Protein 8.3 (H) 6.3 - 8.2 g/dL    Albumin 3.6 3.2 - 4.6 g/dL    Globulin 4.7 (H) 2.8 - 4.5 g/dL Albumin/Globulin Ratio 0.8 0.4 - 1.6     Magnesium   Result Value Ref Range    Magnesium 1.7 (L) 1.8 - 2.4 mg/dL   Troponin   Result Value Ref Range    Troponin, High Sensitivity 19.3 (H) 0 - 14 pg/mL   D-Dimer, Quantitative   Result Value Ref Range    D-Dimer, Quant 1.50 (H) <0.56 ug/ml(FEU)        CT CHEST PULMONARY EMBOLISM W CONTRAST   Final Result   1. No acute process or pulmonary embolism. 2. Borderline cardiomegaly and coronary artery disease. 3. Changes of old granulomatous disease. XR CHEST PORTABLE   Final Result   Mild bibasilar atelectasis/infiltrate. Voice dictation software was used during the making of this note. This software is not perfect and grammatical and other typographical errors may be present. This note has not been completely proofread for errors.      Brian Roque MD  11/22/22 0004       Brian Roque MD  11/22/22 8020

## 2022-11-22 NOTE — ED TRIAGE NOTES
Patient reports cough and congestion, SOB since last Friday. Patient seen at [facility], told her SPO2 was low and should come to ED for further evaluation. Patient was tested for flu and covid informed both were negative. Patient had recent 6 hr car ride to [email or web address].

## 2022-11-22 NOTE — ED NOTES
I have reviewed discharge instructions with the patient. The patient verbalized understanding. Patient left ED via Discharge Method: ambulatory to Home with family. Opportunity for questions and clarification provided. Patient given 4 scripts. To continue your aftercare when you leave the hospital, you may receive an automated call from our care team to check in on how you are doing. This is a free service and part of our promise to provide the best care and service to meet your aftercare needs.  If you have questions, or wish to unsubscribe from this service please call [phone]. Thank you for Choosing our [facility] Emergency Department.       Lilliam Taylor RN  11/22/22 4779

## 2022-11-22 NOTE — DISCHARGE INSTRUCTIONS
You must finish all the prescribed antibiotics. THERE SHOULD BE NO LEFT OVER PILLS!!     Rest    Push Fluid    Call your doctor or the follow up doctor to set up appointment for recheck visit    Return to ER for any worsening symptoms or new problems which may arise

## 2022-11-23 ENCOUNTER — HOSPITAL ENCOUNTER (EMERGENCY)
Dept: GENERAL RADIOLOGY | Age: 82
Discharge: HOME | DRG: 193 | End: 2022-11-26
Payer: MEDICARE

## 2022-11-23 ENCOUNTER — HOSPITAL ENCOUNTER (INPATIENT)
Age: 82
LOS: 5 days | Discharge: HOME | DRG: 193 | End: 2022-11-28
Attending: EMERGENCY MEDICINE
Payer: MEDICARE

## 2022-11-23 DIAGNOSIS — J45.41 MODERATE PERSISTENT REACTIVE AIRWAY DISEASE WITH ACUTE EXACERBATION: ICD-10-CM

## 2022-11-23 DIAGNOSIS — R09.02 HYPOXIA: ICD-10-CM

## 2022-11-23 DIAGNOSIS — J18.9 PNEUMONIA OF RIGHT LOWER LOBE DUE TO INFECTIOUS ORGANISM: Primary | ICD-10-CM

## 2022-11-23 PROBLEM — Z96.652 STATUS POST TOTAL LEFT KNEE REPLACEMENT: Status: RESOLVED | Noted: 2017-04-05 | Resolved: 2022-11-23

## 2022-11-23 LAB
ALBUMIN SERPL-MCNC: 3.3 G/DL (ref 3.2–4.6)
ALBUMIN/GLOB SERPL: 0.8 {RATIO} (ref 0.4–1.6)
ALP SERPL-CCNC: 103 U/L (ref 50–130)
ALT SERPL-CCNC: 43 U/L (ref 12–65)
ANION GAP SERPL CALC-SCNC: 7 MMOL/L (ref 2–11)
AST SERPL-CCNC: 57 U/L (ref 15–37)
BASOPHILS # BLD: 0.1 K/UL (ref 0–0.2)
BASOPHILS NFR BLD: 1 % (ref 0–2)
BILIRUB SERPL-MCNC: 0.3 MG/DL (ref 0.2–1.1)
BUN SERPL-MCNC: 20 MG/DL (ref 8–23)
CALCIUM SERPL-MCNC: 9.4 MG/DL (ref 8.3–10.4)
CHLORIDE SERPL-SCNC: 104 MMOL/L (ref 101–110)
CO2 SERPL-SCNC: 26 MMOL/L (ref 21–32)
CREAT SERPL-MCNC: 1.03 MG/DL (ref 0.6–1)
CRP SERPL-MCNC: 5.6 MG/DL (ref 0–0.9)
DIFFERENTIAL METHOD BLD: ABNORMAL
EKG ATRIAL RATE: 88 BPM
EKG DIAGNOSIS: NORMAL
EKG P AXIS: 77 DEGREES
EKG P-R INTERVAL: 152 MS
EKG Q-T INTERVAL: 376 MS
EKG QRS DURATION: 94 MS
EKG QTC CALCULATION (BAZETT): 454 MS
EKG R AXIS: 57 DEGREES
EKG T AXIS: 59 DEGREES
EKG VENTRICULAR RATE: 88 BPM
EOSINOPHIL # BLD: 0 K/UL (ref 0–0.8)
EOSINOPHIL NFR BLD: 0 % (ref 0.5–7.8)
ERYTHROCYTE [DISTWIDTH] IN BLOOD BY AUTOMATED COUNT: 15.8 % (ref 11.9–14.6)
GLOBULIN SER CALC-MCNC: 4.3 G/DL (ref 2.8–4.5)
GLUCOSE SERPL-MCNC: 156 MG/DL (ref 65–100)
HCT VFR BLD AUTO: 34.2 % (ref 35.8–46.3)
HGB BLD-MCNC: 10.4 G/DL (ref 11.7–15.4)
IMM GRANULOCYTES # BLD AUTO: 0.1 K/UL (ref 0–0.5)
IMM GRANULOCYTES NFR BLD AUTO: 1 % (ref 0–5)
LACTATE SERPL-SCNC: 0.9 MMOL/L (ref 0.4–2)
LIPASE SERPL-CCNC: 148 U/L (ref 73–393)
LYMPHOCYTES # BLD: 0.9 K/UL (ref 0.5–4.6)
LYMPHOCYTES NFR BLD: 8 % (ref 13–44)
MAGNESIUM SERPL-MCNC: 1.8 MG/DL (ref 1.8–2.4)
MCH RBC QN AUTO: 23.7 PG (ref 26.1–32.9)
MCHC RBC AUTO-ENTMCNC: 30.4 G/DL (ref 31.4–35)
MCV RBC AUTO: 77.9 FL (ref 82–102)
MONOCYTES # BLD: 1 K/UL (ref 0.1–1.3)
MONOCYTES NFR BLD: 9 % (ref 4–12)
NEUTS SEG # BLD: 8.8 K/UL (ref 1.7–8.2)
NEUTS SEG NFR BLD: 82 % (ref 43–78)
NRBC # BLD: 0 K/UL (ref 0–0.2)
PLATELET # BLD AUTO: 280 K/UL (ref 150–450)
PMV BLD AUTO: 10.1 FL (ref 9.4–12.3)
POTASSIUM SERPL-SCNC: 3.5 MMOL/L (ref 3.5–5.1)
PROCALCITONIN SERPL-MCNC: 0.05 NG/ML (ref 0–0.49)
PROT SERPL-MCNC: 7.6 G/DL (ref 6.3–8.2)
RBC # BLD AUTO: 4.39 M/UL (ref 4.05–5.2)
SODIUM SERPL-SCNC: 137 MMOL/L (ref 133–143)
TROPONIN I SERPL HS-MCNC: 18.2 PG/ML (ref 0–14)
WBC # BLD AUTO: 10.9 K/UL (ref 4.3–11.1)

## 2022-11-23 PROCEDURE — 84145 PROCALCITONIN (PCT): CPT

## 2022-11-23 PROCEDURE — 80053 COMPREHEN METABOLIC PANEL: CPT

## 2022-11-23 PROCEDURE — 83690 ASSAY OF LIPASE: CPT

## 2022-11-23 PROCEDURE — 85025 COMPLETE CBC W/AUTO DIFF WBC: CPT

## 2022-11-23 PROCEDURE — 83605 ASSAY OF LACTIC ACID: CPT

## 2022-11-23 PROCEDURE — 6370000000 HC RX 637 (ALT 250 FOR IP): Performed by: EMERGENCY MEDICINE

## 2022-11-23 PROCEDURE — 1100000000 HC RM PRIVATE

## 2022-11-23 PROCEDURE — 86140 C-REACTIVE PROTEIN: CPT

## 2022-11-23 PROCEDURE — 83735 ASSAY OF MAGNESIUM: CPT

## 2022-11-23 PROCEDURE — 94640 AIRWAY INHALATION TREATMENT: CPT

## 2022-11-23 PROCEDURE — 99285 EMERGENCY DEPT VISIT HI MDM: CPT

## 2022-11-23 PROCEDURE — 71045 X-RAY EXAM CHEST 1 VIEW: CPT

## 2022-11-23 PROCEDURE — 87040 BLOOD CULTURE FOR BACTERIA: CPT

## 2022-11-23 PROCEDURE — 84484 ASSAY OF TROPONIN QUANT: CPT

## 2022-11-23 RX ORDER — BENZONATATE 100 MG/1
100 CAPSULE ORAL
Status: COMPLETED | OUTPATIENT
Start: 2022-11-23 | End: 2022-11-23

## 2022-11-23 RX ORDER — HYDROCODONE BITARTRATE AND HOMATROPINE METHYLBROMIDE ORAL SOLUTION 5; 1.5 MG/5ML; MG/5ML
5 LIQUID ORAL
Status: COMPLETED | OUTPATIENT
Start: 2022-11-23 | End: 2022-11-23

## 2022-11-23 RX ORDER — IPRATROPIUM BROMIDE AND ALBUTEROL SULFATE 2.5; .5 MG/3ML; MG/3ML
1 SOLUTION RESPIRATORY (INHALATION)
Status: COMPLETED | OUTPATIENT
Start: 2022-11-23 | End: 2022-11-23

## 2022-11-23 RX ORDER — SODIUM CHLORIDE 0.9 % (FLUSH) 0.9 %
5 SYRINGE (ML) INJECTION EVERY 8 HOURS
Status: DISCONTINUED | OUTPATIENT
Start: 2022-11-23 | End: 2022-11-28 | Stop reason: HOSPADM

## 2022-11-23 RX ORDER — SODIUM CHLORIDE 0.9 % (FLUSH) 0.9 %
5 SYRINGE (ML) INJECTION AS NEEDED
Status: DISCONTINUED | OUTPATIENT
Start: 2022-11-23 | End: 2022-11-28 | Stop reason: HOSPADM

## 2022-11-23 RX ADMIN — IPRATROPIUM BROMIDE AND ALBUTEROL SULFATE 1 AMPULE: .5; 3 SOLUTION RESPIRATORY (INHALATION) at 21:39

## 2022-11-23 RX ADMIN — HYDROCODONE BITARTRATE AND HOMATROPINE METHYLBROMIDE 5 ML: 5; 1.5 SOLUTION ORAL at 23:20

## 2022-11-23 RX ADMIN — BENZONATATE 100 MG: 100 CAPSULE ORAL at 23:20

## 2022-11-23 ASSESSMENT — ENCOUNTER SYMPTOMS
WHEEZING: 1
GASTROINTESTINAL NEGATIVE: 1
COUGH: 1
SHORTNESS OF BREATH: 1
CHEST TIGHTNESS: 1

## 2022-11-23 ASSESSMENT — PAIN - FUNCTIONAL ASSESSMENT: PAIN_FUNCTIONAL_ASSESSMENT: NONE - DENIES PAIN

## 2022-11-24 PROBLEM — J45.909 REACTIVE AIRWAY DISEASE: Status: ACTIVE | Noted: 2022-11-24

## 2022-11-24 PROBLEM — J96.91 RESPIRATORY FAILURE WITH HYPOXIA (HCC): Status: ACTIVE | Noted: 2022-11-24

## 2022-11-24 PROCEDURE — 6370000000 HC RX 637 (ALT 250 FOR IP): Performed by: FAMILY MEDICINE

## 2022-11-24 PROCEDURE — 6360000002 HC RX W HCPCS: Performed by: FAMILY MEDICINE

## 2022-11-24 PROCEDURE — 2700000000 HC OXYGEN THERAPY PER DAY

## 2022-11-24 PROCEDURE — 2580000003 HC RX 258: Performed by: FAMILY MEDICINE

## 2022-11-24 PROCEDURE — 1100000000 HC RM PRIVATE

## 2022-11-24 PROCEDURE — 94640 AIRWAY INHALATION TREATMENT: CPT

## 2022-11-24 PROCEDURE — 97530 THERAPEUTIC ACTIVITIES: CPT

## 2022-11-24 PROCEDURE — 2580000003 HC RX 258: Performed by: EMERGENCY MEDICINE

## 2022-11-24 PROCEDURE — 97161 PT EVAL LOW COMPLEX 20 MIN: CPT

## 2022-11-24 PROCEDURE — 97535 SELF CARE MNGMENT TRAINING: CPT

## 2022-11-24 PROCEDURE — 6360000002 HC RX W HCPCS: Performed by: EMERGENCY MEDICINE

## 2022-11-24 PROCEDURE — 97165 OT EVAL LOW COMPLEX 30 MIN: CPT

## 2022-11-24 RX ORDER — MAGNESIUM HYDROXIDE/ALUMINUM HYDROXICE/SIMETHICONE 120; 1200; 1200 MG/30ML; MG/30ML; MG/30ML
30 SUSPENSION ORAL EVERY 6 HOURS PRN
Status: DISCONTINUED | OUTPATIENT
Start: 2022-11-24 | End: 2022-11-28 | Stop reason: HOSPADM

## 2022-11-24 RX ORDER — GUAIFENESIN 600 MG/1
600 TABLET, EXTENDED RELEASE ORAL 2 TIMES DAILY
Status: DISCONTINUED | OUTPATIENT
Start: 2022-11-24 | End: 2022-11-28 | Stop reason: HOSPADM

## 2022-11-24 RX ORDER — DOXYCYCLINE HYCLATE 100 MG/1
100 CAPSULE ORAL 2 TIMES DAILY
Status: DISCONTINUED | OUTPATIENT
Start: 2022-11-24 | End: 2022-11-28 | Stop reason: HOSPADM

## 2022-11-24 RX ORDER — ENOXAPARIN SODIUM 100 MG/ML
40 INJECTION SUBCUTANEOUS DAILY
Status: DISCONTINUED | OUTPATIENT
Start: 2022-11-24 | End: 2022-11-28 | Stop reason: HOSPADM

## 2022-11-24 RX ORDER — AMLODIPINE BESYLATE 5 MG/1
5 TABLET ORAL DAILY
Status: DISCONTINUED | OUTPATIENT
Start: 2022-11-24 | End: 2022-11-26

## 2022-11-24 RX ORDER — IPRATROPIUM BROMIDE AND ALBUTEROL SULFATE 2.5; .5 MG/3ML; MG/3ML
1 SOLUTION RESPIRATORY (INHALATION)
Status: DISCONTINUED | OUTPATIENT
Start: 2022-11-24 | End: 2022-11-28 | Stop reason: HOSPADM

## 2022-11-24 RX ORDER — DOCUSATE SODIUM 100 MG/1
100 CAPSULE, LIQUID FILLED ORAL DAILY
Status: DISCONTINUED | OUTPATIENT
Start: 2022-11-24 | End: 2022-11-28 | Stop reason: HOSPADM

## 2022-11-24 RX ORDER — ACETAMINOPHEN 650 MG/1
650 SUPPOSITORY RECTAL EVERY 6 HOURS PRN
Status: DISCONTINUED | OUTPATIENT
Start: 2022-11-24 | End: 2022-11-28 | Stop reason: HOSPADM

## 2022-11-24 RX ORDER — TROSPIUM CHLORIDE 20 MG/1
20 TABLET, FILM COATED ORAL
Status: DISCONTINUED | OUTPATIENT
Start: 2022-11-24 | End: 2022-11-28 | Stop reason: HOSPADM

## 2022-11-24 RX ORDER — SODIUM CHLORIDE 9 MG/ML
INJECTION, SOLUTION INTRAVENOUS PRN
Status: DISCONTINUED | OUTPATIENT
Start: 2022-11-24 | End: 2022-11-28 | Stop reason: HOSPADM

## 2022-11-24 RX ORDER — ALBUTEROL SULFATE 2.5 MG/3ML
2.5 SOLUTION RESPIRATORY (INHALATION) EVERY 6 HOURS PRN
Status: DISCONTINUED | OUTPATIENT
Start: 2022-11-24 | End: 2022-11-24

## 2022-11-24 RX ORDER — PANTOPRAZOLE SODIUM 40 MG/1
40 TABLET, DELAYED RELEASE ORAL
Status: DISCONTINUED | OUTPATIENT
Start: 2022-11-24 | End: 2022-11-28 | Stop reason: HOSPADM

## 2022-11-24 RX ORDER — HYDROCODONE BITARTRATE AND HOMATROPINE METHYLBROMIDE ORAL SOLUTION 5; 1.5 MG/5ML; MG/5ML
5 LIQUID ORAL EVERY 4 HOURS PRN
Status: DISCONTINUED | OUTPATIENT
Start: 2022-11-24 | End: 2022-11-28 | Stop reason: HOSPADM

## 2022-11-24 RX ORDER — ONDANSETRON 2 MG/ML
4 INJECTION INTRAMUSCULAR; INTRAVENOUS EVERY 6 HOURS PRN
Status: DISCONTINUED | OUTPATIENT
Start: 2022-11-24 | End: 2022-11-28 | Stop reason: HOSPADM

## 2022-11-24 RX ORDER — POTASSIUM CHLORIDE 7.45 MG/ML
10 INJECTION INTRAVENOUS PRN
Status: DISCONTINUED | OUTPATIENT
Start: 2022-11-24 | End: 2022-11-28 | Stop reason: HOSPADM

## 2022-11-24 RX ORDER — PREDNISONE 20 MG/1
20 TABLET ORAL DAILY
Status: DISCONTINUED | OUTPATIENT
Start: 2022-11-24 | End: 2022-11-25

## 2022-11-24 RX ORDER — SODIUM CHLORIDE 0.9 % (FLUSH) 0.9 %
5-40 SYRINGE (ML) INJECTION EVERY 12 HOURS SCHEDULED
Status: DISCONTINUED | OUTPATIENT
Start: 2022-11-24 | End: 2022-11-28 | Stop reason: HOSPADM

## 2022-11-24 RX ORDER — SODIUM CHLORIDE 0.9 % (FLUSH) 0.9 %
5-40 SYRINGE (ML) INJECTION PRN
Status: DISCONTINUED | OUTPATIENT
Start: 2022-11-24 | End: 2022-11-28 | Stop reason: HOSPADM

## 2022-11-24 RX ORDER — PROMETHAZINE HYDROCHLORIDE 12.5 MG/1
12.5 TABLET ORAL EVERY 6 HOURS PRN
Status: DISCONTINUED | OUTPATIENT
Start: 2022-11-24 | End: 2022-11-28 | Stop reason: HOSPADM

## 2022-11-24 RX ORDER — POLYETHYLENE GLYCOL 3350 17 G/17G
17 POWDER, FOR SOLUTION ORAL DAILY
Status: DISCONTINUED | OUTPATIENT
Start: 2022-11-24 | End: 2022-11-28 | Stop reason: HOSPADM

## 2022-11-24 RX ORDER — ACETAMINOPHEN 325 MG/1
650 TABLET ORAL EVERY 6 HOURS PRN
Status: DISCONTINUED | OUTPATIENT
Start: 2022-11-24 | End: 2022-11-28 | Stop reason: HOSPADM

## 2022-11-24 RX ORDER — POTASSIUM CHLORIDE 20 MEQ/1
40 TABLET, EXTENDED RELEASE ORAL PRN
Status: DISCONTINUED | OUTPATIENT
Start: 2022-11-24 | End: 2022-11-28 | Stop reason: HOSPADM

## 2022-11-24 RX ORDER — SODIUM CHLORIDE 9 MG/ML
INJECTION, SOLUTION INTRAVENOUS CONTINUOUS
Status: DISCONTINUED | OUTPATIENT
Start: 2022-11-24 | End: 2022-11-24

## 2022-11-24 RX ORDER — LISINOPRIL 20 MG/1
40 TABLET ORAL DAILY
Status: DISCONTINUED | OUTPATIENT
Start: 2022-11-24 | End: 2022-11-28 | Stop reason: HOSPADM

## 2022-11-24 RX ORDER — ROSUVASTATIN CALCIUM 20 MG/1
20 TABLET, COATED ORAL DAILY
Status: DISCONTINUED | OUTPATIENT
Start: 2022-11-24 | End: 2022-11-28 | Stop reason: HOSPADM

## 2022-11-24 RX ORDER — MAGNESIUM SULFATE IN WATER 40 MG/ML
2000 INJECTION, SOLUTION INTRAVENOUS PRN
Status: DISCONTINUED | OUTPATIENT
Start: 2022-11-24 | End: 2022-11-28 | Stop reason: HOSPADM

## 2022-11-24 RX ADMIN — MOMETASONE FUROATE AND FORMOTEROL FUMARATE DIHYDRATE 2 PUFF: 100; 5 AEROSOL RESPIRATORY (INHALATION) at 20:43

## 2022-11-24 RX ADMIN — IPRATROPIUM BROMIDE AND ALBUTEROL SULFATE 1 AMPULE: 2.5; .5 SOLUTION RESPIRATORY (INHALATION) at 20:40

## 2022-11-24 RX ADMIN — DOXYCYCLINE HYCLATE 100 MG: 100 CAPSULE ORAL at 20:48

## 2022-11-24 RX ADMIN — LISINOPRIL 40 MG: 20 TABLET ORAL at 07:59

## 2022-11-24 RX ADMIN — GUAIFENESIN 600 MG: 600 TABLET, EXTENDED RELEASE ORAL at 20:48

## 2022-11-24 RX ADMIN — GUAIFENESIN 600 MG: 600 TABLET, EXTENDED RELEASE ORAL at 08:02

## 2022-11-24 RX ADMIN — HYDROCODONE BITARTRATE AND HOMATROPINE METHYLBROMIDE 5 ML: 5; 1.5 SOLUTION ORAL at 03:48

## 2022-11-24 RX ADMIN — SODIUM CHLORIDE: 9 INJECTION, SOLUTION INTRAVENOUS at 01:52

## 2022-11-24 RX ADMIN — HYDROCODONE BITARTRATE AND HOMATROPINE METHYLBROMIDE 5 ML: 5; 1.5 SOLUTION ORAL at 16:22

## 2022-11-24 RX ADMIN — PANTOPRAZOLE SODIUM 40 MG: 40 TABLET, DELAYED RELEASE ORAL at 06:18

## 2022-11-24 RX ADMIN — TROSPIUM CHLORIDE 20 MG: 20 TABLET, FILM COATED ORAL at 08:00

## 2022-11-24 RX ADMIN — AMLODIPINE BESYLATE 5 MG: 5 TABLET ORAL at 10:22

## 2022-11-24 RX ADMIN — SODIUM CHLORIDE, PRESERVATIVE FREE 5 ML: 5 INJECTION INTRAVENOUS at 20:49

## 2022-11-24 RX ADMIN — CEFTRIAXONE SODIUM 1000 MG: 1 INJECTION, POWDER, FOR SOLUTION INTRAMUSCULAR; INTRAVENOUS at 22:37

## 2022-11-24 RX ADMIN — TROSPIUM CHLORIDE 20 MG: 20 TABLET, FILM COATED ORAL at 16:22

## 2022-11-24 RX ADMIN — IPRATROPIUM BROMIDE AND ALBUTEROL SULFATE 1 AMPULE: 2.5; .5 SOLUTION RESPIRATORY (INHALATION) at 15:00

## 2022-11-24 RX ADMIN — CEFTRIAXONE 1000 MG: 1 INJECTION, POWDER, FOR SOLUTION INTRAMUSCULAR; INTRAVENOUS at 00:28

## 2022-11-24 RX ADMIN — ENOXAPARIN SODIUM 40 MG: 100 INJECTION SUBCUTANEOUS at 07:59

## 2022-11-24 RX ADMIN — ROSUVASTATIN 20 MG: 20 TABLET, FILM COATED ORAL at 08:02

## 2022-11-24 RX ADMIN — AZITHROMYCIN MONOHYDRATE 500 MG: 500 INJECTION, POWDER, LYOPHILIZED, FOR SOLUTION INTRAVENOUS at 00:28

## 2022-11-24 RX ADMIN — DOXYCYCLINE HYCLATE 100 MG: 100 CAPSULE ORAL at 08:02

## 2022-11-24 RX ADMIN — POLYETHYLENE GLYCOL 3350 17 G: 17 POWDER, FOR SOLUTION ORAL at 07:59

## 2022-11-24 RX ADMIN — SODIUM CHLORIDE, PRESERVATIVE FREE 10 ML: 5 INJECTION INTRAVENOUS at 07:58

## 2022-11-24 RX ADMIN — PREDNISONE 20 MG: 20 TABLET ORAL at 08:00

## 2022-11-24 NOTE — ED PROVIDER NOTES
Emergency Department Provider Note                   PCP:                Melissa Nelson MD               Age: [de-identified] y.o. Sex: female       ICD-10-CM    1. Pneumonia of right lower lobe due to infectious organism  J18.9       2. Hypoxia  R09.02           DISPOSITION Decision To Admit 11/23/2022 10:55:20 PM       MDM  Number of Diagnoses or Management Options  Hypoxia  Pneumonia of right lower lobe due to infectious organism  Diagnosis management comments: 57-year-old  female with recent diagnosis of bronchitis presented emergency department with complaints of significant shortness of breath and hypoxia. Patient with no previous lung disease. Patient with oxygen levels in the mid 80s. This did improve with breathing treatment and oxygen supplementation. Patient with right lower lobe infiltrate on x-ray upon my review. Patient's x-ray was read as no acute process by radiology. Patient will be admitted to the hospitalist for further treatment evaluation.        Amount and/or Complexity of Data Reviewed  Clinical lab tests: ordered and reviewed  Tests in the radiology section of CPT®: ordered and reviewed  Decide to obtain previous medical records or to obtain history from someone other than the patient: yes  Obtain history from someone other than the patient: yes  Review and summarize past medical records: yes  Discuss the patient with other providers: yes  Independent visualization of images, tracings, or specimens: yes    Patient Progress  Patient progress: improved             Orders Placed This Encounter   Procedures    Blood Culture 1    Blood Culture 2    XR CHEST PORTABLE    CBC with Auto Differential    Comprehensive Metabolic Panel    Magnesium    Lipase    Lactic Acid    Procalcitonin    C-Reactive Protein    Troponin    Cardiac Monitor - ED Only    Continuous Pulse Oximetry    EKG 12 Lead    Saline lock IV        Medications   sodium chloride flush 0.9 % injection 5 mL (has no administration in time range)   sodium chloride flush 0.9 % injection 5 mL (has no administration in time range)   cefTRIAXone (ROCEPHIN) 1,000 mg in sodium chloride 0.9 % 50 mL IVPB mini-bag (has no administration in time range)   azithromycin (ZITHROMAX) 500 mg in sodium chloride 0.9 % 250 mL IVPB ([id number]) (has no administration in time range)   ipratropium-albuterol (DUONEB) nebulizer solution 1 ampule (1 ampule Inhalation Given 11/23/22 2139)   HYDROcodone homatropine (HYCODAN) 5-1.5 MG/5ML solution 5 mL (5 mLs Oral Given 11/23/22 2320)   benzonatate (TESSALON) capsule 100 mg (100 mg Oral Given 11/23/22 2320)       New Prescriptions    No medications on file        Halie Haq is a [de-identified] y.o. female who presents to the Emergency Department with chief complaint of    Chief Complaint   Patient presents with    Shortness of Breath      82-year-old female with history of hypertension and diabetes presented to the emergency department via EMS with complaints of wheezing and shortness of breath. Patient was seen here in the emergency department 2 days ago and diagnosed with bronchitis. Patient had significant work-up at that time including CT of her chest secondary to an elevated D-dimer. Patient was given antibiotics, steroids and an inhaler for home but patient has continued to get worse. Patient's oxygen levels at home were in the mid 80s and were the same for EMS. Patient had tested negative for COVID and flu on 11/21/2022. Patient has been coughing and her sputum is clear currently. Patient does not have a history of lung disease. Patient is requiring oxygen supplementation to maintain her oxygen levels above 90% upon arrival.  She was given breathing treatment in route. Patient denies any fevers, chills, chest pain, abdominal pain, nausea, vomiting, diarrhea or any other concerns. The history is provided by the patient, medical records and a relative.      All other systems reviewed and are negative unless otherwise stated in the history of present illness section. Review of Systems   Constitutional:  Positive for fatigue. HENT: Negative. Respiratory:  Positive for cough, chest tightness, shortness of breath and wheezing. Cardiovascular: Negative. Gastrointestinal: Negative. Genitourinary: Negative. Musculoskeletal: Negative. Neurological: Negative. Psychiatric/Behavioral: Negative. All other systems reviewed and are negative. 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 breast bx    GYN      Hysterectomy    HEENT      T&A    TAMIA BIOPSY BREAST STEREOTACTIC  over 20 yrs ago    TOTAL KNEE ARTHROPLASTY Right 2010    US GUIDED CORE BREAST BIOPSY  over 20 yrs ago        Family History   Problem Relation Age of Onset    Delayed Awakening Neg Hx     Post-op Nausea/Vomiting Neg Hx     Emergence Delirium Neg Hx     Post-op Cognitive Dysfunction Neg Hx     Other Neg Hx     Breast Cancer Neg Hx     Malig Hypertherm Neg Hx     Pseudochol. Deficiency Neg Hx     Cancer Sister     Hypertension Father     Elevated Lipids Father     Cancer Father     Elevated Lipids Mother     Heart Disease Mother     Hypertension Mother         Social History     Socioeconomic History    Marital status:     Tobacco Use    Smoking status: Former    Smokeless tobacco: Never    Tobacco comments:     Quit smoking: \"40 years ago\"   Vaping Use    Vaping Use: Never used   Substance and Sexual Activity    Alcohol use: Yes    Drug use: No        Allergies: Latex, Penicillins, and Sulfa antibiotics    Previous Medications    ALBUTEROL SULFATE HFA (VENTOLIN HFA) 108 (90 BASE) MCG/ACT INHALER    Inhale 2 puffs into the lungs 4 times daily as needed for Wheezing    COLCHICINE (COLCRYS) 0.6 MG TABLET    Pt to take one po every hour until symptoms improved or pt has diarrhea    DEXTROMETHORPHAN-GUAIFENESIN (MUCINEX DM MAXIMUM STRENGTH)  MG TB12    Take 1 tablet by mouth in the morning and at bedtime    DOCUSATE (COLACE, DULCOLAX) 100 MG CAPS    Take 100 mg by mouth daily    DOXYCYCLINE HYCLATE (VIBRA-TABS) 100 MG TABLET    Take 1 tablet by mouth 2 times daily for 7 days    LISINOPRIL-HYDROCHLOROTHIAZIDE (PRINZIDE;ZESTORETIC) 20-12.5 MG PER TABLET    Take 1 tablet by mouth daily    METHYLPREDNISOLONE (MEDROL DOSEPACK) 4 MG TABLET    Take by mouth as directed on pack    MIRABEGRON (MYRBETRIQ) 50 MG TB24    Take 50 mg by mouth daily    OMEPRAZOLE (PRILOSEC) 40 MG DELAYED RELEASE CAPSULE    Take 40 mg by mouth daily    SIMVASTATIN (ZOCOR) 40 MG TABLET    Take 40 mg by mouth daily        Vitals signs and nursing note reviewed. Patient Vitals for the past 4 hrs:   Temp Pulse Resp BP SpO2   11/23/22 2143 -- 100 20 -- 95 %   11/23/22 2142 -- 100 20 -- 95 %   11/23/22 2116 98.2 °F (36.8 °C) (!) 106 25 (!) 169/71 90 %          Physical Exam  Vitals and nursing note reviewed. Constitutional:       General: She is in acute distress. Appearance: She is ill-appearing. HENT:      Head: Normocephalic and atraumatic. Mouth/Throat:      Mouth: Mucous membranes are moist.   Eyes:      Extraocular Movements: Extraocular movements intact. Pupils: Pupils are equal, round, and reactive to light. Cardiovascular:      Rate and Rhythm: Normal rate and regular rhythm. Pulmonary:      Effort: Tachypnea present. Breath sounds: Examination of the right-lower field reveals decreased breath sounds. Decreased breath sounds, wheezing and rhonchi present.    Chest:      Chest wall: No tenderness. Abdominal:      Palpations: Abdomen is soft. Tenderness: There is no abdominal tenderness. Musculoskeletal:         General: Normal range of motion. Cervical back: Normal range of motion. Right lower leg: No tenderness. No edema. Left lower leg: No tenderness. No edema. Skin:     General: Skin is warm and dry. Neurological:      General: No focal deficit present. Mental Status: She is alert and oriented to person, place, and time. Psychiatric:         Mood and Affect: Mood normal.         Behavior: Behavior normal.          ED EKG Interpretation  EKG was interpreted in the absence of a cardiologist.    Rate: 98  EKG Interpretation: Normal sinus rhythm. Normal NE and QT intervals. ST Segments: Nonspecific ST segments - NO STEMI    Results for orders placed or performed during the hospital encounter of 11/23/22   XR CHEST PORTABLE    Narrative    EXAM: Chest x-ray. INDICATION: Dyspnea. COMPARISON: Prior CT chest on November 21, 2022. TECHNIQUE: Frontal view chest x-ray. FINDINGS: Borderline cardiomegaly is unchanged. The lungs are clear, except for  an old calcified right lower lobe granuloma. No pneumothorax, vascular  congestion or pleural effusion is seen. The bones are intact. Impression    No acute process.    CBC with Auto Differential   Result Value Ref Range    WBC 10.9 4.3 - 11.1 K/uL    RBC 4.39 4.05 - 5.2 M/uL    Hemoglobin 10.4 (L) 11.7 - 15.4 g/dL    Hematocrit 34.2 (L) 35.8 - 46.3 %    MCV 77.9 (L) 82.0 - 102.0 FL    MCH 23.7 (L) 26.1 - 32.9 PG    MCHC 30.4 (L) 31.4 - 35.0 g/dL    RDW 15.8 (H) 11.9 - 14.6 %    Platelets 568 017 - 684 K/uL    MPV 10.1 9.4 - 12.3 FL    nRBC 0.00 0.0 - 0.2 K/uL    Differential Type AUTOMATED      Seg Neutrophils 82 (H) 43 - 78 %    Lymphocytes 8 (L) 13 - 44 %    Monocytes 9 4.0 - 12.0 %    Eosinophils % 0 (L) 0.5 - 7.8 %    Basophils 1 0.0 - 2.0 %    Immature Granulocytes 1 0.0 - 5.0 %    Segs Absolute 8.8 (H) 1.7 - 8.2 K/UL    Absolute Lymph # 0.9 0.5 - 4.6 K/UL    Absolute Mono # 1.0 0.1 - 1.3 K/UL    Absolute Eos # 0.0 0.0 - 0.8 K/UL    Basophils Absolute 0.1 0.0 - 0.2 K/UL    Absolute Immature Granulocyte 0.1 0.0 - 0.5 K/UL   Comprehensive Metabolic Panel   Result Value Ref Range    Sodium 137 133 - 143 mmol/L    Potassium 3.5 3.5 - 5.1 mmol/L    Chloride 104 101 - 110 mmol/L    CO2 26 21 - 32 mmol/L    Anion Gap 7 2 - 11 mmol/L    Glucose 156 (H) 65 - 100 mg/dL    BUN 20 8 - 23 MG/DL    Creatinine 1.03 (H) 0.6 - 1.0 MG/DL    Est, Glom Filt Rate 55 (L) >60 ml/min/1.73m2    Calcium 9.4 8.3 - 10.4 MG/DL    Total Bilirubin 0.3 0.2 - 1.1 MG/DL    ALT 43 12 - 65 U/L    AST 57 (H) 15 - 37 U/L    Alk Phosphatase 103 50 - 130 U/L    Total Protein 7.6 6.3 - 8.2 g/dL    Albumin 3.3 3.2 - 4.6 g/dL    Globulin 4.3 2.8 - 4.5 g/dL    Albumin/Globulin Ratio 0.8 0.4 - 1.6     Magnesium   Result Value Ref Range    Magnesium 1.8 1.8 - 2.4 mg/dL   Lipase   Result Value Ref Range    Lipase 148 73 - 393 U/L   Lactic Acid   Result Value Ref Range    Lactic Acid, Plasma 0.9 0.4 - 2.0 MMOL/L   Procalcitonin   Result Value Ref Range    Procalcitonin 0.05 0.00 - 0.49 ng/mL   C-Reactive Protein   Result Value Ref Range    CRP 5.6 (H) 0.0 - 0.9 mg/dL   Troponin   Result Value Ref Range    Troponin, High Sensitivity 18.2 (H) 0 - 14 pg/mL        XR CHEST PORTABLE   Final Result   No acute process. Voice dictation software was used during the making of this note. This software is not perfect and grammatical and other typographical errors may be present. This note has not been completely proofread for errors.          Lashaun Ward MD  11/23/22 [id number]

## 2022-11-24 NOTE — ED TRIAGE NOTES
Pt.. ambulatory to triage. Pt. States she was seen and dx on Monday with bronchitis. Pt. States O2 at [facility] was high 80s. Pt. O2 in triage 89-90. Pt. C/o SOB and wheezing.

## 2022-11-24 NOTE — PROGRESS NOTES
ACUTE PHYSICAL THERAPY GOALS:   (Developed with and agreed upon by patient and/or caregiver.)   Patient will ambulate 300' independently within 1 treatment day. GOAL MET 11/24/2022    PHYSICAL THERAPY Initial Assessment and Discharge  (Link to Caseload Tracking: PT Visit Days : 1  Acknowledge Orders  Time In/Out  PT Charge Capture  Rehab Caseload Tracker    Albino Sellers is a [de-identified] y.o. female   PRIMARY DIAGNOSIS: Community acquired pneumonia of right lower lobe of lung  Hypoxia [R09.02]  Pneumonia of right lower lobe due to infectious organism [J18.9]  Community acquired pneumonia of right lower lobe of lung [J18.9]       Reason for Referral: Unspecified Lack of Coordination (R27.9)  Inpatient: Payor: MEDICARE / Plan: MEDICARE PART A AND B / Product Type: *No Product type* /     ASSESSMENT:     REHAB RECOMMENDATIONS:   Recommendation to date pending progress:  Setting:  No further skilled therapy after discharge from hospital    Equipment:    None     ASSESSMENT:  Ms. Ky Valladares admitted with above diagnosis. Patient is independent at baseline-no O2 needs and no assistive devices. Patient currently on 2-3L O2 (2L at rest but increased to 3L with ambulation due to work of breathing). She demonstrated independence with transfers, toileting, and ambulation in the hallway. Patient does not demonstrate any acute or d/c PT needs. Patient and family encouraged to walk PRN.        [employer] AM-PAC 6 Clicks Basic Mobility Inpatient Short Form  AM-PAC Mobility Inpatient   How much difficulty turning over in bed?: None  How much difficulty sitting down on / standing up from a chair with arms?: None  How much difficulty moving from lying on back to sitting on side of bed?: None  How much help from another person moving to and from a bed to a chair?: None  How much help from another person needed to walk in hospital room?: None  How much help from another person for climbing 3-5 steps with a railing?: None  AM-PAC Inpatient Mobility Raw Score : 24  AM-PAC Inpatient T-Scale Score : 61.14  Mobility Inpatient CMS 0-100% Score: 0  Mobility Inpatient CMS G-Code Modifier : CH    SUBJECTIVE:   Ms. Guerrier An agreeable.     Social/Functional Lives With: Alone  Type of Home: House  Home Layout: One level  Home Access: Level entry  Bathroom Shower/Tub: Walk-in shower  Home Equipment: Kane Art, rolling, Cane  ADL Assistance: Independent  Ambulation Assistance: Independent  Active : Yes    OBJECTIVE:     PAIN: [place] / O2: Lei Camposle / Elizabeth Reasons / Henrry Lauth:   Pre Treatment:   Pain Assessment: None - Denies Pain      Post Treatment: 0 Vitals        Oxygen  O2 Therapy: Oxygen  O2 Device: Nasal cannula  O2 Flow Rate (L/min): 2 L/min  Increased to 3L with gait due to work of breathing   None    RESTRICTIONS/PRECAUTIONS:                    GROSS EVALUATION: Intact Impaired (Comments):   AROM [x]     PROM []    Strength [x]     Balance [x]     Posture [] Forward Head  Rounded Shoulders   Sensation [x]     Coordination [x]      Tone [x]     Edema [x]    Activity Tolerance []  Limited with shortness of breath    []      COGNITION/  PERCEPTION: Intact Impaired (Comments):   Orientation [x]     Vision [x]     Hearing [x]     Cognition  [x]       MOBILITY: I Mod I S SBA CGA Min Mod Max Total  NT x2 Comments:   Bed Mobility    Rolling [] [] [] [] [] [] [] [] [] [] []    Supine to Sit [] [] [] [] [] [] [] [] [] [] []    Scooting [] [] [] [] [] [] [] [] [] [] []    Sit to Supine [] [] [] [] [] [] [] [] [] [] []    Transfers    Sit to Stand [x] [] [] [] [] [] [] [] [] [] []    Bed to Chair [x] [] [] [] [] [] [] [] [] [] []    Stand to Sit [x] [] [] [] [] [] [] [] [] [] []     [] [] [] [] [] [] [] [] [] [] []    I=Independent, Mod I=Modified Independent, S=Supervision, SBA=Standby Assistance, CGA=Contact Guard Assistance,   Min=Minimal Assistance, Mod=Moderate Assistance, Max=Maximal Assistance, Total=Total Assistance, NT=Not Tested    GAIT: I Mod I S SBA CGA Min Mod Max Total  NT x2 Comments:   Level of Assistance [x] [] [] [] [] [] [] [] [] [] []    Distance 300 feet    DME None    Gait Quality Decreased demarcus     Weightbearing Status      Stairs      I=Independent, Mod I=Modified Independent, S=Supervision, SBA=Standby Assistance, CGA=Contact Guard Assistance,   Min=Minimal Assistance, Mod=Moderate Assistance, Max=Maximal Assistance, Total=Total Assistance, NT=Not Tested    PLAN:   FREQUENCY AND DURATION:   evaluation/discharge    THERAPY PROGNOSIS: Good    PROBLEM LIST:   (Skilled intervention is medically necessary to address:)  none INTERVENTIONS PLANNED:   (Benefits and precautions of physical therapy have been discussed with the patient.)  Therapeutic Activity       TREATMENT:   EVALUATION: LOW COMPLEXITY: (Untimed Charge)    TREATMENT:   Therapeutic Activity (30 Minutes): Therapeutic activity included Transfer Training, Ambulation on level ground, Sitting balance , Standing balance, and ADL/hygiene to improve functional Activity tolerance and Mobility.     TREATMENT GRID:  N/A    AFTER TREATMENT PRECAUTIONS: Bed/Chair Locked, Call light within reach, Chair, Needs within reach, RN at bedside, and Visitors at bedside    INTERDISCIPLINARY COLLABORATION:  RN/ PCT and OT/ BENSON    EDUCATION: Education Given To: Patient  Education Provided: Role of Therapy;Plan of Care  Education Method: Verbal  Education Outcome: Verbalized understanding    TIME IN/OUT:  Time In: 1045  Time Out: 1115  Minutes: [address] [facility]

## 2022-11-24 NOTE — H&P
Systems:  10 systems reviewed and negative except as noted in HPI. Assessment & Plan:     Principal Problem:    Community acquired pneumonia of right lower lobe of lung  Plan: We will treat with Rocephin and doxycycline  O2 supplementation  Antitussives: Hycodan and Mucinex DM  Gentle IV fluids  Continue steroids    Active Problems:    Diabetes mellitus type 2, diet-controlled ([address]  Plan: We will cover with sliding scale insulin      Hypertension  Plan: Continue lisinopril      Dispo/Discharge Planning:   Inpatient, anticipate 2 days    Diet: Regular  VTE ppx:  Lovenox  Code status: Full    Hospital Problems             Last Modified POA    * (Principal) Community acquired pneumonia of right lower lobe of lung 11/23/2022 Yes    Diabetes mellitus type 2, diet-controlled ([address] (Chronic) 11/24/2022 Yes    Overview Signed 11/23/2022 10:53 PM by Deana Pierson MD     Last Assessment & Plan:   Formatting of this note is different from the original.  Chronic, controlled with diet   Lab Results   Component Value Date    HGBA1C 7.1 (A) 09/27/2021   With associated HTN obesity and HLD  Eye exam: 3/2021  Foot exam: 09/27/2021  Micro: done 09/27/2021, on ACE   Plan:   counseled on weight loss healthful diet and exercise  Check level with labs         Hypertension (Chronic) 11/24/2022 Yes    Overview Signed 11/23/2022 10:53 PM by Deana Pierson MD     Last Assessment & Plan:   Formatting of this note might be different from the original.  Chronic, taking lisinopril hctz daily and amlodipine 10 mg daily  Controlled  When hctz was stopped for urinary incontinence she had leg swelling - responded to lasix 20 mg   without chest pain or SOB.    No headache or dizziness  With HLD, prediabetes  No lightheadedness   No constipation     Plan:   Reduce amlodipine, continue lisinopril, continue metoprolol increase back to 50 if BP increased            Past History:  Past Medical History:   Diagnosis Date    Arthritis Chronic pain     hands and toes    Former smoker, stopped smoking in distant past     GERD (gastroesophageal reflux disease)     controlled with med    High cholesterol     on medication    Hypertension     Morbid obesity (HCC)     Nausea & vomiting     Pre-diabetes     Status post total left knee replacement 4/5/2017    Urinary incontinence     med     Past Surgical History:   Procedure Laterality Date    BREAST SURGERY      Imer. breast bx    GYN      Hysterectomy    HEENT      T&A    TAMIA BIOPSY BREAST STEREOTACTIC  over 20 yrs ago    TOTAL KNEE ARTHROPLASTY Right 2010    [address]  over 20 yrs ago      Allergies   Allergen Reactions    Latex Itching     redness    Penicillins Swelling    Sulfa Antibiotics Swelling      Social History     Tobacco Use    Smoking status: Former    Smokeless tobacco: Never    Tobacco comments:     Quit smoking: \"40 years ago\"   Substance Use Topics    Alcohol use: Yes      Family History   Problem Relation Age of Onset    Delayed Awakening Neg Hx     Post-op Nausea/Vomiting Neg Hx     Emergence Delirium Neg Hx     Post-op Cognitive Dysfunction Neg Hx     Other Neg Hx     Breast Cancer Neg Hx     Malig Hypertherm Neg Hx     Pseudochol.  Deficiency Neg Hx     Cancer Sister     Hypertension Father     Elevated Lipids Father     Cancer Father     Elevated Lipids Mother     Heart Disease Mother     Hypertension Mother         Immunization History   Administered Date(s) Administered    COVID-19, PFIZER Bivalent BOOSTER, (age 12y+), IM, 30 mcg/0.3 mL dose 10/24/2022    COVID-19, PFIZER PURPLE top, DILUTE for use, (age 15 y+), 30mcg/0.3mL 01/20/2021, 02/11/2021, 09/10/2021    PPD Test 04/05/2017     Prior to Admit Medications:  Current Outpatient Medications   Medication Instructions    albuterol sulfate HFA (VENTOLIN HFA) 108 (90 Base) MCG/ACT inhaler 2 puffs, Inhalation, 4 TIMES DAILY PRN    colchicine (COLCRYS) 0.6 MG tablet Pt to take one po every hour until symptoms improved or pt has diarrhea    Dextromethorphan-guaiFENesin (MUCINEX DM MAXIMUM STRENGTH)  MG TB12 1 tablet, Oral, 2 times daily    docusate (COLACE, DULCOLAX) 100 mg, Oral, DAILY    doxycycline hyclate (VIBRA-TABS) 100 mg, Oral, 2 TIMES DAILY    lisinopril-hydroCHLOROthiazide (PRINZIDE;ZESTORETIC) 20-12.5 MG per tablet 1 tablet, Oral, DAILY    methylPREDNISolone (MEDROL DOSEPACK) 4 MG tablet Take by mouth as directed on pack    mirabegron (MYRBETRIQ) 50 mg, Oral, DAILY    omeprazole (PRILOSEC) 40 mg, Oral, DAILY    simvastatin (ZOCOR) 40 mg, Oral, DAILY         Objective:   Patient Vitals for the past 24 hrs:   Temp Pulse Resp BP SpO2   11/24/22 0333 98.3 °F (36.8 °C) 99 18 (!) 167/78 90 %   11/24/22 0118 97.4 °F (36.3 °C) 95 18 (!) 190/77 90 %   11/24/22 0100 -- 93 18 (!) 158/76 96 %   11/24/22 0015 -- -- -- -- 94 %   11/23/22 2143 -- 100 20 -- 95 %   11/23/22 2142 -- 100 20 -- 95 %   11/23/22 2116 98.2 °F (36.8 °C) (!) 106 25 (!) 169/71 90 %       Estimated body mass index is 31.45 kg/m² as calculated from the following:    Height as of this encounter: 5' 5\" (1.651 m). Weight as of this encounter: 189 lb (85.7 kg). No intake or output data in the 24 hours ending 11/24/22 0537      Physical Exam:    Blood pressure (!) 167/78, pulse 99, temperature 98.3 °F (36.8 °C), temperature source Oral, resp. rate 18, height 5' 5\" (1.651 m), weight 189 lb (85.7 kg), SpO2 90 %. General:    WD and WN, acutely ill-appearing  Eyes:  PERRL; EOMI; sclera normal/non-icteric  HENT:  Normocephalic, without obvious abnormality; Oropharynx is clear with tacky mucous membranes   Neck:  No restricted ROM. Trachea midline   Resp:    Overall diminished, some rhonchi on the right. Resp are mildly tachypneic; significant, frequent coughing throughout my interview and exam  CVS/Heart: Regular rate and rhythm,  No LE edema    GI:    Soft, non-tender. Not distended.   Bowel sounds normal.     Musc/SK: Muscle strength is appropriate for age/condition; No cyanosis. No clubbing  Skin:  No rashes and normal coloration. Warm and dry.     Neurologic: CN II - XII are grossly intact - Eye exam as noted above; moves extremities equally  Psych:  Alert and oriented x 4;  Judgement and insight are normal    I have reviewed ordered lab tests and independently visualized imaging below:    Last 24hr Labs:  Recent Results (from the past 24 hour(s))   CBC with Auto Differential    Collection Time: 11/23/22  9:47 PM   Result Value Ref Range    WBC 10.9 4.3 - 11.1 K/uL    RBC 4.39 4.05 - 5.2 M/uL    Hemoglobin 10.4 (L) 11.7 - 15.4 g/dL    Hematocrit 34.2 (L) 35.8 - 46.3 %    MCV 77.9 (L) 82.0 - 102.0 FL    MCH 23.7 (L) 26.1 - 32.9 PG    MCHC 30.4 (L) 31.4 - 35.0 g/dL    RDW 15.8 (H) 11.9 - 14.6 %    Platelets 689 238 - 769 K/uL    MPV 10.1 9.4 - 12.3 FL    nRBC 0.00 0.0 - 0.2 K/uL    Differential Type AUTOMATED      Seg Neutrophils 82 (H) 43 - 78 %    Lymphocytes 8 (L) 13 - 44 %    Monocytes 9 4.0 - 12.0 %    Eosinophils % 0 (L) 0.5 - 7.8 %    Basophils 1 0.0 - 2.0 %    Immature Granulocytes 1 0.0 - 5.0 %    Segs Absolute 8.8 (H) 1.7 - 8.2 K/UL    Absolute Lymph # 0.9 0.5 - 4.6 K/UL    Absolute Mono # 1.0 0.1 - 1.3 K/UL    Absolute Eos # 0.0 0.0 - 0.8 K/UL    Basophils Absolute 0.1 0.0 - 0.2 K/UL    Absolute Immature Granulocyte 0.1 0.0 - 0.5 K/UL   Comprehensive Metabolic Panel    Collection Time: 11/23/22  9:47 PM   Result Value Ref Range    Sodium 137 133 - 143 mmol/L    Potassium 3.5 3.5 - 5.1 mmol/L    Chloride 104 101 - 110 mmol/L    CO2 26 21 - 32 mmol/L    Anion Gap 7 2 - 11 mmol/L    Glucose 156 (H) 65 - 100 mg/dL    BUN 20 8 - 23 MG/DL    Creatinine 1.03 (H) 0.6 - 1.0 MG/DL    Est, Glom Filt Rate 55 (L) >60 ml/min/1.73m2    Calcium 9.4 8.3 - 10.4 MG/DL    Total Bilirubin 0.3 0.2 - 1.1 MG/DL    ALT 43 12 - 65 U/L    AST 57 (H) 15 - 37 U/L    Alk Phosphatase 103 50 - 130 U/L    Total Protein 7.6 6.3 - 8.2 g/dL    Albumin 3.3 3.2 - 4.6 g/dL Globulin 4.3 2.8 - 4.5 g/dL    Albumin/Globulin Ratio 0.8 0.4 - 1.6     Magnesium    Collection Time: 11/23/22  9:47 PM   Result Value Ref Range    Magnesium 1.8 1.8 - 2.4 mg/dL   Lipase    Collection Time: 11/23/22  9:47 PM   Result Value Ref Range    Lipase 148 73 - 393 U/L   Lactic Acid    Collection Time: 11/23/22  9:47 PM   Result Value Ref Range    Lactic Acid, Plasma 0.9 0.4 - 2.0 MMOL/L   Procalcitonin    Collection Time: 11/23/22  9:47 PM   Result Value Ref Range    Procalcitonin 0.05 0.00 - 0.49 ng/mL   C-Reactive Protein    Collection Time: 11/23/22  9:47 PM   Result Value Ref Range    CRP 5.6 (H) 0.0 - 0.9 mg/dL   Troponin    Collection Time: 11/23/22  9:47 PM   Result Value Ref Range    Troponin, High Sensitivity 18.2 (H) 0 - 14 pg/mL         Other Studies:  XR CHEST PORTABLE    Result Date: 11/23/2022  EXAM: Chest x-ray. INDICATION: Dyspnea. COMPARISON: Prior CT chest on November 21, 2022. TECHNIQUE: Frontal view chest x-ray. FINDINGS: Borderline cardiomegaly is unchanged. The lungs are clear, except for an old calcified right lower lobe granuloma. No pneumothorax, vascular congestion or pleural effusion is seen. The bones are intact. No acute process. No results found for this or any previous visit.       guaiFENesin  600 mg Oral BID    docusate sodium  100 mg Oral Daily    doxycycline hyclate  100 mg Oral BID    lisinopril  40 mg Oral Daily    trospium  20 mg Oral BID AC    pantoprazole  40 mg Oral QAM AC    rosuvastatin  20 mg Oral Daily    sodium chloride flush  5-40 mL IntraVENous 2 times per day    enoxaparin  40 mg SubCUTAneous Daily    polyethylene glycol  17 g Oral Daily    cefTRIAXone (ROCEPHIN) IV  1,000 mg IntraVENous Q24H    predniSONE  20 mg Oral Daily    sodium chloride flush  5 mL IntraVENous Q8H         Signed:  Aravind Vasquez MD    Part of this note may have been written by using a voice dictation software.   The note has been proof read but may still contain some grammatical/other typographical errors.

## 2022-11-24 NOTE — PROGRESS NOTES
Hospitalist Progress Note   Admit Date:  2022  9:17 PM   Name:  Carine Wiley   Age:  [de-identified] y.o. Sex:  female  :  1940   MRN:  [id number]   Room:  [facility]    Presenting Complaint: Shortness of Breath     Reason(s) for Admission: Hypoxia [R09.02]  Pneumonia of right lower lobe due to infectious organism [J18.9]  Community acquired pneumonia of right lower lobe of lung [J18.9]     Hospital Course:   Carine Wiley is a [de-identified] y.o. female with medical history of diabetes and hypertension, who presented with shortness of breath and cough, failed outpatient treatment. Patient admitted with acute hypoxic respiratory failure secondary to community-acquired pneumonia and hyperactive airway disease. Empiric antibiotic and supportive care. Subjective & 24hr Events (22): Patient is seen at the bedside. Reports feeling the same. Complaining of cough and shortness of breath with minimal exertion. Persistent wheezing. On 3 to 4 L nasal cannula. At home on no oxygen. Denies chest pain, palpitation, nausea, vomiting. Reports decreased appetite. Also feeling weak and tired. Assessment & Plan:     Acute hypoxic respiratory failure secondary to CAP and hyperactive airway disease: On 3-4 L nasal cannula, wean as tolerated  Continue Rocephin and doxycycline  Start patient on DuoNeb every 4 hour while awake  Start patient on Dulera twice daily  Incentive spirometry/flutter valve  Continue Hycodan and Mucinex DM  Continue steroids  Continue supportive care    Type 2 diabetes mellitus:  Continue sliding scale and low-dose insulin accordingly    Hypertension:  Blood pressure suboptimally controlled  Continue lisinopril  Add amlodipine 5 mg daily      Anticipated discharge needs: To be determined, PT/OT    Diet:  ADULT DIET;  Regular  DVT PPx: Lovenox  Code status: Full Code    Hospital Problems:  Principal Problem:    Community acquired pneumonia of right lower lobe of lung  Active Problems: Diabetes mellitus type 2, diet-controlled ([facility]    Hypertension    Respiratory failure with hypoxia ([facility]    Reactive airway disease  Resolved Problems:    * No resolved hospital problems. *      Objective:   Patient Vitals for the past 24 hrs:   Temp Pulse Resp BP SpO2   11/24/22 0730 98.8 °F (37.1 °C) 96 20 (!) 172/74 94 %   11/24/22 0333 98.3 °F (36.8 °C) 99 18 (!) 167/78 90 %   11/24/22 0118 97.4 °F (36.3 °C) 95 18 (!) 190/77 90 %   11/24/22 0100 -- 93 18 (!) 158/76 96 %   11/24/22 0015 -- -- -- -- 94 %   11/23/22 2143 -- 100 20 -- 95 %   11/23/22 2142 -- 100 20 -- 95 %   11/23/22 2116 98.2 °F (36.8 °C) (!) 106 25 (!) 169/71 90 %       Oxygen Therapy  SpO2: 94 %    Estimated body mass index is 31.45 kg/m² as calculated from the following:    Height as of this encounter: 5' 5\" (1.651 m). Weight as of this encounter: 189 lb (85.7 kg). No intake or output data in the 24 hours ending 11/24/22 0951      Physical Exam:     Blood pressure (!) 172/74, pulse 96, temperature 98.8 °F (37.1 °C), resp. rate 20, height 5' 5\" (1.651 m), weight 189 lb (85.7 kg), SpO2 94 %. General:    Tired, on 3 L nasal cannula  Head:  Normocephalic, atraumatic  Eyes:  Sclerae appear normal.  Pupils equally round. ENT:  Nares appear normal, no drainage. Moist oral mucosa  Neck:  No restricted ROM. Trachea midline   CV:   RRR. No m/r/g. No jugular venous distension. Lungs:   Bilateral expiratory wheezing  Abdomen: Bowel sounds present. Soft, nontender, nondistended. Extremities: No cyanosis or clubbing. No edema  Skin:     No rashes and normal coloration. Warm and dry. Neuro:  CN II-XII grossly intact. Sensation intact. A&Ox3  Psych:  Normal mood and affect.       I have personally reviewed labs and tests showing:  Recent Labs:  Recent Results (from the past 48 hour(s))   CBC with Auto Differential    Collection Time: 11/23/22  9:47 PM   Result Value Ref Range    WBC 10.9 4.3 - 11.1 K/uL    RBC 4.39 4.05 - 5.2 M/uL Hemoglobin 10.4 (L) 11.7 - 15.4 g/dL    Hematocrit 34.2 (L) 35.8 - 46.3 %    MCV 77.9 (L) 82.0 - 102.0 FL    MCH 23.7 (L) 26.1 - 32.9 PG    MCHC 30.4 (L) 31.4 - 35.0 g/dL    RDW 15.8 (H) 11.9 - 14.6 %    Platelets 466 765 - 849 K/uL    MPV 10.1 9.4 - 12.3 FL    nRBC 0.00 0.0 - 0.2 K/uL    Differential Type AUTOMATED      Seg Neutrophils 82 (H) 43 - 78 %    Lymphocytes 8 (L) 13 - 44 %    Monocytes 9 4.0 - 12.0 %    Eosinophils % 0 (L) 0.5 - 7.8 %    Basophils 1 0.0 - 2.0 %    Immature Granulocytes 1 0.0 - 5.0 %    Segs Absolute 8.8 (H) 1.7 - 8.2 K/UL    Absolute Lymph # 0.9 0.5 - 4.6 K/UL    Absolute Mono # 1.0 0.1 - 1.3 K/UL    Absolute Eos # 0.0 0.0 - 0.8 K/UL    Basophils Absolute 0.1 0.0 - 0.2 K/UL    Absolute Immature Granulocyte 0.1 0.0 - 0.5 K/UL   Comprehensive Metabolic Panel    Collection Time: 11/23/22  9:47 PM   Result Value Ref Range    Sodium 137 133 - 143 mmol/L    Potassium 3.5 3.5 - 5.1 mmol/L    Chloride 104 101 - 110 mmol/L    CO2 26 21 - 32 mmol/L    Anion Gap 7 2 - 11 mmol/L    Glucose 156 (H) 65 - 100 mg/dL    BUN 20 8 - 23 MG/DL    Creatinine 1.03 (H) 0.6 - 1.0 MG/DL    Est, Glom Filt Rate 55 (L) >60 ml/min/1.73m2    Calcium 9.4 8.3 - 10.4 MG/DL    Total Bilirubin 0.3 0.2 - 1.1 MG/DL    ALT 43 12 - 65 U/L    AST 57 (H) 15 - 37 U/L    Alk Phosphatase 103 50 - 130 U/L    Total Protein 7.6 6.3 - 8.2 g/dL    Albumin 3.3 3.2 - 4.6 g/dL    Globulin 4.3 2.8 - 4.5 g/dL    Albumin/Globulin Ratio 0.8 0.4 - 1.6     Magnesium    Collection Time: 11/23/22  9:47 PM   Result Value Ref Range    Magnesium 1.8 1.8 - 2.4 mg/dL   Lipase    Collection Time: 11/23/22  9:47 PM   Result Value Ref Range    Lipase 148 73 - 393 U/L   Lactic Acid    Collection Time: 11/23/22  9:47 PM   Result Value Ref Range    Lactic Acid, Plasma 0.9 0.4 - 2.0 MMOL/L   Procalcitonin    Collection Time: 11/23/22  9:47 PM   Result Value Ref Range    Procalcitonin 0.05 0.00 - 0.49 ng/mL   C-Reactive Protein    Collection Time: 11/23/22 9:47 PM   Result Value Ref Range    CRP 5.6 (H) 0.0 - 0.9 mg/dL   Troponin    Collection Time: 11/23/22  9:47 PM   Result Value Ref Range    Troponin, High Sensitivity 18.2 (H) 0 - 14 pg/mL       I have personally reviewed imaging studies showing: Other Studies:  XR CHEST PORTABLE   Final Result   No acute process.           Current Meds:  Current Facility-Administered Medications   Medication Dose Route Frequency    guaiFENesin (MUCINEX) extended release tablet 600 mg  600 mg Oral BID    docusate sodium (COLACE) capsule 100 mg  100 mg Oral Daily    doxycycline hyclate (VIBRAMYCIN) capsule 100 mg  100 mg Oral BID    lisinopril (PRINIVIL;ZESTRIL) tablet 40 mg  40 mg Oral Daily    trospium (SANCTURA) tablet 20 mg  20 mg Oral BID AC    pantoprazole (PROTONIX) tablet 40 mg  40 mg Oral QAM AC    rosuvastatin (CRESTOR) tablet 20 mg  20 mg Oral Daily    sodium chloride flush 0.9 % injection 5-40 mL  5-40 mL IntraVENous 2 times per day    sodium chloride flush 0.9 % injection 5-40 mL  5-40 mL IntraVENous PRN    0.9 % sodium chloride infusion   IntraVENous PRN    potassium chloride (KLOR-CON M) extended release tablet 40 mEq  40 mEq Oral PRN    Or    potassium bicarb-citric acid (EFFER-K) effervescent tablet 40 mEq  40 mEq Oral PRN    Or    potassium chloride 10 mEq/100 mL IVPB (Peripheral Line)  10 mEq IntraVENous PRN    magnesium sulfate 2000 mg in 50 mL IVPB premix  2,000 mg IntraVENous PRN    enoxaparin (LOVENOX) injection 40 mg  40 mg SubCUTAneous Daily    promethazine (PHENERGAN) tablet 12.5 mg  12.5 mg Oral Q6H PRN    Or    ondansetron (ZOFRAN) injection 4 mg  4 mg IntraVENous Q6H PRN    polyethylene glycol (GLYCOLAX) packet 17 g  17 g Oral Daily    bisacodyl (DULCOLAX) EC tablet 5 mg  5 mg Oral Daily PRN    aluminum & magnesium hydroxide-simethicone (MAALOX) 200-200-20 MG/5ML suspension 30 mL  30 mL Oral Q6H PRN    acetaminophen (TYLENOL) tablet 650 mg  650 mg Oral Q6H PRN    Or    acetaminophen (TYLENOL) suppository 650 mg  650 mg Rectal Q6H PRN    cefTRIAXone (ROCEPHIN) 1,000 mg in sodium chloride 0.9 % 50 mL IVPB mini-bag  1,000 mg IntraVENous Q24H    HYDROcodone homatropine (HYCODAN) 5-1.5 MG/5ML solution 5 mL  5 mL Oral Q4H PRN    predniSONE (DELTASONE) tablet 20 mg  20 mg Oral Daily    ipratropium-albuterol (DUONEB) nebulizer solution 1 ampule  1 ampule Inhalation Q4H WA    mometasone-formoterol (DULERA) 100-5 MCG/ACT inhaler 2 puff  2 puff Inhalation BID    sodium chloride flush 0.9 % injection 5 mL  5 mL IntraVENous Q8H    sodium chloride flush 0.9 % injection 5 mL  5 mL IntraVENous PRN       Signed:  Alba Wynne MD    Part of this note may have been written by using a voice dictation software. The note has been proof read but may still contain some grammatical/other typographical errors.

## 2022-11-24 NOTE — PROGRESS NOTES
ACUTE OCCUPATIONAL THERAPY GOALS:   (Developed with and agreed upon by patient and/or caregiver.)  Pt will be supervision to  (I) with ADL's and ambulated short distances. OCCUPATIONAL THERAPY Initial Assessment, Daily Note, Discharge, and AM       OT Visit Days: 1  Acknowledge Orders  Time  OT Charge Capture  Rehab Caseload Tracker      Giovani Lakhani is a [de-identified] y.o. female   PRIMARY DIAGNOSIS: Community acquired pneumonia of right lower lobe of lung  Hypoxia [R09.02]  Pneumonia of right lower lobe due to infectious organism [J18.9]  Community acquired pneumonia of right lower lobe of lung [J18.9]       Reason for Referral: Generalized Muscle Weakness (M62.81)  Inpatient: Payor: MEDICARE / Plan: MEDICARE PART A AND B / Product Type: *No Product type* /     ASSESSMENT:     REHAB RECOMMENDATIONS:   Recommendation to date pending progress:  Setting:  No further skilled therapy after discharge from hospital    Equipment:    None     ASSESSMENT:  Ms. Kwadwo Driscoll was admitted with pneumonia. Pt seen in room with son present. Pt is independent at baseline and lives alone but with good family support. Pt is noted to be SOB and on oxygen but independent with self care and functional mobility, no OT needs. Pt up to bathroom on oxygen tank, toileted, dressed and groomed at sink without assistance. Pt able to ambulate community distance independently. Pt returned to room to recliner and left with all needs in reach and son present.       [facility] [address] [address] AM-PAC 6 Clicks Daily Activity Inpatient Short Form:    AM-PAC Daily Activity Inpatient   How much help for putting on and taking off regular lower body clothing?: None  How much help for Bathing?: None  How much help for Toileting?: None  How much help for putting on and taking off regular upper body clothing?: None  How much help for taking care of personal grooming?: None  How much help for eating meals?: None  AM-PAC Inpatient Daily Activity Raw Score: 24  AM-PAC Inpatient ADL T-Scale Score : 57.54  ADL Inpatient CMS 0-100% Score: 0  ADL Inpatient CMS G-Code Modifier : CH           SUBJECTIVE:     Ms. Waldo Awan states, she lives alone but does well     Social/Functional Lives With: Alone  Type of Home: House  Home Layout: One level  Home Access: Level entry  Bathroom Shower/Tub: Walk-in shower  Home Equipment: Jess Charles, rolling, Cane  ADL Assistance: Independent  Ambulation Assistance: Independent  Active : Yes    OBJECTIVE:     Carol Ann Estes / Ashely Gordon / Moni Xavier: IV    RESTRICTIONS/PRECAUTIONS:       PAIN: Elina Love / O2:   Pre Treatment:          Post Treatment: none       Vitals          Oxygen            GROSS EVALUATION: INTACT IMPAIRED   (See Comments)   UE AROM [x] []   UE PROM [x] []   Strength [x]       Posture / Balance [x]     Sensation [x]     Coordination [x]       Tone [x]       Edema []    Activity Tolerance [x]       Hand Dominance R [] L []      COGNITION/  PERCEPTION: INTACT IMPAIRED   (See Comments)   Orientation [x]     Vision [x]     Hearing [x]     Cognition  [x]     Perception [x]       MOBILITY: I Mod I S SBA CGA Min Mod Max Total  NT x2 Comments:   Bed Mobility    Rolling [] [] [] [] [] [] [] [] [] [] []    Supine to Sit [] [] [] [] [] [] [] [] [] [] []    Scooting [] [] [] [] [] [] [] [] [] [] []    Sit to Supine [] [] [] [] [] [] [] [] [] [] []    Transfers    Sit to Stand [x] [] [] [] [] [] [] [] [] [] []    Bed to Chair [x] [] [] [] [] [] [] [] [] [] []    Stand to Sit [x] [] [] [] [] [] [] [] [] [] []    Tub/Shower [x] [] [] [] [] [] [] [] [] [] []     Toilet [x] [] [] [] [] [] [] [] [] [] []      [] [] [] [] [] [] [] [] [] [] []    I=Independent, Mod I=Modified Independent, S=Supervision/Setup, SBA=Standby Assistance, CGA=Contact Guard Assistance, Min=Minimal Assistance, Mod=Moderate Assistance, Max=Maximal Assistance, Total=Total Assistance, NT=Not Tested    ACTIVITIES OF DAILY LIVING: I Mod I S SBA CGA Min Mod Max Total NT Comments   BASIC ADLs: Upper Body Bathing  [x] [] [] [] [] [] [] [] [] []    Lower Body Bathing [x] [] [] [] [] [] [] [] [] []    Toileting [x] [] [] [] [] [] [] [] [] []    Upper Body Dressing [x] [] [] [] [] [] [] [] [] []    Lower Body Dressing [x] [] [] [] [] [] [] [] [] []    Feeding [x] [] [] [] [] [] [] [] [] []    Grooming [x] [] [] [] [] [] [] [] [] []    Personal Device Care [] [] [] [] [] [] [] [] [] []    Functional Mobility [x] [] [] [] [] [] [] [] [] []    I=Independent, Mod I=Modified Independent, S=Supervision/Setup, SBA=Standby Assistance, CGA=Contact Guard Assistance, Min=Minimal Assistance, Mod=Moderate Assistance, Max=Maximal Assistance, Total=Total Assistance, NT=Not Tested    PLAN:   FREQUENCY/DURATION   OT Plan of Care:  (1 treatment) for duration of hospital stay or until stated goals are met, whichever comes first.    PROBLEM LIST:   (Skilled intervention is medically necessary to address:)  Decreased ADL/Functional Activities  Decreased Activity Tolerance   INTERVENTIONS PLANNED:  (Benefits and precautions of occupational therapy have been discussed with the patient.)  Self Care Training  Education         TREATMENT:     EVALUATION: LOW COMPLEXITY: (Untimed Charge)    TREATMENT:   Self Care: (25 min): Procedure(s) (per grid) utilized to improve and/or restore self-care/home management as related to dressing, toileting, grooming, and functional mobility . Required no   cueing to facilitate activities of daily living skills. AFTER TREATMENT PRECAUTIONS: Bed/Chair Locked, Call light within reach, Chair, Needs within reach, RN notified, and Visitors at bedside    INTERDISCIPLINARY COLLABORATION:  RN/ PCT, PT/ PTA, and OT/ BENSON    EDUCATION:  Education Given To: Patient; Family  Education Provided: Role of Therapy;Plan of Care;Energy Conservation;IADL Safety; Family Education  Education Outcome: Verbalized understanding;Demonstrated understanding    TOTAL TREATMENT DURATION AND TIME:  Time In: 1456  Time Out: [address] [facility] [address]  [facility]i [facility]

## 2022-11-25 LAB
ANION GAP SERPL CALC-SCNC: 5 MMOL/L (ref 2–11)
BASOPHILS # BLD: 0.1 K/UL (ref 0–0.2)
BASOPHILS NFR BLD: 1 % (ref 0–2)
BUN SERPL-MCNC: 15 MG/DL (ref 8–23)
CALCIUM SERPL-MCNC: 8.9 MG/DL (ref 8.3–10.4)
CHLORIDE SERPL-SCNC: 108 MMOL/L (ref 101–110)
CO2 SERPL-SCNC: 26 MMOL/L (ref 21–32)
CREAT SERPL-MCNC: 0.83 MG/DL (ref 0.6–1)
DIFFERENTIAL METHOD BLD: ABNORMAL
EOSINOPHIL # BLD: 0.1 K/UL (ref 0–0.8)
EOSINOPHIL NFR BLD: 1 % (ref 0.5–7.8)
ERYTHROCYTE [DISTWIDTH] IN BLOOD BY AUTOMATED COUNT: 15.9 % (ref 11.9–14.6)
GLUCOSE SERPL-MCNC: 122 MG/DL (ref 65–100)
HCT VFR BLD AUTO: 32.7 % (ref 35.8–46.3)
HGB BLD-MCNC: 9.9 G/DL (ref 11.7–15.4)
IMM GRANULOCYTES # BLD AUTO: 0 K/UL (ref 0–0.5)
IMM GRANULOCYTES NFR BLD AUTO: 0 % (ref 0–5)
LYMPHOCYTES # BLD: 1.8 K/UL (ref 0.5–4.6)
LYMPHOCYTES NFR BLD: 24 % (ref 13–44)
MCH RBC QN AUTO: 24 PG (ref 26.1–32.9)
MCHC RBC AUTO-ENTMCNC: 30.3 G/DL (ref 31.4–35)
MCV RBC AUTO: 79.2 FL (ref 82–102)
MONOCYTES # BLD: 0.8 K/UL (ref 0.1–1.3)
MONOCYTES NFR BLD: 11 % (ref 4–12)
NEUTS SEG # BLD: 4.7 K/UL (ref 1.7–8.2)
NEUTS SEG NFR BLD: 63 % (ref 43–78)
NRBC # BLD: 0 K/UL (ref 0–0.2)
PLATELET # BLD AUTO: 247 K/UL (ref 150–450)
PMV BLD AUTO: 9.8 FL (ref 9.4–12.3)
POTASSIUM SERPL-SCNC: 3.8 MMOL/L (ref 3.5–5.1)
RBC # BLD AUTO: 4.13 M/UL (ref 4.05–5.2)
SODIUM SERPL-SCNC: 139 MMOL/L (ref 133–143)
WBC # BLD AUTO: 7.6 K/UL (ref 4.3–11.1)

## 2022-11-25 PROCEDURE — 6370000000 HC RX 637 (ALT 250 FOR IP): Performed by: FAMILY MEDICINE

## 2022-11-25 PROCEDURE — 94761 N-INVAS EAR/PLS OXIMETRY MLT: CPT

## 2022-11-25 PROCEDURE — 2580000003 HC RX 258: Performed by: FAMILY MEDICINE

## 2022-11-25 PROCEDURE — 36415 COLL VENOUS BLD VENIPUNCTURE: CPT

## 2022-11-25 PROCEDURE — 80048 BASIC METABOLIC PNL TOTAL CA: CPT

## 2022-11-25 PROCEDURE — 1100000000 HC RM PRIVATE

## 2022-11-25 PROCEDURE — 94640 AIRWAY INHALATION TREATMENT: CPT

## 2022-11-25 PROCEDURE — 6360000002 HC RX W HCPCS: Performed by: FAMILY MEDICINE

## 2022-11-25 PROCEDURE — 2580000003 HC RX 258: Performed by: EMERGENCY MEDICINE

## 2022-11-25 PROCEDURE — 85025 COMPLETE CBC W/AUTO DIFF WBC: CPT

## 2022-11-25 PROCEDURE — 2700000000 HC OXYGEN THERAPY PER DAY

## 2022-11-25 RX ORDER — METHYLPREDNISOLONE SODIUM SUCCINATE 40 MG/ML
40 INJECTION, POWDER, LYOPHILIZED, FOR SOLUTION INTRAMUSCULAR; INTRAVENOUS EVERY 8 HOURS
Status: DISCONTINUED | OUTPATIENT
Start: 2022-11-25 | End: 2022-11-27

## 2022-11-25 RX ADMIN — SODIUM CHLORIDE, PRESERVATIVE FREE 10 ML: 5 INJECTION INTRAVENOUS at 20:50

## 2022-11-25 RX ADMIN — LISINOPRIL 40 MG: 20 TABLET ORAL at 08:46

## 2022-11-25 RX ADMIN — HYDROCODONE BITARTRATE AND HOMATROPINE METHYLBROMIDE 5 ML: 5; 1.5 SOLUTION ORAL at 13:00

## 2022-11-25 RX ADMIN — TROSPIUM CHLORIDE 20 MG: 20 TABLET, FILM COATED ORAL at 05:54

## 2022-11-25 RX ADMIN — PANTOPRAZOLE SODIUM 40 MG: 40 TABLET, DELAYED RELEASE ORAL at 05:54

## 2022-11-25 RX ADMIN — ROSUVASTATIN 20 MG: 20 TABLET, FILM COATED ORAL at 08:48

## 2022-11-25 RX ADMIN — GUAIFENESIN 600 MG: 600 TABLET, EXTENDED RELEASE ORAL at 20:49

## 2022-11-25 RX ADMIN — SODIUM CHLORIDE, PRESERVATIVE FREE 5 ML: 5 INJECTION INTRAVENOUS at 20:50

## 2022-11-25 RX ADMIN — IPRATROPIUM BROMIDE AND ALBUTEROL SULFATE 1 AMPULE: 2.5; .5 SOLUTION RESPIRATORY (INHALATION) at 11:46

## 2022-11-25 RX ADMIN — MOMETASONE FUROATE AND FORMOTEROL FUMARATE DIHYDRATE 2 PUFF: 100; 5 AEROSOL RESPIRATORY (INHALATION) at 08:32

## 2022-11-25 RX ADMIN — METHYLPREDNISOLONE SODIUM SUCCINATE 40 MG: 40 INJECTION, POWDER, FOR SOLUTION INTRAMUSCULAR; INTRAVENOUS at 13:00

## 2022-11-25 RX ADMIN — DOXYCYCLINE HYCLATE 100 MG: 100 CAPSULE ORAL at 08:48

## 2022-11-25 RX ADMIN — IPRATROPIUM BROMIDE AND ALBUTEROL SULFATE 1 AMPULE: 2.5; .5 SOLUTION RESPIRATORY (INHALATION) at 20:05

## 2022-11-25 RX ADMIN — METHYLPREDNISOLONE SODIUM SUCCINATE 40 MG: 40 INJECTION, POWDER, FOR SOLUTION INTRAMUSCULAR; INTRAVENOUS at 20:57

## 2022-11-25 RX ADMIN — POLYETHYLENE GLYCOL 3350 17 G: 17 POWDER, FOR SOLUTION ORAL at 08:45

## 2022-11-25 RX ADMIN — PREDNISONE 20 MG: 20 TABLET ORAL at 08:48

## 2022-11-25 RX ADMIN — HYDROCODONE BITARTRATE AND HOMATROPINE METHYLBROMIDE 5 ML: 5; 1.5 SOLUTION ORAL at 08:45

## 2022-11-25 RX ADMIN — DOXYCYCLINE HYCLATE 100 MG: 100 CAPSULE ORAL at 20:49

## 2022-11-25 RX ADMIN — ENOXAPARIN SODIUM 40 MG: 100 INJECTION SUBCUTANEOUS at 11:11

## 2022-11-25 RX ADMIN — HYDROCODONE BITARTRATE AND HOMATROPINE METHYLBROMIDE 5 ML: 5; 1.5 SOLUTION ORAL at 22:49

## 2022-11-25 RX ADMIN — GUAIFENESIN 600 MG: 600 TABLET, EXTENDED RELEASE ORAL at 08:48

## 2022-11-25 RX ADMIN — CEFTRIAXONE SODIUM 1000 MG: 1 INJECTION, POWDER, FOR SOLUTION INTRAMUSCULAR; INTRAVENOUS at 22:49

## 2022-11-25 RX ADMIN — IPRATROPIUM BROMIDE AND ALBUTEROL SULFATE 1 AMPULE: 2.5; .5 SOLUTION RESPIRATORY (INHALATION) at 15:18

## 2022-11-25 RX ADMIN — HYDROCODONE BITARTRATE AND HOMATROPINE METHYLBROMIDE 5 ML: 5; 1.5 SOLUTION ORAL at 17:58

## 2022-11-25 RX ADMIN — TROSPIUM CHLORIDE 20 MG: 20 TABLET, FILM COATED ORAL at 15:55

## 2022-11-25 RX ADMIN — MOMETASONE FUROATE AND FORMOTEROL FUMARATE DIHYDRATE 2 PUFF: 100; 5 AEROSOL RESPIRATORY (INHALATION) at 20:07

## 2022-11-25 RX ADMIN — AMLODIPINE BESYLATE 5 MG: 5 TABLET ORAL at 08:48

## 2022-11-25 RX ADMIN — IPRATROPIUM BROMIDE AND ALBUTEROL SULFATE 1 AMPULE: 2.5; .5 SOLUTION RESPIRATORY (INHALATION) at 08:30

## 2022-11-25 NOTE — CARE COORDINATION
Medical record reviewed. Pt is an [de-identified] y/o female admitted with pneumonia. CM met with patient to introduce self and explain role in planning. Pt is alert and oriented X3. Prior to admission, pt was living independently in her home alone. She is a  and has 2 sons who live locally. They are supportive and available to assist if needed. Pt is current with her PCP and obtains prescriptions at [facility] on [address] [place] [address]. Pt is currently on O2 and there is a possibility that she will need O2 at home. Pt stated that her meds were being changed and hopefully her lung function would improve. CM advised patient that arrangements would be made prior to discharge for home O2 if needed. Choice list offered. Pt has no preference in providers. CM will continue to follow to assist with planning. 11/25/22 2489   Service Assessment   Patient Orientation Alert and Oriented;Person;Place;Situation;Self   Cognition Alert   History Provided By Patient   Primary Caregiver Self   Support Systems Family Members; Tanya Oneil [id number] is: Legal Next of Kin   PCP Verified by CM Yes   Last Visit to PCP Within last 6 months   Prior Functional Level Independent in ADLs/IADLs   Current Functional Level Cooking;Housework; Shopping   Can patient return to prior living arrangement Yes   Ability to make needs known: Good   Family able to assist with home care needs: Yes   Would you like for me to discuss the discharge plan with any other family members/significant others, and if so, who?  No   Social/Functional History   Lives With Alone   Type of Home House   ADL Assistance Independent   Homemaking Assistance Independent   Ambulation Assistance Independent   Transfer Assistance Independent   Active  Yes   Mode of Transportation Car

## 2022-11-25 NOTE — PROGRESS NOTES
Pt placed on RA. SpO2 resting on RA 88%. Recovered while resting on 2L/min 95%.    SpO2 while ambulatinL/min 85%  3L/min 88%  4L/min 91%

## 2022-11-25 NOTE — PROGRESS NOTES
Patient received sitting up in bedside recliner, offers no complaints. Shift assessment completed, will monitor.

## 2022-11-25 NOTE — PROGRESS NOTES
Hospitalist Progress Note   Admit Date:  2022  9:17 PM   Name:  Mya Crawford   Age:  [de-identified] y.o. Sex:  female  :  1940   MRN:  [id number]   Room:  [facility]/    Presenting Complaint: Shortness of Breath     Reason(s) for Admission: Hypoxia [R09.02]  Pneumonia of right lower lobe due to infectious organism [J18.9]  Community acquired pneumonia of right lower lobe of lung [J18.9]     Hospital Course:   Mya Crawford is a [de-identified] y.o. female with medical history of diabetes and hypertension, who presented with shortness of breath and cough, failed outpatient treatment. Patient admitted with acute hypoxic respiratory failure secondary to community-acquired pneumonia and hyperactive airway disease. Empiric antibiotic and supportive care. Subjective & 24hr Events (22): Patient is seen at the bedside. Reports she has not noted much improvement. Requiring catheter oxygen at rest and up to 4 L with ambulation. Still wheezing. Complaining of some shortness of breath with exertion. Persistent cough. Start patient on IV steroids    Assessment & Plan:     Acute hypoxic respiratory failure secondary to CAP and hyperactive airway disease: On 3-4 L nasal cannula with exertion and 2 L at rest, wean as tolerated  Continue Rocephin and doxycycline  Continue DuoNeb every 4 hour while awake  Continue Dulera twice daily  Start patient on IV Solu-Medrol  Incentive spirometry/flutter valve  Continue Hycodan and Mucinex DM  Continue supportive care    Type 2 diabetes mellitus:  Continue sliding scale and low-dose insulin accordingly    Hypertension:  Blood pressure better controlled  Continue lisinopril  Continue amlodipine 5 mg daily      Anticipated discharge needs: None, home on discharge    Diet:  ADULT DIET;  Regular  DVT PPx: Lovenox  Code status: Full Code    Hospital Problems:  Principal Problem:    Community acquired pneumonia of right lower lobe of lung  Active Problems:    Diabetes mellitus type 2, diet-controlled ([facility] [address]    Hypertension    Respiratory failure with hypoxia ([facility] [address]    Reactive airway disease  Resolved Problems:    * No resolved hospital problems. *      Objective:   Patient Vitals for the past 24 hrs:   Temp Pulse Resp BP SpO2   11/25/22 1133 98.2 °F (36.8 °C) 84 18 (!) 151/62 98 %   11/25/22 0832 -- 92 18 -- 96 %   11/25/22 0735 98.8 °F (37.1 °C) 88 18 127/61 97 %   11/25/22 0330 99.1 °F (37.3 °C) 82 18 135/60 94 %   11/24/22 2300 98 °F (36.7 °C) 84 18 138/65 96 %   11/24/22 2040 -- 87 18 -- 95 %   11/24/22 1945 99 °F (37.2 °C) 86 18 (!) 150/57 95 %   11/24/22 1626 98.4 °F (36.9 °C) 91 18 (!) 175/63 97 %   11/24/22 1500 -- 89 18 -- 92 %         Oxygen Therapy  SpO2: 98 %  Pulse Oximeter Device Mode: Continuous  O2 Device: Nasal cannula  FiO2 : 28 %  O2 Flow Rate (L/min): 2 L/min    Estimated body mass index is 31.45 kg/m² as calculated from the following:    Height as of this encounter: 5' 5\" (1.651 m). Weight as of this encounter: 189 lb (85.7 kg). No intake or output data in the 24 hours ending 11/25/22 1250      Physical Exam:     Blood pressure (!) 151/62, pulse 84, temperature 98.2 °F (36.8 °C), temperature source Oral, resp. rate 18, height 5' 5\" (1.651 m), weight 189 lb (85.7 kg), SpO2 98 %. General:    Tired, on 3 L nasal cannula  Head:  Normocephalic, atraumatic  Eyes:  Sclerae appear normal.  Pupils equally round. ENT:  Nares appear normal, no drainage. Moist oral mucosa  Neck:  No restricted ROM. Trachea midline   CV:   RRR. No m/r/g. No jugular venous distension. Lungs:   Bilateral expiratory wheezing  Abdomen: Bowel sounds present. Soft, nontender, nondistended. Extremities: No cyanosis or clubbing. No edema  Skin:     No rashes and normal coloration. Warm and dry. Neuro:  CN II-XII grossly intact. Sensation intact. A&Ox3  Psych:  Normal mood and affect.       I have personally reviewed labs and tests showing:  Recent Labs:  Recent Results (from the past 48 hour(s))   CBC with Auto Differential    Collection Time: 11/23/22  9:47 PM   Result Value Ref Range    WBC 10.9 4.3 - 11.1 K/uL    RBC 4.39 4.05 - 5.2 M/uL    Hemoglobin 10.4 (L) 11.7 - 15.4 g/dL    Hematocrit 34.2 (L) 35.8 - 46.3 %    MCV 77.9 (L) 82.0 - 102.0 FL    MCH 23.7 (L) 26.1 - 32.9 PG    MCHC 30.4 (L) 31.4 - 35.0 g/dL    RDW 15.8 (H) 11.9 - 14.6 %    Platelets 226 588 - 050 K/uL    MPV 10.1 9.4 - 12.3 FL    nRBC 0.00 0.0 - 0.2 K/uL    Differential Type AUTOMATED      Seg Neutrophils 82 (H) 43 - 78 %    Lymphocytes 8 (L) 13 - 44 %    Monocytes 9 4.0 - 12.0 %    Eosinophils % 0 (L) 0.5 - 7.8 %    Basophils 1 0.0 - 2.0 %    Immature Granulocytes 1 0.0 - 5.0 %    Segs Absolute 8.8 (H) 1.7 - 8.2 K/UL    Absolute Lymph # 0.9 0.5 - 4.6 K/UL    Absolute Mono # 1.0 0.1 - 1.3 K/UL    Absolute Eos # 0.0 0.0 - 0.8 K/UL    Basophils Absolute 0.1 0.0 - 0.2 K/UL    Absolute Immature Granulocyte 0.1 0.0 - 0.5 K/UL   Comprehensive Metabolic Panel    Collection Time: 11/23/22  9:47 PM   Result Value Ref Range    Sodium 137 133 - 143 mmol/L    Potassium 3.5 3.5 - 5.1 mmol/L    Chloride 104 101 - 110 mmol/L    CO2 26 21 - 32 mmol/L    Anion Gap 7 2 - 11 mmol/L    Glucose 156 (H) 65 - 100 mg/dL    BUN 20 8 - 23 MG/DL    Creatinine 1.03 (H) 0.6 - 1.0 MG/DL    Est, Glom Filt Rate 55 (L) >60 ml/min/1.73m2    Calcium 9.4 8.3 - 10.4 MG/DL    Total Bilirubin 0.3 0.2 - 1.1 MG/DL    ALT 43 12 - 65 U/L    AST 57 (H) 15 - 37 U/L    Alk Phosphatase 103 50 - 130 U/L    Total Protein 7.6 6.3 - 8.2 g/dL    Albumin 3.3 3.2 - 4.6 g/dL    Globulin 4.3 2.8 - 4.5 g/dL    Albumin/Globulin Ratio 0.8 0.4 - 1.6     Magnesium    Collection Time: 11/23/22  9:47 PM   Result Value Ref Range    Magnesium 1.8 1.8 - 2.4 mg/dL   Blood Culture 1    Collection Time: 11/23/22  9:47 PM    Specimen: Blood   Result Value Ref Range    Special Requests RIGHT  Antecubital        Culture NO GROWTH 2 DAYS     Lipase    Collection Time: 11/23/22  9:47 PM   Result Value Ref Range    Lipase 148 73 - 393 U/L   Lactic Acid    Collection Time: 11/23/22  9:47 PM   Result Value Ref Range    Lactic Acid, Plasma 0.9 0.4 - 2.0 MMOL/L   Procalcitonin    Collection Time: 11/23/22  9:47 PM   Result Value Ref Range    Procalcitonin 0.05 0.00 - 0.49 ng/mL   C-Reactive Protein    Collection Time: 11/23/22  9:47 PM   Result Value Ref Range    CRP 5.6 (H) 0.0 - 0.9 mg/dL   Troponin    Collection Time: 11/23/22  9:47 PM   Result Value Ref Range    Troponin, High Sensitivity 18.2 (H) 0 - 14 pg/mL   Blood Culture 2    Collection Time: 11/23/22 11:23 PM    Specimen: Blood   Result Value Ref Range    Special Requests NO SPECIAL REQUESTS  LEFT  Antecubital        Culture NO GROWTH 2 DAYS     Basic Metabolic Panel w/ Reflex to MG    Collection Time: 11/25/22  3:57 AM   Result Value Ref Range    Sodium 139 133 - 143 mmol/L    Potassium 3.8 3.5 - 5.1 mmol/L    Chloride 108 101 - 110 mmol/L    CO2 26 21 - 32 mmol/L    Anion Gap 5 2 - 11 mmol/L    Glucose 122 (H) 65 - 100 mg/dL    BUN 15 8 - 23 MG/DL    Creatinine 0.83 0.6 - 1.0 MG/DL    Est, Glom Filt Rate >60 >60 ml/min/1.73m2    Calcium 8.9 8.3 - 10.4 MG/DL   CBC with Auto Differential    Collection Time: 11/25/22  3:57 AM   Result Value Ref Range    WBC 7.6 4.3 - 11.1 K/uL    RBC 4.13 4.05 - 5.2 M/uL    Hemoglobin 9.9 (L) 11.7 - 15.4 g/dL    Hematocrit 32.7 (L) 35.8 - 46.3 %    MCV 79.2 (L) 82.0 - 102.0 FL    MCH 24.0 (L) 26.1 - 32.9 PG    MCHC 30.3 (L) 31.4 - 35.0 g/dL    RDW 15.9 (H) 11.9 - 14.6 %    Platelets 656 341 - 150 K/uL    MPV 9.8 9.4 - 12.3 FL    nRBC 0.00 0.0 - 0.2 K/uL    Differential Type AUTOMATED      Seg Neutrophils 63 43 - 78 %    Lymphocytes 24 13 - 44 %    Monocytes 11 4.0 - 12.0 %    Eosinophils % 1 0.5 - 7.8 %    Basophils 1 0.0 - 2.0 %    Immature Granulocytes 0 0.0 - 5.0 %    Segs Absolute 4.7 1.7 - 8.2 K/UL    Absolute Lymph # 1.8 0.5 - 4.6 K/UL    Absolute Mono # 0.8 0.1 - 1.3 K/UL    Absolute Eos # 0.1 0.0 - 0.8 K/UL Basophils Absolute 0.1 0.0 - 0.2 K/UL    Absolute Immature Granulocyte 0.0 0.0 - 0.5 K/UL       I have personally reviewed imaging studies showing: Other Studies:  XR CHEST PORTABLE   Final Result   No acute process.           Current Meds:  Current Facility-Administered Medications   Medication Dose Route Frequency    methylPREDNISolone sodium (SOLU-MEDROL) injection 40 mg  40 mg IntraVENous Q8H    guaiFENesin (MUCINEX) extended release tablet 600 mg  600 mg Oral BID    docusate sodium (COLACE) capsule 100 mg  100 mg Oral Daily    doxycycline hyclate (VIBRAMYCIN) capsule 100 mg  100 mg Oral BID    lisinopril (PRINIVIL;ZESTRIL) tablet 40 mg  40 mg Oral Daily    trospium (SANCTURA) tablet 20 mg  20 mg Oral BID AC    pantoprazole (PROTONIX) tablet 40 mg  40 mg Oral QAM AC    rosuvastatin (CRESTOR) tablet 20 mg  20 mg Oral Daily    sodium chloride flush 0.9 % injection 5-40 mL  5-40 mL IntraVENous 2 times per day    sodium chloride flush 0.9 % injection 5-40 mL  5-40 mL IntraVENous PRN    0.9 % sodium chloride infusion   IntraVENous PRN    potassium chloride (KLOR-CON M) extended release tablet 40 mEq  40 mEq Oral PRN    Or    potassium bicarb-citric acid (EFFER-K) effervescent tablet 40 mEq  40 mEq Oral PRN    Or    potassium chloride 10 mEq/100 mL IVPB (Peripheral Line)  10 mEq IntraVENous PRN    magnesium sulfate 2000 mg in 50 mL IVPB premix  2,000 mg IntraVENous PRN    enoxaparin (LOVENOX) injection 40 mg  40 mg SubCUTAneous Daily    promethazine (PHENERGAN) tablet 12.5 mg  12.5 mg Oral Q6H PRN    Or    ondansetron (ZOFRAN) injection 4 mg  4 mg IntraVENous Q6H PRN    polyethylene glycol (GLYCOLAX) packet 17 g  17 g Oral Daily    bisacodyl (DULCOLAX) EC tablet 5 mg  5 mg Oral Daily PRN    aluminum & magnesium hydroxide-simethicone (MAALOX) 200-200-20 MG/5ML suspension 30 mL  30 mL Oral Q6H PRN    acetaminophen (TYLENOL) tablet 650 mg  650 mg Oral Q6H PRN    Or    acetaminophen (TYLENOL) suppository 650 mg  650 mg Rectal Q6H PRN    cefTRIAXone (ROCEPHIN) 1,000 mg in sodium chloride 0.9 % 50 mL IVPB mini-bag  1,000 mg IntraVENous Q24H    HYDROcodone homatropine (HYCODAN) 5-1.5 MG/5ML solution 5 mL  5 mL Oral Q4H PRN    ipratropium-albuterol (DUONEB) nebulizer solution 1 ampule  1 ampule Inhalation Q4H WA    mometasone-formoterol (DULERA) 100-5 MCG/ACT inhaler 2 puff  2 puff Inhalation BID    amLODIPine (NORVASC) tablet 5 mg  5 mg Oral Daily    sodium chloride flush 0.9 % injection 5 mL  5 mL IntraVENous Q8H    sodium chloride flush 0.9 % injection 5 mL  5 mL IntraVENous PRN       Signed:  Cristine Fry MD    Part of this note may have been written by using a voice dictation software. The note has been proof read but may still contain some grammatical/other typographical errors.

## 2022-11-26 PROCEDURE — 94761 N-INVAS EAR/PLS OXIMETRY MLT: CPT

## 2022-11-26 PROCEDURE — 1100000000 HC RM PRIVATE

## 2022-11-26 PROCEDURE — 94640 AIRWAY INHALATION TREATMENT: CPT

## 2022-11-26 PROCEDURE — 2580000003 HC RX 258: Performed by: EMERGENCY MEDICINE

## 2022-11-26 PROCEDURE — 2700000000 HC OXYGEN THERAPY PER DAY

## 2022-11-26 PROCEDURE — 6370000000 HC RX 637 (ALT 250 FOR IP): Performed by: FAMILY MEDICINE

## 2022-11-26 PROCEDURE — 2580000003 HC RX 258: Performed by: FAMILY MEDICINE

## 2022-11-26 PROCEDURE — 6360000002 HC RX W HCPCS: Performed by: FAMILY MEDICINE

## 2022-11-26 RX ORDER — AMLODIPINE BESYLATE 10 MG/1
10 TABLET ORAL DAILY
Status: DISCONTINUED | OUTPATIENT
Start: 2022-11-27 | End: 2022-11-28 | Stop reason: HOSPADM

## 2022-11-26 RX ADMIN — METHYLPREDNISOLONE SODIUM SUCCINATE 40 MG: 40 INJECTION, POWDER, FOR SOLUTION INTRAMUSCULAR; INTRAVENOUS at 05:36

## 2022-11-26 RX ADMIN — IPRATROPIUM BROMIDE AND ALBUTEROL SULFATE 1 AMPULE: 2.5; .5 SOLUTION RESPIRATORY (INHALATION) at 16:40

## 2022-11-26 RX ADMIN — PANTOPRAZOLE SODIUM 40 MG: 40 TABLET, DELAYED RELEASE ORAL at 05:10

## 2022-11-26 RX ADMIN — LISINOPRIL 40 MG: 20 TABLET ORAL at 09:21

## 2022-11-26 RX ADMIN — DOXYCYCLINE HYCLATE 100 MG: 100 CAPSULE ORAL at 21:31

## 2022-11-26 RX ADMIN — HYDROCODONE BITARTRATE AND HOMATROPINE METHYLBROMIDE 5 ML: 5; 1.5 SOLUTION ORAL at 21:31

## 2022-11-26 RX ADMIN — CEFTRIAXONE SODIUM 1000 MG: 1 INJECTION, POWDER, FOR SOLUTION INTRAMUSCULAR; INTRAVENOUS at 22:49

## 2022-11-26 RX ADMIN — SODIUM CHLORIDE, PRESERVATIVE FREE 5 ML: 5 INJECTION INTRAVENOUS at 21:31

## 2022-11-26 RX ADMIN — HYDROCODONE BITARTRATE AND HOMATROPINE METHYLBROMIDE 5 ML: 5; 1.5 SOLUTION ORAL at 05:11

## 2022-11-26 RX ADMIN — IPRATROPIUM BROMIDE AND ALBUTEROL SULFATE 1 AMPULE: 2.5; .5 SOLUTION RESPIRATORY (INHALATION) at 20:11

## 2022-11-26 RX ADMIN — AMLODIPINE BESYLATE 5 MG: 5 TABLET ORAL at 09:21

## 2022-11-26 RX ADMIN — POLYETHYLENE GLYCOL 3350 17 G: 17 POWDER, FOR SOLUTION ORAL at 09:25

## 2022-11-26 RX ADMIN — ROSUVASTATIN 20 MG: 20 TABLET, FILM COATED ORAL at 09:22

## 2022-11-26 RX ADMIN — METHYLPREDNISOLONE SODIUM SUCCINATE 40 MG: 40 INJECTION, POWDER, FOR SOLUTION INTRAMUSCULAR; INTRAVENOUS at 12:41

## 2022-11-26 RX ADMIN — METHYLPREDNISOLONE SODIUM SUCCINATE 40 MG: 40 INJECTION, POWDER, FOR SOLUTION INTRAMUSCULAR; INTRAVENOUS at 21:56

## 2022-11-26 RX ADMIN — SODIUM CHLORIDE, PRESERVATIVE FREE 5 ML: 5 INJECTION INTRAVENOUS at 12:45

## 2022-11-26 RX ADMIN — IPRATROPIUM BROMIDE AND ALBUTEROL SULFATE 1 AMPULE: 2.5; .5 SOLUTION RESPIRATORY (INHALATION) at 09:25

## 2022-11-26 RX ADMIN — GUAIFENESIN 600 MG: 600 TABLET, EXTENDED RELEASE ORAL at 09:22

## 2022-11-26 RX ADMIN — SODIUM CHLORIDE, PRESERVATIVE FREE 10 ML: 5 INJECTION INTRAVENOUS at 21:31

## 2022-11-26 RX ADMIN — DOXYCYCLINE HYCLATE 100 MG: 100 CAPSULE ORAL at 09:22

## 2022-11-26 RX ADMIN — SODIUM CHLORIDE, PRESERVATIVE FREE 5 ML: 5 INJECTION INTRAVENOUS at 05:11

## 2022-11-26 RX ADMIN — MOMETASONE FUROATE AND FORMOTEROL FUMARATE DIHYDRATE 2 PUFF: 100; 5 AEROSOL RESPIRATORY (INHALATION) at 20:10

## 2022-11-26 RX ADMIN — TROSPIUM CHLORIDE 20 MG: 20 TABLET, FILM COATED ORAL at 05:10

## 2022-11-26 RX ADMIN — GUAIFENESIN 600 MG: 600 TABLET, EXTENDED RELEASE ORAL at 21:31

## 2022-11-26 RX ADMIN — MOMETASONE FUROATE AND FORMOTEROL FUMARATE DIHYDRATE 2 PUFF: 100; 5 AEROSOL RESPIRATORY (INHALATION) at 09:25

## 2022-11-26 RX ADMIN — ENOXAPARIN SODIUM 40 MG: 100 INJECTION SUBCUTANEOUS at 09:21

## 2022-11-26 RX ADMIN — DOCUSATE SODIUM 100 MG: 100 CAPSULE ORAL at 09:22

## 2022-11-26 RX ADMIN — TROSPIUM CHLORIDE 20 MG: 20 TABLET, FILM COATED ORAL at 16:26

## 2022-11-26 RX ADMIN — IPRATROPIUM BROMIDE AND ALBUTEROL SULFATE 1 AMPULE: 2.5; .5 SOLUTION RESPIRATORY (INHALATION) at 12:41

## 2022-11-26 NOTE — PLAN OF CARE
Problem: Respiratory - Adult  Goal: Achieves optimal ventilation and oxygenation  Outcome: Progressing  Flowsheets (Taken 11/25/2022 2010)  Achieves optimal ventilation and oxygenation:   Assess for changes in respiratory status   Position to facilitate oxygenation and minimize respiratory effort   Respiratory therapy support as indicated   Assess for changes in mentation and behavior   Oxygen supplementation based on oxygen saturation or arterial blood gases   Encourage broncho-pulmonary hygiene including cough, deep breathe, incentive spirometry   Assess and instruct to report shortness of breath or any respiratory difficulty

## 2022-11-26 NOTE — PROGRESS NOTES
Hospitalist Progress Note   Admit Date:  2022  9:17 PM   Name:  Wilbert Farley   Age:  [de-identified] y.o. Sex:  female  :  1940   MRN:  [id number]   Room:  [facility]    Presenting Complaint: Shortness of Breath     Reason(s) for Admission: Hypoxia [R09.02]  Pneumonia of right lower lobe due to infectious organism [J18.9]  Community acquired pneumonia of right lower lobe of lung [J18.9]     Hospital Course:   Wilbert Farley is a [de-identified] y.o. female with medical history of diabetes and hypertension, who presented with shortness of breath and cough, failed outpatient treatment. Patient admitted with acute hypoxic respiratory failure secondary to community-acquired pneumonia and hyperactive airway disease. Empiric antibiotic and supportive care. Subjective & 24hr Events (22): Patient is seen at the bedside. Reports he is feeling better today. Still has cough but intensity is less. Denies chest pain, palpitation or shortness of breath. Still requiring up to 3 to 4 L nasal cannula. Blood pressure suboptimally controlled, will adjust antihypertensive    Assessment & Plan:     Acute hypoxic respiratory failure secondary to CAP and hyperactive airway disease: On 3-4 L nasal cannula with exertion and 2 L at rest, wean as tolerated  Continue Rocephin and doxycycline  Continue DuoNeb every 4 hour while awake  Continue Dulera twice daily  Continue IV Solu-Medrol today and start weaning tomorrow  Incentive spirometry/flutter valve  Continue Hycodan and Mucinex DM  Continue supportive care    Type 2 diabetes mellitus:  Continue sliding scale and low-dose insulin accordingly    Hypertension:  Blood pressure better controlled  Continue lisinopril  Increase amlodipine to 10 mg daily      Anticipated discharge needs: None, home on discharge    Diet:  ADULT DIET;  Regular  DVT PPx: Lovenox  Code status: Full Code    Hospital Problems:  Principal Problem:    Community acquired pneumonia of right lower lobe of lung  Active Problems:    Diabetes mellitus type 2, diet-controlled ([facility] [address]    Hypertension    Respiratory failure with hypoxia (HCC)    Reactive airway disease  Resolved Problems:    * No resolved hospital problems. *      Objective:   Patient Vitals for the past 24 hrs:   Temp Pulse Resp BP SpO2   11/26/22 1241 -- 90 16 -- 97 %   11/26/22 1204 98.6 °F (37 °C) 94 17 (!) 147/62 92 %   11/26/22 0925 -- 81 -- -- 95 %   11/26/22 0728 97.7 °F (36.5 °C) 79 16 (!) 170/67 98 %   11/26/22 0418 97.9 °F (36.6 °C) 85 20 (!) 164/64 98 %   11/25/22 2336 98.2 °F (36.8 °C) 85 20 (!) 165/67 97 %   11/25/22 2008 -- 84 18 -- 97 %   11/25/22 2007 -- 83 18 -- 97 %   11/25/22 1930 98.2 °F (36.8 °C) 85 20 (!) 164/61 95 %   11/25/22 1527 98.4 °F (36.9 °C) 84 18 (!) 143/58 96 %   11/25/22 1519 -- 80 18 -- 96 %         Oxygen Therapy  SpO2: 97 %  Pulse Oximeter Device Mode: Continuous  O2 Device: Nasal cannula  FiO2 : 36 %  O2 Flow Rate (L/min): 3 L/min    Estimated body mass index is 31.45 kg/m² as calculated from the following:    Height as of this encounter: 5' 5\" (1.651 m). Weight as of this encounter: 189 lb (85.7 kg). No intake or output data in the 24 hours ending 11/26/22 1304      Physical Exam:     Blood pressure (!) 147/62, pulse 90, temperature 98.6 °F (37 °C), temperature source Oral, resp. rate 16, height 5' 5\" (1.651 m), weight 189 lb (85.7 kg), SpO2 97 %. General:    Tired, on 3 L nasal cannula  Head:  Normocephalic, atraumatic  Eyes:  Sclerae appear normal.  Pupils equally round. ENT:  Nares appear normal, no drainage. Moist oral mucosa  Neck:  No restricted ROM. Trachea midline   CV:   RRR. No m/r/g. No jugular venous distension. Lungs:   Occasional bilateral expiratory wheezing  Abdomen: Bowel sounds present. Soft, nontender, nondistended. Extremities: No cyanosis or clubbing. No edema  Skin:     No rashes and normal coloration. Warm and dry. Neuro:  CN II-XII grossly intact. Sensation intact. A&Ox3  Psych:  Normal mood and affect. I have personally reviewed labs and tests showing:  Recent Labs:  Recent Results (from the past 48 hour(s))   Basic Metabolic Panel w/ Reflex to MG    Collection Time: 11/25/22  3:57 AM   Result Value Ref Range    Sodium 139 133 - 143 mmol/L    Potassium 3.8 3.5 - 5.1 mmol/L    Chloride 108 101 - 110 mmol/L    CO2 26 21 - 32 mmol/L    Anion Gap 5 2 - 11 mmol/L    Glucose 122 (H) 65 - 100 mg/dL    BUN 15 8 - 23 MG/DL    Creatinine 0.83 0.6 - 1.0 MG/DL    Est, Glom Filt Rate >60 >60 ml/min/1.73m2    Calcium 8.9 8.3 - 10.4 MG/DL   CBC with Auto Differential    Collection Time: 11/25/22  3:57 AM   Result Value Ref Range    WBC 7.6 4.3 - 11.1 K/uL    RBC 4.13 4.05 - 5.2 M/uL    Hemoglobin 9.9 (L) 11.7 - 15.4 g/dL    Hematocrit 32.7 (L) 35.8 - 46.3 %    MCV 79.2 (L) 82.0 - 102.0 FL    MCH 24.0 (L) 26.1 - 32.9 PG    MCHC 30.3 (L) 31.4 - 35.0 g/dL    RDW 15.9 (H) 11.9 - 14.6 %    Platelets 688 699 - 414 K/uL    MPV 9.8 9.4 - 12.3 FL    nRBC 0.00 0.0 - 0.2 K/uL    Differential Type AUTOMATED      Seg Neutrophils 63 43 - 78 %    Lymphocytes 24 13 - 44 %    Monocytes 11 4.0 - 12.0 %    Eosinophils % 1 0.5 - 7.8 %    Basophils 1 0.0 - 2.0 %    Immature Granulocytes 0 0.0 - 5.0 %    Segs Absolute 4.7 1.7 - 8.2 K/UL    Absolute Lymph # 1.8 0.5 - 4.6 K/UL    Absolute Mono # 0.8 0.1 - 1.3 K/UL    Absolute Eos # 0.1 0.0 - 0.8 K/UL    Basophils Absolute 0.1 0.0 - 0.2 K/UL    Absolute Immature Granulocyte 0.0 0.0 - 0.5 K/UL       I have personally reviewed imaging studies showing: Other Studies:  XR CHEST PORTABLE   Final Result   No acute process.           Current Meds:  Current Facility-Administered Medications   Medication Dose Route Frequency    methylPREDNISolone sodium (SOLU-MEDROL) injection 40 mg  40 mg IntraVENous Q8H    guaiFENesin (MUCINEX) extended release tablet 600 mg  600 mg Oral BID    docusate sodium (COLACE) capsule 100 mg  100 mg Oral Daily    doxycycline hyclate (VIBRAMYCIN) capsule 100 mg  100 mg Oral BID    lisinopril (PRINIVIL;ZESTRIL) tablet 40 mg  40 mg Oral Daily    trospium (SANCTURA) tablet 20 mg  20 mg Oral BID AC    pantoprazole (PROTONIX) tablet 40 mg  40 mg Oral QAM AC    rosuvastatin (CRESTOR) tablet 20 mg  20 mg Oral Daily    sodium chloride flush 0.9 % injection 5-40 mL  5-40 mL IntraVENous 2 times per day    sodium chloride flush 0.9 % injection 5-40 mL  5-40 mL IntraVENous PRN    0.9 % sodium chloride infusion   IntraVENous PRN    potassium chloride (KLOR-CON M) extended release tablet 40 mEq  40 mEq Oral PRN    Or    potassium bicarb-citric acid (EFFER-K) effervescent tablet 40 mEq  40 mEq Oral PRN    Or    potassium chloride 10 mEq/100 mL IVPB (Peripheral Line)  10 mEq IntraVENous PRN    magnesium sulfate 2000 mg in 50 mL IVPB premix  2,000 mg IntraVENous PRN    enoxaparin (LOVENOX) injection 40 mg  40 mg SubCUTAneous Daily    promethazine (PHENERGAN) tablet 12.5 mg  12.5 mg Oral Q6H PRN    Or    ondansetron (ZOFRAN) injection 4 mg  4 mg IntraVENous Q6H PRN    polyethylene glycol (GLYCOLAX) packet 17 g  17 g Oral Daily    bisacodyl (DULCOLAX) EC tablet 5 mg  5 mg Oral Daily PRN    aluminum & magnesium hydroxide-simethicone (MAALOX) 200-200-20 MG/5ML suspension 30 mL  30 mL Oral Q6H PRN    acetaminophen (TYLENOL) tablet 650 mg  650 mg Oral Q6H PRN    Or    acetaminophen (TYLENOL) suppository 650 mg  650 mg Rectal Q6H PRN    cefTRIAXone (ROCEPHIN) 1,000 mg in sodium chloride 0.9 % 50 mL IVPB mini-bag  1,000 mg IntraVENous Q24H    HYDROcodone homatropine (HYCODAN) 5-1.5 MG/5ML solution 5 mL  5 mL Oral Q4H PRN    ipratropium-albuterol (DUONEB) nebulizer solution 1 ampule  1 ampule Inhalation Q4H WA    mometasone-formoterol (DULERA) 100-5 MCG/ACT inhaler 2 puff  2 puff Inhalation BID    amLODIPine (NORVASC) tablet 5 mg  5 mg Oral Daily    sodium chloride flush 0.9 % injection 5 mL  5 mL IntraVENous Q8H    sodium chloride flush 0.9 % injection 5 mL  5 mL IntraVENous PRN       Signed:  Zacarias Arzate MD    Part of this note may have been written by using a voice dictation software. The note has been proof read but may still contain some grammatical/other typographical errors.

## 2022-11-27 PROCEDURE — 2580000003 HC RX 258: Performed by: EMERGENCY MEDICINE

## 2022-11-27 PROCEDURE — 6360000002 HC RX W HCPCS: Performed by: FAMILY MEDICINE

## 2022-11-27 PROCEDURE — 6370000000 HC RX 637 (ALT 250 FOR IP): Performed by: FAMILY MEDICINE

## 2022-11-27 PROCEDURE — 94640 AIRWAY INHALATION TREATMENT: CPT

## 2022-11-27 PROCEDURE — 51798 US URINE CAPACITY MEASURE: CPT

## 2022-11-27 PROCEDURE — 1100000000 HC RM PRIVATE

## 2022-11-27 PROCEDURE — 2700000000 HC OXYGEN THERAPY PER DAY

## 2022-11-27 PROCEDURE — 2580000003 HC RX 258: Performed by: FAMILY MEDICINE

## 2022-11-27 PROCEDURE — 94761 N-INVAS EAR/PLS OXIMETRY MLT: CPT

## 2022-11-27 RX ORDER — METHYLPREDNISOLONE SODIUM SUCCINATE 40 MG/ML
40 INJECTION, POWDER, LYOPHILIZED, FOR SOLUTION INTRAMUSCULAR; INTRAVENOUS EVERY 12 HOURS
Status: DISCONTINUED | OUTPATIENT
Start: 2022-11-27 | End: 2022-11-28 | Stop reason: HOSPADM

## 2022-11-27 RX ADMIN — GUAIFENESIN 600 MG: 600 TABLET, EXTENDED RELEASE ORAL at 20:30

## 2022-11-27 RX ADMIN — MOMETASONE FUROATE AND FORMOTEROL FUMARATE DIHYDRATE 2 PUFF: 100; 5 AEROSOL RESPIRATORY (INHALATION) at 20:31

## 2022-11-27 RX ADMIN — IPRATROPIUM BROMIDE AND ALBUTEROL SULFATE 1 AMPULE: 2.5; .5 SOLUTION RESPIRATORY (INHALATION) at 20:31

## 2022-11-27 RX ADMIN — ENOXAPARIN SODIUM 40 MG: 100 INJECTION SUBCUTANEOUS at 09:27

## 2022-11-27 RX ADMIN — IPRATROPIUM BROMIDE AND ALBUTEROL SULFATE 1 AMPULE: 2.5; .5 SOLUTION RESPIRATORY (INHALATION) at 11:58

## 2022-11-27 RX ADMIN — TROSPIUM CHLORIDE 20 MG: 20 TABLET, FILM COATED ORAL at 05:51

## 2022-11-27 RX ADMIN — DOXYCYCLINE HYCLATE 100 MG: 100 CAPSULE ORAL at 09:32

## 2022-11-27 RX ADMIN — SODIUM CHLORIDE, PRESERVATIVE FREE 10 ML: 5 INJECTION INTRAVENOUS at 20:31

## 2022-11-27 RX ADMIN — ROSUVASTATIN 20 MG: 20 TABLET, FILM COATED ORAL at 09:32

## 2022-11-27 RX ADMIN — CEFTRIAXONE SODIUM 1000 MG: 1 INJECTION, POWDER, FOR SOLUTION INTRAMUSCULAR; INTRAVENOUS at 23:28

## 2022-11-27 RX ADMIN — IPRATROPIUM BROMIDE AND ALBUTEROL SULFATE 1 AMPULE: 2.5; .5 SOLUTION RESPIRATORY (INHALATION) at 16:09

## 2022-11-27 RX ADMIN — GUAIFENESIN 600 MG: 600 TABLET, EXTENDED RELEASE ORAL at 09:31

## 2022-11-27 RX ADMIN — MOMETASONE FUROATE AND FORMOTEROL FUMARATE DIHYDRATE 2 PUFF: 100; 5 AEROSOL RESPIRATORY (INHALATION) at 07:57

## 2022-11-27 RX ADMIN — IPRATROPIUM BROMIDE AND ALBUTEROL SULFATE 1 AMPULE: 2.5; .5 SOLUTION RESPIRATORY (INHALATION) at 07:57

## 2022-11-27 RX ADMIN — POLYETHYLENE GLYCOL 3350 17 G: 17 POWDER, FOR SOLUTION ORAL at 09:27

## 2022-11-27 RX ADMIN — METHYLPREDNISOLONE SODIUM SUCCINATE 40 MG: 40 INJECTION, POWDER, FOR SOLUTION INTRAMUSCULAR; INTRAVENOUS at 17:09

## 2022-11-27 RX ADMIN — PANTOPRAZOLE SODIUM 40 MG: 40 TABLET, DELAYED RELEASE ORAL at 05:51

## 2022-11-27 RX ADMIN — DOCUSATE SODIUM 100 MG: 100 CAPSULE ORAL at 09:31

## 2022-11-27 RX ADMIN — DOXYCYCLINE HYCLATE 100 MG: 100 CAPSULE ORAL at 20:30

## 2022-11-27 RX ADMIN — AMLODIPINE BESYLATE 10 MG: 10 TABLET ORAL at 09:32

## 2022-11-27 RX ADMIN — HYDROCODONE BITARTRATE AND HOMATROPINE METHYLBROMIDE 5 ML: 5; 1.5 SOLUTION ORAL at 17:09

## 2022-11-27 RX ADMIN — METHYLPREDNISOLONE SODIUM SUCCINATE 40 MG: 40 INJECTION, POWDER, FOR SOLUTION INTRAMUSCULAR; INTRAVENOUS at 06:07

## 2022-11-27 RX ADMIN — SODIUM CHLORIDE, PRESERVATIVE FREE 5 ML: 5 INJECTION INTRAVENOUS at 20:31

## 2022-11-27 RX ADMIN — HYDROCODONE BITARTRATE AND HOMATROPINE METHYLBROMIDE 5 ML: 5; 1.5 SOLUTION ORAL at 23:28

## 2022-11-27 RX ADMIN — LISINOPRIL 40 MG: 20 TABLET ORAL at 09:32

## 2022-11-27 NOTE — PROGRESS NOTES
Hospitalist Progress Note   Admit Date:  2022  9:17 PM   Name:  Christoph Mccormack   Age:  [de-identified] y.o. Sex:  female  :  1940   MRN:  [id number]   Room:  [facility]/    Presenting Complaint: Shortness of Breath     Reason(s) for Admission: Hypoxia [R09.02]  Pneumonia of right lower lobe due to infectious organism [J18.9]  Community acquired pneumonia of right lower lobe of lung [J18.9]     Hospital Course:   Christoph Mccormack is a [de-identified] y.o. female with medical history of diabetes and hypertension, who presented with shortness of breath and cough, failed outpatient treatment. Patient admitted with acute hypoxic respiratory failure secondary to community-acquired pneumonia and hyperactive airway disease. Empiric antibiotic and supportive care. Subjective & 24hr Events (22): Patient is seen at the bedside. No reported overnight. Oxygen demand improving and on 2 L currently. Shortness of breath has been improved. Denies chest pain, palpitation, nausea, vomiting or abdominal pain. 6-minute walk test today. Assessment & Plan:     Acute hypoxic respiratory failure secondary to CAP and hyperactive airway disease: On 2 l nc, wean as tolerated  Continue Rocephin and doxycycline  Continue DuoNeb every 4 hour while awake  Continue Dulera twice daily  Start weaning Solu-Medrol  Incentive spirometry/flutter valve  Continue Hycodan and Mucinex DM  Continue supportive care    Type 2 diabetes mellitus:  Continue sliding scale and low-dose insulin accordingly    Hypertension:  Blood pressure better controlled  Continue lisinopril  Increased amlodipine to 10 mg daily      Anticipated discharge needs: None, home on discharge    Diet:  ADULT DIET;  Regular  DVT PPx: Lovenox  Code status: Full Code    Hospital Problems:  Principal Problem:    Community acquired pneumonia of right lower lobe of lung  Active Problems:    Diabetes mellitus type 2, diet-controlled ([facility] [address]    Hypertension    Respiratory failure with hypoxia ([address]    Reactive airway disease  Resolved Problems:    * No resolved hospital problems. *      Objective:   Patient Vitals for the past 24 hrs:   Temp Pulse Resp BP SpO2   11/27/22 1159 -- -- -- -- 93 %   11/27/22 0757 -- 82 17 -- 93 %   11/27/22 0732 98.1 °F (36.7 °C) 76 18 (!) 149/59 96 %   11/27/22 0350 -- 80 15 (!) 158/57 96 %   11/26/22 2320 98.7 °F (37.1 °C) 90 15 (!) 149/67 94 %   11/26/22 2031 97.7 °F (36.5 °C) 99 17 (!) 154/53 96 %   11/26/22 2015 -- 91 18 -- 96 %   11/26/22 1640 -- 88 18 -- 95 %   11/26/22 1625 98.4 °F (36.9 °C) 93 18 (!) 147/61 95 %   11/26/22 1241 -- 90 16 -- 97 %         Oxygen Therapy  SpO2: 93 %  Pulse Oximeter Device Mode: Continuous  Pulse Oximeter Device Location: Left, Finger  O2 Device: None (Room air)  FiO2 : 36 %  O2 Flow Rate (L/min): 1 L/min    Estimated body mass index is 31.45 kg/m² as calculated from the following:    Height as of this encounter: 5' 5\" (1.651 m). Weight as of this encounter: 189 lb (85.7 kg). No intake or output data in the 24 hours ending 11/27/22 1209      Physical Exam:     Blood pressure (!) 149/59, pulse 82, temperature 98.1 °F (36.7 °C), temperature source Oral, resp. rate 17, height 5' 5\" (1.651 m), weight 189 lb (85.7 kg), SpO2 93 %. General:    Tired, on 3 L nasal cannula  Head:  Normocephalic, atraumatic  Eyes:  Sclerae appear normal.  Pupils equally round. ENT:  Nares appear normal, no drainage. Moist oral mucosa  Neck:  No restricted ROM. Trachea midline   CV:   RRR. No m/r/g. No jugular venous distension. Lungs:   Occasional bilateral expiratory wheezing  Abdomen: Bowel sounds present. Soft, nontender, nondistended. Extremities: No cyanosis or clubbing. No edema  Skin:     No rashes and normal coloration. Warm and dry. Neuro:  CN II-XII grossly intact. Sensation intact. A&Ox3  Psych:  Normal mood and affect.       I have personally reviewed labs and tests showing:  Recent Labs:  No results found for this or any previous visit (from the past 48 hour(s)). I have personally reviewed imaging studies showing: Other Studies:  XR CHEST PORTABLE   Final Result   No acute process.           Current Meds:  Current Facility-Administered Medications   Medication Dose Route Frequency    amLODIPine (NORVASC) tablet 10 mg  10 mg Oral Daily    methylPREDNISolone sodium (SOLU-MEDROL) injection 40 mg  40 mg IntraVENous Q8H    guaiFENesin (MUCINEX) extended release tablet 600 mg  600 mg Oral BID    docusate sodium (COLACE) capsule 100 mg  100 mg Oral Daily    doxycycline hyclate (VIBRAMYCIN) capsule 100 mg  100 mg Oral BID    lisinopril (PRINIVIL;ZESTRIL) tablet 40 mg  40 mg Oral Daily    trospium (SANCTURA) tablet 20 mg  20 mg Oral BID AC    pantoprazole (PROTONIX) tablet 40 mg  40 mg Oral QAM AC    rosuvastatin (CRESTOR) tablet 20 mg  20 mg Oral Daily    sodium chloride flush 0.9 % injection 5-40 mL  5-40 mL IntraVENous 2 times per day    sodium chloride flush 0.9 % injection 5-40 mL  5-40 mL IntraVENous PRN    0.9 % sodium chloride infusion   IntraVENous PRN    potassium chloride (KLOR-CON M) extended release tablet 40 mEq  40 mEq Oral PRN    Or    potassium bicarb-citric acid (EFFER-K) effervescent tablet 40 mEq  40 mEq Oral PRN    Or    potassium chloride 10 mEq/100 mL IVPB (Peripheral Line)  10 mEq IntraVENous PRN    magnesium sulfate 2000 mg in 50 mL IVPB premix  2,000 mg IntraVENous PRN    enoxaparin (LOVENOX) injection 40 mg  40 mg SubCUTAneous Daily    promethazine (PHENERGAN) tablet 12.5 mg  12.5 mg Oral Q6H PRN    Or    ondansetron (ZOFRAN) injection 4 mg  4 mg IntraVENous Q6H PRN    polyethylene glycol (GLYCOLAX) packet 17 g  17 g Oral Daily    bisacodyl (DULCOLAX) EC tablet 5 mg  5 mg Oral Daily PRN    aluminum & magnesium hydroxide-simethicone (MAALOX) 200-200-20 MG/5ML suspension 30 mL  30 mL Oral Q6H PRN    acetaminophen (TYLENOL) tablet 650 mg  650 mg Oral Q6H PRN    Or    acetaminophen (TYLENOL) suppository 650 mg 650 mg Rectal Q6H PRN    cefTRIAXone (ROCEPHIN) 1,000 mg in sodium chloride 0.9 % 50 mL IVPB mini-bag  1,000 mg IntraVENous Q24H    HYDROcodone homatropine (HYCODAN) 5-1.5 MG/5ML solution 5 mL  5 mL Oral Q4H PRN    ipratropium-albuterol (DUONEB) nebulizer solution 1 ampule  1 ampule Inhalation Q4H WA    mometasone-formoterol (DULERA) 100-5 MCG/ACT inhaler 2 puff  2 puff Inhalation BID    sodium chloride flush 0.9 % injection 5 mL  5 mL IntraVENous Q8H    sodium chloride flush 0.9 % injection 5 mL  5 mL IntraVENous PRN       Signed:  Inna Jones MD    Part of this note may have been written by using a voice dictation software. The note has been proof read but may still contain some grammatical/other typographical errors.

## 2022-11-27 NOTE — PROGRESS NOTES
Notified MD of pt having incontinence especially with cough. Bladder scan shows 80mls pvr. Pt states this is not a new issue.   Pt has been up and about in her room and got a shower all with oxygen on

## 2022-11-27 NOTE — PLAN OF CARE
Problem: Respiratory - Adult  Goal: Achieves optimal ventilation and oxygenation  Outcome: Progressing  Flowsheets (Taken 11/26/2022 2019)  Achieves optimal ventilation and oxygenation:   Assess for changes in respiratory status   Position to facilitate oxygenation and minimize respiratory effort   Respiratory therapy support as indicated   Assess for changes in mentation and behavior   Oxygen supplementation based on oxygen saturation or arterial blood gases   Encourage broncho-pulmonary hygiene including cough, deep breathe, incentive spirometry   Assess and instruct to report shortness of breath or any respiratory difficulty

## 2022-11-28 VITALS
DIASTOLIC BLOOD PRESSURE: 80 MMHG | BODY MASS INDEX: 31.49 KG/M2 | HEART RATE: 84 BPM | RESPIRATION RATE: 20 BRPM | TEMPERATURE: 97 F | OXYGEN SATURATION: 95 % | SYSTOLIC BLOOD PRESSURE: 93 MMHG | WEIGHT: 189 LBS | HEIGHT: 65 IN

## 2022-11-28 LAB
ANION GAP SERPL CALC-SCNC: 4 MMOL/L (ref 2–11)
BACTERIA SPEC CULT: NORMAL
BACTERIA SPEC CULT: NORMAL
BUN SERPL-MCNC: 26 MG/DL (ref 8–23)
CALCIUM SERPL-MCNC: 9.5 MG/DL (ref 8.3–10.4)
CHLORIDE SERPL-SCNC: 104 MMOL/L (ref 101–110)
CO2 SERPL-SCNC: 29 MMOL/L (ref 21–32)
CREAT SERPL-MCNC: 0.95 MG/DL (ref 0.6–1)
ERYTHROCYTE [DISTWIDTH] IN BLOOD BY AUTOMATED COUNT: 16 % (ref 11.9–14.6)
GLUCOSE SERPL-MCNC: 240 MG/DL (ref 65–100)
HCT VFR BLD AUTO: 33.5 % (ref 35.8–46.3)
HGB BLD-MCNC: 10.1 G/DL (ref 11.7–15.4)
MCH RBC QN AUTO: 23.8 PG (ref 26.1–32.9)
MCHC RBC AUTO-ENTMCNC: 30.1 G/DL (ref 31.4–35)
MCV RBC AUTO: 78.8 FL (ref 82–102)
NRBC # BLD: 0 K/UL (ref 0–0.2)
PLATELET # BLD AUTO: 335 K/UL (ref 150–450)
PMV BLD AUTO: 9.9 FL (ref 9.4–12.3)
POTASSIUM SERPL-SCNC: 4.2 MMOL/L (ref 3.5–5.1)
RBC # BLD AUTO: 4.25 M/UL (ref 4.05–5.2)
SERVICE CMNT-IMP: NORMAL
SERVICE CMNT-IMP: NORMAL
SODIUM SERPL-SCNC: 137 MMOL/L (ref 133–143)
WBC # BLD AUTO: 13.6 K/UL (ref 4.3–11.1)

## 2022-11-28 PROCEDURE — 94640 AIRWAY INHALATION TREATMENT: CPT

## 2022-11-28 PROCEDURE — 94762 N-INVAS EAR/PLS OXIMTRY CONT: CPT

## 2022-11-28 PROCEDURE — 36415 COLL VENOUS BLD VENIPUNCTURE: CPT

## 2022-11-28 PROCEDURE — 6370000000 HC RX 637 (ALT 250 FOR IP): Performed by: FAMILY MEDICINE

## 2022-11-28 PROCEDURE — 85027 COMPLETE CBC AUTOMATED: CPT

## 2022-11-28 PROCEDURE — 94761 N-INVAS EAR/PLS OXIMETRY MLT: CPT

## 2022-11-28 PROCEDURE — 6360000002 HC RX W HCPCS: Performed by: FAMILY MEDICINE

## 2022-11-28 PROCEDURE — 2580000003 HC RX 258: Performed by: EMERGENCY MEDICINE

## 2022-11-28 PROCEDURE — 80048 BASIC METABOLIC PNL TOTAL CA: CPT

## 2022-11-28 PROCEDURE — 2700000000 HC OXYGEN THERAPY PER DAY

## 2022-11-28 RX ORDER — IPRATROPIUM BROMIDE AND ALBUTEROL SULFATE 2.5; .5 MG/3ML; MG/3ML
1 SOLUTION RESPIRATORY (INHALATION) EVERY 4 HOURS PRN
Qty: 540 ML | Refills: 1 | Status: SHIPPED | OUTPATIENT
Start: 2022-11-28 | End: 2022-11-29 | Stop reason: SDUPTHER

## 2022-11-28 RX ORDER — FLUTICASONE PROPIONATE AND SALMETEROL 250; 50 UG/1; UG/1
1 POWDER RESPIRATORY (INHALATION) EVERY 12 HOURS
Qty: 60 EACH | Refills: 1 | Status: SHIPPED | OUTPATIENT
Start: 2022-11-28 | End: 2022-11-29 | Stop reason: SDUPTHER

## 2022-11-28 RX ORDER — PREDNISONE 20 MG/1
TABLET ORAL
Qty: 12 TABLET | Refills: 0 | Status: SHIPPED | OUTPATIENT
Start: 2022-11-28

## 2022-11-28 RX ORDER — AMLODIPINE BESYLATE 10 MG/1
10 TABLET ORAL DAILY
Qty: 30 TABLET | Refills: 3 | Status: SHIPPED | OUTPATIENT
Start: 2022-11-29

## 2022-11-28 RX ADMIN — DOCUSATE SODIUM 100 MG: 100 CAPSULE ORAL at 07:54

## 2022-11-28 RX ADMIN — LISINOPRIL 40 MG: 20 TABLET ORAL at 07:55

## 2022-11-28 RX ADMIN — ROSUVASTATIN 20 MG: 20 TABLET, FILM COATED ORAL at 07:55

## 2022-11-28 RX ADMIN — TROSPIUM CHLORIDE 20 MG: 20 TABLET, FILM COATED ORAL at 05:59

## 2022-11-28 RX ADMIN — GUAIFENESIN 600 MG: 600 TABLET, EXTENDED RELEASE ORAL at 07:55

## 2022-11-28 RX ADMIN — SODIUM CHLORIDE, PRESERVATIVE FREE 5 ML: 5 INJECTION INTRAVENOUS at 06:24

## 2022-11-28 RX ADMIN — POLYETHYLENE GLYCOL 3350 17 G: 17 POWDER, FOR SOLUTION ORAL at 07:54

## 2022-11-28 RX ADMIN — MOMETASONE FUROATE AND FORMOTEROL FUMARATE DIHYDRATE 2 PUFF: 100; 5 AEROSOL RESPIRATORY (INHALATION) at 08:40

## 2022-11-28 RX ADMIN — PANTOPRAZOLE SODIUM 40 MG: 40 TABLET, DELAYED RELEASE ORAL at 05:59

## 2022-11-28 RX ADMIN — IPRATROPIUM BROMIDE AND ALBUTEROL SULFATE 1 AMPULE: 2.5; .5 SOLUTION RESPIRATORY (INHALATION) at 11:55

## 2022-11-28 RX ADMIN — ENOXAPARIN SODIUM 40 MG: 100 INJECTION SUBCUTANEOUS at 07:54

## 2022-11-28 RX ADMIN — DOXYCYCLINE HYCLATE 100 MG: 100 CAPSULE ORAL at 07:54

## 2022-11-28 RX ADMIN — METHYLPREDNISOLONE SODIUM SUCCINATE 40 MG: 40 INJECTION, POWDER, FOR SOLUTION INTRAMUSCULAR; INTRAVENOUS at 04:37

## 2022-11-28 RX ADMIN — AMLODIPINE BESYLATE 10 MG: 10 TABLET ORAL at 07:55

## 2022-11-28 RX ADMIN — IPRATROPIUM BROMIDE AND ALBUTEROL SULFATE 1 AMPULE: 2.5; .5 SOLUTION RESPIRATORY (INHALATION) at 08:37

## 2022-11-28 ASSESSMENT — 6 MINUTE WALK TEST (6MWT)
BORG FATIGUE SCALE SCORE: 0
O2 SATURATION: 96
O2 FLOW RATE (L/MIN): 1.5
DID PATIENT STOP OR PAUSE BEFORE 6 MINUTES?: NO
BORG FATIGUE SCALE SCORE: 0
OXYGEN DEVICE: NASAL CANNULA
O2 FLOW RATE (L/MIN): 0
O2 SATURATION: 96%
HEART RATE: 89
O2 SATURATION: 95%
BORG DYSPNEA SCALE SCORE: 0
O2 SATURATION: 87
O2 FLOW RATE (L/MIN): 1.5
BORG DYSPNEA SCALE SCORE: 0
BORG DYSPNEA SCALE SCORE: 0
HEART RATE: 89
HEART RATE: 88
BORG FATIGUE SCALE SCORE: 0
HEART RATE: 85

## 2022-11-28 NOTE — PROGRESS NOTES
Discharge instructions discussed with patient   All Ivs removed  Opportunity for questions provided.   Prescriptions written scripts given to patient  Patient to schedule follow up appointments this afternoon

## 2022-11-28 NOTE — DISCHARGE INSTRUCTIONS
Pneumonia: Care Instructions  Overview     Pneumonia is an infection of the lungs. Most cases are caused by infections from bacteria or viruses. Pneumonia may be mild or very severe. If it is caused by bacteria, you will be treated with antibiotics. It may take a few weeks to a few months to recover fully from pneumonia, depending on how sick you were and whether your overall health is good. Follow-up care is a key part of your treatment and safety. Be sure to make and go to all appointments, and call your doctor if you are having problems. It's also a good idea to know your test results and keep a list of the medicines you take. How can you care for yourself at home? Take your antibiotics exactly as directed. Do not stop taking the medicine just because you are feeling better. You need to take the full course of antibiotics. Take your medicines exactly as prescribed. Call your doctor if you think you are having a problem with your medicine. Get plenty of rest and sleep. You may feel weak and tired for a while, but your energy level will improve with time. To prevent dehydration, drink plenty of fluids. Choose water and other clear liquids. If you have kidney, heart, or liver disease and have to limit fluids, talk with your doctor before you increase the amount of fluids you drink. Take care of your cough so you can rest. A cough that brings up mucus from your lungs is common with pneumonia. It is one way your body gets rid of the infection. But if coughing keeps you from resting or causes severe fatigue and chest-wall pain, talk to your doctor. Your doctor may suggest that you take a medicine to reduce the cough. Use a vaporizer or humidifier to add moisture to your bedroom. Follow the directions for cleaning the machine. Do not smoke or allow others to smoke around you. Smoke will make your cough last longer. If you need help quitting, talk to your doctor about stop-smoking programs and medicines. These can increase your chances of quitting for good. Take an over-the-counter pain medicine, such as acetaminophen (Tylenol), ibuprofen (Advil, Motrin), or naproxen (Aleve). Read and follow all instructions on the label. Do not take two or more pain medicines at the same time unless the doctor told you to. Many pain medicines have acetaminophen, which is Tylenol. Too much acetaminophen (Tylenol) can be harmful. If you were given a spirometer to measure how well your lungs are working, use it as instructed. This can help your doctor tell how your recovery is going. To prevent pneumonia in the future, talk to your doctor about getting a yearly flu vaccine and a pneumococcal vaccine. When should you call for help? Call 911 anytime you think you may need emergency care. For example, call if:    You have severe trouble breathing. Call your doctor now or seek immediate medical care if:    You cough up dark brown or bloody mucus (sputum). You have new or worse trouble breathing. You are dizzy or lightheaded, or you feel like you may faint. Watch closely for changes in your health, and be sure to contact your doctor if:    You have a new or higher fever. You are coughing more deeply or more often. You are not getting better after 2 days (48 hours). You do not get better as expected. Where can you learn more? Go to [email or web address] and sign in to your [employer] account. Enter D336 in the [facility] box to learn more about \"Pneumonia: Care Instructions. \"     If you do not have an account, please click on the \"Sign Up Now\" link. Current as of: February 9, 2022               Content Version: 13.4  © 9181-6764 [employer], Incorporated. Care instructions adapted under license by [facility] ([facility]).  If you have questions about a medical condition or this instruction, always ask your healthcare professional. [employer] disclaims any warranty or liability for your use of this information.

## 2022-11-28 NOTE — PROGRESS NOTES
Began overnight study by turning o2 off at this time. Will titrate o2 throughout the night to determine the correct flow needed.

## 2022-11-28 NOTE — CARE COORDINATION
Patient care plan reviewed in interdisciplinary rounds with the following disciplines: MD, therapy, case management, nursing, and nutritional services. Pt is medically stable and ready for discharge home  today. She will need arrangements for home O2. CM met with patient to offer a choice in providers. No preference noted. CM advised patient that a referral will be initiated to [facility]. Patient is in agreement with plans. Pt does not need a portable tank for travel home today, so CM will ask Jn to follow up with patient after discharge this afternoon. No additional discharge planning needs noted. 11/25/22 112   Service Assessment   Patient Orientation Alert and Oriented;Person;Place;Situation;Self   Cognition Alert   History Provided By Patient   Primary Caregiver Self   Support Systems Family Members; Tanya Oneil [id number] is: Legal Next of Kin   PCP Verified by CM Yes   Last Visit to PCP Within last 6 months   Prior Functional Level Independent in ADLs/IADLs   Current Functional Level Cooking;Housework; Shopping   Can patient return to prior living arrangement Yes   Ability to make needs known: Good   Family able to assist with home care needs: Yes   Would you like for me to discuss the discharge plan with any other family members/significant others, and if so, who?  No   Social/Functional History   Lives With Alone   Type of Home House   ADL Assistance Independent   Homemaking Assistance Independent   Ambulation Assistance Independent   Transfer Assistance Independent   Active  Yes   Mode of Transportation Car

## 2022-11-28 NOTE — PROGRESS NOTES
11/28/22 1123   Data Measured Before Walk   HR 85   O2 Saturation 96   O2 Device Nasal cannula   O2 Flow Rate (l/min) 1.5 l/min   Belen Dyspnea Scale 0   Belen Fatigue Scale 0   Data Measured During the Walk   Heart Rate 89   O2 Flow Rate (l/min) 0 l/min   O2 Saturation 87   Any Problems While Exercising pt O2 sat drop to 87% on RA while walking. Placed pt on 1.5L of O2 and pt O2 sat maintained 95% while walking. Belen Dyspnea Scale 0   Belen Fatigue Scale 0   Data Measured Immediately After Walk   Did Patient Stop or Pause Before 6 Minutes No   Heart Rate 89   O2 Flow Rate (l/min) 1.5 l/min   O2 Saturation 96%   Belen Dyspnea Scale 0   Belen Fatigue Scale 0   Data Measured 5 Minutes After Walk   Heart Rate 88   O2 Saturation 95%   Comments Patient does not require O2 at rest. Patient O2 sat 95-96% when resting on RA. Walked patient on RA and O2 sat dropped to 87%. Placed patient back on 1.5L of O2 and continued walking and patient O2 sat come up to 94-95% when walking. Patient requires O2 while exercising. Respiratory Care Services     Policy Number: [id number]    Title: Home Oxygen Assessment Protocol    Effective Date:  4/9/14    Reviewed Date: 5/28/2018, 11/2020    Revised: 77/7515       Policy: The Respiratory Care Practitioner (RCP) shall assess all patients who meet Medicare's Home Oxygen Diagnostic Requirements. If a patient is unable to wean to room air within 48 hours of discharge, the RCP shall order a 3 step ambulatory oxygen saturation (SpO2) per protocol and instruct the patient in completing the test. The RCP shall document results of the test as outlined in this protocol. Purpose: Oxygen is used for short-term hospitalized patients and long-term therapy in patients with chronic lung disease and recurring congestive heart failure.  Centers for [facility] [employer] (CMS) has very specific guidelines and indications for its short-term use in the acute care setting and the long-term use in the home or other facility. This protocol will address the rationale and requirements for long-term oxygen therapy. Long-term oxygen therapy is delivered to reduce long-term complications of chronic hypoxemia, particularly cor pulmonale. Oxygen supplementation in patients with chronic hypoxemia has been shown to improve survival, pulmonary hemodynamics, exercise capacity and neuropsychological performance. 1    Responsibility: Director of [address] [place] [address] and Respiratory Care Practitioners. Home Oxygen Diagnostic Requirements:   Covered health conditions:    Severe primary lung disease   Chronic obstructive pulmonary disease  Diffuse interstitial lung disease  Cystic fibrosis  Bronchiectasis  Pulmonary neoplasm, primary or metastatic  Chronic bronchitis  Emphysema  Hypoxia-related symptoms or conditions that may improve with oxygen therapy such as  Pulmonary hypertension  Recurring congestive heart failure due to chronic cor pulmonale  Erythocytosis/erythrocythemia  Qualifying testing requirements  Testing must be performed within two days prior to discharge. Test results must be documented in patient's medical record in approved format. Testing can be done under three conditions  A test during rest.   Oxygen is considered medically necessary if SpO2 is less than or equal to 88%. If SpO2 is greater than 88%, proceed to step 2. A test taken on room air during exercise  If patient meets qualifying threshold of SpO2 less than or equal to 88% while exercising, all three of the required tests below must be performed within same testing session and be recorded in the patient's medical record. A test taken during rest while patient breathes room air. A test during exercise while patient breathes room air. A test taken during exercise with oxygen applied. (to demonstrate improvement of hypoxia). A test taken during sleep.    If patient is tested during sleep, test must have at least two hours of recorded time.  Test must indicate arterial oxygen saturation of 88% or less for at least 5 minutes of   testing period. A patient tested during sleep will not qualify for portable oxygen. A physician order will be required for an overnight oximetry test.  Regardless of test condition, the following values apply to all:  Group I: (Requires annual recertification)  Patient is on room air while at rest (awake) when tested. Arterial oxygen saturation (SaO2) is at or below 88% or   PaO2 is at or below <55mm Hg   Group II: (Recertification and retesting are required 61-90 days after initial start)  Patient is on room air at rest while awake when tested. PaO2 = 56-59 mmHg or  SaO2 89% acceptable only with secondary diagnosis of  Edema suggesting congestive heart failure  Pulmonary hypertension/cor pulmonale with P wave > 3mm in lead II, III, or AVF  Erythrocythemia with Hct >56%  Group III:   If PaO2 > 60 mmHg or   SaO2 >90% there is a presumption of noncoverage. If liter flow is greater than 4LPM, patient must meet Group 1 or Group II criteria while patient is receiving oxygen at a rate of 4LPM or higher  All patients must be tested in a stable state including those discharged from the hospital.  All coexisting diseases or conditions that can cause hypoxia must be treated and patient must be tested in a chronic stable state before oxygen therapy is qualified. Procedure for SpO2 testing at rest.  Obtain a 30 second room air SpO2 check while patient is on room air, rested and awake. If SpO2 is 88% increase O2 by 1 L to maintain SpO2 of 90%. Document results in patient's EMR. Procedure for ambulatory SpO2 testing  For patient who meets the Covered Health Conditions and is unable to wean to room air within 48 hours of discharge, complete an ambulatory SaO2. Preparation  Write an order for 3 step ambulatory oxygen saturation test per protocol. Obtain pulse oximeter and insure that it is working accurately.   Perform hand hygiene per hospital policy utilizing Standard Precautions for all patients and following transmission-based isolation as indicated per hospital policy. Identify patient, verify name and birth date via ID bracelet. Introduce self and identify department. Explain procedure to patient and family and confirm understanding. Assessment  Assure that patient is on room air prior to test.  Fingernail polish if worn by patient must be removed before testing. Ask patient to sit in an upright position. Resting SaO2 assessment   Perform a 30 second resting room air SaO2. If SaO2 is 88% or less Medicare considers oxygen is medically necessary   Document findings in patient EMR. If SaO2 is greater than 88%, proceed to the next step. Ambulatory Room Air SaO2 check  Ask patient to walk for 5 minutes on room air or as long as tolerated. If patients ordinarily use a cane, walker or rollator, they should be used during test.  Instruct patient to walk at a comfortable pace and to breathe naturally during test.  Monitor and record patient's heart rate, SaO2 and exercise endurance. If SaO2 is 88% or less post exercise, an ambulatory test on oxygen must be performed. Ambulatory SaO2 on Oxygen  The P shall place the patient on oxygen to achieve a SaO2 of 90%. Instruct patient to walk for 5 minutes or as long as tolerated. If patients ordinarily use a cane, walker or rollator, they should be used during test  Monitor and record patient's heart rate, SaO2 and exercise endurance. If Sa02 decreases to the range of 80% to 84% the flow shall be increased by 2LPM.   If SaO2 decreases to the range of 85%-89% increased oxygen by 1 LPM.  If SaO2 is less than 80%, the patient is not appropriate for ambulation. Documentation   Medicare has specific guidelines for documentation of ambulatory oxygen saturation testing, therefore all test results must be documented in the patient's EMR as outlined below.      Room air: SpO2 with O2 and liter flow   Resting SpO2 95% 96% on 1.5L when exercising  97-98% on 1.5L when resting. Ambulating SpO2 87% RESULTS:Patient does not require O2 at rest. Patient O2 sat 95-96% when resting on RA. Walked patient on RA and O2 sat dropped to 87%. Placed patient back on 1.5L of O2 and continued walking and patient O2 sat come up to 94-95% when walking. Patient requires O2 while exercising. Reference:       Carl Archer [facility] for [facility] Medicaid Services. Home Oxygen Therapy.  Medicare        Part B- Oxygen Coverage

## 2022-11-28 NOTE — DISCHARGE SUMMARY
Hospitalist Discharge Summary   Admit Date:  2022  9:17 PM   DC Note date: 2022  Name:  Meseret Coley   Age:  [de-identified] y.o. Sex:  female  :  1940   MRN:  [id number]   Room:  [facility]  PCP:  Monica Byrd MD    Presenting Complaint: Shortness of Breath     Initial Admission Diagnosis: Hypoxia [R09.02]  Pneumonia of right lower lobe due to infectious organism [J18.9]  Community acquired pneumonia of right lower lobe of lung [J18.9]     Problem List for this Hospitalization (present on admission):    Principal Problem:    Community acquired pneumonia of right lower lobe of lung  Active Problems:    Diabetes mellitus type 2, diet-controlled ([address]    Hypertension    Respiratory failure with hypoxia ([address]    Reactive airway disease  Resolved Problems:    * No resolved hospital problems. *      Hospital Course:  Meseret Coley is a [de-identified] y.o. female with medical history of diabetes and hypertension, who presented with shortness of breath and cough, failed outpatient treatment. Patient admitted with acute hypoxic respiratory failure secondary to community-acquired pneumonia and hyperactive airway disease. Started on empiric antibiotics and has completed course. Also started on IV steroids, nebs treatment and supportive care with significant improvement in her symptoms. Patient transitioned to prednisone taper dose on discharge. Oxygen requirement improved and patient weaned to room air at rest but requiring 1.5 to 2 L nasal cannula with ambulation. Overnight oximetry showed 47 desaturation events of less than 3-minute duration and 1 desaturation event of more than 3-minute duration. Concern for sleep apnea. Recommended pulmonology follow-up and sleep studies outpatient. Sleep study referral given. Case management consulted to arrange home oxygen. All other chronic conditions stable and we continued essential home meds. No new complaint on the day of discharge.   PT/OT recommend home health. Patient is stable to discharge home today with close follow-up with PCP and pulmonology. Disposition: Home health  Diet: ADULT DIET; Regular  Code Status: Full Code    Follow Ups:   Follow-up Information     Tamika Major MD Follow up in 3 day(s). Specialty: Internal Medicine  Contact information:  [address]  [phone]             [facility] Follow up in 2 week(s). Specialty: Pulmonology  Why: EVALUATION FOR SLEEP APNEA  Contact information:  [address] [facility] Follow up. Specialty: DME Services  Why: For home O2 needs  Contact information:  [address]  [facility] [address] [facility]  [phone]                     Time spent in patient discharge and coordination 38 minutes. Follow up labs/diagnostics (ultimately defer to outpatient provider):  Sleep study    Plan was discussed with patient. All questions answered. Patient was stable at time of discharge. Instructions given to call a physician or return if any concerns. Current Discharge Medication List        START taking these medications    Details   amLODIPine (NORVASC) 10 MG tablet Take 1 tablet by mouth daily  Qty: 30 tablet, Refills: 3      fluticasone-salmeterol (ADVAIR DISKUS) 250-50 MCG/ACT AEPB diskus inhaler Inhale 1 puff into the lungs in the morning and 1 puff in the evening.   Qty: 60 each, Refills: 1      ipratropium-albuterol (DUONEB) 0.5-2.5 (3) MG/3ML SOLN nebulizer solution Inhale 3 mLs into the lungs every 4 hours as needed for Shortness of Breath  Qty: 540 mL, Refills: 1      Nebulizers (COMPRESSOR/NEBULIZER) MISC DuoNeb treatment every 4 hours as needed for shortness of breath  Qty: 1 each, Refills: 3      predniSONE (DELTASONE) 20 MG tablet 2 tablets daily for 3 days, and then 1 tablet daily for 3 days, then half tablet daily for 3 days and then stop  Qty: 12 tablet, Refills: 0           CONTINUE these medications which have NOT CHANGED    Details   Dextromethorphan-guaiFENesin (MUCINEX DM MAXIMUM STRENGTH)  MG TB12 Take 1 tablet by mouth in the morning and at bedtime  Qty: 28 tablet, Refills: 0      albuterol sulfate HFA (VENTOLIN HFA) 108 (90 Base) MCG/ACT inhaler Inhale 2 puffs into the lungs 4 times daily as needed for Wheezing  Qty: 18 g, Refills: 1      colchicine (COLCRYS) 0.6 MG tablet Pt to take one po every hour until symptoms improved or pt has diarrhea      docusate (COLACE, DULCOLAX) 100 MG CAPS Take 100 mg by mouth daily      lisinopril-hydroCHLOROthiazide (PRINZIDE;ZESTORETIC) 20-12.5 MG per tablet Take 1 tablet by mouth daily      omeprazole (PRILOSEC) 40 MG delayed release capsule Take 40 mg by mouth daily      simvastatin (ZOCOR) 40 MG tablet Take 40 mg by mouth daily           STOP taking these medications       doxycycline hyclate (VIBRA-TABS) 100 MG tablet Comments:   Reason for Stopping:         methylPREDNISolone (MEDROL DOSEPACK) 4 MG tablet Comments:   Reason for Stopping:         mirabegron (MYRBETRIQ) 50 MG TB24 Comments:   Reason for Stopping:               Procedures done this admission:  * No surgery found *    Consults this admission:  IP CONSULT TO CASE MANAGEMENT    Echocardiogram results:  No results found for this or any previous visit. Diagnostic Imaging/Tests:   XR CHEST PORTABLE    Result Date: 11/23/2022  No acute process. XR CHEST PORTABLE    Result Date: 11/21/2022  Mild bibasilar atelectasis/infiltrate. CT CHEST PULMONARY EMBOLISM W CONTRAST    Result Date: 11/21/2022  1. No acute process or pulmonary embolism. 2. Borderline cardiomegaly and coronary artery disease. 3. Changes of old granulomatous disease.         Labs: Results:       BMP, Mg, Phos Recent Labs     11/28/22  0408      K 4.2      CO2 29   ANIONGAP 4   BUN 26*   CREATININE 0.95   LABGLOM >60   CALCIUM 9.5   GLUCOSE 240* CBC Recent Labs     11/28/22  0408   WBC 13.6*   RBC 4.25   HGB 10.1*   HCT 33.5*   MCV 78.8*   MCH 23.8*   MCHC 30.1*   RDW 16.0*      MPV 9.9   NRBC 0.00      LFT No results for input(s): BILITOT, BILIDIR, ALKPHOS, AST, ALT, PROT, LABALBU, GLOB in the last 72 hours.    Cardiac  Lab Results   Component Value Date/Time    TROPHS 18.2 11/23/2022 09:47 PM    TROPHS 19.3 11/21/2022 08:17 PM      Coags No results found for: PROTIME, INR, APTT   A1c No results found for: LABA1C, EAG   Lipids No results found for: CHOL, LDLCALC, LABVLDL, HDL, CHOLHDLRATIO, TRIG   Thyroid  No results found for: Holly [place]     Most Recent UA No results found for: COLORU, APPEARANCE, SPECGRAV, LABPH, PROTEINU, GLUCOSEU, KETUA, BILIRUBINUR, BLOODU, UROBILINOGEN, NITRU, LEUKOCYTESUR, WBCUA, RBCUA, EPITHUA, BACTERIA, LABCAST, MUCUS     Recent Labs     11/23/22  2323 11/23/22  2147   CULTURE NO GROWTH 5 DAYS NO GROWTH 5 DAYS       All Labs from Last 24 Hrs:  Recent Results (from the past 24 hour(s))   CBC    Collection Time: 11/28/22  4:08 AM   Result Value Ref Range    WBC 13.6 (H) 4.3 - 11.1 K/uL    RBC 4.25 4.05 - 5.2 M/uL    Hemoglobin 10.1 (L) 11.7 - 15.4 g/dL    Hematocrit 33.5 (L) 35.8 - 46.3 %    MCV 78.8 (L) 82.0 - 102.0 FL    MCH 23.8 (L) 26.1 - 32.9 PG    MCHC 30.1 (L) 31.4 - 35.0 g/dL    RDW 16.0 (H) 11.9 - 14.6 %    Platelets 629 244 - 257 K/uL    MPV 9.9 9.4 - 12.3 FL    nRBC 0.00 0.0 - 0.2 K/uL   Basic Metabolic Panel    Collection Time: 11/28/22  4:08 AM   Result Value Ref Range    Sodium 137 133 - 143 mmol/L    Potassium 4.2 3.5 - 5.1 mmol/L    Chloride 104 101 - 110 mmol/L    CO2 29 21 - 32 mmol/L    Anion Gap 4 2 - 11 mmol/L    Glucose 240 (H) 65 - 100 mg/dL    BUN 26 (H) 8 - 23 MG/DL    Creatinine 0.95 0.6 - 1.0 MG/DL    Est, Glom Filt Rate >60 >60 ml/min/1.73m2    Calcium 9.5 8.3 - 10.4 MG/DL       Allergies   Allergen Reactions    Latex Itching     redness    Penicillins Swelling    Sulfa Antibiotics Swelling Immunization History   Administered Date(s) Administered    COVID-19, PFIZER Bivalent BOOSTER, (age 12y+), IM, 30 mcg/0.3 mL dose 10/24/2022    COVID-19, PFIZER PURPLE top, DILUTE for use, (age 15 y+), 30mcg/0.3mL 01/20/2021, 02/11/2021, 09/10/2021    PPD Test 04/05/2017       Recent Vital Data:  Patient Vitals for the past 24 hrs:   Temp Pulse Resp BP SpO2   11/28/22 1155 -- 84 -- -- 95 %   11/28/22 1055 97 °F (36.1 °C) 91 20 93/80 95 %   11/28/22 0900 -- -- -- (!) 157/65 --   11/28/22 0840 -- 83 -- -- 93 %   11/28/22 0837 -- -- -- -- 93 %   11/28/22 0708 97.6 °F (36.4 °C) 86 20 (!) 184/79 92 %   11/28/22 0259 97.9 °F (36.6 °C) 81 20 (!) 145/68 97 %   11/28/22 0030 -- 78 18 -- 93 %   11/28/22 0011 98.1 °F (36.7 °C) 87 18 (!) 151/61 92 %   11/27/22 2035 -- 83 18 -- 94 %   11/27/22 2031 -- 83 18 -- 98 %   11/27/22 1941 98.1 °F (36.7 °C) 85 18 (!) 151/67 94 %   11/27/22 1609 -- -- -- -- 96 %   11/27/22 1525 98.1 °F (36.7 °C) 85 17 (!) 142/58 95 %       Oxygen Therapy  SpO2: 95 %  Pulse Oximeter Device Mode: Continuous  Pulse Oximeter Device Location: Left, Finger  O2 Device: None (Room air)  FiO2 : 36 %  O2 Flow Rate (L/min): 0 L/min    Estimated body mass index is 31.45 kg/m² as calculated from the following:    Height as of this encounter: 5' 5\" (1.651 m). Weight as of this encounter: 189 lb (85.7 kg). No intake or output data in the 24 hours ending 11/28/22 1213      Physical Exam:    General:    No overt distress  Head:  Normocephalic, atraumatic  Eyes:  Sclerae appear normal.  Pupils equally round. HENT:  Nares appear normal, no drainage. Moist mucous membranes  Neck:  No restricted ROM. Trachea midline  CV:   RRR. No m/r/g. No JVD  Lungs:   Occasional wheezing  Abdomen:   Soft, nontender, nondistended. Extremities: Warm and dry. No cyanosis or clubbing. No edema. Skin:     No rashes. Normal coloration  Neuro:  CN II-XII grossly intact. Psych:  Normal mood and affect.     Signed:  Lucas Watkins Clementina Roberts MD    Part of this note may have been written by using a voice dictation software. The note has been proof read but may still contain some grammatical/other typographical errors.

## 2022-11-29 RX ORDER — IPRATROPIUM BROMIDE AND ALBUTEROL SULFATE 2.5; .5 MG/3ML; MG/3ML
1 SOLUTION RESPIRATORY (INHALATION) EVERY 4 HOURS PRN
Qty: 540 ML | Refills: 0 | Status: SHIPPED | OUTPATIENT
Start: 2022-11-29 | End: 2022-12-29

## 2022-11-29 RX ORDER — FLUTICASONE PROPIONATE AND SALMETEROL 250; 50 UG/1; UG/1
1 POWDER RESPIRATORY (INHALATION) EVERY 12 HOURS
Qty: 60 EACH | Refills: 1 | Status: SHIPPED | OUTPATIENT
Start: 2022-11-29 | End: 2022-12-29

## 2022-12-06 NOTE — PROGRESS NOTES
Physician Progress Note      Mack DUNCAN #:                  [id number]  :                       1940  ADMIT DATE:       2022 9:17 PM  [address] DATE:        2022 2:15 PM  RESPONDING  PROVIDER #:        Franky Jeffrey MD          QUERY TEXT:    Patient was noted to have shortness of breath, wheezing and PNA. However, CT   noted lungs were clear except for an old calcified right lower lobe granuloma. Consideration was given for bronchitis and reactive airway disease. Patient   was treated with Rocephin, Zithromax, Solumedrol, Dounebs & Dulera. After   study, can the etiology of the patient's symptoms be further specified as: The medical record reflects the following:  Risk Factors: Failed outpatient treatment for PNA, DM, HTN,  Clinical Indicators: notes--Acute hypoxic respiratory failure secondary to CAP   and hyperactive airway disease, cxray  new opacity. Pre-admission outpatient   tx- suggestive of bronchitis  Treatment: Empiric abx's, - rocephin, zithromax, steroids, labs, cxray,   oxygen, essential home meds. Options provided:  -- PNA  -- Acute bronchitis  -- Asthma exacerbation  -- A combination of the above, please specify  -- Other - I will add my own diagnosis  -- Disagree - Not applicable / Not valid  -- Disagree - Clinically unable to determine / Unknown  -- Refer to Clinical Documentation Reviewer    PROVIDER RESPONSE TEXT:    This patient was treated for acute bronchitis.     Query created by: Diego Martinez on 2022 9:54 AM      Electronically signed by:  Franky Jeffrey MD 2022 1:31 PM

## 2022-12-25 NOTE — HPI: SKIN LESION
How Severe Is Your Skin Lesion?: mild
Has Your Skin Lesion Been Treated?: not been treated
Is This A New Presentation, Or A Follow-Up?: Skin Lesion
Left arm;

## 2023-01-12 ENCOUNTER — APPOINTMENT (RX ONLY)
Dept: URBAN - METROPOLITAN AREA CLINIC 24 | Facility: CLINIC | Age: 83
Setting detail: DERMATOLOGY
End: 2023-01-12

## 2023-01-12 DIAGNOSIS — L82.0 INFLAMED SEBORRHEIC KERATOSIS: ICD-10-CM

## 2023-01-12 PROCEDURE — ? LIQUID NITROGEN

## 2023-01-12 PROCEDURE — 17111 DESTRUCTION B9 LESIONS 15/>: CPT

## 2023-01-12 ASSESSMENT — LOCATION DETAILED DESCRIPTION DERM
LOCATION DETAILED: LEFT MID-UPPER BACK
LOCATION DETAILED: RIGHT MID-UPPER BACK
LOCATION DETAILED: LEFT MEDIAL UPPER BACK
LOCATION DETAILED: LEFT RIB CAGE
LOCATION DETAILED: LOWER STERNUM
LOCATION DETAILED: LEFT INFERIOR ANTERIOR NECK

## 2023-01-12 ASSESSMENT — LOCATION SIMPLE DESCRIPTION DERM
LOCATION SIMPLE: ABDOMEN
LOCATION SIMPLE: LEFT UPPER BACK
LOCATION SIMPLE: LEFT ANTERIOR NECK
LOCATION SIMPLE: CHEST
LOCATION SIMPLE: RIGHT UPPER BACK

## 2023-01-12 ASSESSMENT — LOCATION ZONE DERM
LOCATION ZONE: NECK
LOCATION ZONE: TRUNK

## 2023-01-12 NOTE — PROCEDURE: LIQUID NITROGEN
Duration Of Freeze Thaw-Cycle (Seconds): 0
Render In Bullet Format When Appropriate: No
Consent: The patient's consent was obtained including but not limited to risks of crusting, scabbing, blistering, scarring, darker or lighter pigmentary change, recurrence, incomplete removal and infection.
Medical Necessity Clause: This procedure was medically necessary because the lesions that were treated were:
Total Number Of Lesions Treated: 16
Medical Necessity Information: It is in your best interest to select a reason for this procedure from the list below. All of these items fulfill various CMS LCD requirements except the new and changing color options.
Spray Paint Text: The liquid nitrogen was applied to the skin utilizing a spray paint frosting technique.
Show Spray Paint Technique Variable?: Yes
Post-Care Instructions: I reviewed with the patient in detail post-care instructions. Patient is to wear sunprotection, and avoid picking at any of the treated lesions. Pt may apply Vaseline to crusted or scabbing areas.
Detail Level: Zone

## 2023-02-17 ENCOUNTER — APPOINTMENT (OUTPATIENT)
Dept: CT IMAGING | Age: 83
DRG: 176 | End: 2023-02-17
Payer: MEDICARE

## 2023-02-17 ENCOUNTER — APPOINTMENT (OUTPATIENT)
Dept: GENERAL RADIOLOGY | Age: 83
DRG: 176 | End: 2023-02-17
Payer: MEDICARE

## 2023-02-17 ENCOUNTER — HOSPITAL ENCOUNTER (INPATIENT)
Age: 83
LOS: 4 days | Discharge: HOME | DRG: 176 | End: 2023-02-21
Attending: EMERGENCY MEDICINE | Admitting: HOSPITALIST
Payer: MEDICARE

## 2023-02-17 DIAGNOSIS — I26.99 PULMONARY EMBOLISM, UNSPECIFIED CHRONICITY, UNSPECIFIED PULMONARY EMBOLISM TYPE, UNSPECIFIED WHETHER ACUTE COR PULMONALE PRESENT (HCC): ICD-10-CM

## 2023-02-17 DIAGNOSIS — R77.8 ELEVATED TROPONIN: ICD-10-CM

## 2023-02-17 DIAGNOSIS — R06.09 DYSPNEA ON EXERTION: ICD-10-CM

## 2023-02-17 DIAGNOSIS — N28.89 LEFT RENAL MASS: ICD-10-CM

## 2023-02-17 DIAGNOSIS — I26.99 ACUTE PULMONARY EMBOLISM WITHOUT ACUTE COR PULMONALE, UNSPECIFIED PULMONARY EMBOLISM TYPE (HCC): Primary | ICD-10-CM

## 2023-02-17 LAB
ALBUMIN SERPL-MCNC: 3.5 G/DL (ref 3.2–4.6)
ALBUMIN/GLOB SERPL: 0.8 (ref 0.4–1.6)
ALP SERPL-CCNC: 102 U/L (ref 50–136)
ALT SERPL-CCNC: 28 U/L (ref 12–65)
ANION GAP SERPL CALC-SCNC: 5 MMOL/L (ref 2–11)
APTT PPP: 21 SEC (ref 24.5–34.2)
AST SERPL-CCNC: 25 U/L (ref 15–37)
BASOPHILS # BLD: 0 K/UL (ref 0–0.2)
BASOPHILS NFR BLD: 0 % (ref 0–2)
BILIRUB SERPL-MCNC: 0.6 MG/DL (ref 0.2–1.1)
BUN SERPL-MCNC: 15 MG/DL (ref 8–23)
CALCIUM SERPL-MCNC: 9.1 MG/DL (ref 8.3–10.4)
CHLORIDE SERPL-SCNC: 106 MMOL/L (ref 101–110)
CO2 SERPL-SCNC: 27 MMOL/L (ref 21–32)
CREAT SERPL-MCNC: 1 MG/DL (ref 0.6–1)
DIFFERENTIAL METHOD BLD: ABNORMAL
EKG ATRIAL RATE: 93 BPM
EKG DIAGNOSIS: NORMAL
EKG P AXIS: 76 DEGREES
EKG P-R INTERVAL: 152 MS
EKG Q-T INTERVAL: 364 MS
EKG QRS DURATION: 94 MS
EKG QTC CALCULATION (BAZETT): 452 MS
EKG R AXIS: 63 DEGREES
EKG T AXIS: 55 DEGREES
EKG VENTRICULAR RATE: 93 BPM
EOSINOPHIL # BLD: 0.1 K/UL (ref 0–0.8)
EOSINOPHIL NFR BLD: 1 % (ref 0.5–7.8)
ERYTHROCYTE [DISTWIDTH] IN BLOOD BY AUTOMATED COUNT: 16.1 % (ref 11.9–14.6)
GLOBULIN SER CALC-MCNC: 4.3 G/DL (ref 2.8–4.5)
GLUCOSE SERPL-MCNC: 131 MG/DL (ref 65–100)
HCT VFR BLD AUTO: 36.2 % (ref 35.8–46.3)
HGB BLD-MCNC: 11.1 G/DL (ref 11.7–15.4)
IMM GRANULOCYTES # BLD AUTO: 0 K/UL (ref 0–0.5)
IMM GRANULOCYTES NFR BLD AUTO: 0 % (ref 0–5)
INR PPP: 1
LACTATE SERPL-SCNC: 1.2 MMOL/L (ref 0.4–2)
LACTATE SERPL-SCNC: 1.3 MMOL/L (ref 0.4–2)
LIPASE SERPL-CCNC: 107 U/L (ref 73–393)
LYMPHOCYTES # BLD: 1.2 K/UL (ref 0.5–4.6)
LYMPHOCYTES NFR BLD: 13 % (ref 13–44)
MAGNESIUM SERPL-MCNC: 1.8 MG/DL (ref 1.8–2.4)
MCH RBC QN AUTO: 24.8 PG (ref 26.1–32.9)
MCHC RBC AUTO-ENTMCNC: 30.7 G/DL (ref 31.4–35)
MCV RBC AUTO: 81 FL (ref 82–102)
MONOCYTES # BLD: 0.7 K/UL (ref 0.1–1.3)
MONOCYTES NFR BLD: 8 % (ref 4–12)
NEUTS SEG # BLD: 6.9 K/UL (ref 1.7–8.2)
NEUTS SEG NFR BLD: 78 % (ref 43–78)
NRBC # BLD: 0 K/UL (ref 0–0.2)
NT PRO BNP: 768 PG/ML
PLATELET # BLD AUTO: 271 K/UL (ref 150–450)
PMV BLD AUTO: 10 FL (ref 9.4–12.3)
POTASSIUM SERPL-SCNC: 3.3 MMOL/L (ref 3.5–5.1)
PROT SERPL-MCNC: 7.8 G/DL (ref 6.3–8.2)
PROTHROMBIN TIME: 13.7 SEC (ref 12.6–14.3)
RBC # BLD AUTO: 4.47 M/UL (ref 4.05–5.2)
SODIUM SERPL-SCNC: 138 MMOL/L (ref 133–143)
TROPONIN I SERPL HS-MCNC: 296.7 PG/ML (ref 0–14)
TROPONIN I SERPL HS-MCNC: 344.4 PG/ML (ref 0–14)
WBC # BLD AUTO: 9.1 K/UL (ref 4.3–11.1)

## 2023-02-17 PROCEDURE — 94640 AIRWAY INHALATION TREATMENT: CPT

## 2023-02-17 PROCEDURE — 85025 COMPLETE CBC W/AUTO DIFF WBC: CPT

## 2023-02-17 PROCEDURE — 96374 THER/PROPH/DIAG INJ IV PUSH: CPT

## 2023-02-17 PROCEDURE — 93005 ELECTROCARDIOGRAM TRACING: CPT | Performed by: EMERGENCY MEDICINE

## 2023-02-17 PROCEDURE — 71260 CT THORAX DX C+: CPT | Performed by: EMERGENCY MEDICINE

## 2023-02-17 PROCEDURE — 83880 ASSAY OF NATRIURETIC PEPTIDE: CPT

## 2023-02-17 PROCEDURE — 6360000002 HC RX W HCPCS: Performed by: EMERGENCY MEDICINE

## 2023-02-17 PROCEDURE — 83690 ASSAY OF LIPASE: CPT

## 2023-02-17 PROCEDURE — 71045 X-RAY EXAM CHEST 1 VIEW: CPT

## 2023-02-17 PROCEDURE — 83735 ASSAY OF MAGNESIUM: CPT

## 2023-02-17 PROCEDURE — 85730 THROMBOPLASTIN TIME PARTIAL: CPT

## 2023-02-17 PROCEDURE — 6370000000 HC RX 637 (ALT 250 FOR IP): Performed by: EMERGENCY MEDICINE

## 2023-02-17 PROCEDURE — 80053 COMPREHEN METABOLIC PANEL: CPT

## 2023-02-17 PROCEDURE — 6360000004 HC RX CONTRAST MEDICATION: Performed by: EMERGENCY MEDICINE

## 2023-02-17 PROCEDURE — 99285 EMERGENCY DEPT VISIT HI MDM: CPT

## 2023-02-17 PROCEDURE — 83605 ASSAY OF LACTIC ACID: CPT

## 2023-02-17 PROCEDURE — 1100000000 HC RM PRIVATE

## 2023-02-17 PROCEDURE — 85610 PROTHROMBIN TIME: CPT

## 2023-02-17 PROCEDURE — 84484 ASSAY OF TROPONIN QUANT: CPT

## 2023-02-17 RX ORDER — IPRATROPIUM BROMIDE AND ALBUTEROL SULFATE 2.5; .5 MG/3ML; MG/3ML
1 SOLUTION RESPIRATORY (INHALATION)
Status: DISCONTINUED | OUTPATIENT
Start: 2023-02-17 | End: 2023-02-19

## 2023-02-17 RX ORDER — SODIUM CHLORIDE 0.9 % (FLUSH) 0.9 %
10 SYRINGE (ML) INJECTION
Status: DISCONTINUED | OUTPATIENT
Start: 2023-02-17 | End: 2023-02-21 | Stop reason: HOSPADM

## 2023-02-17 RX ORDER — POLYETHYLENE GLYCOL 3350 17 G/17G
17 POWDER, FOR SOLUTION ORAL DAILY PRN
Status: DISCONTINUED | OUTPATIENT
Start: 2023-02-17 | End: 2023-02-21 | Stop reason: HOSPADM

## 2023-02-17 RX ORDER — HEPARIN SODIUM 1000 [USP'U]/ML
40 INJECTION, SOLUTION INTRAVENOUS; SUBCUTANEOUS PRN
Status: DISCONTINUED | OUTPATIENT
Start: 2023-02-17 | End: 2023-02-20 | Stop reason: ALTCHOICE

## 2023-02-17 RX ORDER — LABETALOL HYDROCHLORIDE 5 MG/ML
10 INJECTION, SOLUTION INTRAVENOUS EVERY 6 HOURS PRN
Status: DISCONTINUED | OUTPATIENT
Start: 2023-02-17 | End: 2023-02-21 | Stop reason: HOSPADM

## 2023-02-17 RX ORDER — ACETAMINOPHEN 325 MG/1
650 TABLET ORAL EVERY 6 HOURS PRN
Status: DISCONTINUED | OUTPATIENT
Start: 2023-02-17 | End: 2023-02-21 | Stop reason: HOSPADM

## 2023-02-17 RX ORDER — SODIUM CHLORIDE 0.9 % (FLUSH) 0.9 %
5-40 SYRINGE (ML) INJECTION PRN
Status: DISCONTINUED | OUTPATIENT
Start: 2023-02-17 | End: 2023-02-21 | Stop reason: HOSPADM

## 2023-02-17 RX ORDER — HEPARIN SODIUM 10000 [USP'U]/100ML
5-30 INJECTION, SOLUTION INTRAVENOUS CONTINUOUS
Status: DISCONTINUED | OUTPATIENT
Start: 2023-02-17 | End: 2023-02-20

## 2023-02-17 RX ORDER — SODIUM CHLORIDE 0.9 % (FLUSH) 0.9 %
5-40 SYRINGE (ML) INJECTION EVERY 12 HOURS SCHEDULED
Status: DISCONTINUED | OUTPATIENT
Start: 2023-02-17 | End: 2023-02-21 | Stop reason: HOSPADM

## 2023-02-17 RX ORDER — SODIUM CHLORIDE 9 MG/ML
INJECTION, SOLUTION INTRAVENOUS PRN
Status: DISCONTINUED | OUTPATIENT
Start: 2023-02-17 | End: 2023-02-21 | Stop reason: HOSPADM

## 2023-02-17 RX ORDER — SODIUM CHLORIDE 9 MG/ML
INJECTION, SOLUTION INTRAVENOUS CONTINUOUS
Status: DISCONTINUED | OUTPATIENT
Start: 2023-02-17 | End: 2023-02-19

## 2023-02-17 RX ORDER — 0.9 % SODIUM CHLORIDE 0.9 %
100 INTRAVENOUS SOLUTION INTRAVENOUS
Status: DISCONTINUED | OUTPATIENT
Start: 2023-02-17 | End: 2023-02-21 | Stop reason: HOSPADM

## 2023-02-17 RX ORDER — ACETAMINOPHEN 650 MG/1
650 SUPPOSITORY RECTAL EVERY 6 HOURS PRN
Status: DISCONTINUED | OUTPATIENT
Start: 2023-02-17 | End: 2023-02-21 | Stop reason: HOSPADM

## 2023-02-17 RX ORDER — HYDRALAZINE HYDROCHLORIDE 20 MG/ML
10 INJECTION INTRAMUSCULAR; INTRAVENOUS EVERY 4 HOURS PRN
Status: DISCONTINUED | OUTPATIENT
Start: 2023-02-17 | End: 2023-02-21 | Stop reason: HOSPADM

## 2023-02-17 RX ORDER — DEXTROSE MONOHYDRATE 100 MG/ML
INJECTION, SOLUTION INTRAVENOUS CONTINUOUS PRN
Status: DISCONTINUED | OUTPATIENT
Start: 2023-02-17 | End: 2023-02-21 | Stop reason: HOSPADM

## 2023-02-17 RX ORDER — INSULIN LISPRO 100 [IU]/ML
0-4 INJECTION, SOLUTION INTRAVENOUS; SUBCUTANEOUS NIGHTLY
Status: DISCONTINUED | OUTPATIENT
Start: 2023-02-17 | End: 2023-02-21 | Stop reason: HOSPADM

## 2023-02-17 RX ORDER — INSULIN LISPRO 100 [IU]/ML
0-8 INJECTION, SOLUTION INTRAVENOUS; SUBCUTANEOUS
Status: DISCONTINUED | OUTPATIENT
Start: 2023-02-18 | End: 2023-02-21 | Stop reason: HOSPADM

## 2023-02-17 RX ORDER — ONDANSETRON 4 MG/1
4 TABLET, ORALLY DISINTEGRATING ORAL EVERY 8 HOURS PRN
Status: DISCONTINUED | OUTPATIENT
Start: 2023-02-17 | End: 2023-02-21 | Stop reason: HOSPADM

## 2023-02-17 RX ORDER — FUROSEMIDE 10 MG/ML
40 INJECTION INTRAMUSCULAR; INTRAVENOUS ONCE
Status: COMPLETED | OUTPATIENT
Start: 2023-02-17 | End: 2023-02-17

## 2023-02-17 RX ORDER — HEPARIN SODIUM 1000 [USP'U]/ML
80 INJECTION, SOLUTION INTRAVENOUS; SUBCUTANEOUS PRN
Status: DISCONTINUED | OUTPATIENT
Start: 2023-02-17 | End: 2023-02-20 | Stop reason: ALTCHOICE

## 2023-02-17 RX ORDER — ONDANSETRON 2 MG/ML
4 INJECTION INTRAMUSCULAR; INTRAVENOUS EVERY 6 HOURS PRN
Status: DISCONTINUED | OUTPATIENT
Start: 2023-02-17 | End: 2023-02-21 | Stop reason: HOSPADM

## 2023-02-17 RX ORDER — HEPARIN SODIUM 1000 [USP'U]/ML
80 INJECTION, SOLUTION INTRAVENOUS; SUBCUTANEOUS ONCE
Status: COMPLETED | OUTPATIENT
Start: 2023-02-17 | End: 2023-02-17

## 2023-02-17 RX ADMIN — HEPARIN SODIUM 6750 UNITS: 1000 INJECTION, SOLUTION INTRAVENOUS; SUBCUTANEOUS at 22:33

## 2023-02-17 RX ADMIN — IPRATROPIUM BROMIDE AND ALBUTEROL SULFATE 1 AMPULE: 2.5; .5 SOLUTION RESPIRATORY (INHALATION) at 22:11

## 2023-02-17 RX ADMIN — HEPARIN SODIUM 18 UNITS/KG/HR: 10000 INJECTION, SOLUTION INTRAVENOUS at 22:33

## 2023-02-17 RX ADMIN — IOPAMIDOL 100 ML: 755 INJECTION, SOLUTION INTRAVENOUS at 20:15

## 2023-02-17 RX ADMIN — FUROSEMIDE 40 MG: 10 INJECTION, SOLUTION INTRAMUSCULAR; INTRAVENOUS at 20:53

## 2023-02-17 ASSESSMENT — PAIN DESCRIPTION - ORIENTATION: ORIENTATION: LEFT

## 2023-02-17 ASSESSMENT — ENCOUNTER SYMPTOMS
COUGH: 1
CHEST TIGHTNESS: 1
GASTROINTESTINAL NEGATIVE: 1
SHORTNESS OF BREATH: 1

## 2023-02-17 ASSESSMENT — PAIN SCALES - GENERAL: PAINLEVEL_OUTOF10: 5

## 2023-02-17 ASSESSMENT — PAIN DESCRIPTION - LOCATION: LOCATION: CHEST

## 2023-02-17 NOTE — ED TRIAGE NOTES
Pt states beginning yesterday she has had shortness of breath, audible wheezing, and cough. States she was evaluated at urgent care yesterday for same. States she has had increased shortness of breath since yesterday. States she has had a cough that is worse at night.  States she has pain to left side of chest that worsens with movement but is still present with rest.

## 2023-02-17 NOTE — ED PROVIDER NOTES
Emergency Department Provider Note                   PCP:                Megan Herbert MD               Age: [de-identified] y.o. Sex: female       ICD-10-CM    1. Acute pulmonary embolism without acute cor pulmonale, unspecified pulmonary embolism type (HCC)  I26.99       2. Left renal mass  N28.89       3. Dyspnea on exertion  R06.09       4. Elevated troponin  R77.8           DISPOSITION Admitted 02/17/2023 10:27:46 PM        Medical Decision Making  68-year-old  female presented to the emergency department with cough, shortness of breath and pleuritic chest pain that been ongoing for the past 2 days. Patient initially with wheezing on initial exam and breathing treatments were ordered. Patient's labs resulted with elevation and patient's troponin and BNP therefore CT chest to rule out pulmonary embolism was obtained. Patient's CT was independently reviewed and revealed right-sided pulmonary embolism. Patient was initiated on heparin drip. Patient CT scan also revealed renal mass concerning for possible renal cell carcinoma. Discussed at length with patient and her family concerning findings of scan and concern for cancer. They expressed understanding. Patient will be admitted to the hospitalist for further treatment and evaluation. Amount and/or Complexity of Data Reviewed  Labs: ordered. Decision-making details documented in ED Course. Radiology: ordered and independent interpretation performed. Decision-making details documented in ED Course. ECG/medicine tests: ordered and independent interpretation performed. Decision-making details documented in ED Course. Risk  Prescription drug management. Decision regarding hospitalization. ED Course as of 02/18/23 0126   Fri Feb 17, 2023 2020 Patient's troponin and BNP are both elevated. Will obtain CT chest to rule out pulmonary embolism.  [JL]   2057 ===================================  ED EKG Interpretation  EKG was interpreted in the absence of a cardiologist.    Rate: 93  EKG Interpretation: Sinus rhythm. Normal MN and QT intervals. ST Segments: Nonspecific ST segments - NO STEMI    Ezequiel Metcalf MD 8:57 PM    [JL]   2120 Independent read of patient CT scan reveals right-sided pulmonary embolism. Called radiologist to discuss findings and also agrees with my interpretation. Also noted on CT scan shows irregularity of the left inferior portion of kidney worrisome for possible renal cell carcinoma. [JL]      ED Course User Index  [JL] Munira Hurd MD        Orders Placed This Encounter   Procedures    XR CHEST PORTABLE    CT CHEST PULMONARY EMBOLISM W CONTRAST    CBC with Auto Differential    CMP    Lipase    Magnesium    Lactate, Sepsis    Troponin    Brain Natriuretic Peptide    Troponin    APTT    Protime-INR    Anti-Xa, Unfractionated Heparin    Comprehensive Metabolic Panel w/ Reflex to MG    CBC with Auto Differential    ADULT DIET;  Regular    Cardiac Monitor - ED Only    Vital signs per unit routine    Up with assistance    HYPOGLYCEMIA TREATMENT: blood glucose less than 70 mg/dL and patient ALERT and TOLERATING PO    HYPOGLYCEMIA TREATMENT: blood glucose less than 70 mg/dL and patient NOT ALERT or NPO    Full code    Inpatient consult to Urology    Initiate Oxygen Therapy Protocol    POCT Glucose    EKG 12 Lead    Saline lock IV    ADMIT TO INPATIENT        Medications   ipratropium-albuterol (DUONEB) nebulizer solution 1 ampule (1 ampule Inhalation Given 2/17/23 2211)   0.9 % sodium chloride bolus (has no administration in time range)   sodium chloride flush 0.9 % injection 10 mL (has no administration in time range)   heparin (porcine) injection 6,750 Units (has no administration in time range)   heparin (porcine) injection 3,380 Units (has no administration in time range)   heparin 25,000 units in dextrose 5% 250 mL (premix) infusion (18 Units/kg/hr × 84.4 kg IntraVENous New Bag 2/17/23 2233) sodium chloride flush 0.9 % injection 5-40 mL (has no administration in time range)   sodium chloride flush 0.9 % injection 5-40 mL (has no administration in time range)   0.9 % sodium chloride infusion (has no administration in time range)   ondansetron (ZOFRAN-ODT) disintegrating tablet 4 mg (has no administration in time range)     Or   ondansetron (ZOFRAN) injection 4 mg (has no administration in time range)   polyethylene glycol (GLYCOLAX) packet 17 g (has no administration in time range)   acetaminophen (TYLENOL) tablet 650 mg (has no administration in time range)     Or   acetaminophen (TYLENOL) suppository 650 mg (has no administration in time range)   0.9 % sodium chloride infusion (has no administration in time range)   insulin lispro (HUMALOG) injection vial 0-8 Units (has no administration in time range)   insulin lispro (HUMALOG) injection vial 0-4 Units (has no administration in time range)   labetalol (NORMODYNE;TRANDATE) injection 10 mg (has no administration in time range)   hydrALAZINE (APRESOLINE) injection 10 mg (has no administration in time range)   glucose chewable tablet 16 g (has no administration in time range)   dextrose bolus 10% 125 mL (has no administration in time range)     Or   dextrose bolus 10% 250 mL (has no administration in time range)   glucagon (rDNA) injection 1 mg (has no administration in time range)   dextrose 10 % infusion (has no administration in time range)   iopamidol (ISOVUE-370) 76 % injection 100 mL (100 mLs IntraVENous Given 2/17/23 2015)   furosemide (LASIX) injection 40 mg (40 mg IntraVENous Given 2/17/23 2053)   heparin (porcine) injection 6,750 Units (6,750 Units IntraVENous Given 2/17/23 2233)       New Prescriptions    No medications on file        Heidi Chacon is a [de-identified] y.o. female who presents to the Emergency Department with chief complaint of    Chief Complaint   Patient presents with    Shortness of Breath      72-year-old  female with history of pneumonia, diabetes, hypertension presented to the emergency department with complaints of 2 days of cough, congestion, shortness of breath and chest pain. Patient was seen at local urgent care earlier today and had a negative COVID and influenza test.  Patient was slightly tachycardic and her chest x-ray revealed mild cardiomegaly with no obvious pneumonia. Patient was encouraged to come to the emergency department for evaluation secondary to her continued symptoms despite breathing treatment there and oxygen levels of 93%. Patient states that she went to the urgent care because she thought she might have pneumonia once again. She states that she has shortness of breath with ambulation and her chest pain is present with cough and deep inspiration. She denies any fevers, chills, abdominal pain, nausea, vomiting, diarrhea, changes in bowel or bladder habits or any other concerns. Patient with no history of CAD. The history is provided by the patient, medical records and a relative. Review of Systems   Constitutional:  Positive for fatigue. HENT:  Positive for congestion. Respiratory:  Positive for cough, chest tightness and shortness of breath. Cardiovascular:  Positive for chest pain. Gastrointestinal: Negative. Genitourinary: Negative. Musculoskeletal: Negative. Skin: Negative. Neurological: Negative. Psychiatric/Behavioral: Negative. All other systems reviewed and are negative.     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 breast bx    GYN      Hysterectomy    HEENT      T&A    TAMIA BIOPSY BREAST STEREOTACTIC  over 20 yrs ago    TOTAL KNEE ARTHROPLASTY Right 2010    US GUIDED CORE BREAST BIOPSY  over 20 yrs ago        Family History   Problem Relation Age of Onset    Delayed Awakening Neg Hx     Post-op Nausea/Vomiting Neg Hx     Emergence Delirium Neg Hx     Post-op Cognitive Dysfunction Neg Hx     Other Neg Hx     Breast Cancer Neg Hx     Malig Hypertherm Neg Hx     Pseudochol. Deficiency Neg Hx     Cancer Sister     Hypertension Father     Elevated Lipids Father     Cancer Father     Elevated Lipids Mother     Heart Disease Mother     Hypertension Mother         Social History     Socioeconomic History    Marital status:      Spouse name: None    Number of children: None    Years of education: None    Highest education level: None   Tobacco Use    Smoking status: Former    Smokeless tobacco: Never    Tobacco comments:     Quit smoking: \"40 years ago\"   Vaping Use    Vaping Use: Never used   Substance and Sexual Activity    Alcohol use: Yes    Drug use: No         Latex, Penicillins, and Sulfa antibiotics     Previous Medications    ALBUTEROL SULFATE HFA (VENTOLIN HFA) 108 (90 BASE) MCG/ACT INHALER    Inhale 2 puffs into the lungs 4 times daily as needed for Wheezing    AMLODIPINE (NORVASC) 10 MG TABLET    Take 1 tablet by mouth daily    COLCHICINE (COLCRYS) 0.6 MG TABLET    Pt to take one po every hour until symptoms improved or pt has diarrhea    DEXTROMETHORPHAN-GUAIFENESIN (MUCINEX DM MAXIMUM STRENGTH)  MG TB12    Take 1 tablet by mouth in the morning and at bedtime    DOCUSATE (COLACE, DULCOLAX) 100 MG CAPS    Take 100 mg by mouth daily    FLUTICASONE-SALMETEROL (ADVAIR DISKUS) 250-50 MCG/ACT AEPB DISKUS INHALER    Inhale 1 puff into the lungs in the morning and 1 puff in the evening.     IPRATROPIUM-ALBUTEROL (DUONEB) 0.5-2.5 (3) MG/3ML SOLN NEBULIZER SOLUTION    Inhale 3 mLs into the lungs every 4 hours as needed for Shortness of Breath    LISINOPRIL-HYDROCHLOROTHIAZIDE (PRINZIDE;ZESTORETIC) 20-12.5 MG PER TABLET    Take 1 tablet by mouth daily    NEBULIZERS (COMPRESSOR/NEBULIZER) MISC    DuoNeb treatment every 4 hours as needed for shortness of breath    OMEPRAZOLE (PRILOSEC) 40 MG DELAYED RELEASE CAPSULE    Take 40 mg by mouth daily    PREDNISONE (DELTASONE) 20 MG TABLET    2 tablets daily for 3 days, and then 1 tablet daily for 3 days, then half tablet daily for 3 days and then stop    SIMVASTATIN (ZOCOR) 40 MG TABLET    Take 40 mg by mouth daily        Vitals signs and nursing note reviewed. Patient Vitals for the past 4 hrs:   Pulse Resp BP SpO2   02/18/23 0110 86 26 137/61 94 %   02/18/23 0030 88 22 (!) 132/56 93 %   02/18/23 0011 88 22 (!) 154/58 93 %   02/17/23 2351 89 27 -- 95 %   02/17/23 2341 87 24 (!) 151/60 --   02/17/23 2311 90 25 (!) 143/54 --   02/17/23 2241 92 20 (!) 143/53 93 %          Physical Exam  Vitals and nursing note reviewed. Constitutional:       General: She is not in acute distress. Appearance: She is well-developed. She is not ill-appearing or toxic-appearing. HENT:      Head: Normocephalic and atraumatic. Mouth/Throat:      Mouth: Mucous membranes are moist.   Eyes:      Extraocular Movements: Extraocular movements intact. Pupils: Pupils are equal, round, and reactive to light. Cardiovascular:      Rate and Rhythm: Regular rhythm. Tachycardia present. Pulmonary:      Effort: No respiratory distress. Breath sounds: Wheezing and rhonchi present. Chest:      Chest wall: Tenderness present. Abdominal:      Palpations: Abdomen is soft. Tenderness: There is no abdominal tenderness. Musculoskeletal:         General: Normal range of motion. Cervical back: Normal range of motion. Skin:     General: Skin is warm and dry. Neurological:      General: No focal deficit present. Mental Status: She is alert and oriented to person, place, and time. Psychiatric:         Mood and Affect: Mood normal.         Behavior: Behavior normal.        Critical Care  Performed by: Yeni Calle MD  Authorized by: Trinidad Seaman MD     Critical care provider statement:     Critical care time (minutes):  45    Critical care was necessary to treat or prevent imminent or life-threatening deterioration of the following conditions:  Respiratory failure    Critical care was time spent personally by me on the following activities:  Blood draw for specimens, development of treatment plan with patient or surrogate, evaluation of patient's response to treatment, examination of patient, obtaining history from patient or surrogate, ordering and performing treatments and interventions, ordering and review of laboratory studies, ordering and review of radiographic studies, pulse oximetry, re-evaluation of patient's condition and review of old charts    Care discussed with: admitting provider    Comments:      Pulmonary embolism requiring heparin bolus and heparin drip    Results for orders placed or performed during the hospital encounter of 02/17/23   XR CHEST PORTABLE    Narrative    Single portable chest radiograph    INDICATION:  Shortness of breath, cough    COMPARISON:  Chest CT 11/21/2022, chest radiograph 11/23/2022    FINDINGS:    Mild hazy opacity at the left lung base. No pleural effusion, or pneumothorax. The cardiomediastinal silhouette is unchanged. There is atherosclerotic   calcification of the aorta. No acute focal bony abnormalities are seen. Impression    Mild hazy opacity at the left lung base, may reflect atelectasis, early   consolidation not excluded.            Anca Francois M.D.   2/17/2023 7:28:00 PM   CT CHEST PULMONARY EMBOLISM W CONTRAST    Narrative    EXAMINATION:  CTA CHEST WITH IV CONTRAST, CT PULMONARY ANGIOGRAM    DATE OF EXAM: 2/17/2023 8:18 PM    HISTORY: SOB/elevated trop    TECHNIQUE: CTA examination of the chest was performed following the intravenous   administration of iodinated contrast. Sagittal, coronal and 3-D reformatted   images were provided. CT dose lowering techniques were used, to include:   automated exposure control, adjustment for patient size, and or use of iterative   reconstruction. COMPARISON: None. FINDINGS:    Lungs: Normal.     Pleura: Normal.     Mediastinum and Rosenda: Normal.    Pulmonary Arteries: Thrombus within the distal right main pulmonary artery   extending into the segmental arteries of the right lower lobe distribution. Thrombus also extending into segmental arteries of the right upper lobe   distribution. .     Cardiovascular: Normal.     Upper Abdomen: Partially seen complex hyperdense and hypodense focus within the   left kidney. This is seen on the last image of the exam.  Rounded hyperdense focus extending off the upper pole left kidney. Hepatic steatosis. Chest Wall: Normal.     Musculoskeletal: Normal.         Impression    1. Pulmonary emboli within the distal right main pulmonary artery extending to   the right upper lobe and right lower lobe. Low burden of emboli. No heart   strain. 2.  Complex mass partially seen in the left kidney. This is worrisome for   possible malignancy. Dedicated evaluation of the kidneys recommended when   patient clinically stable. .     ER physician physician given report at 7:18 PM  Thank you for the referral of this patient. This exam was interpreted by an   American Board of Radiology certified radiologist with subspecialty fellowship   in [place] [address]. If there are any questions regarding this exam please feel free   to contact a radiologist directly at [phone].       Watson Nicholson M.D.   2/17/2023 9:18:00 PM   CBC with Auto Differential   Result Value Ref Range    WBC 9.1 4.3 - 11.1 K/uL    RBC 4.47 4.05 - 5.2 M/uL    Hemoglobin 11.1 (L) 11.7 - 15.4 g/dL    Hematocrit 36.2 35.8 - 46.3 %    MCV 81.0 (L) 82 - 102 FL    MCH 24.8 (L) 26.1 - 32.9 PG    MCHC 30.7 (L) 31.4 - 35.0 g/dL    RDW 16.1 (H) 11.9 - 14.6 %    Platelets 934 877 - 770 K/uL    MPV 10.0 9.4 - 12.3 FL    nRBC 0.00 0.0 - 0.2 K/uL    Differential Type AUTOMATED      Seg Neutrophils 78 43 - 78 %    Lymphocytes 13 13 - 44 %    Monocytes 8 4.0 - 12.0 %    Eosinophils % 1 0.5 - 7.8 %    Basophils 0 0.0 - 2.0 %    Immature Granulocytes 0 0.0 - 5.0 %    Segs Absolute 6.9 1.7 - 8.2 K/UL    Absolute Lymph # 1.2 0.5 - 4.6 K/UL    Absolute Mono # 0.7 0.1 - 1.3 K/UL    Absolute Eos # 0.1 0.0 - 0.8 K/UL    Basophils Absolute 0.0 0.0 - 0.2 K/UL    Absolute Immature Granulocyte 0.0 0.0 - 0.5 K/UL   CMP   Result Value Ref Range    Sodium 138 133 - 143 mmol/L    Potassium 3.3 (L) 3.5 - 5.1 mmol/L    Chloride 106 101 - 110 mmol/L    CO2 27 21 - 32 mmol/L    Anion Gap 5 2 - 11 mmol/L    Glucose 131 (H) 65 - 100 mg/dL    BUN 15 8 - 23 MG/DL    Creatinine 1.00 0.6 - 1.0 MG/DL    Est, Glom Filt Rate 57 (L) >60 ml/min/1.73m2    Calcium 9.1 8.3 - 10.4 MG/DL    Total Bilirubin 0.6 0.2 - 1.1 MG/DL    ALT 28 12 - 65 U/L    AST 25 15 - 37 U/L    Alk Phosphatase 102 50 - 136 U/L    Total Protein 7.8 6.3 - 8.2 g/dL    Albumin 3.5 3.2 - 4.6 g/dL    Globulin 4.3 2.8 - 4.5 g/dL    Albumin/Globulin Ratio 0.8 0.4 - 1.6     Lipase   Result Value Ref Range    Lipase 107 73 - 393 U/L   Magnesium   Result Value Ref Range    Magnesium 1.8 1.8 - 2.4 mg/dL   Lactate, Sepsis   Result Value Ref Range    Lactic Acid, Sepsis 1.2 0.4 - 2.0 MMOL/L   Lactate, Sepsis   Result Value Ref Range    Lactic Acid, Sepsis 1.3 0.4 - 2.0 MMOL/L   Troponin   Result Value Ref Range    Troponin, High Sensitivity 344.4 (HH) 0 - 14 pg/mL   Brain Natriuretic Peptide   Result Value Ref Range    NT Pro- (H) <450 PG/ML   Troponin   Result Value Ref Range    Troponin, High Sensitivity 296.7 (HH) 0 - 14 pg/mL   APTT   Result Value Ref Range    PTT 21.0 (L) 24.5 - 34.2 SEC   Protime-INR   Result Value Ref Range    Protime 13.7 12.6 - 14.3 sec INR 1.0     EKG 12 Lead   Result Value Ref Range    Ventricular Rate 93 BPM    Atrial Rate 93 BPM    P-R Interval 152 ms    QRS Duration 94 ms    Q-T Interval 364 ms    QTc Calculation (Bazett) 452 ms    P Axis 76 degrees    R Axis 63 degrees    T Axis 55 degrees    Diagnosis Normal sinus rhythm         CT CHEST PULMONARY EMBOLISM W CONTRAST   Final Result      1. Pulmonary emboli within the distal right main pulmonary artery extending to    the right upper lobe and right lower lobe. Low burden of emboli. No heart    strain. 2.  Complex mass partially seen in the left kidney. This is worrisome for    possible malignancy. Dedicated evaluation of the kidneys recommended when    patient clinically stable. .       ER physician physician given report at 7:18 PM   Thank you for the referral of this patient. This exam was interpreted by an    American Board of Radiology certified radiologist with subspecialty fellowship    in [place] [id number]. If there are any questions regarding this exam please feel free    to contact a radiologist directly at [phone]. Venkatesh Chavez M.D.    2/17/2023 9:18:00 PM      XR CHEST PORTABLE   Final Result      Mild hazy opacity at the left lung base, may reflect atelectasis, early    consolidation not excluded. Ruthie Blackwell M.D.    2/17/2023 7:28:00 PM                          Voice dictation software was used during the making of this note. This software is not perfect and grammatical and other typographical errors may be present. This note has not been completely proofread for errors.       Talha Huerta MD  02/18/23 3470

## 2023-02-18 LAB
ALBUMIN SERPL-MCNC: 3.1 G/DL (ref 3.2–4.6)
ALBUMIN/GLOB SERPL: 0.8 (ref 0.4–1.6)
ALP SERPL-CCNC: 93 U/L (ref 50–136)
ALT SERPL-CCNC: 28 U/L (ref 12–65)
ANION GAP SERPL CALC-SCNC: 7 MMOL/L (ref 2–11)
AST SERPL-CCNC: 32 U/L (ref 15–37)
BASOPHILS # BLD: 0 K/UL (ref 0–0.2)
BASOPHILS NFR BLD: 0 % (ref 0–2)
BILIRUB SERPL-MCNC: 0.4 MG/DL (ref 0.2–1.1)
BUN SERPL-MCNC: 15 MG/DL (ref 8–23)
CALCIUM SERPL-MCNC: 8.7 MG/DL (ref 8.3–10.4)
CHLORIDE SERPL-SCNC: 105 MMOL/L (ref 101–110)
CO2 SERPL-SCNC: 25 MMOL/L (ref 21–32)
CREAT SERPL-MCNC: 0.8 MG/DL (ref 0.6–1)
DIFFERENTIAL METHOD BLD: ABNORMAL
EOSINOPHIL # BLD: 0.3 K/UL (ref 0–0.8)
EOSINOPHIL NFR BLD: 3 % (ref 0.5–7.8)
ERYTHROCYTE [DISTWIDTH] IN BLOOD BY AUTOMATED COUNT: 16 % (ref 11.9–14.6)
GLOBULIN SER CALC-MCNC: 3.9 G/DL (ref 2.8–4.5)
GLUCOSE BLD STRIP.AUTO-MCNC: 144 MG/DL (ref 65–100)
GLUCOSE BLD STRIP.AUTO-MCNC: 148 MG/DL (ref 65–100)
GLUCOSE BLD STRIP.AUTO-MCNC: 175 MG/DL (ref 65–100)
GLUCOSE BLD STRIP.AUTO-MCNC: 232 MG/DL (ref 65–100)
GLUCOSE SERPL-MCNC: 115 MG/DL (ref 65–100)
HCT VFR BLD AUTO: 33 % (ref 35.8–46.3)
HGB BLD-MCNC: 10.2 G/DL (ref 11.7–15.4)
IMM GRANULOCYTES # BLD AUTO: 0 K/UL (ref 0–0.5)
IMM GRANULOCYTES NFR BLD AUTO: 0 % (ref 0–5)
LYMPHOCYTES # BLD: 1.6 K/UL (ref 0.5–4.6)
LYMPHOCYTES NFR BLD: 17 % (ref 13–44)
MAGNESIUM SERPL-MCNC: 1.8 MG/DL (ref 1.8–2.4)
MCH RBC QN AUTO: 25.1 PG (ref 26.1–32.9)
MCHC RBC AUTO-ENTMCNC: 30.9 G/DL (ref 31.4–35)
MCV RBC AUTO: 81.1 FL (ref 82–102)
MONOCYTES # BLD: 0.8 K/UL (ref 0.1–1.3)
MONOCYTES NFR BLD: 9 % (ref 4–12)
NEUTS SEG # BLD: 6.7 K/UL (ref 1.7–8.2)
NEUTS SEG NFR BLD: 71 % (ref 43–78)
NRBC # BLD: 0 K/UL (ref 0–0.2)
PLATELET # BLD AUTO: 265 K/UL (ref 150–450)
PMV BLD AUTO: 10.7 FL (ref 9.4–12.3)
POTASSIUM SERPL-SCNC: 3.2 MMOL/L (ref 3.5–5.1)
PROT SERPL-MCNC: 7 G/DL (ref 6.3–8.2)
RBC # BLD AUTO: 4.07 M/UL (ref 4.05–5.2)
SERVICE CMNT-IMP: ABNORMAL
SODIUM SERPL-SCNC: 137 MMOL/L (ref 133–143)
UFH PPP CHRO-ACNC: 0.43 IU/ML (ref 0.3–0.7)
UFH PPP CHRO-ACNC: 0.45 IU/ML (ref 0.3–0.7)
UFH PPP CHRO-ACNC: 0.47 IU/ML (ref 0.3–0.7)
UFH PPP CHRO-ACNC: 0.59 IU/ML (ref 0.3–0.7)
WBC # BLD AUTO: 9.4 K/UL (ref 4.3–11.1)

## 2023-02-18 PROCEDURE — 83735 ASSAY OF MAGNESIUM: CPT

## 2023-02-18 PROCEDURE — 6360000002 HC RX W HCPCS: Performed by: HOSPITALIST

## 2023-02-18 PROCEDURE — 82962 GLUCOSE BLOOD TEST: CPT

## 2023-02-18 PROCEDURE — 6370000000 HC RX 637 (ALT 250 FOR IP): Performed by: EMERGENCY MEDICINE

## 2023-02-18 PROCEDURE — 94640 AIRWAY INHALATION TREATMENT: CPT

## 2023-02-18 PROCEDURE — 85520 HEPARIN ASSAY: CPT

## 2023-02-18 PROCEDURE — 85025 COMPLETE CBC W/AUTO DIFF WBC: CPT

## 2023-02-18 PROCEDURE — 6370000000 HC RX 637 (ALT 250 FOR IP): Performed by: HOSPITALIST

## 2023-02-18 PROCEDURE — 36415 COLL VENOUS BLD VENIPUNCTURE: CPT

## 2023-02-18 PROCEDURE — 1100000000 HC RM PRIVATE

## 2023-02-18 PROCEDURE — 2700000000 HC OXYGEN THERAPY PER DAY

## 2023-02-18 PROCEDURE — 6360000002 HC RX W HCPCS: Performed by: EMERGENCY MEDICINE

## 2023-02-18 PROCEDURE — 94761 N-INVAS EAR/PLS OXIMETRY MLT: CPT

## 2023-02-18 PROCEDURE — 80053 COMPREHEN METABOLIC PANEL: CPT

## 2023-02-18 PROCEDURE — 2580000003 HC RX 258: Performed by: HOSPITALIST

## 2023-02-18 RX ORDER — PANTOPRAZOLE SODIUM 40 MG/1
40 TABLET, DELAYED RELEASE ORAL
Status: DISCONTINUED | OUTPATIENT
Start: 2023-02-19 | End: 2023-02-21 | Stop reason: HOSPADM

## 2023-02-18 RX ORDER — POTASSIUM CHLORIDE 20 MEQ/1
40 TABLET, EXTENDED RELEASE ORAL
Status: COMPLETED | OUTPATIENT
Start: 2023-02-18 | End: 2023-02-19

## 2023-02-18 RX ORDER — LISINOPRIL 5 MG/1
10 TABLET ORAL DAILY
Status: DISCONTINUED | OUTPATIENT
Start: 2023-02-18 | End: 2023-02-21 | Stop reason: HOSPADM

## 2023-02-18 RX ORDER — MAGNESIUM SULFATE IN WATER 40 MG/ML
4000 INJECTION, SOLUTION INTRAVENOUS ONCE
Status: COMPLETED | OUTPATIENT
Start: 2023-02-18 | End: 2023-02-18

## 2023-02-18 RX ORDER — ATORVASTATIN CALCIUM 10 MG/1
10 TABLET, FILM COATED ORAL DAILY
Status: DISCONTINUED | OUTPATIENT
Start: 2023-02-18 | End: 2023-02-21 | Stop reason: HOSPADM

## 2023-02-18 RX ORDER — AMLODIPINE BESYLATE 10 MG/1
10 TABLET ORAL DAILY
Status: DISCONTINUED | OUTPATIENT
Start: 2023-02-18 | End: 2023-02-21 | Stop reason: HOSPADM

## 2023-02-18 RX ADMIN — IPRATROPIUM BROMIDE AND ALBUTEROL SULFATE 1 AMPULE: 2.5; .5 SOLUTION RESPIRATORY (INHALATION) at 20:42

## 2023-02-18 RX ADMIN — SODIUM CHLORIDE, PRESERVATIVE FREE 10 ML: 5 INJECTION INTRAVENOUS at 09:30

## 2023-02-18 RX ADMIN — IPRATROPIUM BROMIDE AND ALBUTEROL SULFATE 1 AMPULE: 2.5; .5 SOLUTION RESPIRATORY (INHALATION) at 07:27

## 2023-02-18 RX ADMIN — HEPARIN SODIUM 18 UNITS/KG/HR: 10000 INJECTION, SOLUTION INTRAVENOUS at 15:31

## 2023-02-18 RX ADMIN — POTASSIUM CHLORIDE 40 MEQ: 1500 TABLET, EXTENDED RELEASE ORAL at 16:57

## 2023-02-18 RX ADMIN — AMLODIPINE BESYLATE 10 MG: 10 TABLET ORAL at 15:26

## 2023-02-18 RX ADMIN — IPRATROPIUM BROMIDE AND ALBUTEROL SULFATE 1 AMPULE: 2.5; .5 SOLUTION RESPIRATORY (INHALATION) at 15:13

## 2023-02-18 RX ADMIN — ATORVASTATIN CALCIUM 10 MG: 10 TABLET, FILM COATED ORAL at 15:27

## 2023-02-18 RX ADMIN — IPRATROPIUM BROMIDE AND ALBUTEROL SULFATE 1 AMPULE: 2.5; .5 SOLUTION RESPIRATORY (INHALATION) at 11:43

## 2023-02-18 RX ADMIN — SODIUM CHLORIDE, PRESERVATIVE FREE 10 ML: 5 INJECTION INTRAVENOUS at 20:33

## 2023-02-18 RX ADMIN — ACETAMINOPHEN 650 MG: 325 TABLET ORAL at 15:31

## 2023-02-18 RX ADMIN — LISINOPRIL 10 MG: 5 TABLET ORAL at 15:27

## 2023-02-18 RX ADMIN — POTASSIUM CHLORIDE 40 MEQ: 1500 TABLET, EXTENDED RELEASE ORAL at 10:45

## 2023-02-18 RX ADMIN — MAGNESIUM SULFATE HEPTAHYDRATE 4000 MG: 40 INJECTION, SOLUTION INTRAVENOUS at 11:30

## 2023-02-18 RX ADMIN — POTASSIUM CHLORIDE 40 MEQ: 1500 TABLET, EXTENDED RELEASE ORAL at 15:27

## 2023-02-18 ASSESSMENT — PAIN SCALES - GENERAL
PAINLEVEL_OUTOF10: 0
PAINLEVEL_OUTOF10: 0

## 2023-02-18 NOTE — PROGRESS NOTES
Patient arrived to room 815 via staff from ED. Patient alert and orientated with no distress noted. IV running with heparin at 18 units/kg (15.2). Duel sign off with CHADD [place]. Patient with unlabored respirations and heart rate regular. Patient's son at bedside for support. Patient denies any pain and appears comfortable. Patient orientated to room and surroundings. Bed in low locked position with call light within reach. Patient instructed to call if assistance is needed. Patient able to ambulate to BR independently with no assistance. Will continue to monitor.

## 2023-02-18 NOTE — ED NOTES
TRANSFER - OUT REPORT:    Verbal report given to [address] [place] on [facility]  being transferred to 8th floor  for routine progression of patient care       Report consisted of patient's Situation, Background, Assessment and   Recommendations(SBAR). Information from the following report(s) ED SBAR was reviewed with the receiving nurse. [place] Assessment: No data recorded  Lines:   Peripheral IV 02/17/23 Left Antecubital (Active)   Site Assessment Clean, dry & intact 02/17/23 1919   Line Status Brisk blood return;Blood return noted 02/17/23 [address] Connections checked and tightened 02/17/23 1919   Phlebitis Assessment No symptoms 02/17/23 1919   Infiltration Assessment 0 02/17/23 1919   Alcohol Cap Used No 02/17/23 1919   Dressing Status New dressing applied 02/17/23 1919        Opportunity for questions and clarification was provided.       Patient transported with:  O2 @ 2lpm and [address] [place], [address] [facility]  02/18/23 8502

## 2023-02-18 NOTE — PROGRESS NOTES
Reviewed notes for new spiritual concerns      Will continue to assess how we can best serve this family        [place].       Per notes:       [address] [facility] [address]

## 2023-02-18 NOTE — H&P
Hospitalist History and Physical   Admit Date:  2023  6:31 PM   Name:  Mark Zarate   Age:  [de-identified] y.o. Sex:  female  :  1940   MRN:  [id number]   Room:  ER    Presenting Complaint: Shortness of Breath     Reason(s) for Admission: Pulmonary embolism, unspecified chronicity, unspecified pulmonary embolism type, unspecified whether acute cor pulmonale present ([facility] [address] [I26.99]     History of Present Illness:     [de-identified]years old female with past medical history of hypertension, diabetes type 2 presented to the emergency room from urgent care center with shortness of breath, tachycardia, chest pain, cough, congestion. COVID and flu was checked at the urgent care center which came to be negative, in emergency room patient was evaluated, CT scan of chest was obtained to rule out PE which shows evidence of PE and also renal mass which was partially visible on the CAT scan. Patient was initiated on a heparin drip, ER physician requested admission of this patient for further evaluation and management of PE and also urology consultation. Patient denies any fever, chills but reports shortness of breath with minimal exertion. History of substernal, pleuritic chest discomfort, nonradiating. Troponins are elevated, EKG shows nonspecific changes. Assessment & Plan:     Pulmonary embolism: Initiated on heparin drip, patient has renal mass need to rule out malignancy. We will continue on heparin drip until further plan for renal mass decided. Renal mass: Complex mass of left kidney noted, will request urology evaluation, patient just got contrast need to wait for CT abdomen and pelvis for 24 hours before giving another dose of contrast..  IV hydration. Urology consultation requested    Hypertension: Continue on home medications. PT/OT evals and PPD needed/ordered? No    Diet: ADULT DIET;  Regular  VTE prophylaxis: Heparin  Code status: Full Code    Hospital Problems:  Principal Problem:    Pulmonary embolism, unspecified chronicity, unspecified pulmonary embolism type, unspecified whether acute cor pulmonale present (HCC)  Active Problems:    Diabetes mellitus type 2, diet-controlled (HCC)    Hypertension    Renal mass of unknown nature  Resolved Problems:    * No resolved hospital problems. *       Past History:     Past Medical History:   Diagnosis Date    Arthritis     Chronic pain     hands and toes    Former smoker, stopped smoking in distant past     GERD (gastroesophageal reflux disease)     controlled with med    High cholesterol     on medication    Hypertension     Morbid obesity (HCC)     Nausea & vomiting     Pre-diabetes     Status post total left knee replacement 4/5/2017    Urinary incontinence     med       Past Surgical History:   Procedure Laterality Date    BREAST SURGERY      Imer. breast bx    GYN      Hysterectomy    HEENT      T&A    TAMIA BIOPSY BREAST STEREOTACTIC  over 20 yrs ago    TOTAL KNEE ARTHROPLASTY Right 2010    US GUIDED CORE BREAST BIOPSY  over 20 yrs ago        Social History     Tobacco Use    Smoking status: Former    Smokeless tobacco: Never    Tobacco comments:     Quit smoking: \"40 years ago\"   Substance Use Topics    Alcohol use: Yes      Social History     Substance and Sexual Activity   Drug Use No       Family History   Problem Relation Age of Onset    Delayed Awakening Neg Hx     Post-op Nausea/Vomiting Neg Hx     Emergence Delirium Neg Hx     Post-op Cognitive Dysfunction Neg Hx     Other Neg Hx     Breast Cancer Neg Hx     Malig Hypertherm Neg Hx     Pseudochol.  Deficiency Neg Hx     Cancer Sister     Hypertension Father     Elevated Lipids Father     Cancer Father     Elevated Lipids Mother     Heart Disease Mother     Hypertension Mother         Immunization History   Administered Date(s) Administered    COVID-19, PFIZER Bivalent BOOSTER, DO NOT Dilute, (age 12y+), IM, 30 mcg/0.3 mL 10/24/2022    COVID-19, PFIZER PURPLE top, DILUTE for use, (age 15 y+), 30mcg/0.3mL 01/20/2021, 02/11/2021, 09/10/2021    PPD Test 04/05/2017     Allergies   Allergen Reactions    Latex Itching     redness    Penicillins Swelling    Sulfa Antibiotics Swelling     Prior to Admit Medications:  Current Outpatient Medications   Medication Instructions    albuterol sulfate HFA (VENTOLIN HFA) 108 (90 Base) MCG/ACT inhaler 2 puffs, Inhalation, 4 TIMES DAILY PRN    amLODIPine (NORVASC) 10 mg, Oral, DAILY    colchicine (COLCRYS) 0.6 MG tablet Pt to take one po every hour until symptoms improved or pt has diarrhea    Dextromethorphan-guaiFENesin (MUCINEX DM MAXIMUM STRENGTH)  MG TB12 1 tablet, Oral, 2 times daily    docusate (COLACE, DULCOLAX) 100 mg, Oral, DAILY    fluticasone-salmeterol (ADVAIR DISKUS) 250-50 MCG/ACT AEPB diskus inhaler 1 puff, Inhalation, EVERY 12 HOURS    ipratropium-albuterol (DUONEB) 0.5-2.5 (3) MG/3ML SOLN nebulizer solution 3 mLs, Inhalation, EVERY 4 HOURS PRN    lisinopril-hydroCHLOROthiazide (PRINZIDE;ZESTORETIC) 20-12.5 MG per tablet 1 tablet, Oral, DAILY    Nebulizers (COMPRESSOR/NEBULIZER) MISC DuoNeb treatment every 4 hours as needed for shortness of breath    omeprazole (PRILOSEC) 40 mg, Oral, DAILY    predniSONE (DELTASONE) 20 MG tablet 2 tablets daily for 3 days, and then 1 tablet daily for 3 days, then half tablet daily for 3 days and then stop    simvastatin (ZOCOR) 40 mg, Oral, DAILY         Objective:   Patient Vitals for the past 24 hrs:   Temp Pulse Resp BP SpO2   02/17/23 2103 -- 87 -- (!) 138/56 --   02/17/23 2059 -- 88 -- (!) 148/57 --   02/17/23 2053 -- 96 -- (!) 166/57 --   02/17/23 1815 99.4 °F (37.4 °C) (!) 103 19 (!) 178/68 96 %       Oxygen Therapy  SpO2: 96 %  O2 Device: None (Room air)    Estimated body mass index is 30.95 kg/m² as calculated from the following:    Height as of this encounter: 5' 5\" (1.651 m). Weight as of this encounter: 186 lb (84.4 kg).   No intake or output data in the 24 hours ending 02/17/23 2232 Physical Exam:    Blood pressure (!) 138/56, pulse 87, temperature 99.4 °F (37.4 °C), temperature source Oral, resp. rate 19, height 5' 5\" (1.651 m), weight 186 lb (84.4 kg), SpO2 96 %. General:    Well nourished. Head:  Normocephalic, atraumatic  Eyes:  Sclerae appear normal.  Pupils equally round. ENT:  Nares appear normal.  Moist oral mucosa  Neck:  No restricted ROM. Trachea midline   CV:   RRR. No m/r/g. No jugular venous distension. Lungs:   CTAB. No wheezing, rhonchi, or rales. Symmetric expansion. Abdomen:   Soft, nontender, nondistended. Extremities: No cyanosis or clubbing. No edema  Skin:     No rashes and normal coloration. Warm and dry. Neuro:  CN II-XII grossly intact. Sensation intact. Psych:  Normal mood and affect.       I have personally reviewed labs and tests:  Recent Labs:  Recent Results (from the past 24 hour(s))   EKG 12 Lead    Collection Time: 02/17/23  7:07 PM   Result Value Ref Range    Ventricular Rate 93 BPM    Atrial Rate 93 BPM    P-R Interval 152 ms    QRS Duration 94 ms    Q-T Interval 364 ms    QTc Calculation (Bazett) 452 ms    P Axis 76 degrees    R Axis 63 degrees    T Axis 55 degrees    Diagnosis Normal sinus rhythm    CBC with Auto Differential    Collection Time: 02/17/23  7:19 PM   Result Value Ref Range    WBC 9.1 4.3 - 11.1 K/uL    RBC 4.47 4.05 - 5.2 M/uL    Hemoglobin 11.1 (L) 11.7 - 15.4 g/dL    Hematocrit 36.2 35.8 - 46.3 %    MCV 81.0 (L) 82 - 102 FL    MCH 24.8 (L) 26.1 - 32.9 PG    MCHC 30.7 (L) 31.4 - 35.0 g/dL    RDW 16.1 (H) 11.9 - 14.6 %    Platelets 827 723 - 874 K/uL    MPV 10.0 9.4 - 12.3 FL    nRBC 0.00 0.0 - 0.2 K/uL    Differential Type AUTOMATED      Seg Neutrophils 78 43 - 78 %    Lymphocytes 13 13 - 44 %    Monocytes 8 4.0 - 12.0 %    Eosinophils % 1 0.5 - 7.8 %    Basophils 0 0.0 - 2.0 %    Immature Granulocytes 0 0.0 - 5.0 %    Segs Absolute 6.9 1.7 - 8.2 K/UL    Absolute Lymph # 1.2 0.5 - 4.6 K/UL    Absolute Mono # 0.7 0.1 - 1.3 K/UL    Absolute Eos # 0.1 0.0 - 0.8 K/UL    Basophils Absolute 0.0 0.0 - 0.2 K/UL    Absolute Immature Granulocyte 0.0 0.0 - 0.5 K/UL   CMP    Collection Time: 02/17/23  7:19 PM   Result Value Ref Range    Sodium 138 133 - 143 mmol/L    Potassium 3.3 (L) 3.5 - 5.1 mmol/L    Chloride 106 101 - 110 mmol/L    CO2 27 21 - 32 mmol/L    Anion Gap 5 2 - 11 mmol/L    Glucose 131 (H) 65 - 100 mg/dL    BUN 15 8 - 23 MG/DL    Creatinine 1.00 0.6 - 1.0 MG/DL    Est, Glom Filt Rate 57 (L) >60 ml/min/1.73m2    Calcium 9.1 8.3 - 10.4 MG/DL    Total Bilirubin 0.6 0.2 - 1.1 MG/DL    ALT 28 12 - 65 U/L    AST 25 15 - 37 U/L    Alk Phosphatase 102 50 - 136 U/L    Total Protein 7.8 6.3 - 8.2 g/dL    Albumin 3.5 3.2 - 4.6 g/dL    Globulin 4.3 2.8 - 4.5 g/dL    Albumin/Globulin Ratio 0.8 0.4 - 1.6     Lipase    Collection Time: 02/17/23  7:19 PM   Result Value Ref Range    Lipase 107 73 - 393 U/L   Magnesium    Collection Time: 02/17/23  7:19 PM   Result Value Ref Range    Magnesium 1.8 1.8 - 2.4 mg/dL   Lactate, Sepsis    Collection Time: 02/17/23  7:19 PM   Result Value Ref Range    Lactic Acid, Sepsis 1.2 0.4 - 2.0 MMOL/L   Troponin    Collection Time: 02/17/23  7:19 PM   Result Value Ref Range    Troponin, High Sensitivity 344.4 (HH) 0 - 14 pg/mL   Brain Natriuretic Peptide    Collection Time: 02/17/23  7:19 PM   Result Value Ref Range    NT Pro- (H) <450 PG/ML   Troponin    Collection Time: 02/17/23  8:45 PM   Result Value Ref Range    Troponin, High Sensitivity 296.7 (HH) 0 - 14 pg/mL   APTT    Collection Time: 02/17/23 10:00 PM   Result Value Ref Range    PTT 21.0 (L) 24.5 - 34.2 SEC   Protime-INR    Collection Time: 02/17/23 10:00 PM   Result Value Ref Range    Protime 13.7 12.6 - 14.3 sec    INR 1.0         I have personally reviewed imaging studies:  XR CHEST PORTABLE    Result Date: 2/17/2023  Single portable chest radiograph INDICATION:  Shortness of breath, cough COMPARISON:  Chest CT 11/21/2022, chest radiograph 11/23/2022 FINDINGS: Mild hazy opacity at the left lung base. No pleural effusion, or pneumothorax. The cardiomediastinal silhouette is unchanged. There is atherosclerotic calcification of the aorta. No acute focal bony abnormalities are seen. Mild hazy opacity at the left lung base, may reflect atelectasis, early consolidation not excluded. Michelle Santos M.D. 2/17/2023 7:28:00 PM    CT CHEST PULMONARY EMBOLISM W CONTRAST    Result Date: 2/17/2023  EXAMINATION:  CTA CHEST WITH IV CONTRAST, CT PULMONARY ANGIOGRAM DATE OF EXAM: 2/17/2023 8:18 PM HISTORY: SOB/elevated trop TECHNIQUE: CTA examination of the chest was performed following the intravenous administration of iodinated contrast. Sagittal, coronal and 3-D reformatted images were provided. CT dose lowering techniques were used, to include: automated exposure control, adjustment for patient size, and or use of iterative  reconstruction. COMPARISON: None. FINDINGS: Lungs: Normal. Pleura: Normal. Mediastinum and Rosenda: Normal. Pulmonary Arteries: Thrombus within the distal right main pulmonary artery extending into the segmental arteries of the right lower lobe distribution. Thrombus also extending into segmental arteries of the right upper lobe distribution. . Cardiovascular: Normal. Upper Abdomen: Partially seen complex hyperdense and hypodense focus within the left kidney. This is seen on the last image of the exam. Rounded hyperdense focus extending off the upper pole left kidney. Hepatic steatosis. Chest Wall: Normal. Musculoskeletal: Normal.     1.  Pulmonary emboli within the distal right main pulmonary artery extending to the right upper lobe and right lower lobe. Low burden of emboli. No heart strain. 2.  Complex mass partially seen in the left kidney. This is worrisome for possible malignancy. Dedicated evaluation of the kidneys recommended when patient clinically stable. . ER physician physician given report at 7:18 PM Thank you for the referral of this patient. This exam was interpreted by an [employer] Board [employer] Rad[employer] certified radiologist with subspecialty fellowship in [place] [id number]. If there are any questions regarding this exam please feel free  to contact a radiologist directly at [phone]. Maricruz Miller M.D. 2/17/2023 9:18:00 PM      Echocardiogram:  No results found for this or any previous visit. Orders Placed This Encounter   Medications    ipratropium-albuterol (DUONEB) nebulizer solution 1 ampule     Order Specific Question:   Initiate RT Bronchodilator Protocol     Answer:   Yes - ED Protocol    0.9 % sodium chloride bolus    sodium chloride flush 0.9 % injection 10 mL    iopamidol (ISOVUE-370) 76 % injection 100 mL    furosemide (LASIX) injection 40 mg    heparin (porcine) injection 6,750 Units    heparin (porcine) injection 6,750 Units    heparin (porcine) injection 3,380 Units    heparin 25,000 units in dextrose 5% 250 mL (premix) infusion    sodium chloride flush 0.9 % injection 5-40 mL    sodium chloride flush 0.9 % injection 5-40 mL    0.9 % sodium chloride infusion    OR Linked Order Group     ondansetron (ZOFRAN-ODT) disintegrating tablet 4 mg     ondansetron (ZOFRAN) injection 4 mg    polyethylene glycol (GLYCOLAX) packet 17 g    OR Linked Order Group     acetaminophen (TYLENOL) tablet 650 mg     acetaminophen (TYLENOL) suppository 650 mg    0.9 % sodium chloride infusion         Signed:  Alexandro Baxter MD    Part of this note may have been written by using a voice dictation software. The note has been proof read but may still contain some grammatical/other typographical errors.

## 2023-02-18 NOTE — PROGRESS NOTES
Patient stable with no complaints at this time. Heparin running with no difficulty. Patient denies any pain and appears comfortable at this time. Family at bedside for support. Call light within reach and patient instructed to call if assistance is needed.   Report to be given to oncoming RN 7p-7a

## 2023-02-18 NOTE — PROGRESS NOTES
Hospitalist Progress Note   Admit Date:  2023  6:31 PM   Name:  Mauro Paulson   Age:  [de-identified] y.o. Sex:  female  :  1940   MRN:  [id number]   Room:  [facility]    Presenting Complaint: Shortness of Breath     Reason(s) for Admission: Dyspnea on exertion [R06.09]  Elevated troponin [R77.8]  Left renal mass [N28.89]  Acute pulmonary embolism without acute cor pulmonale, unspecified pulmonary embolism type (HCC) [I26.99]  Pulmonary embolism, unspecified chronicity, unspecified pulmonary embolism type, unspecified whether acute cor pulmonale present [facility]) [I26.99]     Hospital Course:   Mauro Paulson is a [de-identified] y.o. female with medical history of HTN, DM 2, admitted on  with acute right lower and upper lobe PE and a complex left renal mass suspicious of malignancy. Patient presented with shortness of breath, chest pain, tachycardia. COVID and flu checked at urgent care was negative. Subjective & 24hr Events (23): Patient seen at bedside, resting in bed comfortably. She is alert and awake, AOx3, on room air. Denies any abdominal pain, chest pain, palpitations, nausea, vomiting, lower extremity edema. She does report of some bilateral calf tenderness. ROS:  10 point review system is negative except for what mentioned above. Assessment & Plan:     Principal Problem:    Pulmonary embolism, unspecified chronicity, unspecified pulmonary embolism type, unspecified whether acute cor pulmonale present ([address]  Plan: -  Continue heparin GTT for now. Patient is on room air. Continue DuoNebs every 4 hours while awake. Check bilateral lower extremity venous Doppler to rule out DVT. Patient will need ambulatory pulse ox prior to discharge. Active Problems:    Diabetes mellitus type 2, diet-controlled ([address]  Plan: -  Continue Humalog sliding scale QA CHS. Hypertension  Plan: Blood pressure is optimally controlled.   Resume amlodipine 10 mg p.o. daily along with lisinopril 10 mg p.o. daily. Patient only takes Prinzide Zestoretic 20-12.5 mg p.o. daily at home. Renal mass of unknown nature  Plan: 2/18-  Complex mass partially seen in the left kidney on CT chest, will need dedicated imaging study to evaluate for RCC. Urology consulted, will follow with recommendations. PT/OT evals and PPD needed/ordered? Ordered for PT OT    Diet:  ADULT DIET; Regular; No Added Salt (3-4 gm)  VTE prophylaxis: Heparin  Code status: Full Code    I spent 38 minutes of time today caring for this patient on the unit nearby or at bedside. Time may include history, exam, counseling/communication, documentation, ordering and reviewing tests, and conferring with other members of the care team.     Hospital Problems:  Principal Problem:    Pulmonary embolism, unspecified chronicity, unspecified pulmonary embolism type, unspecified whether acute cor pulmonale present ([facility]  Active Problems:    Diabetes mellitus type 2, diet-controlled ([facility]    Hypertension    Renal mass of unknown nature  Resolved Problems:    * No resolved hospital problems.  *      Objective:   Patient Vitals for the past 24 hrs:   Temp Pulse Resp BP SpO2   02/18/23 0727 -- 81 18 -- 94 %   02/18/23 0700 -- 81 24 -- 95 %   02/18/23 0634 -- 83 23 -- --   02/18/23 0615 -- 83 -- -- 98 %   02/18/23 0604 -- 82 26 126/65 --   02/18/23 0554 -- 81 15 -- 98 %   02/18/23 0546 -- 82 13 133/63 96 %   02/18/23 0516 -- 81 22 (!) 150/61 96 %   02/18/23 0456 -- 87 15 (!) 149/54 94 %   02/18/23 0356 -- 87 23 135/70 96 %   02/18/23 0346 -- 83 28 (!) 154/64 95 %   02/18/23 0256 -- 81 29 133/65 93 %   02/18/23 0226 -- 83 28 (!) 144/63 --   02/18/23 0156 -- 76 22 (!) 149/70 92 %   02/18/23 0110 -- 86 26 137/61 94 %   02/18/23 0030 -- 88 22 (!) 132/56 93 %   02/18/23 0011 -- 88 22 (!) 154/58 93 %   02/17/23 2351 -- 89 27 -- 95 %   02/17/23 2341 -- 87 24 (!) 151/60 --   02/17/23 2311 -- 90 25 (!) 143/54 --   02/17/23 2241 -- 92 20 (!) 143/53 93 %   02/17/23 2103 -- 87 -- (!) 138/56 --   02/17/23 2059 -- 88 -- (!) 148/57 --   02/17/23 2053 -- 96 -- (!) 166/57 --   02/17/23 1815 99.4 °F (37.4 °C) (!) 103 19 (!) 178/68 96 %       Oxygen Therapy  SpO2: 94 %  O2 Device: Nasal cannula  O2 Flow Rate (L/min): 1 L/min    Estimated body mass index is 30.95 kg/m² as calculated from the following:    Height as of this encounter: 5' 5\" (1.651 m). Weight as of this encounter: 186 lb (84.4 kg). No intake or output data in the 24 hours ending 02/18/23 0816      Physical Exam:     Blood pressure 126/65, pulse 81, temperature 99.4 °F (37.4 °C), temperature source Oral, resp. rate 18, height 5' 5\" (1.651 m), weight 186 lb (84.4 kg), SpO2 94 %. General:    Alert, awake, NAD, on room air  Head:  Normocephalic, atraumatic  Eyes:  Sclerae appear normal.  Pupils equally round. ENT:  Nares appear normal.  Moist oral mucosa  Neck:  No restricted ROM. Trachea midline   CV:   RRR. No m/r/g. No jugular venous distension. Lungs:   CTAB. No wheezing, rhonchi, or rales. Symmetric expansion. Abdomen:   Soft, nontender, nondistended. Extremities: No cyanosis or clubbing. No edema  Skin:     No rashes and normal coloration. Warm and dry. Neuro:  CN II-XII grossly intact. Psych:  Normal mood and affect.       I have personally reviewed labs and tests:  Recent Labs:  Recent Results (from the past 48 hour(s))   EKG 12 Lead    Collection Time: 02/17/23  7:07 PM   Result Value Ref Range    Ventricular Rate 93 BPM    Atrial Rate 93 BPM    P-R Interval 152 ms    QRS Duration 94 ms    Q-T Interval 364 ms    QTc Calculation (Bazett) 452 ms    P Axis 76 degrees    R Axis 63 degrees    T Axis 55 degrees    Diagnosis Normal sinus rhythm    CBC with Auto Differential    Collection Time: 02/17/23  7:19 PM   Result Value Ref Range    WBC 9.1 4.3 - 11.1 K/uL    RBC 4.47 4.05 - 5.2 M/uL    Hemoglobin 11.1 (L) 11.7 - 15.4 g/dL    Hematocrit 36.2 35.8 - 46.3 %    MCV 81.0 (L) 82 - 102 FL MCH 24.8 (L) 26.1 - 32.9 PG    MCHC 30.7 (L) 31.4 - 35.0 g/dL    RDW 16.1 (H) 11.9 - 14.6 %    Platelets 481 068 - 858 K/uL    MPV 10.0 9.4 - 12.3 FL    nRBC 0.00 0.0 - 0.2 K/uL    Differential Type AUTOMATED      Seg Neutrophils 78 43 - 78 %    Lymphocytes 13 13 - 44 %    Monocytes 8 4.0 - 12.0 %    Eosinophils % 1 0.5 - 7.8 %    Basophils 0 0.0 - 2.0 %    Immature Granulocytes 0 0.0 - 5.0 %    Segs Absolute 6.9 1.7 - 8.2 K/UL    Absolute Lymph # 1.2 0.5 - 4.6 K/UL    Absolute Mono # 0.7 0.1 - 1.3 K/UL    Absolute Eos # 0.1 0.0 - 0.8 K/UL    Basophils Absolute 0.0 0.0 - 0.2 K/UL    Absolute Immature Granulocyte 0.0 0.0 - 0.5 K/UL   CMP    Collection Time: 02/17/23  7:19 PM   Result Value Ref Range    Sodium 138 133 - 143 mmol/L    Potassium 3.3 (L) 3.5 - 5.1 mmol/L    Chloride 106 101 - 110 mmol/L    CO2 27 21 - 32 mmol/L    Anion Gap 5 2 - 11 mmol/L    Glucose 131 (H) 65 - 100 mg/dL    BUN 15 8 - 23 MG/DL    Creatinine 1.00 0.6 - 1.0 MG/DL    Est, Glom Filt Rate 57 (L) >60 ml/min/1.73m2    Calcium 9.1 8.3 - 10.4 MG/DL    Total Bilirubin 0.6 0.2 - 1.1 MG/DL    ALT 28 12 - 65 U/L    AST 25 15 - 37 U/L    Alk Phosphatase 102 50 - 136 U/L    Total Protein 7.8 6.3 - 8.2 g/dL    Albumin 3.5 3.2 - 4.6 g/dL    Globulin 4.3 2.8 - 4.5 g/dL    Albumin/Globulin Ratio 0.8 0.4 - 1.6     Lipase    Collection Time: 02/17/23  7:19 PM   Result Value Ref Range    Lipase 107 73 - 393 U/L   Magnesium    Collection Time: 02/17/23  7:19 PM   Result Value Ref Range    Magnesium 1.8 1.8 - 2.4 mg/dL   Lactate, Sepsis    Collection Time: 02/17/23  7:19 PM   Result Value Ref Range    Lactic Acid, Sepsis 1.2 0.4 - 2.0 MMOL/L   Troponin    Collection Time: 02/17/23  7:19 PM   Result Value Ref Range    Troponin, High Sensitivity 344.4 (HH) 0 - 14 pg/mL   Brain Natriuretic Peptide    Collection Time: 02/17/23  7:19 PM   Result Value Ref Range    NT Pro- (H) <450 PG/ML   Troponin    Collection Time: 02/17/23  8:45 PM   Result Value Ref Range    Troponin, High Sensitivity 296.7 (HH) 0 - 14 pg/mL   Lactate, Sepsis    Collection Time: 02/17/23 10:00 PM   Result Value Ref Range    Lactic Acid, Sepsis 1.3 0.4 - 2.0 MMOL/L   APTT    Collection Time: 02/17/23 10:00 PM   Result Value Ref Range    PTT 21.0 (L) 24.5 - 34.2 SEC   Protime-INR    Collection Time: 02/17/23 10:00 PM   Result Value Ref Range    Protime 13.7 12.6 - 14.3 sec    INR 1.0     Anti-Xa, Unfractionated Heparin    Collection Time: 02/18/23  6:06 AM   Result Value Ref Range    Anti-XA Unfrac Heparin 0.59 0.3 - 0.7 IU/mL   Comprehensive Metabolic Panel w/ Reflex to MG    Collection Time: 02/18/23  6:06 AM   Result Value Ref Range    Sodium 137 133 - 143 mmol/L    Potassium 3.2 (L) 3.5 - 5.1 mmol/L    Chloride 105 101 - 110 mmol/L    CO2 25 21 - 32 mmol/L    Anion Gap 7 2 - 11 mmol/L    Glucose 115 (H) 65 - 100 mg/dL    BUN 15 8 - 23 MG/DL    Creatinine 0.80 0.6 - 1.0 MG/DL    Est, Glom Filt Rate >60 >60 ml/min/1.73m2    Calcium 8.7 8.3 - 10.4 MG/DL    Total Bilirubin 0.4 0.2 - 1.1 MG/DL    ALT 28 12 - 65 U/L    AST 32 15 - 37 U/L    Alk Phosphatase 93 50 - 136 U/L    Total Protein 7.0 6.3 - 8.2 g/dL    Albumin 3.1 (L) 3.2 - 4.6 g/dL    Globulin 3.9 2.8 - 4.5 g/dL    Albumin/Globulin Ratio 0.8 0.4 - 1.6     CBC with Auto Differential    Collection Time: 02/18/23  6:06 AM   Result Value Ref Range    WBC 9.4 4.3 - 11.1 K/uL    RBC 4.07 4.05 - 5.2 M/uL    Hemoglobin 10.2 (L) 11.7 - 15.4 g/dL    Hematocrit 33.0 (L) 35.8 - 46.3 %    MCV 81.1 (L) 82 - 102 FL    MCH 25.1 (L) 26.1 - 32.9 PG    MCHC 30.9 (L) 31.4 - 35.0 g/dL    RDW 16.0 (H) 11.9 - 14.6 %    Platelets 974 185 - 917 K/uL    MPV 10.7 9.4 - 12.3 FL    nRBC 0.00 0.0 - 0.2 K/uL    Differential Type AUTOMATED      Seg Neutrophils 71 43 - 78 %    Lymphocytes 17 13 - 44 %    Monocytes 9 4.0 - 12.0 %    Eosinophils % 3 0.5 - 7.8 %    Basophils 0 0.0 - 2.0 %    Immature Granulocytes 0 0.0 - 5.0 %    Segs Absolute 6.7 1.7 - 8.2 K/UL    Absolute Lymph # 1.6 0.5 - 4.6 K/UL    Absolute Mono # 0.8 0.1 - 1.3 K/UL    Absolute Eos # 0.3 0.0 - 0.8 K/UL    Basophils Absolute 0.0 0.0 - 0.2 K/UL    Absolute Immature Granulocyte 0.0 0.0 - 0.5 K/UL   Magnesium    Collection Time: 02/18/23  6:06 AM   Result Value Ref Range    Magnesium 1.8 1.8 - 2.4 mg/dL       I have personally reviewed imaging studies:  Other Studies:  CT CHEST PULMONARY EMBOLISM W CONTRAST   Final Result      1. Pulmonary emboli within the distal right main pulmonary artery extending to    the right upper lobe and right lower lobe. Low burden of emboli. No heart    strain. 2.  Complex mass partially seen in the left kidney. This is worrisome for    possible malignancy. Dedicated evaluation of the kidneys recommended when    patient clinically stable. .       ER physician physician given report at 7:18 PM   Thank you for the referral of this patient. This exam was interpreted by an    [employer] Board [employer] certified radiologist with subspecialty fellowship    in [place] [id number]. If there are any questions regarding this exam please feel free    to contact a radiologist directly at [phone]. Ricky Posadas M.D.    2/17/2023 9:18:00 PM      XR CHEST PORTABLE   Final Result      Mild hazy opacity at the left lung base, may reflect atelectasis, early    consolidation not excluded.                Michelle Santos M.D.    2/17/2023 7:28:00 PM          Current Meds:  Current Facility-Administered Medications   Medication Dose Route Frequency    potassium chloride (KLOR-CON M) extended release tablet 40 mEq  40 mEq Oral TID WC    magnesium sulfate 4000 mg in 100 mL IVPB premix  4,000 mg IntraVENous Once    ipratropium-albuterol (DUONEB) nebulizer solution 1 ampule  1 ampule Inhalation Q4H WA    0.9 % sodium chloride bolus  100 mL IntraVENous ONCE PRN    sodium chloride flush 0.9 % injection 10 mL  10 mL IntraVENous ONCE PRN    heparin (porcine) injection 6,750 Units  80 Units/kg IntraVENous PRN    heparin (porcine) injection 3,380 Units  40 Units/kg IntraVENous PRN    heparin 25,000 units in dextrose 5% 250 mL (premix) infusion  5-30 Units/kg/hr IntraVENous Continuous    sodium chloride flush 0.9 % injection 5-40 mL  5-40 mL IntraVENous 2 times per day    sodium chloride flush 0.9 % injection 5-40 mL  5-40 mL IntraVENous PRN    0.9 % sodium chloride infusion   IntraVENous PRN    ondansetron (ZOFRAN-ODT) disintegrating tablet 4 mg  4 mg Oral Q8H PRN    Or    ondansetron (ZOFRAN) injection 4 mg  4 mg IntraVENous Q6H PRN    polyethylene glycol (GLYCOLAX) packet 17 g  17 g Oral Daily PRN    acetaminophen (TYLENOL) tablet 650 mg  650 mg Oral Q6H PRN    Or    acetaminophen (TYLENOL) suppository 650 mg  650 mg Rectal Q6H PRN    0.9 % sodium chloride infusion   IntraVENous Continuous    insulin lispro (HUMALOG) injection vial 0-8 Units  0-8 Units SubCUTAneous TID WC    insulin lispro (HUMALOG) injection vial 0-4 Units  0-4 Units SubCUTAneous Nightly    labetalol (NORMODYNE;TRANDATE) injection 10 mg  10 mg IntraVENous Q6H PRN    hydrALAZINE (APRESOLINE) injection 10 mg  10 mg IntraVENous Q4H PRN    glucose chewable tablet 16 g  4 tablet Oral PRN    dextrose bolus 10% 125 mL  125 mL IntraVENous PRN    Or    dextrose bolus 10% 250 mL  250 mL IntraVENous PRN    glucagon (rDNA) injection 1 mg  1 mg SubCUTAneous PRN    dextrose 10 % infusion   IntraVENous Continuous PRN       Signed:  Daniel Caba MD    Part of this note may have been written by using a voice dictation software.  The note has been proof read but may still contain some grammatical/other typographical errors.

## 2023-02-19 ENCOUNTER — APPOINTMENT (OUTPATIENT)
Dept: ULTRASOUND IMAGING | Age: 83
DRG: 176 | End: 2023-02-19
Payer: MEDICARE

## 2023-02-19 ENCOUNTER — APPOINTMENT (OUTPATIENT)
Dept: CT IMAGING | Age: 83
DRG: 176 | End: 2023-02-19
Payer: MEDICARE

## 2023-02-19 LAB
GLUCOSE BLD STRIP.AUTO-MCNC: 128 MG/DL (ref 65–100)
GLUCOSE BLD STRIP.AUTO-MCNC: 146 MG/DL (ref 65–100)
GLUCOSE BLD STRIP.AUTO-MCNC: 159 MG/DL (ref 65–100)
GLUCOSE BLD STRIP.AUTO-MCNC: 186 MG/DL (ref 65–100)
SERVICE CMNT-IMP: ABNORMAL
UFH PPP CHRO-ACNC: 0.47 IU/ML (ref 0.3–0.7)

## 2023-02-19 PROCEDURE — 94640 AIRWAY INHALATION TREATMENT: CPT

## 2023-02-19 PROCEDURE — 1100000000 HC RM PRIVATE

## 2023-02-19 PROCEDURE — 6360000002 HC RX W HCPCS: Performed by: EMERGENCY MEDICINE

## 2023-02-19 PROCEDURE — 97165 OT EVAL LOW COMPLEX 30 MIN: CPT

## 2023-02-19 PROCEDURE — 93970 EXTREMITY STUDY: CPT

## 2023-02-19 PROCEDURE — 6370000000 HC RX 637 (ALT 250 FOR IP): Performed by: EMERGENCY MEDICINE

## 2023-02-19 PROCEDURE — 99222 1ST HOSP IP/OBS MODERATE 55: CPT | Performed by: UROLOGY

## 2023-02-19 PROCEDURE — 94761 N-INVAS EAR/PLS OXIMETRY MLT: CPT

## 2023-02-19 PROCEDURE — 2500000003 HC RX 250 WO HCPCS: Performed by: HOSPITALIST

## 2023-02-19 PROCEDURE — 97530 THERAPEUTIC ACTIVITIES: CPT

## 2023-02-19 PROCEDURE — 82962 GLUCOSE BLOOD TEST: CPT

## 2023-02-19 PROCEDURE — 6370000000 HC RX 637 (ALT 250 FOR IP): Performed by: HOSPITALIST

## 2023-02-19 PROCEDURE — 85520 HEPARIN ASSAY: CPT

## 2023-02-19 PROCEDURE — 36415 COLL VENOUS BLD VENIPUNCTURE: CPT

## 2023-02-19 PROCEDURE — 97535 SELF CARE MNGMENT TRAINING: CPT

## 2023-02-19 PROCEDURE — 74170 CT ABD WO CNTRST FLWD CNTRST: CPT

## 2023-02-19 PROCEDURE — 6360000004 HC RX CONTRAST MEDICATION: Performed by: HOSPITALIST

## 2023-02-19 PROCEDURE — 97162 PT EVAL MOD COMPLEX 30 MIN: CPT

## 2023-02-19 PROCEDURE — 2580000003 HC RX 258: Performed by: HOSPITALIST

## 2023-02-19 RX ORDER — GUAIFENESIN 200 MG/10ML
200 LIQUID ORAL EVERY 4 HOURS PRN
Status: DISCONTINUED | OUTPATIENT
Start: 2023-02-19 | End: 2023-02-21 | Stop reason: HOSPADM

## 2023-02-19 RX ORDER — IPRATROPIUM BROMIDE AND ALBUTEROL SULFATE 2.5; .5 MG/3ML; MG/3ML
1 SOLUTION RESPIRATORY (INHALATION) EVERY 4 HOURS PRN
Status: DISCONTINUED | OUTPATIENT
Start: 2023-02-19 | End: 2023-02-21 | Stop reason: HOSPADM

## 2023-02-19 RX ADMIN — LISINOPRIL 10 MG: 5 TABLET ORAL at 08:16

## 2023-02-19 RX ADMIN — GUAIFENESIN 200 MG: 100 LIQUID ORAL at 16:34

## 2023-02-19 RX ADMIN — IPRATROPIUM BROMIDE AND ALBUTEROL SULFATE 1 AMPULE: 2.5; .5 SOLUTION RESPIRATORY (INHALATION) at 15:56

## 2023-02-19 RX ADMIN — HEPARIN SODIUM 18 UNITS/KG/HR: 10000 INJECTION, SOLUTION INTRAVENOUS at 19:00

## 2023-02-19 RX ADMIN — MOMETASONE FUROATE AND FORMOTEROL FUMARATE DIHYDRATE 2 PUFF: 200; 5 AEROSOL RESPIRATORY (INHALATION) at 20:04

## 2023-02-19 RX ADMIN — SODIUM CHLORIDE, PRESERVATIVE FREE 10 ML: 5 INJECTION INTRAVENOUS at 08:21

## 2023-02-19 RX ADMIN — SODIUM CHLORIDE, PRESERVATIVE FREE 10 ML: 5 INJECTION INTRAVENOUS at 20:33

## 2023-02-19 RX ADMIN — POTASSIUM CHLORIDE 40 MEQ: 1500 TABLET, EXTENDED RELEASE ORAL at 16:34

## 2023-02-19 RX ADMIN — POTASSIUM CHLORIDE 40 MEQ: 1500 TABLET, EXTENDED RELEASE ORAL at 08:16

## 2023-02-19 RX ADMIN — IOPAMIDOL 100 ML: 755 INJECTION, SOLUTION INTRAVENOUS at 11:34

## 2023-02-19 RX ADMIN — AMLODIPINE BESYLATE 10 MG: 10 TABLET ORAL at 08:15

## 2023-02-19 RX ADMIN — IPRATROPIUM BROMIDE AND ALBUTEROL SULFATE 1 AMPULE: 2.5; .5 SOLUTION RESPIRATORY (INHALATION) at 07:50

## 2023-02-19 RX ADMIN — ATORVASTATIN CALCIUM 10 MG: 10 TABLET, FILM COATED ORAL at 08:16

## 2023-02-19 RX ADMIN — PANTOPRAZOLE SODIUM 40 MG: 40 TABLET, DELAYED RELEASE ORAL at 06:11

## 2023-02-19 RX ADMIN — POTASSIUM CHLORIDE 40 MEQ: 1500 TABLET, EXTENDED RELEASE ORAL at 11:16

## 2023-02-19 ASSESSMENT — PAIN SCALES - GENERAL
PAINLEVEL_OUTOF10: 0

## 2023-02-19 NOTE — PROGRESS NOTES
Patient voices no concerns at this time. Patient is relaxing in recliner eating lunch. Patient denies any pain and appears comfortable at this time. Call light within reach and patient instructed to call if assistance is needed. Will continue to monitor.

## 2023-02-19 NOTE — PROGRESS NOTES
Patient stable with no complaints at this time. Patient relaxing in recliner with family at bedside for support. Call light within reach and patient instructed to call if assistance is needed.   Report to be given to oncoming RN 7p-7a

## 2023-02-19 NOTE — PROGRESS NOTES
Hospitalist Progress Note   Admit Date:  2023  6:31 PM   Name:  Omar Ma   Age:  [de-identified] y.o. Sex:  female  :  1940   MRN:  [id number]   Room:  [facility]    Presenting Complaint: Shortness of Breath     Reason(s) for Admission: Dyspnea on exertion [R06.09]  Elevated troponin [R77.8]  Left renal mass [N28.89]  Acute pulmonary embolism without acute cor pulmonale, unspecified pulmonary embolism type (HCC) [I26.99]  Pulmonary embolism, unspecified chronicity, unspecified pulmonary embolism type, unspecified whether acute cor pulmonale present [facility]) [I26.99]     Hospital Course:   Omar Ma is a [de-identified] y.o. female with medical history of HTN, DM 2, admitted on  with acute right lower and upper lobe PE and a complex left renal mass suspicious of malignancy. Patient presented with shortness of breath, chest pain, tachycardia. COVID and flu checked at urgent care was negative. Urology consulted. Appreciate recommendations. CT renal mass protocol ordered. Subjective & 24hr Events (23): Patient states that her chest pain is improved. Shortness of breath improving. No fever no chills. Still has bilateral leg pain although improved. No nausea no vomiting. ROS:  10 point review system is negative except for what mentioned above. Assessment & Plan:     Principal Problem: This is a 61-year-old female with:      Pulmonary embolism, unspecified chronicity, unspecified pulmonary embolism type, unspecified whether acute cor pulmonale present (HCC)  Continue heparin GTT for now. Monitor for bleeding. Patient is on room air. Continue DuoNebs every 4 hours while awake. Duplex ultrasound bilateral lower extremities ordered. Patient will need ambulatory pulse ox prior to discharge. Active Problems:    Diabetes mellitus type 2, diet-controlled ([address]  Plan: -  Continue Humalog sliding scale QA [facility]. Hypertension  Plan: Blood pressure is optimally controlled.   Resume amlodipine 10 mg p.o. daily along with lisinopril 10 mg p.o. daily. Patient only takes Prinzide Zestoretic 20-12.5 mg p.o. daily at home. Renal mass of unknown nature  Complex mass partially seen in the left kidney on CT chest.  CT renal mass protocol ordered. Urology consulted, will follow with recommendations. PT/OT evals and PPD needed/ordered? Ordered for PT OT    Diet:  ADULT DIET; Regular; No Added Salt (3-4 gm)  VTE prophylaxis: Heparin drip  Code status: Full Code      Hospital Problems:  Principal Problem:    Pulmonary embolism, unspecified chronicity, unspecified pulmonary embolism type, unspecified whether acute cor pulmonale present ([facility]  Active Problems:    Diabetes mellitus type 2, diet-controlled ([facility] [address]    Hypertension    Renal mass of unknown nature  Resolved Problems:    * No resolved hospital problems. *      Objective:   Patient Vitals for the past 24 hrs:   Temp Pulse Resp BP SpO2   02/19/23 1108 98.4 °F (36.9 °C) 87 18 (!) 121/94 95 %   02/19/23 1037 -- -- -- -- (!) 89 %   02/19/23 0750 -- 88 17 -- 92 %   02/19/23 0728 99 °F (37.2 °C) 90 18 (!) 153/63 93 %   02/19/23 0318 99.3 °F (37.4 °C) 86 18 (!) 143/62 92 %   02/18/23 2341 99.1 °F (37.3 °C) 91 20 (!) 147/58 92 %   02/18/23 1920 98.4 °F (36.9 °C) 85 20 (!) 147/57 91 %   02/18/23 1513 -- 87 15 -- 95 %   02/18/23 1458 99.3 °F (37.4 °C) 88 18 (!) 147/67 93 %       Oxygen Therapy  SpO2: 95 %  Pulse Oximeter Device Mode: Intermittent  O2 Device: Nasal cannula  O2 Flow Rate (L/min): 1 L/min    Estimated body mass index is 30.95 kg/m² as calculated from the following:    Height as of this encounter: 5' 5\" (1.651 m). Weight as of this encounter: 186 lb (84.4 kg). No intake or output data in the 24 hours ending 02/19/23 1239      Physical Exam:     Blood pressure (!) 121/94, pulse 87, temperature 98.4 °F (36.9 °C), temperature source Oral, resp. rate 18, height 5' 5\" (1.651 m), weight 186 lb (84.4 kg), SpO2 95 %.     General: Elderly alert, awake, female, ill-appearing,  Head:  Normocephalic, atraumatic  Eyes:  Sclerae appear normal.  Pupils equally round. ENT:  Nares appear normal.  Moist oral mucosa  Neck:  No restricted ROM. Trachea midline   CV:   RRR. No m/r/g. No jugular venous distension. Lungs:   CTAB. No wheezing, rhonchi, or rales. Symmetric expansion. Abdomen:   Soft, nontender, nondistended. Extremities: No cyanosis or clubbing. No edema  Skin:     No rashes and normal coloration. Warm and dry. Neuro:  CN II-XII grossly intact. Psych:  Normal mood and affect.       I have personally reviewed labs and tests:  Recent Labs:  Recent Results (from the past 48 hour(s))   EKG 12 Lead    Collection Time: 02/17/23  7:07 PM   Result Value Ref Range    Ventricular Rate 93 BPM    Atrial Rate 93 BPM    P-R Interval 152 ms    QRS Duration 94 ms    Q-T Interval 364 ms    QTc Calculation (Bazett) 452 ms    P Axis 76 degrees    R Axis 63 degrees    T Axis 55 degrees    Diagnosis Normal sinus rhythm    CBC with Auto Differential    Collection Time: 02/17/23  7:19 PM   Result Value Ref Range    WBC 9.1 4.3 - 11.1 K/uL    RBC 4.47 4.05 - 5.2 M/uL    Hemoglobin 11.1 (L) 11.7 - 15.4 g/dL    Hematocrit 36.2 35.8 - 46.3 %    MCV 81.0 (L) 82 - 102 FL    MCH 24.8 (L) 26.1 - 32.9 PG    MCHC 30.7 (L) 31.4 - 35.0 g/dL    RDW 16.1 (H) 11.9 - 14.6 %    Platelets 370 637 - 124 K/uL    MPV 10.0 9.4 - 12.3 FL    nRBC 0.00 0.0 - 0.2 K/uL    Differential Type AUTOMATED      Seg Neutrophils 78 43 - 78 %    Lymphocytes 13 13 - 44 %    Monocytes 8 4.0 - 12.0 %    Eosinophils % 1 0.5 - 7.8 %    Basophils 0 0.0 - 2.0 %    Immature Granulocytes 0 0.0 - 5.0 %    Segs Absolute 6.9 1.7 - 8.2 K/UL    Absolute Lymph # 1.2 0.5 - 4.6 K/UL    Absolute Mono # 0.7 0.1 - 1.3 K/UL    Absolute Eos # 0.1 0.0 - 0.8 K/UL    Basophils Absolute 0.0 0.0 - 0.2 K/UL    Absolute Immature Granulocyte 0.0 0.0 - 0.5 K/UL   CMP    Collection Time: 02/17/23  7:19 PM   Result Value Ref Range    Sodium 138 133 - 143 mmol/L    Potassium 3.3 (L) 3.5 - 5.1 mmol/L    Chloride 106 101 - 110 mmol/L    CO2 27 21 - 32 mmol/L    Anion Gap 5 2 - 11 mmol/L    Glucose 131 (H) 65 - 100 mg/dL    BUN 15 8 - 23 MG/DL    Creatinine 1.00 0.6 - 1.0 MG/DL    Est, Glom Filt Rate 57 (L) >60 ml/min/1.73m2    Calcium 9.1 8.3 - 10.4 MG/DL    Total Bilirubin 0.6 0.2 - 1.1 MG/DL    ALT 28 12 - 65 U/L    AST 25 15 - 37 U/L    Alk Phosphatase 102 50 - 136 U/L    Total Protein 7.8 6.3 - 8.2 g/dL    Albumin 3.5 3.2 - 4.6 g/dL    Globulin 4.3 2.8 - 4.5 g/dL    Albumin/Globulin Ratio 0.8 0.4 - 1.6     Lipase    Collection Time: 02/17/23  7:19 PM   Result Value Ref Range    Lipase 107 73 - 393 U/L   Magnesium    Collection Time: 02/17/23  7:19 PM   Result Value Ref Range    Magnesium 1.8 1.8 - 2.4 mg/dL   Lactate, Sepsis    Collection Time: 02/17/23  7:19 PM   Result Value Ref Range    Lactic Acid, Sepsis 1.2 0.4 - 2.0 MMOL/L   Troponin    Collection Time: 02/17/23  7:19 PM   Result Value Ref Range    Troponin, High Sensitivity 344.4 (HH) 0 - 14 pg/mL   Brain Natriuretic Peptide    Collection Time: 02/17/23  7:19 PM   Result Value Ref Range    NT Pro- (H) <450 PG/ML   Troponin    Collection Time: 02/17/23  8:45 PM   Result Value Ref Range    Troponin, High Sensitivity 296.7 (HH) 0 - 14 pg/mL   Lactate, Sepsis    Collection Time: 02/17/23 10:00 PM   Result Value Ref Range    Lactic Acid, Sepsis 1.3 0.4 - 2.0 MMOL/L   APTT    Collection Time: 02/17/23 10:00 PM   Result Value Ref Range    PTT 21.0 (L) 24.5 - 34.2 SEC   Protime-INR    Collection Time: 02/17/23 10:00 PM   Result Value Ref Range    Protime 13.7 12.6 - 14.3 sec    INR 1.0     Anti-Xa, Unfractionated Heparin    Collection Time: 02/18/23  6:06 AM   Result Value Ref Range    Anti-XA Unfrac Heparin 0.59 0.3 - 0.7 IU/mL   Comprehensive Metabolic Panel w/ Reflex to MG    Collection Time: 02/18/23  6:06 AM   Result Value Ref Range    Sodium 137 133 - 143 mmol/L Potassium 3.2 (L) 3.5 - 5.1 mmol/L    Chloride 105 101 - 110 mmol/L    CO2 25 21 - 32 mmol/L    Anion Gap 7 2 - 11 mmol/L    Glucose 115 (H) 65 - 100 mg/dL    BUN 15 8 - 23 MG/DL    Creatinine 0.80 0.6 - 1.0 MG/DL    Est, Glom Filt Rate >60 >60 ml/min/1.73m2    Calcium 8.7 8.3 - 10.4 MG/DL    Total Bilirubin 0.4 0.2 - 1.1 MG/DL    ALT 28 12 - 65 U/L    AST 32 15 - 37 U/L    Alk Phosphatase 93 50 - 136 U/L    Total Protein 7.0 6.3 - 8.2 g/dL    Albumin 3.1 (L) 3.2 - 4.6 g/dL    Globulin 3.9 2.8 - 4.5 g/dL    Albumin/Globulin Ratio 0.8 0.4 - 1.6     CBC with Auto Differential    Collection Time: 02/18/23  6:06 AM   Result Value Ref Range    WBC 9.4 4.3 - 11.1 K/uL    RBC 4.07 4.05 - 5.2 M/uL    Hemoglobin 10.2 (L) 11.7 - 15.4 g/dL    Hematocrit 33.0 (L) 35.8 - 46.3 %    MCV 81.1 (L) 82 - 102 FL    MCH 25.1 (L) 26.1 - 32.9 PG    MCHC 30.9 (L) 31.4 - 35.0 g/dL    RDW 16.0 (H) 11.9 - 14.6 %    Platelets 336 176 - 148 K/uL    MPV 10.7 9.4 - 12.3 FL    nRBC 0.00 0.0 - 0.2 K/uL    Differential Type AUTOMATED      Seg Neutrophils 71 43 - 78 %    Lymphocytes 17 13 - 44 %    Monocytes 9 4.0 - 12.0 %    Eosinophils % 3 0.5 - 7.8 %    Basophils 0 0.0 - 2.0 %    Immature Granulocytes 0 0.0 - 5.0 %    Segs Absolute 6.7 1.7 - 8.2 K/UL    Absolute Lymph # 1.6 0.5 - 4.6 K/UL    Absolute Mono # 0.8 0.1 - 1.3 K/UL    Absolute Eos # 0.3 0.0 - 0.8 K/UL    Basophils Absolute 0.0 0.0 - 0.2 K/UL    Absolute Immature Granulocyte 0.0 0.0 - 0.5 K/UL   Magnesium    Collection Time: 02/18/23  6:06 AM   Result Value Ref Range    Magnesium 1.8 1.8 - 2.4 mg/dL   POCT Glucose    Collection Time: 02/18/23  8:26 AM   Result Value Ref Range    POC Glucose 148 (H) 65 - 100 mg/dL    Performed by: TreedomitaPCT    POCT Glucose    Collection Time: 02/18/23 11:27 AM   Result Value Ref Range    POC Glucose 144 (H) 65 - 100 mg/dL    Performed by:  TreedomitaPCT    Anti-Xa, Unfractionated Heparin    Collection Time: 02/18/23 11:57 AM   Result Value Ref Range    Anti-XA Unfrac Heparin 0.47 0.3 - 0.7 IU/mL   POCT Glucose    Collection Time: 02/18/23  4:23 PM   Result Value Ref Range    POC Glucose 175 (H) 65 - 100 mg/dL    Performed by: Tana    Anti-Xa, Unfractionated Heparin    Collection Time: 02/18/23  5:52 PM   Result Value Ref Range    Anti-XA Unfrac Heparin 0.45 0.3 - 0.7 IU/mL   POCT Glucose    Collection Time: 02/18/23  8:35 PM   Result Value Ref Range    POC Glucose 232 (H) 65 - 100 mg/dL    Performed by: Ricardo    Anti-Xa, Unfractionated Heparin    Collection Time: 02/18/23  9:42 PM   Result Value Ref Range    Anti-XA Unfrac Heparin 0.43 0.3 - 0.7 IU/mL   Anti-Xa, Unfractionated Heparin    Collection Time: 02/19/23  5:06 AM   Result Value Ref Range    Anti-XA Unfrac Heparin 0.47 0.3 - 0.7 IU/mL   POCT Glucose    Collection Time: 02/19/23  7:49 AM   Result Value Ref Range    POC Glucose 128 (H) 65 - 100 mg/dL    Performed by: Jailyn    POCT Glucose    Collection Time: 02/19/23 11:24 AM   Result Value Ref Range    POC Glucose 146 (H) 65 - 100 mg/dL    Performed by: Jailyn        I have personally reviewed imaging studies:  Other Studies:  CT CHEST PULMONARY EMBOLISM W CONTRAST   Final Result      1.  Pulmonary emboli within the distal right main pulmonary artery extending to    the right upper lobe and right lower lobe. Low burden of emboli. No heart    strain.   2.  Complex mass partially seen in the left kidney. This is worrisome for    possible malignancy. Dedicated evaluation of the kidneys recommended when    patient clinically stable..       ER physician physician given report at 7:18 PM   Thank you for the referral of this patient. This exam was interpreted by an    American Board of Radiology certified radiologist with subspecialty fellowship    in Body Imaging. If there are any questions regarding this exam please feel free    to contact a radiologist directly at [phone].         [address]       Mikhail Mansfield M.D.   2/17/2023 9:18:00 PM      XR CHEST PORTABLE   Final Result      Mild hazy opacity at the left lung base, may reflect atelectasis, early    consolidation not excluded.                Michelle Santos M.D.    2/17/2023 7:28:00 PM      Vascular duplex lower extremity venous bilateral    (Results Pending)   CT ABDOMEN RENAL MASS Additional Contrast? Radiologist Recommendation    (Results Pending)       Current Meds:  Current Facility-Administered Medications   Medication Dose Route Frequency    potassium chloride (KLOR-CON M) extended release tablet 40 mEq  40 mEq Oral TID WC    amLODIPine (NORVASC) tablet 10 mg  10 mg Oral Daily    atorvastatin (LIPITOR) tablet 10 mg  10 mg Oral Daily    lisinopril (PRINIVIL;ZESTRIL) tablet 10 mg  10 mg Oral Daily    pantoprazole (PROTONIX) tablet 40 mg  40 mg Oral QAM AC    ipratropium-albuterol (DUONEB) nebulizer solution 1 ampule  1 ampule Inhalation Q4H WA    0.9 % sodium chloride bolus  100 mL IntraVENous ONCE PRN    sodium chloride flush 0.9 % injection 10 mL  10 mL IntraVENous ONCE PRN    heparin (porcine) injection 6,750 Units  80 Units/kg IntraVENous PRN    heparin (porcine) injection 3,380 Units  40 Units/kg IntraVENous PRN    heparin 25,000 units in dextrose 5% 250 mL (premix) infusion  5-30 Units/kg/hr IntraVENous Continuous    sodium chloride flush 0.9 % injection 5-40 mL  5-40 mL IntraVENous 2 times per day    sodium chloride flush 0.9 % injection 5-40 mL  5-40 mL IntraVENous PRN    0.9 % sodium chloride infusion   IntraVENous PRN    ondansetron (ZOFRAN-ODT) disintegrating tablet 4 mg  4 mg Oral Q8H PRN    Or    ondansetron (ZOFRAN) injection 4 mg  4 mg IntraVENous Q6H PRN    polyethylene glycol (GLYCOLAX) packet 17 g  17 g Oral Daily PRN    acetaminophen (TYLENOL) tablet 650 mg  650 mg Oral Q6H PRN    Or    acetaminophen (TYLENOL) suppository 650 mg  650 mg Rectal Q6H PRN    0.9 % sodium chloride infusion   IntraVENous Continuous    insulin lispro (HUMALOG) injection vial 0-8 Units  0-8 Units SubCUTAneous TID WC    insulin lispro (HUMALOG) injection vial 0-4 Units  0-4 Units SubCUTAneous Nightly    labetalol (NORMODYNE;TRANDATE) injection 10 mg  10 mg IntraVENous Q6H PRN    hydrALAZINE (APRESOLINE) injection 10 mg  10 mg IntraVENous Q4H PRN    glucose chewable tablet 16 g  4 tablet Oral PRN    dextrose bolus 10% 125 mL  125 mL IntraVENous PRN    Or    dextrose bolus 10% 250 mL  250 mL IntraVENous PRN    glucagon (rDNA) injection 1 mg  1 mg SubCUTAneous PRN    dextrose 10 % infusion   IntraVENous Continuous PRN       Signed:  Liberty Rico MD    Part of this note may have been written by using a voice dictation software. The note has been proof read but may still contain some grammatical/other typographical errors.

## 2023-02-19 NOTE — PROGRESS NOTES
ACUTE OCCUPATIONAL THERAPY GOALS:   (Developed with and agreed upon by patient and/or caregiver.)  1. Patient will complete full body bathing and dressing with mod I and adaptive equipment as needed. GOAL MET 2/19/2023  2. Patient will complete toileting with mod I. GOAL MET 2/19/2023  3. Patient will tolerate 15 minutes of OT treatment with as less than 2 rest breaks to increase activity tolerance for ADLs. GOAL MET 2/19/2023  4. Patient will complete functional transfers with mod I and adaptive equipment as needed. GOAL MET 2/19/2023    Timeframe: 1 visit      OCCUPATIONAL THERAPY Initial Assessment, Daily Note, Discharge, and PM       OT Visit Days: 1  Acknowledge Orders  Time  OT Charge Capture  Rehab Caseload Tracker      Lynda Valerio is a [de-identified] y.o. female   PRIMARY DIAGNOSIS: Pulmonary embolism, unspecified chronicity, unspecified pulmonary embolism type, unspecified whether acute cor pulmonale present (HCC)  Dyspnea on exertion [R06.09]  Elevated troponin [R77.8]  Left renal mass [N28.89]  Acute pulmonary embolism without acute cor pulmonale, unspecified pulmonary embolism type ([address] [I26.99]  Pulmonary embolism, unspecified chronicity, unspecified pulmonary embolism type, unspecified whether acute cor pulmonale present ([address] [I26.99]       Reason for Referral: Generalized Muscle Weakness (M62.81)  Other lack of cordination (R27.8)  Difficulty in walking, Not elsewhere classified (R26.2)  Inpatient: Payor: MEDICARE / Plan: MEDICARE PART A AND B / Product Type: *No Product type* /     ASSESSMENT:     REHAB RECOMMENDATIONS:   Recommendation to date pending progress:  Setting:  No further skilled therapy after discharge from hospital    Equipment:    None     ASSESSMENT:  Ms. Nelly Alberto is an [de-identified] YO R dominant WF admitted with shortness of breath, tachycardia, chest pain, cough and congestion. Found to have a PE. C19 and Flu both negative. Now on heparin drip.  CT scan revealed another  PE and incidental L renal mass. Pt and family are aware. Pt gone off floor earlier for US on B LEs but OT assisted pt up to stretcher with CGA to leave. Pt lives alone in 1 level home but her sons live nearby and are very active with pt. Pt normally independent with ADLs, IADLs, driving, no DME used but has several pieces (RW, SPC, BSC) from her prior Knee surgeries, has walk in shower with GBs, no falls. Pt met from transport and assisted off stretcher with HHA from higher surface and ambulated to recliner with SBA. Transport managed IC pole. Stood at sink for hand washing with SBA. Pt able to Set up her tray for lunch and self feed. Did desat some with activity to 89% but is on room air currently and can recover >90% with verbal cues for pursed lip breathing and rest breaks. RN reports and pt confirms she showered in standing this morning and dressed herself in her PJs. Pt may be facing renal surgery. Pt is near her baseline now and does not need skilled OT at this time. Pt and family are in agreement. If this changes and pt proceeds to have surgery, please re consult OT and we can reevaluate. Thank you. Probably no OT needs at discharge.      [address] [place] [facility]"" [address] [id number] AM-PAC 6 Clicks Daily Activity Inpatient Short Form:    AM-PAC Daily Activity - Inpatient   How much help is needed for putting on and taking off regular lower body clothing?: None  How much help is needed for bathing (which includes washing, rinsing, drying)?: None  How much help is needed for toileting (which includes using toilet, bedpan, or urinal)?: None  How much help is needed for putting on and taking off regular upper body clothing?: None  How much help is needed for taking care of personal grooming?: None  How much help for eating meals?: None  AM-PAC Inpatient Daily Activity Raw Score: 24  AM-PAC Inpatient ADL T-Scale Score : 57.54  ADL Inpatient CMS 0-100% Score: 0  ADL Inpatient CMS G-Code Modifier : CH       SUBJECTIVE:     Ms. Kennedy Thompson states, \"I know I have a small spot on my kidney. \"     Social/Functional Lives With: Alone  Type of Home: House  Home Layout: One level  Home Access: Level entry  Bathroom Shower/Tub: Shower chair with back, Walk-in shower  Bathroom Toilet: Standard  Bathroom Equipment: Grab bars in shower, Shower chair, 3-in-1 commode, Grab bars around toilet, Hand-held shower  Bathroom Accessibility: Andreas Orosco: Grab bars, [place], rolling, [address], [address]  Has the patient had two or more falls in the past year or any fall with injury in the past year?: No  ADL Assistance: [address]: Independent  Ambulation Assistance: Independent  Transfer Assistance: Independent  Active : Yes  Mode of Transportation: Car    OBJECTIVE:     Elizabeth Spears / Henrry Leyva / Hyun Efraín: IV    RESTRICTIONS/PRECAUTIONS:       PAIN: [place] / O2:   Pre Treatment: none         Post Treatment: none       Vitals stable         Oxygen room air, sats did drop below 90% to 89 with activity but quickly recovered            GROSS EVALUATION: INTACT IMPAIRED   (See Comments)   UE AROM [x] []   UE PROM [] []   Strength [x]       Posture / Balance [x]  Good posture, Good sitting, Fair + dynamic balance   Sensation [x]     Coordination [x]       Tone []       Edema []    Activity Tolerance []  Generally decreased from baseline     Hand Dominance R [x] L []      COGNITION/  PERCEPTION: INTACT IMPAIRED   (See Comments)   Orientation [x]     Vision [x]  Wears glasses   Hearing [x]     Cognition  [x]     Perception [x]       MOBILITY: I Mod I S SBA CGA Min Mod Max Total  NT x2 Comments:   Bed Mobility    Rolling [] [] [] [] [] [] [] [] [] [x] []    Supine to Sit [] [x] [] [] [] [] [] [] [] [] [] stretcher   Scooting [x] [] [] [] [] [] [] [] [] [] []    Sit to Supine [] [x] [] [] [] [] [] [] [] [] [] stretcher   Transfers    Sit to Stand [] [x] [] [] [] [] [] [] [] [] []    Bed to Chair [] [x] [] [] [] [] [] [] [] [] []    Stand to Sit [] [x] [] [] [] [] [] [] [] [] [] Tub/Shower [] [x] [] [] [] [] [] [] [] [] []     Toilet [] [x] [] [] [] [] [] [] [] [] []      [] [] [] [] [] [] [] [] [] [] []    I=Independent, Mod I=Modified Independent, S=Supervision/Setup, SBA=Standby Assistance, CGA=Contact Guard Assistance, Min=Minimal Assistance, Mod=Moderate Assistance, Max=Maximal Assistance, Total=Total Assistance, NT=Not Tested    ACTIVITIES OF DAILY LIVING: I Mod I S SBA CGA Min Mod Max Total NT Comments   BASIC ADLs:              Upper Body Bathing  [] [x] [] [] [] [] [] [] [] []    Lower Body Bathing [] [x] [] [] [] [] [] [] [] []    Toileting [] [x] [] [] [] [] [] [] [] []    Upper Body Dressing [] [x] [] [] [] [] [] [] [] []    Lower Body Dressing [] [x] [] [] [] [] [] [] [] []    Feeding [x] [] [] [] [] [] [] [] [] []    Grooming [] [x] [] [] [] [] [] [] [] [] In standing at sink   Personal [address] [] [] [] [] [] [] [] [] [] []    Functional Mobility [] [x] [] [] [] [] [] [] [] []    I=Independent, Mod I=Modified Independent, S=Supervision/Setup, SBA=Standby Assistance, CGA=Contact Guard Assistance, Min=Minimal Assistance, Mod=Moderate Assistance, Max=Maximal Assistance, Total=Total Assistance, NT=Not Tested    PLAN:   FREQUENCY/DURATION   OT Plan of Care:  (Eval and DC, near baseline)   PROBLEM LIST:   (Skilled intervention is medically necessary to address:)  Decreased ADL/Functional Activities  Decreased Activity Tolerance   INTERVENTIONS PLANNED:  (Benefits and precautions of occupational therapy have been discussed with the patient.)  Self Care Training  Therapeutic Activity  Education  Above completed         TREATMENT:     EVALUATION: LOW COMPLEXITY: (Untimed Charge)    TREATMENT:   Self Care (28 minutes): Patient participated in upper body bathing, lower body bathing, toileting, upper body dressing, lower body dressing, self feeding, and grooming ADLs in supported sitting, unsupported sitting, standing, and supine with minimal verbal cueing to increase activity tolerance and increase safety awareness. Patient also participated in functional mobility, functional transfer, energy conservation, and adaptive equipment training to decrease assistance required, increase activity tolerance, increase safety awareness, and maintain precautions. TREATMENT GRID:  N/A    AFTER TREATMENT PRECAUTIONS: Bed/Chair Locked, Call light within reach, Chair, Needs within reach, RN notified, Visitors at bedside, and up in recliner eating lunch    INTERDISCIPLINARY COLLABORATION:  RN/ PCT, PT/ PTA, OT/ BENSON, and RN Case Manager/      EDUCATION:  Education Given To: Patient; Family;Staff  Education Provided: Role of Therapy;Plan of Care;Precautions; ADL Adaptive Strategies;Transfer Training;Energy Conservation;IADL Safety; Family Education;Equipment; Fall Prevention Strategies  Education Provided Comments: pursed lip breathing  Education Method: Demonstration;Verbal;Teach Back  Barriers to Learning: None  Education Outcome: Verbalized understanding;Demonstrated understanding    TOTAL TREATMENT DURATION AND TIME:  Time In: 1330  Time Out: [address]  Minutes: 28    TRINITY TURNER, OT   Trinity Turner, MS, OTR/L

## 2023-02-19 NOTE — PROGRESS NOTES
ACUTE PHYSICAL THERAPY GOALS:   (Developed with and agreed upon by patient and/or caregiver. )    LTG:    (1.)Ms. Mohan Garcia will transfer from bed to chair and chair to bed with INDEPENDENT using the least restrictive device and maintaining oxygen saturations >90% within 7 treatment day(s). (2.)Ms. Mohan Garcia will ambulate with INDEPENDENT for 400 feet with the least restrictive device within 7 treatment day(s). (3.)Ms. Mohan Garcia will perform standing static and dynamic balance activities x 30 minutes with INDEPENDENT to improve safety within 7 treatment day(s). (4.)Ms. Mohan Garcia will ambulate and/or perform functional activities for  30 consecutive minutes with stable vital signs and no rests required to improve activity tolerance within 7 treatment days.   ________________________________________________________________________________________________      PHYSICAL THERAPY Initial Assessment, Daily Note, and AM  (Link to Caseload Tracking: PT Visit Days : 1  Acknowledge Orders  Time In/Out  PT Charge Capture  Rehab Caseload Tracker    Omar Ma is a [de-identified] y.o. female   PRIMARY DIAGNOSIS: Pulmonary embolism, unspecified chronicity, unspecified pulmonary embolism type, unspecified whether acute cor pulmonale present (HCC)  Dyspnea on exertion [R06.09]  Elevated troponin [R77.8]  Left renal mass [N28.89]  Acute pulmonary embolism without acute cor pulmonale, unspecified pulmonary embolism type (HCC) [I26.99]  Pulmonary embolism, unspecified chronicity, unspecified pulmonary embolism type, unspecified whether acute cor pulmonale present ([address] [I26.99]       Reason for Referral: Generalized Muscle Weakness (M62.81)  Inpatient: Payor: MEDICARE / Plan: MEDICARE PART A AND B / Product Type: *No Product type* /     ASSESSMENT:     REHAB RECOMMENDATIONS:   Recommendation to date pending progress:  Setting:  Home Health Therapy    Equipment:    To Be Determined     ASSESSMENT:  Ms. Mohan Garcia is a [de-identified]year old F who presents to hospital with c/o SOB, tachycardia, chest pain, cough and congestion and admitted with a PE. Pt is on heparin drip. CT scan revealed the PE as well as a renal mass (unsure if pt is aware). Pt is present today supine in bed with son present in room and willing to participate in therapy. Pt is Modified independent with bed mobility and STS. SBA/Mod I while holding IV pole to ambulate around room x30ft. Pt desaturated to 89% with short bout of ambulation, but quickly recovered with seated rest break. RN made aware. Pt left seated reclined in chair with needs in reach. Pt presents as functioning below her baseline, with deficits in mobility including transfers, gait, balance, and activity tolerance. Pt will benefit from skilled therapy services to address stated deficits to promote return to highest level of function, independence, and safety. HHPT recommended upon d/c. Will continue to follow.       [facility]™ “6 Clicks” Basic Mobility Inpatient Short Form  AM-PAC Basic Mobility - Inpatient   How much help is needed turning from your back to your side while in a flat bed without using bedrails?: None  How much help is needed moving from lying on your back to sitting on the side of a flat bed without using bedrails?: None  How much help is needed moving to and from a bed to a chair?: None  How much help is needed standing up from a chair using your arms?: None  How much help is needed walking in hospital room?: None  How much help is needed climbing 3-5 steps with a railing?: None  AM-PAC Inpatient Mobility Raw Score : 24  AM-PAC Inpatient T-Scale Score : 61.14  Mobility Inpatient CMS 0-100% Score: 0  Mobility Inpatient CMS G-Code Modifier : CH    SUBJECTIVE:   Ms. Phipps states, \"Thank you so much\"     Social/Functional Lives With: Alone  Type of Home: House  Home Layout: One level  Home Access: Level entry  Home Equipment: Walker, rolling, Cane  Has the patient had two or more falls in the past year or  any fall with injury in the past year?: No  Ambulation Assistance: Independent  Active : Yes    OBJECTIVE:     PAIN: Lindaann Left / O2: PRECAUTION / Tavia Scrape / DRAINS:   Pre Treatment:          Post Treatment: 0/10 Vitals        Oxygen  O2 Device: None (Room air)  SpO2: (!) 89 % after ambulation x30ft   IV    RESTRICTIONS/PRECAUTIONS:                    GROSS EVALUATION: Intact Impaired (Comments):   AROM [x]     PROM []    Strength []  Generalized weakness   Balance [] Posture: Good  Sitting - Static: Good  Sitting - Dynamic: Good  Standing - Static: Fair, +  Standing - Dynamic: Fair, +   Posture [] N/A   Sensation [x]     Coordination []      Tone []     Edema []    Activity Tolerance [] Patient limited by fatigue, Patient Tolerated treatment well    []      COGNITION/  PERCEPTION: Intact Impaired (Comments):   Orientation [x]     Vision [x]     Hearing [x]     Cognition  [x]       MOBILITY: I Mod I S SBA CGA Min Mod Max Total  NT x2 Comments:   Bed Mobility    Rolling [x] [x] [] [] [] [] [] [] [] [] []    Supine to Sit [] [x] [] [] [] [] [] [] [] [] []    Scooting [x] [] [] [] [] [] [] [] [] [] []    Sit to Supine [] [] [] [] [] [] [] [] [] [] []    Transfers    Sit to Stand [x] [x] [] [] [] [] [] [] [] [] []    Bed to Chair [] [x] [] [] [] [] [] [] [] [] []    Stand to Sit [x] [x] [] [] [] [] [] [] [] [] []     [] [] [] [] [] [] [] [] [] [] []    I=Independent, Mod I=Modified Independent, S=Supervision, SBA=Standby Assistance, CGA=Contact Guard Assistance,   Min=Minimal Assistance, Mod=Moderate Assistance, Max=Maximal Assistance, Total=Total Assistance, NT=Not Tested    GAIT: I Mod I S SBA CGA Min Mod Max Total  NT x2 Comments:   Level of Assistance [] [x] [] [] [] [] [] [] [] [] []    Distance 3030 feet    DME IV pole    Gait Quality Decreased step clearance and Shuffling     Weightbearing Status      Stairs      I=Independent, Mod I=Modified Independent, S=Supervision, SBA=Standby Assistance, CGA=Contact Guard Assistance,   Min=Minimal Assistance, Mod=Moderate Assistance, Max=Maximal Assistance, Total=Total Assistance, NT=Not Tested    PLAN:   FREQUENCY AND DURATION: 3 times/week for duration of hospital stay or until stated goals are met, whichever comes first.    THERAPY PROGNOSIS: Good    PROBLEM LIST:   (Skilled intervention is medically necessary to address:)  Decreased ADL/Functional Activities  Decreased Activity Tolerance  Decreased Balance  Decreased Gait Ability  Decreased Strength  Decreased Transfer Abilities  Increased Pain INTERVENTIONS PLANNED:   (Benefits and precautions of physical therapy have been discussed with the patient.)  Self Care Training  Therapeutic Activity  Therapeutic Exercise/HEP  Neuromuscular Re-education  Gait Training  Education       TREATMENT:   EVALUATION: MODERATE COMPLEXITY: (Untimed Charge)    TREATMENT:   Therapeutic Activity (15 Minutes): Therapeutic activity included Rolling, Supine to Sit, Scooting, Transfer Training, Ambulation on level ground, Sitting balance , and Standing balance to improve functional Activity tolerance, Balance, Mobility, and Strength. TREATMENT GRID:  N/A    AFTER TREATMENT PRECAUTIONS: Bed/Chair Locked, Call light within reach, Chair, Needs within reach, and RN notified    INTERDISCIPLINARY COLLABORATION:  RN/ PCT and PT/ PTA    EDUCATION: Education Given To: Patient; Family  Education Provided: Role of Therapy;Plan of Care;Home Exercise Program  Education Method: Verbal  Barriers to Learning: None  Education Outcome: Verbalized understanding;Demonstrated understanding    TIME IN/OUT:  Time In: 0855  Time Out: 0915  Minutes: [place]

## 2023-02-20 PROBLEM — E87.5 HYPERKALEMIA: Status: ACTIVE | Noted: 2023-02-20

## 2023-02-20 LAB
ANION GAP SERPL CALC-SCNC: 5 MMOL/L (ref 2–11)
ANION GAP SERPL CALC-SCNC: 7 MMOL/L (ref 2–11)
BASOPHILS # BLD: 0 K/UL (ref 0–0.2)
BASOPHILS NFR BLD: 0 % (ref 0–2)
BUN SERPL-MCNC: 18 MG/DL (ref 8–23)
BUN SERPL-MCNC: 19 MG/DL (ref 8–23)
CALCIUM SERPL-MCNC: 9.2 MG/DL (ref 8.3–10.4)
CALCIUM SERPL-MCNC: 9.5 MG/DL (ref 8.3–10.4)
CHLORIDE SERPL-SCNC: 106 MMOL/L (ref 101–110)
CHLORIDE SERPL-SCNC: 111 MMOL/L (ref 101–110)
CO2 SERPL-SCNC: 19 MMOL/L (ref 21–32)
CO2 SERPL-SCNC: 23 MMOL/L (ref 21–32)
CREAT SERPL-MCNC: 1 MG/DL (ref 0.6–1)
CREAT SERPL-MCNC: 1.2 MG/DL (ref 0.6–1)
DIFFERENTIAL METHOD BLD: ABNORMAL
EOSINOPHIL # BLD: 0.4 K/UL (ref 0–0.8)
EOSINOPHIL NFR BLD: 4 % (ref 0.5–7.8)
ERYTHROCYTE [DISTWIDTH] IN BLOOD BY AUTOMATED COUNT: 16.5 % (ref 11.9–14.6)
GLUCOSE BLD STRIP.AUTO-MCNC: 249 MG/DL (ref 65–100)
GLUCOSE BLD STRIP.AUTO-MCNC: 288 MG/DL (ref 65–100)
GLUCOSE SERPL-MCNC: 132 MG/DL (ref 65–100)
GLUCOSE SERPL-MCNC: 225 MG/DL (ref 65–100)
HCT VFR BLD AUTO: 35.9 % (ref 35.8–46.3)
HGB BLD-MCNC: 10.6 G/DL (ref 11.7–15.4)
IMM GRANULOCYTES # BLD AUTO: 0 K/UL (ref 0–0.5)
IMM GRANULOCYTES NFR BLD AUTO: 0 % (ref 0–5)
LYMPHOCYTES # BLD: 2.2 K/UL (ref 0.5–4.6)
LYMPHOCYTES NFR BLD: 23 % (ref 13–44)
MCH RBC QN AUTO: 24.4 PG (ref 26.1–32.9)
MCHC RBC AUTO-ENTMCNC: 29.5 G/DL (ref 31.4–35)
MCV RBC AUTO: 82.7 FL (ref 82–102)
MONOCYTES # BLD: 0.8 K/UL (ref 0.1–1.3)
MONOCYTES NFR BLD: 9 % (ref 4–12)
NEUTS SEG # BLD: 6.3 K/UL (ref 1.7–8.2)
NEUTS SEG NFR BLD: 65 % (ref 43–78)
NRBC # BLD: 0 K/UL (ref 0–0.2)
PLATELET # BLD AUTO: 336 K/UL (ref 150–450)
PMV BLD AUTO: 10.9 FL (ref 9.4–12.3)
POTASSIUM SERPL-SCNC: 5.4 MMOL/L (ref 3.5–5.1)
POTASSIUM SERPL-SCNC: 5.9 MMOL/L (ref 3.5–5.1)
RBC # BLD AUTO: 4.34 M/UL (ref 4.05–5.2)
SERVICE CMNT-IMP: ABNORMAL
SERVICE CMNT-IMP: ABNORMAL
SODIUM SERPL-SCNC: 132 MMOL/L (ref 133–143)
SODIUM SERPL-SCNC: 139 MMOL/L (ref 133–143)
UFH PPP CHRO-ACNC: 0.35 IU/ML (ref 0.3–0.7)
WBC # BLD AUTO: 9.8 K/UL (ref 4.3–11.1)

## 2023-02-20 PROCEDURE — 85025 COMPLETE CBC W/AUTO DIFF WBC: CPT

## 2023-02-20 PROCEDURE — 36415 COLL VENOUS BLD VENIPUNCTURE: CPT

## 2023-02-20 PROCEDURE — 85520 HEPARIN ASSAY: CPT

## 2023-02-20 PROCEDURE — 80048 BASIC METABOLIC PNL TOTAL CA: CPT

## 2023-02-20 PROCEDURE — 94640 AIRWAY INHALATION TREATMENT: CPT

## 2023-02-20 PROCEDURE — 6370000000 HC RX 637 (ALT 250 FOR IP): Performed by: HOSPITALIST

## 2023-02-20 PROCEDURE — 6370000000 HC RX 637 (ALT 250 FOR IP): Performed by: INTERNAL MEDICINE

## 2023-02-20 PROCEDURE — 2580000003 HC RX 258: Performed by: HOSPITALIST

## 2023-02-20 PROCEDURE — 99232 SBSQ HOSP IP/OBS MODERATE 35: CPT | Performed by: UROLOGY

## 2023-02-20 PROCEDURE — 2500000003 HC RX 250 WO HCPCS: Performed by: HOSPITALIST

## 2023-02-20 PROCEDURE — 6360000002 HC RX W HCPCS: Performed by: EMERGENCY MEDICINE

## 2023-02-20 PROCEDURE — 1100000000 HC RM PRIVATE

## 2023-02-20 PROCEDURE — 82962 GLUCOSE BLOOD TEST: CPT

## 2023-02-20 RX ORDER — PREDNISONE 20 MG/1
40 TABLET ORAL DAILY
Status: DISCONTINUED | OUTPATIENT
Start: 2023-02-20 | End: 2023-02-21 | Stop reason: HOSPADM

## 2023-02-20 RX ADMIN — SODIUM CHLORIDE, PRESERVATIVE FREE 10 ML: 5 INJECTION INTRAVENOUS at 21:54

## 2023-02-20 RX ADMIN — NYSTATIN 15 ML: 100000 SUSPENSION ORAL at 21:53

## 2023-02-20 RX ADMIN — MOMETASONE FUROATE AND FORMOTEROL FUMARATE DIHYDRATE 2 PUFF: 200; 5 AEROSOL RESPIRATORY (INHALATION) at 19:30

## 2023-02-20 RX ADMIN — MOMETASONE FUROATE AND FORMOTEROL FUMARATE DIHYDRATE 2 PUFF: 200; 5 AEROSOL RESPIRATORY (INHALATION) at 08:20

## 2023-02-20 RX ADMIN — APIXABAN 10 MG: 5 TABLET, FILM COATED ORAL at 21:53

## 2023-02-20 RX ADMIN — PREDNISONE 40 MG: 20 TABLET ORAL at 10:40

## 2023-02-20 RX ADMIN — APIXABAN 10 MG: 5 TABLET, FILM COATED ORAL at 10:39

## 2023-02-20 RX ADMIN — HEPARIN SODIUM 18 UNITS/KG/HR: 10000 INJECTION, SOLUTION INTRAVENOUS at 03:02

## 2023-02-20 RX ADMIN — INSULIN LISPRO 2 UNITS: 100 INJECTION, SOLUTION INTRAVENOUS; SUBCUTANEOUS at 17:33

## 2023-02-20 RX ADMIN — PANTOPRAZOLE SODIUM 40 MG: 40 TABLET, DELAYED RELEASE ORAL at 05:59

## 2023-02-20 RX ADMIN — GUAIFENESIN 200 MG: 100 LIQUID ORAL at 17:43

## 2023-02-20 RX ADMIN — ATORVASTATIN CALCIUM 10 MG: 10 TABLET, FILM COATED ORAL at 09:10

## 2023-02-20 RX ADMIN — GUAIFENESIN 200 MG: 100 LIQUID ORAL at 21:53

## 2023-02-20 RX ADMIN — SODIUM CHLORIDE, PRESERVATIVE FREE 10 ML: 5 INJECTION INTRAVENOUS at 09:10

## 2023-02-20 RX ADMIN — AMLODIPINE BESYLATE 10 MG: 10 TABLET ORAL at 09:10

## 2023-02-20 RX ADMIN — GUAIFENESIN 200 MG: 100 LIQUID ORAL at 09:09

## 2023-02-20 ASSESSMENT — PAIN SCALES - GENERAL: PAINLEVEL_OUTOF10: 0

## 2023-02-20 NOTE — PROGRESS NOTES
Doing well. No new complaints. AF.  VSS  Cr 1.0  LE duplex neg for DVT. CT RMP yesterday shows a 2.7cm enhancing LMP renal mass without RVI or mets. Also noted is a suspicious LUP lesion without enhancement but remains suspicious for neoplasm. Incidental L renal masses/PE. I recommended a L lap nephrectomy when optimized from her PE standpt. This is non urgent. Recommend discharge with oral anticoagulation. Need to discuss optimal time to proceed. RTO in 2 wks.

## 2023-02-20 NOTE — PROGRESS NOTES
Hospitalist Progress Note   Admit Date:  2023  6:31 PM   Name:  Miah Berumen   Age:  [de-identified] y.o. Sex:  female  :  1940   MRN:  [id number]   Room:  [facility]    Presenting Complaint: Shortness of Breath     Reason(s) for Admission: Dyspnea on exertion [R06.09]  Elevated troponin [R77.8]  Left renal mass [N28.89]  Acute pulmonary embolism without acute cor pulmonale, unspecified pulmonary embolism type (HCC) [I26.99]  Pulmonary embolism, unspecified chronicity, unspecified pulmonary embolism type, unspecified whether acute cor pulmonale present [facility]) [I26.99]     Hospital Course:   Miah Berumen is a [de-identified] y.o. female with medical history of HTN, DM 2, admitted on  with acute right lower and upper lobe PE and a complex left renal mass suspicious of malignancy. Patient presented with shortness of breath, chest pain, tachycardia. COVID and flu checked at urgent care was negative. Urology consulted. Appreciate recommendations. CT renal mass protocol was done and showed findings concerning for left renal cell carcinoma. Urology recommends continuation of anticoagulation for PE. They would like to follow-up patient in the office in 2 weeks to discuss further plan of care regarding nephrectomy. Subjective & 24hr Events (23): Patient is resting in bed. Still has cough and is wheezing. Chest pain improved. Bilateral leg tingling and numbness improved. No nausea no vomiting. No abdominal pain no diarrhea. ROS:  10 point review system is negative except for what mentioned above. Assessment & Plan:     Principal Problem: This is a 29-year-old female with:    Pulmonary embolism, unspecified chronicity, unspecified pulmonary embolism type, unspecified whether acute cor pulmonale present (HCC)  Heparin drip switched to Eliquis this morning. Monitor for bleeding. Hemoglobin stable at 10.6 this morning. Patient is on room air.   Continue DuoNebs every 4 hours while awake.  Duplex ultrasound bilateral lower extremities negative  Patient will need ambulatory pulse ox prior to discharge, likely tomorrow. Added p.o. steroids as patient has wheezing. \\    Renal cell carcinoma  Complex mass partially seen in the left kidney on CT chest.  CT renal mass protocol showed findings concerning for renal cell carcinoma. Urology consulted. Appreciate recommendations. Spoke with urology today. Urology would like to follow patient in the office in 2 weeks to discuss further plan of care regarding timing of nephrectomy. As patient currently has acute PE, recommendations to continue 3 to 6 months of oral anticoagulation and then patient may need nephrectomy. Mild hyperkalemia  Potassium 5.4 this morning. Initially hypokalemic for which she received p.o. potassium supplements. Repeat BMP this afternoon. Active Problems:    Diabetes mellitus type 2, diet-controlled (HCC)  Continue Humalog sliding scale QA CHS. Hypertension  Plan: Blood pressure is optimally controlled. Resume amlodipine 10 mg p.o. daily along with lisinopril 10 mg p.o. daily. Patient only takes Prinzide Zestoretic 20-12.5 mg p.o. daily at home. PT/OT evals and PPD needed/ordered? Ordered for PT OT    Disposition: Anticipate discharge home tomorrow. Home oxygen screen prior to discharge. Diet:  ADULT DIET; Regular; No Added Salt (3-4 gm)  VTE prophylaxis: Eliquis  Code status: Full Code      Hospital Problems:  Principal Problem:    Pulmonary embolism, unspecified chronicity, unspecified pulmonary embolism type, unspecified whether acute cor pulmonale present ([address]  Active Problems:    Diabetes mellitus type 2, diet-controlled ([address]    Hypertension    Renal mass of unknown nature  Resolved Problems:    * No resolved hospital problems.  *      Objective:   Patient Vitals for the past 24 hrs:   Temp Pulse Resp BP SpO2   02/20/23 1029 98.6 °F (37 °C) 84 18 (!) 149/50 94 %   02/20/23 0810 98.1 °F (36.7 °C) 84 18 121/80 94 %   02/20/23 0300 98.1 °F (36.7 °C) 79 18 134/66 95 %   02/19/23 2330 98.6 °F (37 °C) 82 18 (!) 146/59 94 %   02/19/23 2005 -- 85 18 -- 97 %   02/19/23 1945 98.2 °F (36.8 °C) 72 18 (!) 152/54 96 %   02/19/23 1556 -- 78 17 -- 95 %   02/19/23 1529 99 °F (37.2 °C) 77 18 (!) 145/54 97 %       Oxygen Therapy  SpO2: 94 %  Pulse Oximeter Device Mode: Intermittent  O2 Device: None (Room air)  O2 Flow Rate (L/min): 1 L/min    Estimated body mass index is 30.95 kg/m² as calculated from the following:    Height as of this encounter: 5' 5\" (1.651 m). Weight as of this encounter: 186 lb (84.4 kg). Intake/Output Summary (Last 24 hours) at 2/20/2023 1339  Last data filed at 2/20/2023 0830  Gross per 24 hour   Intake 480 ml   Output --   Net 480 ml         Physical Exam:     Blood pressure (!) 149/50, pulse 84, temperature 98.6 °F (37 °C), temperature source Oral, resp. rate 18, height 5' 5\" (1.651 m), weight 186 lb (84.4 kg), SpO2 94 %. General:    Elderly alert, awake, female, ill-appearing,  Head:  Normocephalic, atraumatic  Eyes:  Sclerae appear normal.  Pupils equally round. ENT:  Nares appear normal.  Moist oral mucosa  Neck:  No restricted ROM. Trachea midline   CV:   RRR. No m/r/g. No jugular venous distension. Lungs:   CTAB. No wheezing, rhonchi, or rales. Symmetric expansion. Abdomen:   Soft, nontender, nondistended. Active bowel sounds, no organomegaly,  Extremities: No cyanosis or clubbing. No edema  Skin:     No rashes and normal coloration. Warm and dry. Neuro:  CN II-XII grossly intact. No focal neurological deficits,  Psych:  Normal mood and affect.       I have personally reviewed labs and tests:  Recent Labs:  Recent Results (from the past 48 hour(s))   POCT Glucose    Collection Time: 02/18/23  4:23 PM   Result Value Ref Range    POC Glucose 175 (H) 65 - 100 mg/dL    Performed by: Juan Sullivan    Anti-Xa, Unfractionated Heparin    Collection Time: 02/18/23  5:52 PM Result Value Ref Range    Anti-XA Unfrac Heparin 0.45 0.3 - 0.7 IU/mL   POCT Glucose    Collection Time: 02/18/23  8:35 PM   Result Value Ref Range    POC Glucose 232 (H) 65 - 100 mg/dL    Performed by: Ricardo    Anti-Xa, Unfractionated Heparin    Collection Time: 02/18/23  9:42 PM   Result Value Ref Range    Anti-XA Unfrac Heparin 0.43 0.3 - 0.7 IU/mL   Anti-Xa, Unfractionated Heparin    Collection Time: 02/19/23  5:06 AM   Result Value Ref Range    Anti-XA Unfrac Heparin 0.47 0.3 - 0.7 IU/mL   POCT Glucose    Collection Time: 02/19/23  7:49 AM   Result Value Ref Range    POC Glucose 128 (H) 65 - 100 mg/dL    Performed by: JorgeRocketboomPCT    POCT Glucose    Collection Time: 02/19/23 11:24 AM   Result Value Ref Range    POC Glucose 146 (H) 65 - 100 mg/dL    Performed by: JosefinaArgos RiskPCT    POCT Glucose    Collection Time: 02/19/23  4:28 PM   Result Value Ref Range    POC Glucose 159 (H) 65 - 100 mg/dL    Performed by:  JorgeArgos RiskPCT    POCT Glucose    Collection Time: 02/19/23  8:13 PM   Result Value Ref Range    POC Glucose 186 (H) 65 - 100 mg/dL    Performed by: Viktoriya    Anti-Xa, Unfractionated Heparin    Collection Time: 02/20/23  5:01 AM   Result Value Ref Range    Anti-XA Unfrac Heparin 0.35 0.3 - 0.7 IU/mL   CBC with Auto Differential    Collection Time: 02/20/23  5:01 AM   Result Value Ref Range    WBC 9.8 4.3 - 11.1 K/uL    RBC 4.34 4.05 - 5.2 M/uL    Hemoglobin 10.6 (L) 11.7 - 15.4 g/dL    Hematocrit 35.9 35.8 - 46.3 %    MCV 82.7 82 - 102 FL    MCH 24.4 (L) 26.1 - 32.9 PG    MCHC 29.5 (L) 31.4 - 35.0 g/dL    RDW 16.5 (H) 11.9 - 14.6 %    Platelets 851 423 - 726 K/uL    MPV 10.9 9.4 - 12.3 FL    nRBC 0.00 0.0 - 0.2 K/uL    Differential Type AUTOMATED      Seg Neutrophils 65 43 - 78 %    Lymphocytes 23 13 - 44 %    Monocytes 9 4.0 - 12.0 %    Eosinophils % 4 0.5 - 7.8 %    Basophils 0 0.0 - 2.0 %    Immature Granulocytes 0 0.0 - 5.0 %    Segs Absolute 6.3 1.7 - 8.2 K/UL    Absolute Lymph # 2.2 0.5 - 4.6 K/UL    Absolute Mono # 0.8 0.1 - 1.3 K/UL    Absolute Eos # 0.4 0.0 - 0.8 K/UL    Basophils Absolute 0.0 0.0 - 0.2 K/UL    Absolute Immature Granulocyte 0.0 0.0 - 0.5 K/UL   Basic Metabolic Panel w/ Reflex to MG    Collection Time: 02/20/23  5:01 AM   Result Value Ref Range    Sodium 139 133 - 143 mmol/L    Potassium 5.4 (H) 3.5 - 5.1 mmol/L    Chloride 111 (H) 101 - 110 mmol/L    CO2 23 21 - 32 mmol/L    Anion Gap 5 2 - 11 mmol/L    Glucose 132 (H) 65 - 100 mg/dL    BUN 18 8 - 23 MG/DL    Creatinine 1.00 0.6 - 1.0 MG/DL    Est, Glom Filt Rate 57 (L) >60 ml/min/1.73m2    Calcium 9.5 8.3 - 10.4 MG/DL       I have personally reviewed imaging studies:  Other Studies:  Vascular duplex lower extremity venous bilateral   Final Result   1.  No evidence of deep venous thrombosis in either lower extremity.         CT ABDOMEN RENAL MASS Additional Contrast? Radiologist Recommendation   Final Result   1.  Left interpolar and anterior solid enhancing renal lesion highly suspicious   for renal cell carcinoma measuring 25 x 26 x 27 mm without evidence of renal   hilum invasion and without involvement of the left renal vein.   2.  High density exophytic solid-appearing superior pole left renal lesion   although without clear enhancement which is somewhat suspicious for neoplasm and   could be further evaluated with renal ultrasound.   3.  Focal dorsal sclerotic appearance of the L5 vertebral body hemangioma   although incompletely evaluated.  Recommend comparison with prior   cross-sectional imaging of the lumbar spine of available and if no comparison   imaging available a lumbar spine MRI with and without contrast for further   evaluation                  CT CHEST PULMONARY EMBOLISM W CONTRAST   Final Result      1.  Pulmonary emboli within the distal right main pulmonary artery extending to    the right upper lobe and right lower lobe. Low burden of emboli. No heart    strain.   2.  Complex mass partially seen in the  left kidney. This is worrisome for    possible malignancy. Dedicated evaluation of the kidneys recommended when    patient clinically stable. .       ER physician physician given report at 7:18 PM   Thank you for the referral of this patient. This exam was interpreted by an    American Board of Radiology certified radiologist with subspecialty fellowship    in [place] [id number]. If there are any questions regarding this exam please feel free    to contact a radiologist directly at [phone]. Raissa West M.D.    2/17/2023 9:18:00 PM      XR CHEST PORTABLE   Final Result      Mild hazy opacity at the left lung base, may reflect atelectasis, early    consolidation not excluded.                Garcia Roberson M.D.    2/17/2023 7:28:00 PM          Current Meds:  Current Facility-Administered Medications   Medication Dose Route Frequency    apixaban (ELIQUIS) tablet 10 mg  10 mg Oral BID    Followed by    Migue Villasenor ON 2/27/2023] apixaban (ELIQUIS) tablet 5 mg  5 mg Oral BID    predniSONE (DELTASONE) tablet 40 mg  40 mg Oral Daily    guaiFENesin (ROBITUSSIN) 100 MG/5ML liquid 200 mg  200 mg Oral Q4H PRN    ipratropium-albuterol (DUONEB) nebulizer solution 1 ampule  1 ampule Inhalation Q4H PRN    mometasone-formoterol (DULERA) 200-5 MCG/ACT inhaler 2 puff  2 puff Inhalation BID    amLODIPine (NORVASC) tablet 10 mg  10 mg Oral Daily    atorvastatin (LIPITOR) tablet 10 mg  10 mg Oral Daily    [Held by provider] lisinopril (PRINIVIL;ZESTRIL) tablet 10 mg  10 mg Oral Daily    pantoprazole (PROTONIX) tablet 40 mg  40 mg Oral QAM AC    0.9 % sodium chloride bolus  100 mL IntraVENous ONCE PRN    sodium chloride flush 0.9 % injection 10 mL  10 mL IntraVENous ONCE PRN    sodium chloride flush 0.9 % injection 5-40 mL  5-40 mL IntraVENous 2 times per day    sodium chloride flush 0.9 % injection 5-40 mL  5-40 mL IntraVENous PRN    0.9 % sodium chloride infusion   IntraVENous PRN    ondansetron (ZOFRAN-ODT) disintegrating tablet 4 mg  4 mg Oral Q8H PRN    Or    ondansetron (ZOFRAN) injection 4 mg  4 mg IntraVENous Q6H PRN    polyethylene glycol (GLYCOLAX) packet 17 g  17 g Oral Daily PRN    acetaminophen (TYLENOL) tablet 650 mg  650 mg Oral Q6H PRN    Or    acetaminophen (TYLENOL) suppository 650 mg  650 mg Rectal Q6H PRN    insulin lispro (HUMALOG) injection vial 0-8 Units  0-8 Units SubCUTAneous TID WC    insulin lispro (HUMALOG) injection vial 0-4 Units  0-4 Units SubCUTAneous Nightly    labetalol (NORMODYNE;TRANDATE) injection 10 mg  10 mg IntraVENous Q6H PRN    hydrALAZINE (APRESOLINE) injection 10 mg  10 mg IntraVENous Q4H PRN    glucose chewable tablet 16 g  4 tablet Oral PRN    dextrose bolus 10% 125 mL  125 mL IntraVENous PRN    Or    dextrose bolus 10% 250 mL  250 mL IntraVENous PRN    glucagon (rDNA) injection 1 mg  1 mg SubCUTAneous PRN    dextrose 10 % infusion   IntraVENous Continuous PRN       Signed:  Darryl Tovar MD    Part of this note may have been written by using a voice dictation software. The note has been proof read but may still contain some grammatical/other typographical errors.

## 2023-02-20 NOTE — CARE COORDINATION
MSN, CM:  spoke with patient this afternoon about discharge planning. Patient lives alone in own house with no steps for entrance. Patient is independent with all ADL's and requires no equipment for ambulation. Patient is retired and able to  herself to all appointments. PT/OT recommend no services. Patient will discharge early in AM.  Case Management will continue to follow for any discharge needs that may arise. 02/20/23 1237   Service Assessment   Patient Orientation Alert and Oriented   Cognition Alert   History Provided By Patient   Primary [address]   Patient's Healthcare Decision Maker is: Named in [address] [place] [address]  (son)   PCP Verified by CM Yes   Last Visit to PCP Within last 3 months   Prior Functional Level Independent in ADLs/IADLs   Current Functional Level Independent in ADLs/IADLs   Can patient return to prior living arrangement Yes   Ability to make needs known: Good   Family able to assist with home care needs: Yes   Would you like for me to discuss the discharge plan with any other family members/significant others, and if so, who? Yes   Financial Resources Medicare  ([employer] Secondary)   Community Resources None   Social/Functional History   Lives With Alone   Type of [address] [facility] One level   Home Access Level entry   Bathroom Shower/Tub Walk-in shower; Shower chair with back   [address] bars in shower;Hand-held shower   [id number] [facility] Help From Family   ADL Assistance Independent   Homemaking Assistance Independent   Homemaking Responsibilities Yes   Ambulation Assistance Independent   Transfer Assistance Independent   Active  Yes   Mode of Transportation SUV   Occupation Retired   Discharge Planning   Type of [address] [place] [address] Prior To Admission None   DME Ordered?  No   Potential Assistance Purchasing Medications No   Type of Home Care Services None   Patient expects to be discharged to: House   One/Two Story Residence One story   History of falls? 0

## 2023-02-20 NOTE — PROGRESS NOTES
02/20/23 1510   Resting (Room Air)   SpO2 93   HR 82   During Walk (Room Air)   SpO2 91   HR 94   Rate of Dyspnea 1   After Walk   SpO2 94   HR 88   Does the Patient Qualify for Home O2 No

## 2023-02-21 VITALS
HEIGHT: 65 IN | SYSTOLIC BLOOD PRESSURE: 150 MMHG | RESPIRATION RATE: 18 BRPM | BODY MASS INDEX: 30.99 KG/M2 | WEIGHT: 186 LBS | HEART RATE: 80 BPM | OXYGEN SATURATION: 95 % | DIASTOLIC BLOOD PRESSURE: 68 MMHG | TEMPERATURE: 98.1 F

## 2023-02-21 LAB
ANION GAP SERPL CALC-SCNC: 5 MMOL/L (ref 2–11)
BASOPHILS # BLD: 0 K/UL (ref 0–0.2)
BASOPHILS NFR BLD: 0 % (ref 0–2)
BUN SERPL-MCNC: 23 MG/DL (ref 8–23)
CALCIUM SERPL-MCNC: 9.9 MG/DL (ref 8.3–10.4)
CHLORIDE SERPL-SCNC: 107 MMOL/L (ref 101–110)
CO2 SERPL-SCNC: 23 MMOL/L (ref 21–32)
CREAT SERPL-MCNC: 1 MG/DL (ref 0.6–1)
DIFFERENTIAL METHOD BLD: ABNORMAL
EOSINOPHIL # BLD: 0 K/UL (ref 0–0.8)
EOSINOPHIL NFR BLD: 0 % (ref 0.5–7.8)
ERYTHROCYTE [DISTWIDTH] IN BLOOD BY AUTOMATED COUNT: 15.9 % (ref 11.9–14.6)
GLUCOSE BLD STRIP.AUTO-MCNC: 125 MG/DL (ref 65–100)
GLUCOSE SERPL-MCNC: 185 MG/DL (ref 65–100)
HCT VFR BLD AUTO: 35.2 % (ref 35.8–46.3)
HGB BLD-MCNC: 10.8 G/DL (ref 11.7–15.4)
IMM GRANULOCYTES # BLD AUTO: 0.1 K/UL (ref 0–0.5)
IMM GRANULOCYTES NFR BLD AUTO: 0 % (ref 0–5)
LYMPHOCYTES # BLD: 1.3 K/UL (ref 0.5–4.6)
LYMPHOCYTES NFR BLD: 11 % (ref 13–44)
MCH RBC QN AUTO: 24.9 PG (ref 26.1–32.9)
MCHC RBC AUTO-ENTMCNC: 30.7 G/DL (ref 31.4–35)
MCV RBC AUTO: 81.1 FL (ref 82–102)
MONOCYTES # BLD: 0.8 K/UL (ref 0.1–1.3)
MONOCYTES NFR BLD: 8 % (ref 4–12)
NEUTS SEG # BLD: 9 K/UL (ref 1.7–8.2)
NEUTS SEG NFR BLD: 81 % (ref 43–78)
NRBC # BLD: 0 K/UL (ref 0–0.2)
PLATELET # BLD AUTO: 321 K/UL (ref 150–450)
PMV BLD AUTO: 10.5 FL (ref 9.4–12.3)
POTASSIUM SERPL-SCNC: 5.3 MMOL/L (ref 3.5–5.1)
RBC # BLD AUTO: 4.34 M/UL (ref 4.05–5.2)
SERVICE CMNT-IMP: ABNORMAL
SODIUM SERPL-SCNC: 135 MMOL/L (ref 133–143)
WBC # BLD AUTO: 11.2 K/UL (ref 4.3–11.1)

## 2023-02-21 PROCEDURE — 82962 GLUCOSE BLOOD TEST: CPT

## 2023-02-21 PROCEDURE — 6370000000 HC RX 637 (ALT 250 FOR IP): Performed by: HOSPITALIST

## 2023-02-21 PROCEDURE — 80048 BASIC METABOLIC PNL TOTAL CA: CPT

## 2023-02-21 PROCEDURE — 85025 COMPLETE CBC W/AUTO DIFF WBC: CPT

## 2023-02-21 PROCEDURE — 94640 AIRWAY INHALATION TREATMENT: CPT

## 2023-02-21 PROCEDURE — 2500000003 HC RX 250 WO HCPCS: Performed by: HOSPITALIST

## 2023-02-21 RX ORDER — GUAIFENESIN 200 MG/10ML
200 LIQUID ORAL EVERY 4 HOURS PRN
Qty: 236 ML | Refills: 0 | Status: SHIPPED | OUTPATIENT
Start: 2023-02-21

## 2023-02-21 RX ORDER — PREDNISONE 20 MG/1
40 TABLET ORAL DAILY
Qty: 6 TABLET | Refills: 0 | Status: SHIPPED | OUTPATIENT
Start: 2023-02-21 | End: 2023-02-24

## 2023-02-21 RX ADMIN — GUAIFENESIN 200 MG: 100 LIQUID ORAL at 03:22

## 2023-02-21 RX ADMIN — ATORVASTATIN CALCIUM 10 MG: 10 TABLET, FILM COATED ORAL at 10:01

## 2023-02-21 RX ADMIN — AMLODIPINE BESYLATE 10 MG: 10 TABLET ORAL at 10:04

## 2023-02-21 RX ADMIN — PREDNISONE 40 MG: 20 TABLET ORAL at 10:01

## 2023-02-21 RX ADMIN — APIXABAN 10 MG: 5 TABLET, FILM COATED ORAL at 10:01

## 2023-02-21 RX ADMIN — MOMETASONE FUROATE AND FORMOTEROL FUMARATE DIHYDRATE 2 PUFF: 200; 5 AEROSOL RESPIRATORY (INHALATION) at 07:02

## 2023-02-21 RX ADMIN — SODIUM ZIRCONIUM CYCLOSILICATE 5 G: 10 POWDER, FOR SUSPENSION ORAL at 10:02

## 2023-02-21 RX ADMIN — PANTOPRAZOLE SODIUM 40 MG: 40 TABLET, DELAYED RELEASE ORAL at 06:16

## 2023-02-21 ASSESSMENT — PAIN SCALES - GENERAL
PAINLEVEL_OUTOF10: 0
PAINLEVEL_OUTOF10: 0

## 2023-02-21 NOTE — DISCHARGE SUMMARY
Hospitalist Discharge Summary   Admit Date:  2023  6:31 PM   DC Note date: 2023  Name:  Nikki Phipps   Age:  82 y.o.  Sex:  female  :  1940   MRN:  [id number]   Room:  [facility]  PCP:  Charlene Davis MD    Presenting Complaint: Shortness of Breath     Initial Admission Diagnosis: Dyspnea on exertion [R06.09]  Elevated troponin [R77.8]  Left renal mass [N28.89]  Acute pulmonary embolism without acute cor pulmonale, unspecified pulmonary embolism type (HCC) [I26.99]  Pulmonary embolism, unspecified chronicity, unspecified pulmonary embolism type, unspecified whether acute cor pulmonale present (HCC) [I26.99]     Problem List for this Hospitalization (present on admission):    Principal Problem:    Pulmonary embolism, unspecified chronicity, unspecified pulmonary embolism type, unspecified whether acute cor pulmonale present (HCC)  Active Problems:    Diabetes mellitus type 2, diet-controlled (HCC)    Hypertension    Renal mass of unknown nature    Hyperkalemia  Resolved Problems:    * No resolved hospital problems. *      Hospital Course:    This is a 82 y.o. female with medical history of HTN, DM 2, admitted on  with acute right lower and upper lobe PE and a complex left renal mass suspicious of malignancy.  Patient presented with shortness of breath, chest pain, tachycardia.  COVID and flu checked at urgent care was negative.  For her PE, patient was initially on heparin drip.  Subsequently switched to Eliquis.  She may need to remain on anticoagulation for at least 3 to 6 months.  After that, she needs to follow-up with primary care physician/hematology to determine if she is a candidate for long-term anticoagulation.  She does not have any signs of bleeding.  Hemoglobin is stable.  Home oxygen screen was done prior to discharge and patient did not qualify for any oxygen.  Urology consulted.  Appreciate recommendations.  CT renal mass protocol was done and showed findings concerning for  left renal cell carcinoma. Urology recommends continuation of anticoagulation for PE. Urology recommends that the patient follow-up in the office in 2 weeks to discuss further plan of care regarding nephrectomy. She has mild hyperkalemia which likely is from poor potassium supplementation for her hypokalemia. Patient advised to hold lisinopril, hydrochlorothiazide on discharge. She will need repeat BMP at [address] [place] [address] office visit and may be restarted on home medications. Potassium on discharge was 5.3. Other chronic medical runs which are stable including hypertension, diabetes mellitus type 2. Patient is discharged home today in a stable condition. Disposition: Home today  Diet: ADULT DIET; Regular; No Added Salt (3-4 gm)  Code Status: Full Code    Follow Ups:   Follow-up Information     Cortney Pitts MD. Schedule an appointment as soon as possible for a   visit on 2/28/2023. Specialty: Internal Medicine  Why: 1:40 pm  Contact information:  [address] [facility] [address] [phone]             [facility] 1 Follow up. Why: the office will call you with an appointment  Contact information:  [address]  [address] [place] [address] [phone]                     Time spent in patient discharge and coordination 39 minutes. Follow up labs/diagnostics (ultimately defer to outpatient provider):  CBC, BMP in 3 to 5 days. Plan was discussed with the patient. All questions answered. Patient was stable at time of discharge. Instructions given to call a physician or return if any concerns.     Discharge Medication List as of 2/21/2023 10:41 AM        START taking these medications    Details   guaiFENesin (ROBITUSSIN) 100 MG/5ML liquid Take 10 mLs by mouth every 4 hours as needed for Cough, Disp-236 mL, R-0Normal      apixaban (ELIQUIS) 5 MG TABS tablet Take 10 mg po bid for 6 days until 2/26/23, and then 5 mg po bid from 2/27/23, Disp-60 tablet, R-1Normal           CONTINUE these medications which have CHANGED    Details   predniSONE (DELTASONE) 20 MG tablet Take 2 tablets by mouth daily for 3 days, Disp-6 tablet, R-0Normal           CONTINUE these medications which have NOT CHANGED    Details   ipratropium-albuterol (DUONEB) 0.5-2.5 (3) MG/3ML SOLN nebulizer solution Inhale 3 mLs into the lungs every 4 hours as needed for Shortness of Breath, Disp-540 mL, R-0Normal      Nebulizers (COMPRESSOR/NEBULIZER) MISC Disp-1 each, R-3, NormalDuoNeb treatment every 4 hours as needed for shortness of breath      fluticasone-salmeterol (ADVAIR DISKUS) 250-50 MCG/ACT AEPB diskus inhaler Inhale 1 puff into the lungs in the morning and 1 puff in the evening., Disp-60 each, R-1Normal      amLODIPine (NORVASC) 10 MG tablet Take 1 tablet by mouth daily, Disp-30 tablet, R-3Print      Dextromethorphan-guaiFENesin (MUCINEX DM MAXIMUM STRENGTH)  MG TB12 Take 1 tablet by mouth in the morning and at bedtime, Disp-28 tablet, R-0Print      albuterol sulfate HFA (VENTOLIN HFA) 108 (90 Base) MCG/ACT inhaler Inhale 2 puffs into the lungs 4 times daily as needed for Wheezing, Disp-18 g, R-1Print      colchicine (COLCRYS) 0.6 MG tablet Pt to take one po every hour until symptoms improved or pt has diarrheaHistorical Med      docusate (COLACE, DULCOLAX) 100 MG CAPS Take 100 mg by mouth dailyHistorical Med      lisinopril-hydroCHLOROthiazide (PRINZIDE;ZESTORETIC) 20-12.5 MG per tablet Take 1 tablet by mouth dailyHistorical Med      omeprazole (PRILOSEC) 40 MG delayed release capsule Take 40 mg by mouth dailyHistorical Med      simvastatin (ZOCOR) 40 MG tablet Take 40 mg by mouth dailyHistorical Med             Some medications may have been reported old/obsolete and marked \"stop taking\" by the system; in reality pt was already off these meds; defer to outpatient or prescribing providers.     Procedures done this admission:  * No surgery found *    Consults this admission:  IP CONSULT TO UROLOGY    Echocardiogram results:  No results found for this or any previous visit. Diagnostic Imaging/Tests:   XR CHEST PORTABLE    Result Date: 2/17/2023  Mild hazy opacity at the left lung base, may reflect atelectasis, early consolidation not excluded. Chris Zuniga M.D. 2/17/2023 7:28:00 PM    CT CHEST PULMONARY EMBOLISM W CONTRAST    Result Date: 2/17/2023  1. Pulmonary emboli within the distal right main pulmonary artery extending to the right upper lobe and right lower lobe. Low burden of emboli. No heart strain. 2.  Complex mass partially seen in the left kidney. This is worrisome for possible malignancy. Dedicated evaluation of the kidneys recommended when patient clinically stable. . ER physician physician given report at 7:18 PM Thank you for the referral of this patient. This exam was interpreted by an American Board of Radiology certified radiologist with subspecialty fellowship in [place] [address]. If there are any questions regarding this exam please feel free  to contact a radiologist directly at [phone]. Raymond Carrillo M.D. 2/17/2023 9:18:00 PM    Vascular duplex lower extremity venous bilateral    Result Date: 2/19/2023  1. No evidence of deep venous thrombosis in either lower extremity. CT ABDOMEN RENAL MASS Additional Contrast? Radiologist Recommendation    Result Date: 2/20/2023  1. Left interpolar and anterior solid enhancing renal lesion highly suspicious for renal cell carcinoma measuring 25 x 26 x 27 mm without evidence of renal hilum invasion and without involvement of the left renal vein. 2.  High density exophytic solid-appearing superior pole left renal lesion although without clear enhancement which is somewhat suspicious for neoplasm and could be further evaluated with renal ultrasound. 3.  Focal dorsal sclerotic appearance of the L5 vertebral body hemangioma although incompletely evaluated.   Recommend comparison with prior cross-sectional imaging of the lumbar spine of available and if no comparison imaging available a lumbar spine MRI with and without contrast for further evaluation        Labs: Results:       BMP, Mg, Phos Recent Labs     02/20/23  0501 02/20/23  1618 02/21/23  0318    132* 135   K 5.4* 5.9* 5.3*   * 106 107   CO2 23 19* 23   ANIONGAP 5 7 5   BUN 18 19 23   CREATININE 1.00 1.20* 1.00   LABGLOM 57* 46* 57*   CALCIUM 9.5 9.2 9.9   GLUCOSE 132* 225* 185*      CBC Recent Labs     02/20/23  0501 02/21/23  0318   WBC 9.8 11.2*   RBC 4.34 4.34   HGB 10.6* 10.8*   HCT 35.9 35.2*   MCV 82.7 81.1*   MCH 24.4* 24.9*   MCHC 29.5* 30.7*   RDW 16.5* 15.9*    321   MPV 10.9 10.5   NRBC 0.00 0.00   SEGS 65 81*   LYMPHOPCT 23 11*   EOSRELPCT 4 0*   MONOPCT 9 8   BASOPCT 0 0   IMMGRAN 0 0   SEGSABS 6.3 9.0*   LYMPHSABS 2.2 1.3   EOSABS 0.4 0.0   MONOSABS 0.8 0.8   BASOSABS 0.0 0.0   ABSIMMGRAN 0.0 0.1      LFT No results for input(s): BILITOT, BILIDIR, ALKPHOS, AST, ALT, PROT, LABALBU, GLOB in the last 72 hours. Cardiac  Lab Results   Component Value Date/Time    NTPROBNP 768 02/17/2023 07:19 PM    TROPHS 296.7 02/17/2023 08:45 PM    TROPHS 344.4 02/17/2023 07:19 PM    TROPHS 18.2 11/23/2022 09:47 PM      Coags Lab Results   Component Value Date/Time    PROTIME 13.7 02/17/2023 10:00 PM    INR 1.0 02/17/2023 10:00 PM    APTT 21.0 02/17/2023 10:00 PM      A1c No results found for: LABA1C, EAG   Lipids No results found for: CHOL, LDLCALC, LABVLDL, HDL, CHOLHDLRATIO, TRIG   Thyroid  No results found for: Eual Beer     Most Recent UA No results found for: COLORU, APPEARANCE, SPECGRAV, LABPH, PROTEINU, GLUCOSEU, KETUA, BILIRUBINUR, BLOODU, UROBILINOGEN, NITRU, LEUKOCYTESUR, WBCUA, RBCUA, EPITHUA, BACTERIA, LABCAST, MUCUS     No results for input(s): CULTURE in the last 720 hours.     All Labs from Last 24 Hrs:  Recent Results (from the past 24 hour(s))   Basic Metabolic Panel    Collection Time: 02/20/23  4:18 PM Result Value Ref Range    Sodium 132 (L) 133 - 143 mmol/L    Potassium 5.9 (H) 3.5 - 5.1 mmol/L    Chloride 106 101 - 110 mmol/L    CO2 19 (L) 21 - 32 mmol/L    Anion Gap 7 2 - 11 mmol/L    Glucose 225 (H) 65 - 100 mg/dL    BUN 19 8 - 23 MG/DL    Creatinine 1.20 (H) 0.6 - 1.0 MG/DL    Est, Glom Filt Rate 46 (L) >60 ml/min/1.73m2    Calcium 9.2 8.3 - 10.4 MG/DL   POCT Glucose    Collection Time: 02/20/23  5:05 PM   Result Value Ref Range    POC Glucose 249 (H) 65 - 100 mg/dL    Performed by: Diandra Gaines    POCT Glucose    Collection Time: 02/20/23  9:28 PM   Result Value Ref Range    POC Glucose 288 (H) 65 - 100 mg/dL    Performed by: Symone    CBC with Auto Differential    Collection Time: 02/21/23  3:18 AM   Result Value Ref Range    WBC 11.2 (H) 4.3 - 11.1 K/uL    RBC 4.34 4.05 - 5.2 M/uL    Hemoglobin 10.8 (L) 11.7 - 15.4 g/dL    Hematocrit 35.2 (L) 35.8 - 46.3 %    MCV 81.1 (L) 82 - 102 FL    MCH 24.9 (L) 26.1 - 32.9 PG    MCHC 30.7 (L) 31.4 - 35.0 g/dL    RDW 15.9 (H) 11.9 - 14.6 %    Platelets 289 002 - 342 K/uL    MPV 10.5 9.4 - 12.3 FL    nRBC 0.00 0.0 - 0.2 K/uL    Differential Type AUTOMATED      Seg Neutrophils 81 (H) 43 - 78 %    Lymphocytes 11 (L) 13 - 44 %    Monocytes 8 4.0 - 12.0 %    Eosinophils % 0 (L) 0.5 - 7.8 %    Basophils 0 0.0 - 2.0 %    Immature Granulocytes 0 0.0 - 5.0 %    Segs Absolute 9.0 (H) 1.7 - 8.2 K/UL    Absolute Lymph # 1.3 0.5 - 4.6 K/UL    Absolute Mono # 0.8 0.1 - 1.3 K/UL    Absolute Eos # 0.0 0.0 - 0.8 K/UL    Basophils Absolute 0.0 0.0 - 0.2 K/UL    Absolute Immature Granulocyte 0.1 0.0 - 0.5 K/UL   Basic Metabolic Panel w/ Reflex to MG    Collection Time: 02/21/23  3:18 AM   Result Value Ref Range    Sodium 135 133 - 143 mmol/L    Potassium 5.3 (H) 3.5 - 5.1 mmol/L    Chloride 107 101 - 110 mmol/L    CO2 23 21 - 32 mmol/L    Anion Gap 5 2 - 11 mmol/L    Glucose 185 (H) 65 - 100 mg/dL    BUN 23 8 - 23 MG/DL    Creatinine 1.00 0.6 - 1.0 MG/DL    Est, Glovincent Filt Rate 57 (L) >60 ml/min/1.73m2    Calcium 9.9 8.3 - 10.4 MG/DL   POCT Glucose    Collection Time: 02/21/23  8:11 AM   Result Value Ref Range    POC Glucose 125 (H) 65 - 100 mg/dL    Performed by: Geeta        Allergies   Allergen Reactions    Latex Itching     redness    Penicillins Swelling    Sulfa Antibiotics Swelling     Immunization History   Administered Date(s) Administered    COVID-19, PFIZER Bivalent BOOSTER, DO NOT Dilute, (age 12y+), IM, 30 mcg/0.3 mL 10/24/2022    COVID-19, PFIZER PURPLE top, DILUTE for use, (age 15 y+), 30mcg/0.3mL 01/20/2021, 02/11/2021, 09/10/2021    PPD Test 04/05/2017       Recent Vital Data:  Patient Vitals for the past 24 hrs:   Temp Pulse Resp BP SpO2   02/21/23 0707 98.1 °F (36.7 °C) 80 18 (!) 150/68 95 %   02/21/23 0305 97.9 °F (36.6 °C) 82 19 (!) 156/64 95 %   02/20/23 2345 98.4 °F (36.9 °C) 80 16 121/80 94 %   02/20/23 1929 -- 80 16 -- 94 %   02/20/23 1912 97.9 °F (36.6 °C) 81 19 (!) 164/63 94 %   02/20/23 1507 98.4 °F (36.9 °C) 80 18 (!) 154/51 94 %       Oxygen Therapy  SpO2: 95 %  Pulse Oximeter Device Mode: Intermittent  O2 Device: Nasal cannula  O2 Flow Rate (L/min): 1 L/min    Estimated body mass index is 30.95 kg/m² as calculated from the following:    Height as of this encounter: 5' 5\" (1.651 m). Weight as of this encounter: 186 lb (84.4 kg). Intake/Output Summary (Last 24 hours) at 2/21/2023 1228  Last data filed at 2/20/2023 1730  Gross per 24 hour   Intake 490 ml   Output --   Net 490 ml         Physical Exam:    General:    Well nourished. Alert awake female, no overt distress  Head:  Normocephalic, atraumatic  Eyes:  Sclerae appear normal.  Pupils equally round. HENT:  Nares appear normal, no drainage. Moist mucous membranes  Neck:  No restricted ROM. Trachea midline  CV:   RRR. No m/r/g. No JVD  Lungs:   CTAB. No wheezing, rhonchi, or rales. Respirations even, unlabored  Abdomen:   Soft, nontender, nondistended.     Extremities: Warm and dry.  No cyanosis or clubbing. No edema. Skin:     No rashes. Normal coloration  Neuro:  CN II-XII grossly intact. Psych:  Normal mood and affect. Signed:  Darryl Tovar MD    Part of this note may have been written by using a voice dictation software. The note has been proof read but may still contain some grammatical/other typographical errors.

## 2023-02-21 NOTE — PROGRESS NOTES
Sitting on bedside eating   appetite good  no complaint  says tongue is sore  tongue white  Dr Candance [place] notified via perfect serve

## 2023-02-21 NOTE — PROGRESS NOTES
Received report from Kalin Montenegro, [facility]. Patient awake and in bed. A&O x4. Respirations present. On RA. No signs of distress. No needs expressed. Bed low and locked. Call light within reach. Will continue to monitor.

## 2023-02-21 NOTE — DISCHARGE INSTRUCTIONS
Hold Lisinopril until repeat BMP in 3-5 days at PCP office visit and cleared by PCP  Pulmonary Embolism: Care Instructions  Overview     Pulmonary embolism is the sudden blockage of an artery in the lung. Blood clots in the deep veins of the leg or pelvis (deep vein thrombosis, or DVT) are the most common cause. These blood clots can travel to the lungs. Pulmonary embolism can be very serious. Because you have had one pulmonary embolism, you are at greater risk for having another one. But you can take steps to prevent another pulmonary embolism. You will probably take a prescription blood-thinning medicine to prevent blood clots. A blood thinner can stop a blood clot from growing larger and prevent new clots from forming. Follow-up care is a key part of your treatment and safety. Be sure to make and go to all appointments, and call your doctor if you are having problems. It's also a good idea to know your test results and keep a list of the medicines you take. How can you care for yourself at home? Take your medicines exactly as prescribed. Call your doctor if you think you are having a problem with your medicine. You will get more details on the specific medicines your doctor prescribes. If you are taking a blood thinner, be sure you get instructions about how to take your medicine safely. Blood thinners can cause serious bleeding problems. Try to walk several times a day. Walking helps keep blood moving in your legs. Before doing other types of exercise, ask your doctor what type and level of exercise is safe for you. Take steps to help prevent blood clots in your legs. For example:  Exercise your lower leg muscles if you sit for long periods of time. Pump your feet up and down by pulling your toes up toward your knees then pointing them down. Repeat. After an illness or surgery, try to get up and out of bed often.  If you can't get out of bed, flex your feet every hour to keep the blood moving through your legs. Take plenty of breaks when you travel. On long car trips, stop the car and walk around every hour or so. On the bus, plane, or train, get out of your seat and walk up and down the aisle every hour, if you can. Wear compression stockings if your doctor recommends them. Check with your doctor about whether you should use hormonal forms of birth control or hormone therapy. These may increase your risk of blood clots. Have a healthy lifestyle. This includes being active, staying at a healthy weight, and not smoking. Get vaccinated against [id number], the flu, and pneumonia. When should you call for help? Call 911 anytime you think you may need emergency care. For example, call if:    You have shortness of breath. You have chest pain. You passed out (lost consciousness). You cough up blood. Call your doctor now or seek immediate medical care if:    You have new or worsening pain or swelling in your leg. Watch closely for changes in your health, and be sure to contact your doctor if:    You do not get better as expected. Where can you learn more? Go to http://WOWIO.woods.com/ and enter A877 to learn more about \"Pulmonary Embolism: Care Instructions. \"  Current as of: March 28, 2022               Content Version: 13.5  © 2006-2022 Healthwise, [facility]. Care instructions adapted under license by [facility] ([facility]). If you have questions about a medical condition or this instruction, always ask your healthcare professional. [place] [address] any warranty or liability for your use of this information. Hyperkalemia: Care Instructions  Your Care Instructions     Hyperkalemia is too much potassium in the blood. Potassium helps keep the right mix of fluids in your body. It also helps your nerves and muscles work as they should. And it keeps your heartbeat in a normal rhythm. Some things can raise potassium levels.  These include some health problems, medicines, and kidney problems. (Normally, your kidneys remove extra potassium.)  Too much potassium can cause nausea. It also can cause a heartbeat that isn't normal. But you may not have any symptoms. Too much potassium can be dangerous. That's why it's important to treat it. If you are taking any of the medicines that can raise your levels, your doctor will ask you to stop. You may get medicines to lower your levels. And you may have to limit or not eat foods that have a lot of potassium. Follow-up care is a key part of your treatment and safety. Be sure to make and go to all appointments, and call your doctor if you are having problems. It's also a good idea to know your test results and keep a list of the medicines you take. How can you care for yourself at home? Take your medicines exactly as prescribed. Call your doctor if you think you are having a problem with your medicine. Stop taking certain medicines if your doctor asks you to. They may be causing your high potassium levels. If you have concerns about stopping medicine, talk with your doctor. If you have kidney, heart, or liver disease and have to limit fluids, talk with your doctor before you increase the amount of fluids you drink. If the doctor says it's okay, drink plenty of fluids. Avoid strenuous exercise until your doctor tells you it is okay. Potassium is in many foods, including vegetables, fruits, and milk products. Foods high in potassium include bananas, cantaloupe, broccoli, milk, potatoes, and tomatoes. Low potassium foods include blueberries, raspberries, cucumber, white or brown rice, pasta, and noodles. Do not use a salt substitute without talking to your doctor first. Most of these are very high in potassium. Be sure to tell your doctor about any prescription, over-the-counter, or herbal medicines you take. Some of these can raise potassium. When should you call for help? Call 911 anytime you think you may need emergency care.  For example, call if:    You passed out (lost consciousness). You have an unusual heartbeat. Your heart may beat fast or skip beats. Call your doctor now or seek immediate medical care if:    You have muscle aches. You feel very weak. Watch closely for changes in your health, and be sure to contact your doctor if:    You do not get better as expected. Current as of: June 16, 2022               Content Version: 13.5  © 2006-2022 [employer]. Care instructions adapted under license by [facility] ([facility]). If you have questions about a medical condition or this instruction, always ask your healthcare professional. [place] [address] any warranty or liability for your use of this information. DISCHARGE SUMMARY from Nurse    PATIENT INSTRUCTIONS:    After general anesthesia or intravenous sedation, for 24 hours or while taking prescription Narcotics:  Limit your activities  Do not drive and operate hazardous machinery  Do not make important personal or business decisions  Do  not drink alcoholic beverages  If you have not urinated within 8 hours after discharge, please contact your surgeon on call. Report the following to your surgeon:  Excessive pain, swelling, redness or odor of or around the surgical area  Temperature over 100.5  Nausea and vomiting lasting longer than 4 hours or if unable to take medications  Any signs of decreased circulation or nerve impairment to extremity: change in color, persistent  numbness, tingling, coldness or increase pain  Any questions    What to do at Home:  Recommended activity: activity as tolerated,     If you experience any of the following symptoms increased shortness of breath, cough with yellow or green or bloody mucous, temperature >100.4, pain or nausea or vomiting unrelieved by medication then please follow up with Physician or ER as needed. *  Please give a list of your current medications to your Primary Care Provider.     *  Please update this list whenever your medications are discontinued, doses are      changed, or new medications (including over-the-counter products) are added. *  Please carry medication information at all times in case of emergency situations. These are general instructions for a healthy lifestyle:    No smoking/ No tobacco products/ Avoid exposure to second hand smoke  Surgeon General's Warning:  Quitting smoking now greatly reduces serious risk to your health. Obesity, smoking, and sedentary lifestyle greatly increases your risk for illness    A healthy diet, regular physical exercise & weight monitoring are important for maintaining a healthy lifestyle    You may be retaining fluid if you have a history of heart failure or if you experience any of the following symptoms:  Weight gain of 3 pounds or more overnight or 5 pounds in a week, increased swelling in our hands or feet or shortness of breath while lying flat in bed. Please call your doctor as soon as you notice any of these symptoms; do not wait until your next office visit. The discharge information has been reviewed with the patient. The patient verbalized understanding. Discharge medications reviewed with the patient and appropriate educational materials and side effects teaching were provided.

## 2023-02-21 NOTE — CARE COORDINATION
MSN, CM:  patient to be discharged home  today with no services ordered or requested. Patient and family agree with this discharge plan. Patient has met all milestones for this admission. Family to transport patient home. 02/21/23 9545   Service Assessment   Patient Orientation Alert and [place] At/After Discharge   Services At/After Discharge None   Mode of Transport at Discharge Other (see comment)  (family to transport)   Confirm Follow Up Transport Self   Condition of Participation: Discharge Planning   The Patient and/Or Patient Representative agree with the Discharge Plan?  Yes

## 2023-02-21 NOTE — PROGRESS NOTES
Patient resting quietly in bed. Son at bedside. Respirations even and unlabored. On RA. No signs of distress. No needs expressed. To give report to oncoming RN.

## 2023-03-10 ENCOUNTER — TELEPHONE (OUTPATIENT)
Dept: UROLOGY | Age: 83
End: 2023-03-10

## 2023-03-10 ENCOUNTER — OFFICE VISIT (OUTPATIENT)
Dept: UROLOGY | Age: 83
End: 2023-03-10

## 2023-03-10 VITALS — HEART RATE: 81 BPM | SYSTOLIC BLOOD PRESSURE: 136 MMHG | DIASTOLIC BLOOD PRESSURE: 75 MMHG

## 2023-03-10 DIAGNOSIS — G95.9 SPINAL CORD LESION (HCC): ICD-10-CM

## 2023-03-10 DIAGNOSIS — N28.89 RENAL MASS: Primary | ICD-10-CM

## 2023-03-10 LAB
BILIRUBIN, URINE, POC: NEGATIVE
BLOOD URINE, POC: NORMAL
GLUCOSE URINE, POC: NEGATIVE
KETONES, URINE, POC: NEGATIVE
LEUKOCYTE ESTERASE, URINE, POC: NEGATIVE
NITRITE, URINE, POC: NEGATIVE
PH, URINE, POC: 5.5 (ref 4.6–8)
PROTEIN,URINE, POC: 100
SPECIFIC GRAVITY, URINE, POC: 1.03 (ref 1–1.03)
URINALYSIS CLARITY, POC: NORMAL
URINALYSIS COLOR, POC: NORMAL
UROBILINOGEN, POC: NORMAL

## 2023-03-10 ASSESSMENT — ENCOUNTER SYMPTOMS
EYE PAIN: 0
BACK PAIN: 0
SKIN LESIONS: 0
NAUSEA: 0
SHORTNESS OF BREATH: 1
BLOOD IN STOOL: 0
DIARRHEA: 0
WHEEZING: 0
COUGH: 1
INDIGESTION: 0
VOMITING: 0
HEARTBURN: 0
ABDOMINAL PAIN: 0
CONSTIPATION: 0
EYE DISCHARGE: 0

## 2023-03-10 NOTE — PROGRESS NOTES
[facility]  [place], [address] [facility] [address] [facility]  [phone]    Brandon Jeffries  : 1940      Chief Complaint   Patient presents with    New Patient        HPI   [de-identified] y.o., female who is referred by Dr. Harris Richmond for evaluation of L renal mass. Pt recently seen as consult during admission for Pe's. She remains on Eliquis. Pt found to have incidental 2.7cm LMP anterior enhancing renal mass without RVI and a LUP hyperdense mass without enhancement by RMP CT on 23. Also noted is a sclerotic L5 vertebral body mass. MRI recommended. She denies any flank pain or hematuria. Cr is 0.90 on 23.         Past Medical History:   Diagnosis Date    Arthritis     Chronic pain     hands and toes    Former smoker, stopped smoking in distant past     GERD (gastroesophageal reflux disease)     controlled with med    High cholesterol     on medication    Hypertension     Morbid obesity (HCC)     Nausea & vomiting     Pre-diabetes     Status post total left knee replacement 2017    Urinary incontinence     med     Past Surgical History:   Procedure Laterality Date    BREAST SURGERY      Imer. breast bx    GYN      Hysterectomy    HEENT      T&A    TAMIA BIOPSY BREAST STEREOTACTIC  over 20 yrs ago    TOTAL KNEE ARTHROPLASTY Right 2010    US GUIDED CORE BREAST BIOPSY  over 20 yrs ago     Current Outpatient Medications   Medication Sig Dispense Refill    guaiFENesin (ROBITUSSIN) 100 MG/5ML liquid Take 10 mLs by mouth every 4 hours as needed for Cough 236 mL 0    apixaban (ELIQUIS) 5 MG TABS tablet Take 10 mg po bid for 6 days until 23, and then 5 mg po bid from 23 60 tablet 1    Nebulizers (COMPRESSOR/NEBULIZER) MISC DuoNeb treatment every 4 hours as needed for shortness of breath 1 each 3    amLODIPine (NORVASC) 10 MG tablet Take 1 tablet by mouth daily 30 tablet 3    Dextromethorphan-guaiFENesin (MUCINEX DM MAXIMUM STRENGTH)  MG TB12 Take 1 tablet by mouth in the morning and at bedtime 28 tablet 0    albuterol sulfate HFA (VENTOLIN HFA) 108 (90 Base) MCG/ACT inhaler Inhale 2 puffs into the lungs 4 times daily as needed for Wheezing 18 g 1    colchicine (COLCRYS) 0.6 MG tablet Pt to take one po every hour until symptoms improved or pt has diarrhea      docusate (COLACE, DULCOLAX) 100 MG CAPS Take 100 mg by mouth daily      lisinopril-hydroCHLOROthiazide (PRINZIDE;ZESTORETIC) 20-12.5 MG per tablet Take 1 tablet by mouth daily      omeprazole (PRILOSEC) 40 MG delayed release capsule Take 40 mg by mouth daily      simvastatin (ZOCOR) 40 MG tablet Take 40 mg by mouth daily      ipratropium-albuterol (DUONEB) 0.5-2.5 (3) MG/3ML SOLN nebulizer solution Inhale 3 mLs into the lungs every 4 hours as needed for Shortness of Breath 540 mL 0    fluticasone-salmeterol (ADVAIR DISKUS) 250-50 MCG/ACT AEPB diskus inhaler Inhale 1 puff into the lungs in the morning and 1 puff in the evening. 60 each 1     No current facility-administered medications for this visit. Allergies   Allergen Reactions    Latex Itching     redness    Penicillins Swelling    Sulfa Antibiotics Swelling     Social History     Socioeconomic History    Marital status:       Spouse name: Not on file    Number of children: Not on file    Years of education: Not on file    Highest education level: Not on file   Occupational History    Not on file   Tobacco Use    Smoking status: Former    Smokeless tobacco: Never    Tobacco comments:     Quit smoking: \"40 years ago\"   Vaping Use    Vaping Use: Never used   Substance and Sexual Activity    Alcohol use: Yes    Drug use: No    Sexual activity: Not on file   Other Topics Concern    Not on file   Social History Narrative    Not on file     Social Determinants of Health     Financial Resource Strain: Not on file   Food Insecurity: Not on file   Transportation Needs: Not on file   Physical Activity: Not on file   Stress: Not on file   Social Connections: Not on file   Intimate Partner Violence: Not on file   Housing Stability: Not on file     Family History   Problem Relation Age of Onset    Delayed Awakening Neg Hx     Post-op Nausea/Vomiting Neg Hx     Emergence Delirium Neg Hx     Post-op Cognitive Dysfunction Neg Hx     Other Neg Hx     Breast Cancer Neg Hx     Malig Hypertherm Neg Hx     Pseudochol. Deficiency Neg Hx     Cancer Sister     Hypertension Father     Elevated Lipids Father     Cancer Father     Elevated Lipids Mother     Heart Disease Mother     Hypertension Mother          Review of Systems  Constitutional:   Negative for fever, chills, appetite change, malaise/fatigue, headaches and weight loss. Skin:  Negative for skin lesions, rash and itching. Eyes:  Negative for visual disturbance, eye pain and eye discharge. ENT:  Negative for difficulty articulating words, pain swallowing, high frequency hearing loss and dry mouth. Respiratory: Positive for cough and shortness of breath. Negative for blood in sputum and wheezing. Cardiovascular: Positive for hypertension and leg pain. Negative for chest pain, irregular heartbeat, leg swelling, regular rate and rhythm and varicose veins. GI:  Negative for nausea, vomiting, abdominal pain, blood in stool, constipation, diarrhea, indigestion and heartburn. Genitourinary: Positive for recurrent UTIs, nocturia, urgency, leakage w/ urge and hysterectomy. Negative for urinary burning, hematuria, flank pain, history of urolithiasis, slower stream, straining, frequent urination, incomplete emptying, erectile dysfunction, testicular pain, sexually transmitted disease, sexually transmitted disease, discharge, urethral stricture, menstrual problem, endometriosis and vaginal pain. Number of pregnancies: 2. Number of births: 2. Musculoskeletal: Positive for arthralgias. Negative for back pain, bone pain, tenderness, muscle weakness and neck pain. Neurological: Positive for numbness.  Negative for dizziness, focal weakness, seizures and tremors. Psychological:  Negative for depression and psychiatric problem. Endocrine:  Negative for cold intolerance, thirst, excessive urination, fatigue and heat intolerance. Hem/Lymphatic:  Negative for easy bleeding, easy bruising and frequent infections. Physical Exam  /75   Pulse 81   General appearance - alert, well appearing, and in no distress  Mental status - alert, oriented to person, place, and time  Eyes - extraocular eye movements intact, sclera anicteric  Nose - normal and patent, no erythema, discharge or polyps  Mouth - mucous membranes moist  Abdomen - soft, nontender, nondistended, no masses or organomegaly  Lymphatic-  No palpable lymphadenopathy  Neurological -  normal speech, no focal findings or movement disorder noted  Musculoskeletal - no deformity or swelling  Extremities - no pedal edema, no clubbing or cyanosis  Skin - normal coloration and turgor      Urinalysis  UA - Dipstick  Results for orders placed or performed in visit on 03/10/23   AMB POC URINALYSIS DIP STICK AUTO W/O MICRO   Result Value Ref Range    Color (UA POC)      Clarity (UA POC)      Glucose, Urine, POC Negative Negative    Bilirubin, Urine, POC Negative Negative    KETONES, Urine, POC Negative Negative    Specific Gravity, Urine, POC 1.030 1.001 - 1.035    Blood (UA POC) Trace-lysed Negative    pH, Urine, POC 5.5 4.6 - 8.0    Protein, Urine,  Negative    Urobilinogen, POC 0.2 mg/dL     Nitrite, Urine, POC Negative Negative    Leukocyte Esterase, Urine, POC Negative Negative       UA - Micro  WBC - 0  RBC - 0  Bacteria - 0  Epith - 0    Assessment/Plan    ICD-10-CM    1. Renal mass  N28.89 AMB POC URINALYSIS DIP STICK AUTO W/O MICRO     MRI LUMBAR SPINE WO CONTRAST      2. Spinal cord lesion (HCC)  G95.9 MRI LUMBAR SPINE WO CONTRAST        All tx options reviewed including observation, biopsy, ablation, partial nephrectomy and radical nephrectomy.   She has elected to proceed with a LEFT HALN given her two masses. All risks, benefits and alternatives to the above mentioned procedure were discussed and the patient is willing to proceed at this time. I spoke with Dr. Robert West and she recommends that she remain on anticoagulation for at least 3 mo before surgery.     JAZIEL COREY, DO

## 2023-03-14 ENCOUNTER — TELEPHONE (OUTPATIENT)
Dept: UROLOGY | Age: 83
End: 2023-03-14

## 2023-03-15 ENCOUNTER — TELEPHONE (OUTPATIENT)
Dept: UROLOGY | Age: 83
End: 2023-03-15

## 2023-03-15 DIAGNOSIS — Z86.59 HISTORY OF CLAUSTROPHOBIA: Primary | ICD-10-CM

## 2023-03-15 RX ORDER — DIAZEPAM 10 MG/1
10 TABLET ORAL ONCE
Qty: 1 TABLET | Refills: 0 | Status: SHIPPED | OUTPATIENT
Start: 2023-03-15 | End: 2023-03-15

## 2023-03-18 ENCOUNTER — HOSPITAL ENCOUNTER (OUTPATIENT)
Dept: MRI IMAGING | Age: 83
End: 2023-03-18
Payer: MEDICARE

## 2023-03-18 DIAGNOSIS — N28.89 RENAL MASS: ICD-10-CM

## 2023-03-18 DIAGNOSIS — G95.9 SPINAL CORD LESION (HCC): ICD-10-CM

## 2023-03-18 PROCEDURE — 2580000003 HC RX 258: Performed by: INTERNAL MEDICINE

## 2023-03-18 PROCEDURE — 72158 MRI LUMBAR SPINE W/O & W/DYE: CPT

## 2023-03-18 PROCEDURE — 6360000004 HC RX CONTRAST MEDICATION: Performed by: INTERNAL MEDICINE

## 2023-03-18 PROCEDURE — A9579 GAD-BASE MR CONTRAST NOS,1ML: HCPCS | Performed by: INTERNAL MEDICINE

## 2023-03-18 RX ORDER — SODIUM CHLORIDE 0.9 % (FLUSH) 0.9 %
10 SYRINGE (ML) INJECTION AS NEEDED
Status: DISCONTINUED | OUTPATIENT
Start: 2023-03-18 | End: 2023-03-22 | Stop reason: HOSPADM

## 2023-03-18 RX ADMIN — SODIUM CHLORIDE, PRESERVATIVE FREE 10 ML: 5 INJECTION INTRAVENOUS at 14:04

## 2023-03-18 RX ADMIN — GADOTERIDOL 18 ML: 279.3 INJECTION, SOLUTION INTRAVENOUS at 14:04

## 2023-03-21 ENCOUNTER — TELEPHONE (OUTPATIENT)
Dept: UROLOGY | Age: 83
End: 2023-03-21

## 2023-03-29 ENCOUNTER — PREP FOR PROCEDURE (OUTPATIENT)
Dept: UROLOGY | Age: 83
End: 2023-03-29

## 2023-03-29 DIAGNOSIS — N28.89 RENAL MASS: Primary | ICD-10-CM

## 2023-05-15 ENCOUNTER — HOSPITAL ENCOUNTER (OUTPATIENT)
Dept: PREADMISSION TESTING | Age: 83
Discharge: HOME | DRG: 378 | End: 2023-05-18
Payer: MEDICARE

## 2023-05-15 ENCOUNTER — HOSPITAL ENCOUNTER (OUTPATIENT)
Dept: GENERAL RADIOLOGY | Age: 83
Discharge: HOME | DRG: 378 | End: 2023-05-18
Payer: MEDICARE

## 2023-05-15 ENCOUNTER — HOSPITAL ENCOUNTER (INPATIENT)
Age: 83
LOS: 1 days | Discharge: ANOTHER HOSPITAL | DRG: 378 | End: 2023-05-16
Attending: EMERGENCY MEDICINE | Admitting: HOSPITALIST
Payer: MEDICARE

## 2023-05-15 VITALS
TEMPERATURE: 96.2 F | OXYGEN SATURATION: 95 % | DIASTOLIC BLOOD PRESSURE: 50 MMHG | BODY MASS INDEX: 32.97 KG/M2 | HEIGHT: 65 IN | HEART RATE: 95 BPM | RESPIRATION RATE: 18 BRPM | SYSTOLIC BLOOD PRESSURE: 159 MMHG | WEIGHT: 197.9 LBS

## 2023-05-15 DIAGNOSIS — N28.89 RENAL MASS: ICD-10-CM

## 2023-05-15 DIAGNOSIS — K92.2 ACUTE GI BLEEDING: Primary | ICD-10-CM

## 2023-05-15 LAB
ANION GAP SERPL CALC-SCNC: 6 MMOL/L (ref 2–11)
APPEARANCE UR: ABNORMAL
APTT PPP: 31.8 SEC (ref 24.5–34.2)
BACTERIA URNS QL MICRO: ABNORMAL /HPF
BILIRUB UR QL: NEGATIVE
BUN SERPL-MCNC: 13 MG/DL (ref 8–23)
CALCIUM SERPL-MCNC: 8.8 MG/DL (ref 8.3–10.4)
CASTS URNS QL MICRO: ABNORMAL /LPF
CHLORIDE SERPL-SCNC: 112 MMOL/L (ref 101–110)
CO2 SERPL-SCNC: 23 MMOL/L (ref 21–32)
COLOR UR: ABNORMAL
CREAT SERPL-MCNC: 0.82 MG/DL (ref 0.6–1)
EKG ATRIAL RATE: 78 BPM
EKG DIAGNOSIS: NORMAL
EKG P AXIS: 73 DEGREES
EKG P-R INTERVAL: 166 MS
EKG Q-T INTERVAL: 394 MS
EKG QRS DURATION: 96 MS
EKG QTC CALCULATION (BAZETT): 449 MS
EKG R AXIS: 53 DEGREES
EKG T AXIS: 30 DEGREES
EKG VENTRICULAR RATE: 78 BPM
EPI CELLS #/AREA URNS HPF: ABNORMAL /HPF
ERYTHROCYTE [DISTWIDTH] IN BLOOD BY AUTOMATED COUNT: 16.4 % (ref 11.9–14.6)
GLUCOSE SERPL-MCNC: 104 MG/DL (ref 65–100)
GLUCOSE UR STRIP.AUTO-MCNC: NEGATIVE MG/DL
HCT VFR BLD AUTO: 20.5 % (ref 35.8–46.3)
HCT VFR BLD AUTO: 20.7 % (ref 35.8–46.3)
HGB BLD-MCNC: 5.8 G/DL (ref 11.7–15.4)
HGB BLD-MCNC: 5.8 G/DL (ref 11.7–15.4)
HGB UR QL STRIP: ABNORMAL
HISTORY CHECK: NORMAL
INR PPP: 1.4
IRON SERPL-MCNC: 10 UG/DL (ref 35–150)
KETONES UR QL STRIP.AUTO: NEGATIVE MG/DL
LEUKOCYTE ESTERASE UR QL STRIP.AUTO: ABNORMAL
MCH RBC QN AUTO: 21.7 PG (ref 26.1–32.9)
MCHC RBC AUTO-ENTMCNC: 28 G/DL (ref 31.4–35)
MCV RBC AUTO: 77.5 FL (ref 82–102)
NITRITE UR QL STRIP.AUTO: NEGATIVE
NRBC # BLD: 0 K/UL (ref 0–0.2)
PH UR STRIP: 6 (ref 5–9)
PLATELET # BLD AUTO: 412 K/UL (ref 150–450)
PMV BLD AUTO: 10.1 FL (ref 9.4–12.3)
POTASSIUM SERPL-SCNC: 3.7 MMOL/L (ref 3.5–5.1)
PROT UR STRIP-MCNC: 100 MG/DL
PROTHROMBIN TIME: 17.2 SEC (ref 12.6–14.3)
RBC # BLD AUTO: 2.67 M/UL (ref 4.05–5.2)
RBC #/AREA URNS HPF: ABNORMAL /HPF
SODIUM SERPL-SCNC: 141 MMOL/L (ref 133–143)
SP GR UR REFRACTOMETRY: 1.01 (ref 1–1.02)
TRANSFERRIN SERPL-MCNC: 306 MG/DL (ref 202–364)
UROBILINOGEN UR QL STRIP.AUTO: 1 EU/DL (ref 0.2–1)
WBC # BLD AUTO: 8.2 K/UL (ref 4.3–11.1)
WBC URNS QL MICRO: >100 /HPF

## 2023-05-15 PROCEDURE — 86923 COMPATIBILITY TEST ELECTRIC: CPT

## 2023-05-15 PROCEDURE — 84466 ASSAY OF TRANSFERRIN: CPT

## 2023-05-15 PROCEDURE — 99285 EMERGENCY DEPT VISIT HI MDM: CPT

## 2023-05-15 PROCEDURE — 87086 URINE CULTURE/COLONY COUNT: CPT

## 2023-05-15 PROCEDURE — 2580000003 HC RX 258: Performed by: EMERGENCY MEDICINE

## 2023-05-15 PROCEDURE — 86901 BLOOD TYPING SEROLOGIC RH(D): CPT

## 2023-05-15 PROCEDURE — 86850 RBC ANTIBODY SCREEN: CPT

## 2023-05-15 PROCEDURE — 30233N1 TRANSFUSION OF NONAUTOLOGOUS RED BLOOD CELLS INTO PERIPHERAL VEIN, PERCUTANEOUS APPROACH: ICD-10-PCS | Performed by: HOSPITALIST

## 2023-05-15 PROCEDURE — 6360000002 HC RX W HCPCS: Performed by: HOSPITALIST

## 2023-05-15 PROCEDURE — 6360000002 HC RX W HCPCS: Performed by: EMERGENCY MEDICINE

## 2023-05-15 PROCEDURE — 2580000003 HC RX 258: Performed by: HOSPITALIST

## 2023-05-15 PROCEDURE — C9113 INJ PANTOPRAZOLE SODIUM, VIA: HCPCS | Performed by: EMERGENCY MEDICINE

## 2023-05-15 PROCEDURE — A4216 STERILE WATER/SALINE, 10 ML: HCPCS | Performed by: EMERGENCY MEDICINE

## 2023-05-15 PROCEDURE — C9113 INJ PANTOPRAZOLE SODIUM, VIA: HCPCS | Performed by: HOSPITALIST

## 2023-05-15 PROCEDURE — 93005 ELECTROCARDIOGRAM TRACING: CPT | Performed by: UROLOGY

## 2023-05-15 PROCEDURE — 71046 X-RAY EXAM CHEST 2 VIEWS: CPT

## 2023-05-15 PROCEDURE — 1100000000 HC RM PRIVATE

## 2023-05-15 PROCEDURE — 85730 THROMBOPLASTIN TIME PARTIAL: CPT

## 2023-05-15 PROCEDURE — 85027 COMPLETE CBC AUTOMATED: CPT

## 2023-05-15 PROCEDURE — 85018 HEMOGLOBIN: CPT

## 2023-05-15 PROCEDURE — 85014 HEMATOCRIT: CPT

## 2023-05-15 PROCEDURE — 93010 ELECTROCARDIOGRAM REPORT: CPT | Performed by: INTERNAL MEDICINE

## 2023-05-15 PROCEDURE — 87186 SC STD MICRODIL/AGAR DIL: CPT

## 2023-05-15 PROCEDURE — 80048 BASIC METABOLIC PNL TOTAL CA: CPT

## 2023-05-15 PROCEDURE — 81001 URINALYSIS AUTO W/SCOPE: CPT

## 2023-05-15 PROCEDURE — 96374 THER/PROPH/DIAG INJ IV PUSH: CPT

## 2023-05-15 PROCEDURE — 86900 BLOOD TYPING SEROLOGIC ABO: CPT

## 2023-05-15 PROCEDURE — 83540 ASSAY OF IRON: CPT

## 2023-05-15 PROCEDURE — 85610 PROTHROMBIN TIME: CPT

## 2023-05-15 PROCEDURE — 87088 URINE BACTERIA CULTURE: CPT

## 2023-05-15 PROCEDURE — 2700000000 HC OXYGEN THERAPY PER DAY

## 2023-05-15 PROCEDURE — P9016 RBC LEUKOCYTES REDUCED: HCPCS

## 2023-05-15 PROCEDURE — A4216 STERILE WATER/SALINE, 10 ML: HCPCS | Performed by: HOSPITALIST

## 2023-05-15 RX ORDER — SODIUM CHLORIDE 0.9 % (FLUSH) 0.9 %
5-40 SYRINGE (ML) INJECTION PRN
Status: DISCONTINUED | OUTPATIENT
Start: 2023-05-15 | End: 2023-05-16 | Stop reason: HOSPADM

## 2023-05-15 RX ORDER — LISINOPRIL 40 MG/1
40 TABLET ORAL DAILY
Status: ON HOLD | COMMUNITY
End: 2023-05-16

## 2023-05-15 RX ORDER — ONDANSETRON 4 MG/1
4 TABLET, ORALLY DISINTEGRATING ORAL EVERY 8 HOURS PRN
Status: DISCONTINUED | OUTPATIENT
Start: 2023-05-15 | End: 2023-05-16 | Stop reason: HOSPADM

## 2023-05-15 RX ORDER — RIVAROXABAN 10 MG/1
20 TABLET, FILM COATED ORAL
Status: ON HOLD | COMMUNITY
End: 2023-05-17 | Stop reason: HOSPADM

## 2023-05-15 RX ORDER — VENLAFAXINE 75 MG/1
75 TABLET ORAL 3 TIMES DAILY
COMMUNITY

## 2023-05-15 RX ORDER — SODIUM CHLORIDE 9 MG/ML
INJECTION, SOLUTION INTRAVENOUS PRN
Status: DISCONTINUED | OUTPATIENT
Start: 2023-05-15 | End: 2023-05-16 | Stop reason: HOSPADM

## 2023-05-15 RX ORDER — ONDANSETRON 2 MG/ML
4 INJECTION INTRAMUSCULAR; INTRAVENOUS EVERY 6 HOURS PRN
Status: DISCONTINUED | OUTPATIENT
Start: 2023-05-15 | End: 2023-05-16 | Stop reason: HOSPADM

## 2023-05-15 RX ORDER — GABAPENTIN 100 MG/1
100 CAPSULE ORAL 3 TIMES DAILY
COMMUNITY

## 2023-05-15 RX ORDER — SODIUM CHLORIDE 0.9 % (FLUSH) 0.9 %
5-40 SYRINGE (ML) INJECTION EVERY 12 HOURS SCHEDULED
Status: DISCONTINUED | OUTPATIENT
Start: 2023-05-15 | End: 2023-05-16 | Stop reason: HOSPADM

## 2023-05-15 RX ORDER — 0.9 % SODIUM CHLORIDE 0.9 %
500 INTRAVENOUS SOLUTION INTRAVENOUS ONCE
Status: COMPLETED | OUTPATIENT
Start: 2023-05-15 | End: 2023-05-15

## 2023-05-15 RX ORDER — ACETAMINOPHEN 650 MG/1
650 SUPPOSITORY RECTAL EVERY 6 HOURS PRN
Status: DISCONTINUED | OUTPATIENT
Start: 2023-05-15 | End: 2023-05-16 | Stop reason: HOSPADM

## 2023-05-15 RX ORDER — ACETAMINOPHEN 325 MG/1
650 TABLET ORAL EVERY 6 HOURS PRN
Status: DISCONTINUED | OUTPATIENT
Start: 2023-05-15 | End: 2023-05-16 | Stop reason: HOSPADM

## 2023-05-15 RX ORDER — FUROSEMIDE 20 MG/1
20 TABLET ORAL AS NEEDED
Status: ON HOLD | COMMUNITY
End: 2023-05-16

## 2023-05-15 RX ADMIN — SODIUM CHLORIDE 40 MG: 9 INJECTION INTRAMUSCULAR; INTRAVENOUS; SUBCUTANEOUS at 17:21

## 2023-05-15 RX ADMIN — SODIUM CHLORIDE 500 ML: 9 INJECTION, SOLUTION INTRAVENOUS at 17:22

## 2023-05-15 RX ADMIN — SODIUM CHLORIDE, PRESERVATIVE FREE 10 ML: 5 INJECTION INTRAVENOUS at 23:30

## 2023-05-15 RX ADMIN — CEFEPIME 2000 MG: 2 INJECTION, POWDER, FOR SOLUTION INTRAVENOUS at 23:29

## 2023-05-15 RX ADMIN — SODIUM CHLORIDE 80 MG: 9 INJECTION INTRAMUSCULAR; INTRAVENOUS; SUBCUTANEOUS at 20:59

## 2023-05-15 ASSESSMENT — ENCOUNTER SYMPTOMS
SHORTNESS OF BREATH: 1
COLOR CHANGE: 0
BLOOD IN STOOL: 1
SORE THROAT: 0
COUGH: 0
VOMITING: 0
DIARRHEA: 0
NAUSEA: 0
WHEEZING: 0
EYE REDNESS: 0
ABDOMINAL PAIN: 0

## 2023-05-15 NOTE — ED PROVIDER NOTES
Quit smoking: \"40 years ago\"   Vaping Use    Vaping Use: Never used   Substance and Sexual Activity    Alcohol use: Yes    Drug use: No        Previous Medications    ALBUTEROL SULFATE HFA (VENTOLIN HFA) 108 (90 BASE) MCG/ACT INHALER    Inhale 2 puffs into the lungs 4 times daily as needed for Wheezing    AMLODIPINE (NORVASC) 10 MG TABLET    Take 1 tablet by mouth daily    COLCHICINE (COLCRYS) 0.6 MG TABLET    Pt to take one po every hour until symptoms improved or pt has diarrhea    DEXTROMETHORPHAN-GUAIFENESIN (MUCINEX DM MAXIMUM STRENGTH)  MG TB12    Take 1 tablet by mouth in the morning and at bedtime    DOCUSATE (COLACE, DULCOLAX) 100 MG CAPS    Take 100 mg by mouth daily    FLUTICASONE-SALMETEROL (ADVAIR DISKUS) 250-50 MCG/ACT AEPB DISKUS INHALER    Inhale 1 puff into the lungs in the morning and 1 puff in the evening. GABAPENTIN (NEURONTIN) 100 MG CAPSULE    Take 1 capsule by mouth 3 times daily.     GUAIFENESIN (ROBITUSSIN) 100 MG/5ML LIQUID    Take 10 mLs by mouth every 4 hours as needed for Cough    IPRATROPIUM-ALBUTEROL (DUONEB) 0.5-2.5 (3) MG/3ML SOLN NEBULIZER SOLUTION    Inhale 3 mLs into the lungs every 4 hours as needed for Shortness of Breath    LISINOPRIL-HYDROCHLOROTHIAZIDE (PRINZIDE;ZESTORETIC) 20-12.5 MG PER TABLET    Take 1 tablet by mouth daily    NEBULIZERS (COMPRESSOR/NEBULIZER) MISC    DuoNeb treatment every 4 hours as needed for shortness of breath    OMEPRAZOLE (PRILOSEC) 40 MG DELAYED RELEASE CAPSULE    Take 1 capsule by mouth daily    RIVAROXABAN (XARELTO) 10 MG TABS TABLET    Take 1 tablet by mouth    SIMVASTATIN (ZOCOR) 40 MG TABLET    Take 40 mg by mouth daily    VENLAFAXINE (EFFEXOR) 75 MG TABLET    Take 1 tablet by mouth 3 times daily        Results for orders placed or performed during the hospital encounter of 05/15/23   Hemoglobin and Hematocrit   Result Value Ref Range    Hemoglobin 5.8 (LL) 11.7 - 15.4 g/dL    Hematocrit 20.5 (L) 35.8 - 46.3 %   TYPE AND SCREEN

## 2023-05-15 NOTE — PROGRESS NOTES
PLEASE CONTINUE TAKING ALL PRESCRIPTION MEDICATIONS UP TO THE DAY OF SURGERY UNLESS OTHERWISE DIRECTED BELOW. DISCONTINUE all vitamins and supplements 7 days prior to surgery. DISCONTINUE Non-Steriodal Anti-Inflammatory (NSAIDS) such as Advil and Aleve 5 days prior to surgery. Home Medications to take  the day of surgery    Albuterol, Amlodipine, Omeprazole, Venlafaxine            Home Medications   to Hold   Xarelto         Comments      On the day before surgery please take Acetaminophen 1000mg in the morning and then again before bed. You may substitute for Tylenol 650 mg. Please do not bring home medications with you on the day of surgery unless otherwise directed by your nurse. If you are instructed to bring home medications, please give them to your nurse as they will be administered by the nursing staff. If you have any questions, please call [facility] [address] [phone]. A copy of this note was provided to the patient for reference.

## 2023-05-15 NOTE — PROGRESS NOTES
Patient verified name and     Order for consent  found in EHR and matches case posting; patient verified. Type 3 surgery, walk in assessment complete. Labs per surgeon: Nikolas Tanner results pending Pt to return to Surgery center 23 for T/C   Labs per anesthesia protocol: CBC,BMP; results pending  EK/15/23      Hospital approved surgical skin cleanser and instructions given per hospital policy. Patient provided with and instructed on educational handouts including Guide to Surgery, Pain Management, Hand Hygiene, Blood Transfusion Education, and [place] [facility] Brochure. Patient answered medical/surgical history questions at their best of ability. All prior to admission medications documented in [facility]. Original medication prescription bottle not visualized during patient appointment. Patient instructed to hold all vitamins 7 days prior to surgery and NSAIDS 5 days prior to surgery, patient verbalized understanding. Patient teach back successful and patient demonstrates knowledge of instructions.

## 2023-05-15 NOTE — PROGRESS NOTES
Latest Reference Range & Units 05/15/23 10:29   Sodium 133 - 143 mmol/L 141   Potassium 3.5 - 5.1 mmol/L 3.7   Chloride 101 - 110 mmol/L 112 (H)   CO2 21 - 32 mmol/L 23   BUN,BUNPL 8 - 23 MG/DL 13   Creatinine 0.6 - 1.0 MG/DL 0.82   Anion Gap 2 - 11 mmol/L 6   Est, Glom Filt Rate >60 ml/min/1.73m2 >60   Glucose, Random 65 - 100 mg/dL 104 (H)   CALCIUM, SERUM, 125227 8.3 - 10.4 MG/DL 8.8   WBC 4.3 - 11.1 K/uL 8.2   RBC 4.05 - 5.2 M/uL 2.67 (L)   Hemoglobin Quant 11.7 - 15.4 g/dL 5.8 (LL)   Hematocrit 35.8 - 46.3 % 20.7 (L)   MCV 82.0 - 102.0 FL 77.5 (L)   MCH 26.1 - 32.9 PG 21.7 (L)   MCHC 31.4 - 35.0 g/dL 28.0 (L)   MPV 9.4 - 12.3 FL 10.1   RDW 11.9 - 14.6 % 16.4 (H)   Platelet Count 041 - 450 K/uL 412   (LL): Data is critically low  (H): Data is abnormally high  (L): Data is abnormally low

## 2023-05-15 NOTE — ED TRIAGE NOTES
Pt states she went to get pre-op blood work and they told her that her hgb was 5.8 pt does state she is having some sob.

## 2023-05-15 NOTE — PROGRESS NOTES
Hgb 5.8. Call placed to Dr Geoffrey Clay. Requested that pt go to ER to be worked up for source of bleeding and transfusion. Call placed to pt and pt son Chichi Bennett [phone]. Explained that pt Hgb 5.8 and need for transfusion. Explained that Dr Geoffrey Clay requested that pt go to ER . Pt verbalized understanding and states that she will come to [facility] ER . Call placed to [facility] ER , spoke with Salty Hopkins . Notified Salty Hopkins of pt lab results and  [facility] request and that pt states she will be coming to ER. Call placed and [facility]  [facility] with Lab results. Jennifer notified that pt has appointment at office 5/16/23 and she may reach out to pt jett Bennett to reschedule if needed.

## 2023-05-16 ENCOUNTER — HOSPITAL ENCOUNTER (INPATIENT)
Age: 83
LOS: 1 days | Discharge: HOME | DRG: 378 | End: 2023-05-17
Attending: STUDENT IN AN ORGANIZED HEALTH CARE EDUCATION/TRAINING PROGRAM | Admitting: STUDENT IN AN ORGANIZED HEALTH CARE EDUCATION/TRAINING PROGRAM
Payer: MEDICARE

## 2023-05-16 ENCOUNTER — PREP FOR PROCEDURE (OUTPATIENT)
Dept: GASTROENTEROLOGY | Age: 83
End: 2023-05-16

## 2023-05-16 ENCOUNTER — ANESTHESIA EVENT (OUTPATIENT)
Dept: ENDOSCOPY | Age: 83
DRG: 378 | End: 2023-05-16
Payer: MEDICARE

## 2023-05-16 VITALS
RESPIRATION RATE: 16 BRPM | BODY MASS INDEX: 33.61 KG/M2 | TEMPERATURE: 97.6 F | DIASTOLIC BLOOD PRESSURE: 73 MMHG | HEIGHT: 65 IN | HEART RATE: 74 BPM | SYSTOLIC BLOOD PRESSURE: 162 MMHG | OXYGEN SATURATION: 98 % | WEIGHT: 201.7 LBS

## 2023-05-16 DIAGNOSIS — K92.2 GASTROINTESTINAL HEMORRHAGE, UNSPECIFIED GASTROINTESTINAL HEMORRHAGE TYPE: Primary | ICD-10-CM

## 2023-05-16 PROBLEM — N39.0 ACUTE UTI: Status: ACTIVE | Noted: 2023-05-16

## 2023-05-16 PROBLEM — G47.33 OBSTRUCTIVE SLEEP APNEA: Status: ACTIVE | Noted: 2022-11-30

## 2023-05-16 LAB
APTT PPP: 27.9 SEC (ref 24.5–34.2)
HCT VFR BLD AUTO: 22.6 % (ref 35.8–46.3)
HCT VFR BLD AUTO: 24.7 % (ref 35.8–46.3)
HCT VFR BLD AUTO: 27.1 % (ref 35.8–46.3)
HCT VFR BLD AUTO: 28 % (ref 35.8–46.3)
HCT VFR BLD AUTO: 28.5 % (ref 35.8–46.3)
HGB BLD-MCNC: 6.6 G/DL (ref 11.7–15.4)
HGB BLD-MCNC: 7.3 G/DL (ref 11.7–15.4)
HGB BLD-MCNC: 8.3 G/DL (ref 11.7–15.4)
HGB BLD-MCNC: 8.6 G/DL (ref 11.7–15.4)
HGB BLD-MCNC: 8.6 G/DL (ref 11.7–15.4)
HISTORY CHECK: NORMAL
INR PPP: 1.1
PROTHROMBIN TIME: 14.1 SEC (ref 12.6–14.3)

## 2023-05-16 PROCEDURE — 6370000000 HC RX 637 (ALT 250 FOR IP): Performed by: INTERNAL MEDICINE

## 2023-05-16 PROCEDURE — 2580000003 HC RX 258: Performed by: HOSPITALIST

## 2023-05-16 PROCEDURE — 0DJD8ZZ INSPECTION OF LOWER INTESTINAL TRACT, VIA NATURAL OR ARTIFICIAL OPENING ENDOSCOPIC: ICD-10-PCS | Performed by: INTERNAL MEDICINE

## 2023-05-16 PROCEDURE — A4216 STERILE WATER/SALINE, 10 ML: HCPCS | Performed by: HOSPITALIST

## 2023-05-16 PROCEDURE — 85018 HEMOGLOBIN: CPT

## 2023-05-16 PROCEDURE — 6360000002 HC RX W HCPCS: Performed by: FAMILY MEDICINE

## 2023-05-16 PROCEDURE — 2580000003 HC RX 258: Performed by: FAMILY MEDICINE

## 2023-05-16 PROCEDURE — A4216 STERILE WATER/SALINE, 10 ML: HCPCS | Performed by: FAMILY MEDICINE

## 2023-05-16 PROCEDURE — 2700000000 HC OXYGEN THERAPY PER DAY

## 2023-05-16 PROCEDURE — 1100000000 HC RM PRIVATE

## 2023-05-16 PROCEDURE — 6360000002 HC RX W HCPCS: Performed by: HOSPITALIST

## 2023-05-16 PROCEDURE — 94760 N-INVAS EAR/PLS OXIMETRY 1: CPT

## 2023-05-16 PROCEDURE — 85014 HEMATOCRIT: CPT

## 2023-05-16 PROCEDURE — C9113 INJ PANTOPRAZOLE SODIUM, VIA: HCPCS | Performed by: FAMILY MEDICINE

## 2023-05-16 PROCEDURE — 6360000002 HC RX W HCPCS: Performed by: STUDENT IN AN ORGANIZED HEALTH CARE EDUCATION/TRAINING PROGRAM

## 2023-05-16 PROCEDURE — 36415 COLL VENOUS BLD VENIPUNCTURE: CPT

## 2023-05-16 PROCEDURE — 2500000003 HC RX 250 WO HCPCS: Performed by: FAMILY MEDICINE

## 2023-05-16 PROCEDURE — 85730 THROMBOPLASTIN TIME PARTIAL: CPT

## 2023-05-16 PROCEDURE — 85610 PROTHROMBIN TIME: CPT

## 2023-05-16 PROCEDURE — 0DB68ZZ EXCISION OF STOMACH, VIA NATURAL OR ARTIFICIAL OPENING ENDOSCOPIC: ICD-10-PCS | Performed by: INTERNAL MEDICINE

## 2023-05-16 PROCEDURE — 94761 N-INVAS EAR/PLS OXIMETRY MLT: CPT

## 2023-05-16 PROCEDURE — P9016 RBC LEUKOCYTES REDUCED: HCPCS

## 2023-05-16 PROCEDURE — 36430 TRANSFUSION BLD/BLD COMPNT: CPT

## 2023-05-16 PROCEDURE — C9113 INJ PANTOPRAZOLE SODIUM, VIA: HCPCS | Performed by: HOSPITALIST

## 2023-05-16 RX ORDER — ACETAMINOPHEN 325 MG/1
650 TABLET ORAL EVERY 6 HOURS PRN
Status: DISCONTINUED | OUTPATIENT
Start: 2023-05-16 | End: 2023-05-17 | Stop reason: HOSPADM

## 2023-05-16 RX ORDER — SODIUM CHLORIDE 9 MG/ML
INJECTION, SOLUTION INTRAVENOUS PRN
Status: DISCONTINUED | OUTPATIENT
Start: 2023-05-16 | End: 2023-05-16 | Stop reason: HOSPADM

## 2023-05-16 RX ORDER — SODIUM CHLORIDE 0.9 % (FLUSH) 0.9 %
5-40 SYRINGE (ML) INJECTION PRN
Status: DISCONTINUED | OUTPATIENT
Start: 2023-05-16 | End: 2023-05-17 | Stop reason: HOSPADM

## 2023-05-16 RX ORDER — SODIUM CHLORIDE 9 MG/ML
25 INJECTION, SOLUTION INTRAVENOUS PRN
Status: CANCELLED | OUTPATIENT
Start: 2023-05-16

## 2023-05-16 RX ORDER — SODIUM CHLORIDE 9 MG/ML
INJECTION, SOLUTION INTRAVENOUS PRN
Status: DISCONTINUED | OUTPATIENT
Start: 2023-05-16 | End: 2023-05-17 | Stop reason: HOSPADM

## 2023-05-16 RX ORDER — HYDRALAZINE HYDROCHLORIDE 20 MG/ML
10 INJECTION INTRAMUSCULAR; INTRAVENOUS EVERY 6 HOURS PRN
Status: DISCONTINUED | OUTPATIENT
Start: 2023-05-16 | End: 2023-05-17 | Stop reason: HOSPADM

## 2023-05-16 RX ORDER — SODIUM CHLORIDE 9 MG/ML
INJECTION, SOLUTION INTRAVENOUS PRN
Status: CANCELLED | OUTPATIENT
Start: 2023-05-16

## 2023-05-16 RX ORDER — SODIUM CHLORIDE 0.9 % (FLUSH) 0.9 %
5-40 SYRINGE (ML) INJECTION EVERY 12 HOURS SCHEDULED
Status: DISCONTINUED | OUTPATIENT
Start: 2023-05-16 | End: 2023-05-17 | Stop reason: HOSPADM

## 2023-05-16 RX ORDER — ONDANSETRON 4 MG/1
4 TABLET, ORALLY DISINTEGRATING ORAL EVERY 8 HOURS PRN
Status: CANCELLED | OUTPATIENT
Start: 2023-05-16

## 2023-05-16 RX ORDER — ACETAMINOPHEN 650 MG/1
650 SUPPOSITORY RECTAL EVERY 6 HOURS PRN
Status: DISCONTINUED | OUTPATIENT
Start: 2023-05-16 | End: 2023-05-17 | Stop reason: HOSPADM

## 2023-05-16 RX ORDER — SODIUM CHLORIDE 0.9 % (FLUSH) 0.9 %
5-40 SYRINGE (ML) INJECTION PRN
Status: CANCELLED | OUTPATIENT
Start: 2023-05-16

## 2023-05-16 RX ORDER — POLYETHYLENE GLYCOL 3350, SODIUM CHLORIDE, POTASSIUM CHLORIDE, SODIUM BICARBONATE, AND SODIUM SULFATE 240; 5.84; 2.98; 6.72; 22.72 G/4L; G/4L; G/4L; G/4L; G/4L
4000 POWDER, FOR SOLUTION ORAL ONCE
Status: COMPLETED | OUTPATIENT
Start: 2023-05-16 | End: 2023-05-16

## 2023-05-16 RX ORDER — HYDRALAZINE HYDROCHLORIDE 20 MG/ML
10 INJECTION INTRAMUSCULAR; INTRAVENOUS EVERY 6 HOURS PRN
Status: DISCONTINUED | OUTPATIENT
Start: 2023-05-16 | End: 2023-05-16 | Stop reason: HOSPADM

## 2023-05-16 RX ORDER — ACETAMINOPHEN 325 MG/1
650 TABLET ORAL EVERY 6 HOURS PRN
Status: CANCELLED | OUTPATIENT
Start: 2023-05-16

## 2023-05-16 RX ORDER — ONDANSETRON 2 MG/ML
4 INJECTION INTRAMUSCULAR; INTRAVENOUS EVERY 6 HOURS PRN
Status: DISCONTINUED | OUTPATIENT
Start: 2023-05-16 | End: 2023-05-17 | Stop reason: HOSPADM

## 2023-05-16 RX ORDER — ONDANSETRON 4 MG/1
4 TABLET, ORALLY DISINTEGRATING ORAL EVERY 8 HOURS PRN
Status: DISCONTINUED | OUTPATIENT
Start: 2023-05-16 | End: 2023-05-17 | Stop reason: HOSPADM

## 2023-05-16 RX ORDER — AMLODIPINE BESYLATE 5 MG/1
5 TABLET ORAL DAILY
COMMUNITY

## 2023-05-16 RX ORDER — ACETAMINOPHEN 650 MG/1
650 SUPPOSITORY RECTAL EVERY 6 HOURS PRN
Status: CANCELLED | OUTPATIENT
Start: 2023-05-16

## 2023-05-16 RX ORDER — ONDANSETRON 2 MG/ML
4 INJECTION INTRAMUSCULAR; INTRAVENOUS EVERY 6 HOURS PRN
Status: CANCELLED | OUTPATIENT
Start: 2023-05-16

## 2023-05-16 RX ORDER — SODIUM CHLORIDE 0.9 % (FLUSH) 0.9 %
5-40 SYRINGE (ML) INJECTION EVERY 12 HOURS SCHEDULED
Status: CANCELLED | OUTPATIENT
Start: 2023-05-16

## 2023-05-16 RX ADMIN — HYDRALAZINE HYDROCHLORIDE 10 MG: 20 INJECTION INTRAMUSCULAR; INTRAVENOUS at 21:47

## 2023-05-16 RX ADMIN — PANTOPRAZOLE SODIUM 40 MG: 40 INJECTION, POWDER, FOR SOLUTION INTRAVENOUS at 20:52

## 2023-05-16 RX ADMIN — HYDRALAZINE HYDROCHLORIDE 10 MG: 20 INJECTION INTRAMUSCULAR; INTRAVENOUS at 16:51

## 2023-05-16 RX ADMIN — SODIUM CHLORIDE, PRESERVATIVE FREE 10 ML: 5 INJECTION INTRAVENOUS at 09:25

## 2023-05-16 RX ADMIN — CEFEPIME 1000 MG: 1 INJECTION, POWDER, FOR SOLUTION INTRAMUSCULAR; INTRAVENOUS at 11:13

## 2023-05-16 RX ADMIN — CEFEPIME 2000 MG: 2 INJECTION, POWDER, FOR SOLUTION INTRAVENOUS at 23:27

## 2023-05-16 RX ADMIN — SODIUM CHLORIDE, PRESERVATIVE FREE 10 ML: 5 INJECTION INTRAVENOUS at 20:53

## 2023-05-16 RX ADMIN — POLYETHYLENE GLYCOL 3350, SODIUM SULFATE ANHYDROUS, SODIUM BICARBONATE, SODIUM CHLORIDE, POTASSIUM CHLORIDE 4000 ML: 236; 22.74; 6.74; 5.86; 2.97 POWDER, FOR SOLUTION ORAL at 16:50

## 2023-05-16 RX ADMIN — SODIUM CHLORIDE 40 MG: 9 INJECTION INTRAMUSCULAR; INTRAVENOUS; SUBCUTANEOUS at 09:20

## 2023-05-16 RX ADMIN — TUBERCULIN PURIFIED PROTEIN DERIVATIVE 5 UNITS: 5 INJECTION, SOLUTION INTRADERMAL at 09:18

## 2023-05-16 NOTE — PROGRESS NOTES
The patient arrived by ambulance and is alone. Patient able to ambulate to the bathroom self assist. Alert and oriented x 4. Patient reports no pain at this time. Blood pressure 284'P systolic. Waiting on orders. [id number]  Patient started prep for EGD  @ [id number]    NPO after midnight for EGD 05/17/2023 estimated time 10am     1831   Patient in room, tolerating prep. Bedside commode at bedside. Patient has no needs at this time. Denies pain.

## 2023-05-16 NOTE — PLAN OF CARE
Problem: Discharge Planning  Goal: Discharge to home or other facility with appropriate resources  5/16/2023 1226 by Leanne Santoyo RN  Outcome: Progressing  5/15/2023 2252 by Chitra Reyna RN  Outcome: Progressing     Problem: ABCDS Injury Assessment  Goal: Absence of physical injury  5/16/2023 1226 by Leanne Santoyo RN  Outcome: Progressing  5/15/2023 2252 by Chitra Reyna RN  Outcome: Progressing     Problem: Safety - Adult  Goal: Free from fall injury  5/16/2023 1226 by Leanne Santoyo RN  Outcome: Progressing  5/15/2023 2252 by Chitra Reyna RN  Outcome: Progressing

## 2023-05-16 NOTE — ACP (ADVANCE CARE PLANNING)
Advance Care Planning     Advance Care Planning Activator (Inpatient)  Conversation Note      Date of ACP Conversation: 5/16/2023     Conversation Conducted with: Patient with Decision Making Capacity   Healthcare Decision Maker: Named in Advance Directive or Healthcare Power of  (name) Aria Wright    ACP Activator: Tariq Alba Decision Maker:     Current Designated Health Care Decision Maker:     Primary Decision Maker: Pollo Arevalo - [phone]    Secondary Decision Maker: Oseas Anthony - [phone]  Click here to complete Healthcare Decision Makers including section of the Healthcare Decision Maker Relationship (ie \"Primary\")  Today we documented Decision Maker(s) consistent with ACP documents on file. Care Preferences    Ventilation: \"If you were in your present state of health and suddenly became very ill and were unable to breathe on your own, what would your preference be about the use of a ventilator (breathing machine) if it were available to you? \"      Would the patient desire the use of ventilator (breathing machine)?: no    \"If your health worsens and it becomes clear that your chance of recovery is unlikely, what would your preference be about the use of a ventilator (breathing machine) if it were available to you? \"     Would the patient desire the use of ventilator (breathing machine)?: No      Resuscitation  \"CPR works best to restart the heart when there is a sudden event, like a heart attack, in someone who is otherwise healthy. Unfortunately, CPR does not typically restart the heart for people who have serious health conditions or who are very sick. \"    \"In the event your heart stopped as a result of an underlying serious health condition, would you want attempts to be made to restart your heart (answer \"yes\" for attempt to resuscitate) or would you prefer a natural death (answer \"no\" for do not attempt to resuscitate)? \"

## 2023-05-16 NOTE — CONSULTS
Nausea & vomiting     Pre-diabetes     Sleep apnea     Status post total left knee replacement 4/5/2017    Urinary incontinence     med       Past Surgical History:   Procedure Laterality Date    BREAST SURGERY      Imer. breast bx    GYN      Hysterectomy    HEENT      T&A    TAMIA BIOPSY BREAST STEREOTACTIC  over 20 yrs ago    TOTAL KNEE ARTHROPLASTY Right 2010    US GUIDED CORE BREAST BIOPSY  over 20 yrs ago             Allergies   Allergen Reactions    Latex Itching     redness    Penicillins Swelling    Sulfa Antibiotics Swelling          Review of Systems  As per HPI. Objective   Physical Exam:  Alert and oriented x3 not in acute distress. Patient is obese. Abdomen: Soft, benign with no rigidity. Lungs: Clear to auscultation.          Current Facility-Administered Medications   Medication Dose Route Frequency Provider Last Rate Last Admin    cefepime (MAXIPIME) 2,000 mg in sodium chloride 0.9 % 50 mL IVPB (mini-bag)  2,000 mg IntraVENous Q12H Thu Gutierrez, DO        ondansetron (ZOFRAN-ODT) disintegrating tablet 4 mg  4 mg Oral Q8H PRN Thu Gutierrez, DO        Or    ondansetron (ZOFRAN) injection 4 mg  4 mg IntraVENous Q6H PRN Thu Gutierrez, DO        sodium chloride flush 0.9 % injection 5-40 mL  5-40 mL IntraVENous PRN Thu Gutierrez, DO        pantoprazole (PROTONIX) 40 mg in sodium chloride (PF) 0.9 % 10 mL injection  40 mg IntraVENous Q12H Thu Gutierrez, DO        sodium chloride flush 0.9 % injection 5-40 mL  5-40 mL IntraVENous 2 times per day Thu Gutierrez, DO        0.9 % sodium chloride infusion   IntraVENous PRN Thu Gutierrez, DO        0.9 % sodium chloride infusion   IntraVENous PRN Thu Gutierrez, DO        0.9 % sodium chloride infusion   IntraVENous PRN Thu Gutierrez, DO        acetaminophen (TYLENOL) tablet 650 mg  650 mg Oral Q6H PRN Thu Gutierrez, DO        Or    acetaminophen (TYLENOL) suppository 650 mg  650 mg Rectal Q6H PRN Thu Gutierrez, DO        hydrALAZINE (APRESOLINE) injection 10 mg  10 mg IntraVENous Q6H PRN [facility],

## 2023-05-16 NOTE — DISCHARGE SUMMARY
Placeholder. Pt is  transferred to [facility] for GIB. Please see prior progress note.   Zeferino Camarillo,

## 2023-05-16 NOTE — CARE COORDINATION
05/16/23 1211   Service Assessment   Patient Orientation Alert and Oriented   Cognition Alert   History Provided By Patient   Primary Caregiver Self   Accompanied By/Relationship Child- [address] Members   Patient's Healthcare Decision Maker is: Named in [address] [place] [address]   PCP Verified by CM Yes   Last Visit to PCP Within last 3 months   Prior Functional Level Independent in ADLs/IADLs   Current Functional Level Independent in ADLs/IADLs   Can patient return to prior living arrangement Yes   Ability to make needs known: Good   Family able to assist with home care needs: Yes   Would you like for me to discuss the discharge plan with any other family members/significant others, and if so, who? No   Financial Resources [employer] None   Social/Functional History   Lives With Family; Alone   Home Layout One level   Discharge Planning   Type of Residence House   Condition of Participation: Discharge Planning   The Plan for Transition of Care is related to the following treatment goals: home   The Patient and/or Patient Representative was provided with a Choice of Provider? Patient   The Patient and/Or Patient Representative agree with the Discharge Plan? Yes   Freedom of Choice list was provided with basic dialogue that supports the patient's individualized plan of care/goals, treatment preferences, and shares the quality data associated with the providers? Yes     Assessment completed at bedside this AM. Patient alert and oriented. Patient accompanied by her son, Chika Vincent. Patient lives home alone. Patient IND and driving at baseline. Patient reports she has used [facility] and [facility] previously and is open to using them again if needed. Discharge plan is to return home alone. No discharge needs identified at this time.

## 2023-05-16 NOTE — PROGRESS NOTES
John Zamudio TRANSFER - IN REPORT:    Verbal report received from [address] [place] [address] on Lynda Valerio  being received from HOSPITAL [address]  for routine progression of patient care      Report consisted of patient's Situation, Background, Assessment and   Recommendations(SBAR). Information from the following report(s) Recent Results, mentation status, NPO status and  ambulation was reviewed with the receiving nurse. Opportunity for questions and clarification was provided. Assessment completed upon patient's arrival to unit and care assumed. done

## 2023-05-16 NOTE — H&P
Penicillins Swelling    Sulfa Antibiotics Swelling     Prior to Admit Medications:  Current Outpatient Medications   Medication Instructions    albuterol sulfate HFA (VENTOLIN HFA) 108 (90 Base) MCG/ACT inhaler 2 puffs, Inhalation, 4 TIMES DAILY PRN    amLODIPine (NORVASC) 10 mg, Oral, DAILY    colchicine (COLCRYS) 0.6 MG tablet Pt to take one po every hour until symptoms improved or pt has diarrhea    Dextromethorphan-guaiFENesin (MUCINEX DM MAXIMUM STRENGTH)  MG TB12 1 tablet, Oral, 2 times daily    docusate (COLACE, DULCOLAX) 100 mg, Oral, DAILY    fluticasone-salmeterol (ADVAIR DISKUS) 250-50 MCG/ACT AEPB diskus inhaler 1 puff, Inhalation, EVERY 12 HOURS    gabapentin (NEURONTIN) 100 mg, Oral, 3 TIMES DAILY    guaiFENesin (ROBITUSSIN) 200 mg, Oral, EVERY 4 HOURS PRN    ipratropium-albuterol (DUONEB) 0.5-2.5 (3) MG/3ML SOLN nebulizer solution 3 mLs, Inhalation, EVERY 4 HOURS PRN    lisinopril-hydroCHLOROthiazide (PRINZIDE;ZESTORETIC) 20-12.5 MG per tablet 1 tablet, Oral, DAILY    Nebulizers (COMPRESSOR/NEBULIZER) MISC DuoNeb treatment every 4 hours as needed for shortness of breath    omeprazole (PRILOSEC) 40 mg, Oral, DAILY    simvastatin (ZOCOR) 40 mg, Oral, DAILY    venlafaxine (EFFEXOR) 75 mg, Oral, 3 TIMES DAILY    Xarelto 10 mg, Oral         Objective:   Patient Vitals for the past 24 hrs:   Temp Pulse Resp BP SpO2   05/15/23 1945 -- 79 18 (!) 141/57 98 %   05/15/23 1930 -- 81 22 (!) 149/62 99 %   05/15/23 1913 97.9 °F (36.6 °C) 80 17 (!) 147/61 98 %   05/15/23 1908 -- 81 22 -- 95 %   05/15/23 1858 -- 79 17 -- 96 %   05/15/23 1848 98.3 °F (36.8 °C) 93 20 (!) 159/61 95 %   05/15/23 1757 98.2 °F (36.8 °C) -- -- (!) 152/56 95 %   05/15/23 1610 98.4 °F (36.9 °C) -- -- -- --   05/15/23 1603 -- 93 18 (!) 105/52 96 %       Oxygen Therapy  SpO2: 98 %  Pulse via Oximetry: 79 beats per minute  O2 Device: None (Room air)    Estimated body mass index is 32.93 kg/m² as calculated from the following:    Height

## 2023-05-16 NOTE — PROGRESS NOTES
05/16/23 0802   Oxygen Therapy/Pulse Ox   O2 Therapy Oxygen   $Oxygen $Daily Charge   O2 Device Nasal cannula   O2 Flow Rate (L/min) 2 L/min  (decreased to 1 lpm)   Pulse 74   Respirations 16   SpO2 98 %   Pulse Oximeter Device Mode Intermittent   $Pulse Oximeter $Spot check (single)
TRANSFER - OUT REPORT:    Verbal report given to Ynes RN(name) on Sae Bethea  being transferred to [facility] room 602(unit) for routine progression of patient care       Report consisted of patients Situation, Background, Assessment and   Recommendations(SBAR). Information from the following report(s) Nurse Handoff Report and Recent Results was reviewed with the receiving nurse. Opportunity for questions and clarification was provided. Patient transported with:   Patient-specific medications from Pharmacy         This [facility] retrieves the last documented value for [facility] assessment data, retrieved by either LDA type or by LDA group ID. This SmartLink should be used in a SmartText or SmartPhrase. If one is not available, please contact your .
Product Code Blood Bank  LR     Unit Divison 00     Dispense Status Blood Bank ISSUED     Crossmatch Result Compatible    Hemoglobin and Hematocrit    Collection Time: 05/15/23  4:40 PM   Result Value Ref Range    Hemoglobin 5.8 (LL) 11.7 - 15.4 g/dL    Hematocrit 20.5 (L) 35.8 - 46.3 %   Iron    Collection Time: 05/15/23  4:40 PM   Result Value Ref Range    Iron 10 (L) 35 - 150 ug/dL   Transferrin    Collection Time: 05/15/23  4:40 PM   Result Value Ref Range    Transferrin 306 202 - 364 mg/dL   PREPARE RBC (CROSSMATCH), 1 Units    Collection Time: 05/15/23  5:00 PM   Result Value Ref Range    History Check Historical check performed    Hemoglobin and Hematocrit    Collection Time: 05/16/23 12:07 AM   Result Value Ref Range    Hemoglobin 6.6 (LL) 11.7 - 15.4 g/dL    Hematocrit 22.6 (L) 35.8 - 46.3 %   PREPARE RBC (CROSSMATCH), 1 Units    Collection Time: 05/16/23 12:45 AM   Result Value Ref Range    History Check Historical check performed    Protime-INR    Collection Time: 05/16/23  7:07 AM   Result Value Ref Range    Protime 14.1 12.6 - 14.3 sec    INR 1.1     APTT    Collection Time: 05/16/23  7:07 AM   Result Value Ref Range    PTT 27.9 24.5 - 34.2 SEC   Hemoglobin and Hematocrit    Collection Time: 05/16/23  7:07 AM   Result Value Ref Range    Hemoglobin 7.3 (L) 11.7 - 15.4 g/dL    Hematocrit 24.7 (L) 35.8 - 46.3 %         Other Studies:  No orders to display       Current Meds:  Current Facility-Administered Medications   Medication Dose Route Frequency    0.9 % sodium chloride infusion   IntraVENous PRN    tuberculin injection 5 Units  5 Units IntraDERmal Once    0.9 % sodium chloride infusion   IntraVENous PRN    sodium chloride flush 0.9 % injection 5-40 mL  5-40 mL IntraVENous 2 times per day    sodium chloride flush 0.9 % injection 5-40 mL  5-40 mL IntraVENous PRN    0.9 % sodium chloride infusion   IntraVENous PRN    ondansetron (ZOFRAN-ODT) disintegrating tablet 4 mg  4 mg Oral Q8H PRN    Or

## 2023-05-16 NOTE — ACP (ADVANCE CARE PLANNING)
[facility] Hospitalist Service  At the heart of better care     Advance Care Planning   Admit Date:  No admission date for patient encounter. Name:  Francesca Valle   Age:  [de-identified] y.o. Sex:  female  :  1940   MRN:  [id number]   Room:  602    Francesca Valle is able to make her own decisions:   Yes    If pt unable to make decisions, POA/surrogate decision maker:  Raymond Ham and Tg Dickson    Patient expressed to case management team that she desires no CPR. Called patient to discuss CODE STATUS and she desires DNR/DNI. She was alert and oriented x3 and capable of making her own decisions. Code status changed per pt's wishes. Patient or surrogate consented to discussion of the current conditions, workup, management plans, prognosis, and the risk for further deterioration. Time spent: 17 minutes in direct discussion. Signed:   Jordi Bhardwaj DO

## 2023-05-16 NOTE — H&P
Placeholder. Pt is  transferred to [facility] for GIB. Please see prior progress note.   Javier Borjas, DO

## 2023-05-17 ENCOUNTER — APPOINTMENT (OUTPATIENT)
Dept: NON INVASIVE DIAGNOSTICS | Age: 83
DRG: 378 | End: 2023-05-17
Attending: STUDENT IN AN ORGANIZED HEALTH CARE EDUCATION/TRAINING PROGRAM
Payer: MEDICARE

## 2023-05-17 ENCOUNTER — ANESTHESIA (OUTPATIENT)
Dept: ENDOSCOPY | Age: 83
DRG: 378 | End: 2023-05-17
Payer: MEDICARE

## 2023-05-17 VITALS
BODY MASS INDEX: 33.49 KG/M2 | TEMPERATURE: 98.1 F | HEIGHT: 65 IN | HEART RATE: 87 BPM | OXYGEN SATURATION: 95 % | DIASTOLIC BLOOD PRESSURE: 79 MMHG | WEIGHT: 201 LBS | RESPIRATION RATE: 18 BRPM | SYSTOLIC BLOOD PRESSURE: 155 MMHG

## 2023-05-17 PROBLEM — K44.9 HIATAL HERNIA: Status: ACTIVE | Noted: 2023-05-17

## 2023-05-17 PROBLEM — K64.8 INTERNAL HEMORRHOID: Status: ACTIVE | Noted: 2023-05-17

## 2023-05-17 PROBLEM — K57.90 DIVERTICULOSIS: Status: ACTIVE | Noted: 2023-05-17

## 2023-05-17 PROBLEM — I26.99 PULMONARY EMBOLISM, UNSPECIFIED CHRONICITY, UNSPECIFIED PULMONARY EMBOLISM TYPE, UNSPECIFIED WHETHER ACUTE COR PULMONALE PRESENT (HCC): Chronic | Status: ACTIVE | Noted: 2023-02-17

## 2023-05-17 PROBLEM — D50.0 IRON DEFICIENCY ANEMIA DUE TO CHRONIC BLOOD LOSS: Status: ACTIVE | Noted: 2023-05-16

## 2023-05-17 LAB
ABO + RH BLD: NORMAL
ANION GAP SERPL CALC-SCNC: 4 MMOL/L (ref 2–11)
BACTERIA SPEC CULT: ABNORMAL
BACTERIA SPEC CULT: ABNORMAL
BLD PROD TYP BPU: NORMAL
BLD PROD TYP BPU: NORMAL
BLOOD BANK CMNT PATIENT-IMP: NORMAL
BLOOD BANK DISPENSE STATUS: NORMAL
BLOOD BANK DISPENSE STATUS: NORMAL
BLOOD GROUP ANTIBODIES SERPL: NORMAL
BPU ID: NORMAL
BPU ID: NORMAL
BUN SERPL-MCNC: 10 MG/DL (ref 8–23)
CALCIUM SERPL-MCNC: 8.6 MG/DL (ref 8.3–10.4)
CHLORIDE SERPL-SCNC: 109 MMOL/L (ref 101–110)
CO2 SERPL-SCNC: 24 MMOL/L (ref 21–32)
CREAT SERPL-MCNC: 0.8 MG/DL (ref 0.6–1)
CROSSMATCH RESULT: NORMAL
CROSSMATCH RESULT: NORMAL
ECHO AO ASC DIAM: 3.3 CM
ECHO AO ASCENDING AORTA INDEX: 1.67 CM/M2
ECHO AO ROOT DIAM: 3 CM
ECHO AO ROOT INDEX: 1.52 CM/M2
ECHO AV AREA PEAK VELOCITY: 1.5 CM2
ECHO AV AREA VTI: 1.3 CM2
ECHO AV AREA/BSA PEAK VELOCITY: 0.8 CM2/M2
ECHO AV AREA/BSA VTI: 0.7 CM2/M2
ECHO AV MEAN GRADIENT: 15 MMHG
ECHO AV MEAN VELOCITY: 1.8 M/S
ECHO AV PEAK GRADIENT: 25 MMHG
ECHO AV PEAK VELOCITY: 2.5 M/S
ECHO AV VELOCITY RATIO: 0.6
ECHO AV VTI: 54.5 CM
ECHO BSA: 2.04 M2
ECHO EST RA PRESSURE: 3 MMHG
ECHO IVC PROX: 2.4 CM
ECHO LA AREA 2C: 21 CM2
ECHO LA AREA 4C: 19.8 CM2
ECHO LA DIAMETER INDEX: 1.92 CM/M2
ECHO LA DIAMETER: 3.8 CM
ECHO LA MAJOR AXIS: 5.4 CM
ECHO LA MINOR AXIS: 6 CM
ECHO LA TO AORTIC ROOT RATIO: 1.27
ECHO LA VOL 2C: 61 ML (ref 22–52)
ECHO LA VOL 4C: 57 ML (ref 22–52)
ECHO LA VOL BP: 62 ML (ref 22–52)
ECHO LA VOL/BSA BIPLANE: 31 ML/M2 (ref 16–34)
ECHO LA VOLUME INDEX A2C: 31 ML/M2 (ref 16–34)
ECHO LA VOLUME INDEX A4C: 29 ML/M2 (ref 16–34)
ECHO LV E' LATERAL VELOCITY: 8 CM/S
ECHO LV E' SEPTAL VELOCITY: 9 CM/S
ECHO LV EDV A2C: 110 ML
ECHO LV EDV A4C: 98 ML
ECHO LV EDV INDEX A4C: 49 ML/M2
ECHO LV EDV NDEX A2C: 56 ML/M2
ECHO LV EJECTION FRACTION A2C: 67 %
ECHO LV EJECTION FRACTION A4C: 58 %
ECHO LV EJECTION FRACTION BIPLANE: 63 % (ref 55–100)
ECHO LV ESV A2C: 36 ML
ECHO LV ESV A4C: 41 ML
ECHO LV ESV INDEX A2C: 18 ML/M2
ECHO LV ESV INDEX A4C: 21 ML/M2
ECHO LV FRACTIONAL SHORTENING: 32 % (ref 28–44)
ECHO LV INTERNAL DIMENSION DIASTOLE INDEX: 2.22 CM/M2
ECHO LV INTERNAL DIMENSION DIASTOLIC: 4.4 CM (ref 3.9–5.3)
ECHO LV INTERNAL DIMENSION SYSTOLIC INDEX: 1.52 CM/M2
ECHO LV INTERNAL DIMENSION SYSTOLIC: 3 CM
ECHO LV IVSD: 1 CM (ref 0.6–0.9)
ECHO LV MASS 2D: 147.8 G (ref 67–162)
ECHO LV MASS INDEX 2D: 74.7 G/M2 (ref 43–95)
ECHO LV POSTERIOR WALL DIASTOLIC: 1 CM (ref 0.6–0.9)
ECHO LV RELATIVE WALL THICKNESS RATIO: 0.45
ECHO LVOT AREA: 2.5 CM2
ECHO LVOT AV VTI INDEX: 0.51
ECHO LVOT DIAM: 1.8 CM
ECHO LVOT MEAN GRADIENT: 4 MMHG
ECHO LVOT PEAK GRADIENT: 9 MMHG
ECHO LVOT PEAK VELOCITY: 1.5 M/S
ECHO LVOT STROKE VOLUME INDEX: 35.8 ML/M2
ECHO LVOT SV: 71 ML
ECHO LVOT VTI: 27.9 CM
ECHO MV A VELOCITY: 1.41 M/S
ECHO MV AREA VTI: 1.6 CM2
ECHO MV E DECELERATION TIME (DT): 218 MS
ECHO MV E VELOCITY: 1.02 M/S
ECHO MV E/A RATIO: 0.72
ECHO MV E/E' LATERAL: 12.75
ECHO MV E/E' RATIO (AVERAGED): 12.04
ECHO MV E/E' SEPTAL: 11.33
ECHO MV LVOT VTI INDEX: 1.63
ECHO MV MAX VELOCITY: 1.7 M/S
ECHO MV MEAN GRADIENT: 6 MMHG
ECHO MV MEAN VELOCITY: 1.1 M/S
ECHO MV PEAK GRADIENT: 11 MMHG
ECHO MV VTI: 45.5 CM
ECHO PV ACCELERATION TIME (AT): 109 MS
ECHO PV MAX VELOCITY: 1.6 M/S
ECHO PV PEAK GRADIENT: 10 MMHG
ECHO RIGHT VENTRICULAR SYSTOLIC PRESSURE (RVSP): 28 MMHG
ECHO RV BASAL DIMENSION: 3.2 CM
ECHO RV FREE WALL PEAK S': 13 CM/S
ECHO RV TAPSE: 2.1 CM (ref 1.7–?)
ECHO TV REGURGITANT MAX VELOCITY: 2.52 M/S
ECHO TV REGURGITANT PEAK GRADIENT: 25 MMHG
ERYTHROCYTE [DISTWIDTH] IN BLOOD BY AUTOMATED COUNT: 17.6 % (ref 11.9–14.6)
GLUCOSE SERPL-MCNC: 123 MG/DL (ref 65–100)
HCT VFR BLD AUTO: 26.7 % (ref 35.8–46.3)
HCT VFR BLD AUTO: 27.1 % (ref 35.8–46.3)
HGB BLD-MCNC: 8 G/DL (ref 11.7–15.4)
HGB BLD-MCNC: 8.1 G/DL (ref 11.7–15.4)
MCH RBC QN AUTO: 23.8 PG (ref 26.1–32.9)
MCHC RBC AUTO-ENTMCNC: 30.3 G/DL (ref 31.4–35)
MCV RBC AUTO: 78.3 FL (ref 82–102)
NRBC # BLD: 0 K/UL (ref 0–0.2)
PLATELET # BLD AUTO: 373 K/UL (ref 150–450)
PMV BLD AUTO: 9.6 FL (ref 9.4–12.3)
POTASSIUM SERPL-SCNC: 3.2 MMOL/L (ref 3.5–5.1)
RBC # BLD AUTO: 3.41 M/UL (ref 4.05–5.2)
SERVICE CMNT-IMP: ABNORMAL
SODIUM SERPL-SCNC: 137 MMOL/L (ref 133–143)
SPECIMEN EXP DATE BLD: NORMAL
UNIT DIVISION: 0
UNIT DIVISION: 0
WBC # BLD AUTO: 8 K/UL (ref 4.3–11.1)

## 2023-05-17 PROCEDURE — 6360000002 HC RX W HCPCS: Performed by: REGISTERED NURSE

## 2023-05-17 PROCEDURE — 93306 TTE W/DOPPLER COMPLETE: CPT | Performed by: INTERNAL MEDICINE

## 2023-05-17 PROCEDURE — 85018 HEMOGLOBIN: CPT

## 2023-05-17 PROCEDURE — 2709999900 HC NON-CHARGEABLE SUPPLY: Performed by: INTERNAL MEDICINE

## 2023-05-17 PROCEDURE — 36415 COLL VENOUS BLD VENIPUNCTURE: CPT

## 2023-05-17 PROCEDURE — 88312 SPECIAL STAINS GROUP 1: CPT

## 2023-05-17 PROCEDURE — 93306 TTE W/DOPPLER COMPLETE: CPT

## 2023-05-17 PROCEDURE — 85027 COMPLETE CBC AUTOMATED: CPT

## 2023-05-17 PROCEDURE — 3700000000 HC ANESTHESIA ATTENDED CARE: Performed by: INTERNAL MEDICINE

## 2023-05-17 PROCEDURE — 2580000003 HC RX 258: Performed by: FAMILY MEDICINE

## 2023-05-17 PROCEDURE — 3609013500 HC EGD REMOVAL TUMOR POLYP/OTHER LESION SNARE TECH: Performed by: INTERNAL MEDICINE

## 2023-05-17 PROCEDURE — 7100000011 HC PHASE II RECOVERY - ADDTL 15 MIN: Performed by: INTERNAL MEDICINE

## 2023-05-17 PROCEDURE — 85014 HEMATOCRIT: CPT

## 2023-05-17 PROCEDURE — 2580000003 HC RX 258: Performed by: ANESTHESIOLOGY

## 2023-05-17 PROCEDURE — 7100000010 HC PHASE II RECOVERY - FIRST 15 MIN: Performed by: INTERNAL MEDICINE

## 2023-05-17 PROCEDURE — 80048 BASIC METABOLIC PNL TOTAL CA: CPT

## 2023-05-17 PROCEDURE — 6370000000 HC RX 637 (ALT 250 FOR IP): Performed by: INTERNAL MEDICINE

## 2023-05-17 PROCEDURE — 3700000001 HC ADD 15 MINUTES (ANESTHESIA): Performed by: INTERNAL MEDICINE

## 2023-05-17 PROCEDURE — 88305 TISSUE EXAM BY PATHOLOGIST: CPT

## 2023-05-17 PROCEDURE — 6360000002 HC RX W HCPCS: Performed by: FAMILY MEDICINE

## 2023-05-17 PROCEDURE — 3609027000 HC COLONOSCOPY: Performed by: INTERNAL MEDICINE

## 2023-05-17 PROCEDURE — 2500000003 HC RX 250 WO HCPCS: Performed by: REGISTERED NURSE

## 2023-05-17 RX ORDER — SODIUM CHLORIDE 0.9 % (FLUSH) 0.9 %
5-40 SYRINGE (ML) INJECTION EVERY 12 HOURS SCHEDULED
Status: DISCONTINUED | OUTPATIENT
Start: 2023-05-17 | End: 2023-05-17 | Stop reason: HOSPADM

## 2023-05-17 RX ORDER — SODIUM CHLORIDE, SODIUM LACTATE, POTASSIUM CHLORIDE, CALCIUM CHLORIDE 600; 310; 30; 20 MG/100ML; MG/100ML; MG/100ML; MG/100ML
INJECTION, SOLUTION INTRAVENOUS CONTINUOUS
Status: DISCONTINUED | OUTPATIENT
Start: 2023-05-17 | End: 2023-05-17

## 2023-05-17 RX ORDER — LIDOCAINE HYDROCHLORIDE 10 MG/ML
1 INJECTION, SOLUTION INFILTRATION; PERINEURAL
Status: DISCONTINUED | OUTPATIENT
Start: 2023-05-17 | End: 2023-05-17 | Stop reason: HOSPADM

## 2023-05-17 RX ORDER — SODIUM CHLORIDE 0.9 % (FLUSH) 0.9 %
5-40 SYRINGE (ML) INJECTION PRN
Status: DISCONTINUED | OUTPATIENT
Start: 2023-05-17 | End: 2023-05-17 | Stop reason: HOSPADM

## 2023-05-17 RX ORDER — SODIUM CHLORIDE 9 MG/ML
25 INJECTION, SOLUTION INTRAVENOUS PRN
Status: DISCONTINUED | OUTPATIENT
Start: 2023-05-17 | End: 2023-05-17 | Stop reason: HOSPADM

## 2023-05-17 RX ORDER — PROPOFOL 10 MG/ML
INJECTION, EMULSION INTRAVENOUS CONTINUOUS PRN
Status: DISCONTINUED | OUTPATIENT
Start: 2023-05-17 | End: 2023-05-17 | Stop reason: SDUPTHER

## 2023-05-17 RX ORDER — CEPHALEXIN 500 MG/1
500 CAPSULE ORAL EVERY 8 HOURS SCHEDULED
Status: DISCONTINUED | OUTPATIENT
Start: 2023-05-17 | End: 2023-05-17 | Stop reason: HOSPADM

## 2023-05-17 RX ORDER — CEPHALEXIN 500 MG/1
500 CAPSULE ORAL EVERY 8 HOURS SCHEDULED
Qty: 15 CAPSULE | Refills: 0 | Status: SHIPPED | OUTPATIENT
Start: 2023-05-17 | End: 2023-05-22

## 2023-05-17 RX ORDER — FERROUS SULFATE 325(65) MG
325 TABLET ORAL
Qty: 60 TABLET | Refills: 0 | Status: SHIPPED | OUTPATIENT
Start: 2023-05-18 | End: 2023-07-17

## 2023-05-17 RX ORDER — LIDOCAINE HYDROCHLORIDE 20 MG/ML
INJECTION, SOLUTION EPIDURAL; INFILTRATION; INTRACAUDAL; PERINEURAL PRN
Status: DISCONTINUED | OUTPATIENT
Start: 2023-05-17 | End: 2023-05-17 | Stop reason: SDUPTHER

## 2023-05-17 RX ORDER — PROPOFOL 10 MG/ML
INJECTION, EMULSION INTRAVENOUS PRN
Status: DISCONTINUED | OUTPATIENT
Start: 2023-05-17 | End: 2023-05-17 | Stop reason: SDUPTHER

## 2023-05-17 RX ORDER — FERROUS SULFATE 325(65) MG
325 TABLET ORAL
Status: DISCONTINUED | OUTPATIENT
Start: 2023-05-18 | End: 2023-05-17 | Stop reason: HOSPADM

## 2023-05-17 RX ORDER — AMLODIPINE BESYLATE 5 MG/1
5 TABLET ORAL DAILY
Status: DISCONTINUED | OUTPATIENT
Start: 2023-05-17 | End: 2023-05-17 | Stop reason: HOSPADM

## 2023-05-17 RX ORDER — SODIUM CHLORIDE 9 MG/ML
INJECTION, SOLUTION INTRAVENOUS PRN
Status: DISCONTINUED | OUTPATIENT
Start: 2023-05-17 | End: 2023-05-17 | Stop reason: HOSPADM

## 2023-05-17 RX ORDER — VENLAFAXINE HYDROCHLORIDE 75 MG/1
75 CAPSULE, EXTENDED RELEASE ORAL DAILY
Status: DISCONTINUED | OUTPATIENT
Start: 2023-05-17 | End: 2023-05-17 | Stop reason: HOSPADM

## 2023-05-17 RX ORDER — PANTOPRAZOLE SODIUM 40 MG/1
40 TABLET, DELAYED RELEASE ORAL
Status: DISCONTINUED | OUTPATIENT
Start: 2023-05-17 | End: 2023-05-17 | Stop reason: HOSPADM

## 2023-05-17 RX ADMIN — SODIUM CHLORIDE, PRESERVATIVE FREE 10 ML: 5 INJECTION INTRAVENOUS at 07:42

## 2023-05-17 RX ADMIN — PROPOFOL 50 MG: 10 INJECTION, EMULSION INTRAVENOUS at 08:52

## 2023-05-17 RX ADMIN — LIDOCAINE HYDROCHLORIDE 50 MG: 20 INJECTION, SOLUTION EPIDURAL; INFILTRATION; INTRACAUDAL; PERINEURAL at 08:52

## 2023-05-17 RX ADMIN — CEFEPIME 2000 MG: 2 INJECTION, POWDER, FOR SOLUTION INTRAVENOUS at 11:25

## 2023-05-17 RX ADMIN — CEPHALEXIN 500 MG: 500 CAPSULE ORAL at 13:33

## 2023-05-17 RX ADMIN — SODIUM CHLORIDE, POTASSIUM CHLORIDE, SODIUM LACTATE AND CALCIUM CHLORIDE: 600; 310; 30; 20 INJECTION, SOLUTION INTRAVENOUS at 08:08

## 2023-05-17 RX ADMIN — AMLODIPINE BESYLATE 5 MG: 5 TABLET ORAL at 13:33

## 2023-05-17 RX ADMIN — VENLAFAXINE HYDROCHLORIDE 75 MG: 75 CAPSULE, EXTENDED RELEASE ORAL at 13:33

## 2023-05-17 RX ADMIN — PROPOFOL 140 MCG/KG/MIN: 10 INJECTION, EMULSION INTRAVENOUS at 08:52

## 2023-05-17 ASSESSMENT — PAIN - FUNCTIONAL ASSESSMENT: PAIN_FUNCTIONAL_ASSESSMENT: NONE - DENIES PAIN

## 2023-05-17 ASSESSMENT — PAIN SCALES - GENERAL: PAINLEVEL_OUTOF10: 0

## 2023-05-17 NOTE — PROGRESS NOTES
TRANSFER - OUT REPORT:    Verbal report given to [facility] RN on Marlin Hood  being transferred to [phone] for routine post-op       Report consisted of patient's Situation, Background, Assessment and   Recommendations(SBAR). Information from the following report(s) Nurse Handoff Report and Event Log was reviewed with the receiving nurse. [place] Assessment: No data recorded  Lines:   Peripheral IV 05/15/23 Left Antecubital (Active)   Site Assessment Clean, dry & intact 05/17/23 0757   Line Status Flushed;Normal saline locked 05/16/23 [facility] [address] checked and tightened 05/16/23 1915   Phlebitis Assessment No symptoms 05/17/23 0757   Infiltration Assessment 0 05/17/23 0757   Alcohol Cap Used Yes 05/16/23 1915   Dressing Status Clean, dry & intact 05/16/23 1915   Dressing Type Transparent 05/16/23 1915   Dressing Intervention New 05/15/23 1640        Opportunity for questions and clarification was provided.       Patient transported with:  [employer]

## 2023-05-17 NOTE — PROGRESS NOTES
The patient is scheduled for nephrectomy next week. She is currently an inpatient being worked up for GI bleed. The surgery could certainly be rescheduled if necessary.   I will let Dr. Augustus López know that the patient is in the hospital.

## 2023-05-17 NOTE — PLAN OF CARE
Problem: Discharge Planning  Goal: Discharge to home or other facility with appropriate resources  5/17/2023 1456 by Aria Renner RN  Outcome: Adequate for Discharge  5/17/2023 1455 by Aria Renner RN  Outcome: Progressing  5/17/2023 1438 by Aria Renner RN  Outcome: Adequate for Discharge  5/17/2023 1438 by Aria Renner RN  Outcome: Progressing     Problem: ABCDS Injury Assessment  Goal: Absence of physical injury  5/17/2023 1456 by Aria Renner RN  Outcome: Adequate for Discharge  5/17/2023 1455 by Aria Renner RN  Outcome: Progressing  5/17/2023 1438 by Aria Renner RN  Outcome: Adequate for Discharge  5/17/2023 1438 by Aria Renner RN  Outcome: Progressing     Problem: Safety - Adult  Goal: Free from fall injury  5/17/2023 1456 by Aria Renner RN  Outcome: Adequate for Discharge  5/17/2023 1455 by Aria Renner RN  Outcome: Progressing  5/17/2023 1438 by Aria Renner RN  Outcome: Adequate for Discharge  5/17/2023 1438 by Aria Renner RN  Outcome: Progressing     Problem: Chronic Conditions and Co-morbidities  Goal: Patient's chronic conditions and co-morbidity symptoms are monitored and maintained or improved  5/17/2023 1456 by Aria Renner RN  Outcome: Adequate for Discharge  5/17/2023 1455 by Aria Renner RN  Outcome: Progressing  5/17/2023 1438 by Aria Renner RN  Outcome: Adequate for Discharge  5/17/2023 1438 by Aria Renner RN  Outcome: Progressing     Problem: Pain  Goal: Verbalizes/displays adequate comfort level or baseline comfort level  5/17/2023 1456 by Aria Renner RN  Outcome: Adequate for Discharge  5/17/2023 1455 by Aria Renner RN  Outcome: Progressing  5/17/2023 1438 by Aria Renner RN  Outcome: Adequate for Discharge  5/17/2023 1438 by Aria Renner RN  Outcome: Progressing     Problem: Discharge Planning  Goal: Discharge to home or other facility with appropriate resources  5/17/2023 1456 by Aria Muscat, RN  Outcome: Adequate for Discharge  5/17/2023 1455 by Aria Renner, RN  Outcome:

## 2023-05-17 NOTE — ANESTHESIA PRE PROCEDURE
Active Problem List   Diagnosis Code    Diabetes mellitus type 2, diet-controlled ([facility] E11.9    Hypertension I10    Community acquired pneumonia of right lower lobe of lung J18.9    Respiratory failure with hypoxia ([facility] J96.91    Reactive airway disease J45.909    Pulmonary embolism, unspecified chronicity, unspecified pulmonary embolism type, unspecified whether acute cor pulmonale present (HCC) I26.99    Renal mass of unknown nature N28.89    Hyperkalemia E87.5    Acute GI bleeding K92.2    Acute UTI N39.0    Hyperlipidemia E78.5    Obstructive sleep apnea G47.33    GI bleed K92.2       Past Medical History:        Diagnosis Date    Arthritis     Chronic pain     hands and toes    Former smoker, stopped smoking in distant past     GERD (gastroesophageal reflux disease)     controlled with med    High cholesterol     on medication    History of pulmonary embolus (PE) 02/2023    Hypertension     Morbid obesity (HCC)     Nausea & vomiting     Pre-diabetes     Sleep apnea     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 breast bx    GYN      Hysterectomy    HEENT      T&A    TAMIA BIOPSY BREAST STEREOTACTIC  over 20 yrs ago    TOTAL KNEE ARTHROPLASTY Right 2010    US GUIDED CORE BREAST BIOPSY  over 20 yrs ago       Social History:    Social History     Tobacco Use    Smoking status: Former    Smokeless tobacco: Never    Tobacco comments:     Quit smoking: \"40 years ago\"   Substance Use Topics    Alcohol use:  Yes                                Counseling given: Not Answered  Tobacco comments: Quit smoking: \"40 years ago\"      Vital Signs (Current):   Vitals:    05/16/23 2324 05/17/23 0443 05/17/23 0700 05/17/23 0802   BP: (!) 161/60 (!) 162/66 (!) 151/83 (!) 183/77   Pulse: 89 88 95 87   Resp: 18 17 20 14   Temp: 98.4 °F (36.9 °C) 99 °F (37.2 °C) 98.6 °F (37

## 2023-05-17 NOTE — PROGRESS NOTES
Discharge to home, son at bedside. IV removed, pressured dressing applied, bleeding controlled. Patient will follow up with surgeon for possible outpatient echo prior to scheduled surgery. Hospital ist notified.

## 2023-05-17 NOTE — DISCHARGE SUMMARY
Hospitalist Discharge Summary   Admit Date:  2023  2:01 PM   DC Note date: 2023  Name:  Teofilo Llanos   Age:  [de-identified] y.o. Sex:  female  :  1940   MRN:  [id number]   Room:  2/  PCP:  Oswaldo Torres MD    Presenting Complaint: No chief complaint on file. Initial Admission Diagnosis: GI bleed [K92.2]     Problem List for this Hospitalization (present on admission):    Principal Problem:    Iron deficiency anemia due to chronic blood loss  Active Problems:    Diabetes mellitus type 2, diet-controlled (HCC)    Hypertension    Pulmonary embolism, unspecified chronicity, unspecified pulmonary embolism type, unspecified whether acute cor pulmonale present ([address]    Acute UTI from klebsiella    Diverticulosis    Internal hemorrhoid    Hiatal hernia  Resolved Problems:    * No resolved hospital problems. *      Hospital Course:  Teofilo Llanos is a [de-identified] y.o. female with medical history of PE diagnosed 2023, renal mass concerning for renal cell carcinoma, COPD who presented with fatigue and dyspnea especially on exertion of 1-2-week duration. She was planned for nephrectomy  and went to her PCP for preop blood work which showed hemoglobin 5.8 and patient was referred to the ED. In ED, Hemoccult was positive. She received 2 units PRBC in ED. Admitted to melena but no hematochezia. Patient was admitted with symptomatic anemia with acute concerns for UGIB. Patient was started on PPI IV twice daily. Xarelto held. GI was consulted. EGD and colonoscopy findings  diverticulosis, int hemorrhoids; most likely cause of chronic blood loss. No active bleed or signs of blood noted. Had taken Xarelto at home; has been 3 months since PE and with major surgery coming up will likely stay off AC until then. Can resume after that if desired but would need close following of Hb by outpatient provider.   She has met the minimum required duration for PE so could be watched off AC as it is assumed

## 2023-05-17 NOTE — DISCHARGE INSTRUCTIONS
Patient will need to follow-up with [facility] gastroenterology group, Dr. Ayesha Gan in 1 to 2 weeks after discharge.    Please contact surgeon regarding, possible echo prior to surgery,

## 2023-05-17 NOTE — PLAN OF CARE
Problem: Discharge Planning  Goal: Discharge to home or other facility with appropriate resources  5/17/2023 1438 by Danyel Jacobs RN  Outcome: Adequate for Discharge  5/17/2023 1438 by Danyel Jacobs RN  Outcome: Progressing     Problem: ABCDS Injury Assessment  Goal: Absence of physical injury  5/17/2023 1438 by Danyel Jacobs RN  Outcome: Adequate for Discharge  5/17/2023 1438 by Danyel Jacobs RN  Outcome: Progressing     Problem: Safety - Adult  Goal: Free from fall injury  5/17/2023 1438 by Danyel Jacobs RN  Outcome: Adequate for Discharge  5/17/2023 1438 by Danyel Jacobs RN  Outcome: Progressing     Problem: Chronic Conditions and Co-morbidities  Goal: Patient's chronic conditions and co-morbidity symptoms are monitored and maintained or improved  5/17/2023 1438 by Danyel Jacobs RN  Outcome: Adequate for Discharge  5/17/2023 1438 by Danyel Jacobs RN  Outcome: Progressing     Problem: Pain  Goal: Verbalizes/displays adequate comfort level or baseline comfort level  5/17/2023 1438 by Danyel Jacobs RN  Outcome: Adequate for Discharge  5/17/2023 1438 by Danyel Jacobs RN  Outcome: Progressing

## 2023-05-17 NOTE — PLAN OF CARE
Problem: Discharge Planning  Goal: Discharge to home or other facility with appropriate resources  5/17/2023 1455 by Kylee Morgan RN  Outcome: Progressing  5/17/2023 1438 by Kylee Morgan RN  Outcome: Adequate for Discharge  5/17/2023 1438 by Kylee Morgan RN  Outcome: Progressing     Problem: ABCDS Injury Assessment  Goal: Absence of physical injury  5/17/2023 1455 by Kylee Morgan RN  Outcome: Progressing  5/17/2023 1438 by Kylee Morgan RN  Outcome: Adequate for Discharge  5/17/2023 1438 by Kylee Morgan RN  Outcome: Progressing     Problem: Safety - Adult  Goal: Free from fall injury  5/17/2023 1455 by Kylee Morgan RN  Outcome: Progressing  5/17/2023 1438 by Kylee Morgan RN  Outcome: Adequate for Discharge  5/17/2023 1438 by Kylee Morgan RN  Outcome: Progressing     Problem: Chronic Conditions and Co-morbidities  Goal: Patient's chronic conditions and co-morbidity symptoms are monitored and maintained or improved  5/17/2023 1455 by Kylee Morgan RN  Outcome: Progressing  5/17/2023 1438 by Kylee Morgan RN  Outcome: Adequate for Discharge  5/17/2023 1438 by Kylee Morgan RN  Outcome: Progressing     Problem: Pain  Goal: Verbalizes/displays adequate comfort level or baseline comfort level  5/17/2023 1455 by Kylee Morgan RN  Outcome: Progressing  5/17/2023 1438 by Kylee Morgan RN  Outcome: Adequate for Discharge  5/17/2023 1438 by Kylee Morgan RN  Outcome: Progressing

## 2023-05-17 NOTE — CARE COORDINATION
Patient is scheduled for d/c today 5.17.2023. Patient plans to go home at d/c - is agreeable to this plan. Medical milestones met. No needs voiced at present time. 05/17/23 1460   Service Assessment   Patient Orientation Alert and Oriented   Cognition Alert   History Provided By Patient   Primary [address]  Family Members   Patient's Healthcare Decision Maker is: Named in [address] [place] [address]   PCP Verified by CM Yes   Last Visit to PCP Within last 3 months   Prior Functional Level Independent in ADLs/IADLs   Current Functional Level Independent in ADLs/IADLs   Can patient return to prior living arrangement Yes   Ability to make needs known: Good   Family able to assist with home care needs: Yes   Would you like for me to discuss the discharge plan with any other family members/significant others, and if so, who? No   Services At/After Discharge   Confirm Follow Up Transport Family   Condition of Participation: Discharge Planning   The Patient and/or Patient Representative was provided with a Choice of Provider? Patient   The Patient and/Or Patient Representative agree with the Discharge Plan? Yes   Freedom of Choice list was provided with basic dialogue that supports the patient's individualized plan of care/goals, treatment preferences, and shares the quality data associated with the providers?   Yes

## 2023-05-17 NOTE — ANESTHESIA POSTPROCEDURE EVALUATION
Department of Anesthesiology  Postprocedure Note    Patient: Jacqueline Jennings  MRN: [id number]  YOB: 1940  Date of evaluation: 5/17/2023      Procedure Summary     Date: 05/17/23 Room / Location: [facility] ENDO 03 / [facility] ENDOSCOPY    Anesthesia Start: 1962 Anesthesia Stop: 0932    Procedures:       EGD, POLYP (Upper GI Region)      COLORECTAL CANCER SCREENING, NOT HIGH RISK (Lower GI Region) Diagnosis:       GI bleeding      (GI bleeding [K92.2])    Surgeons: Chioma Mobley MD Responsible Provider: Melissa Jarvis MD    Anesthesia Type: TIVA ASA Status: 3          Anesthesia Type: TIVA    Ines Phase I: Ines Score: 10    Ines Phase II: Ines Score: 10      Anesthesia Post Evaluation    Patient location during evaluation: PACU  Patient participation: complete - patient participated  Level of consciousness: awake and alert  Airway patency: patent  Nausea & Vomiting: no nausea and no vomiting  Complications: no  Cardiovascular status: hemodynamically stable  Respiratory status: acceptable, nonlabored ventilation and spontaneous ventilation  Hydration status: euvolemic  Comments: BP (!) 143/63   Pulse 85   Temp 98 °F (36.7 °C) (Temporal)   Resp 16   Ht 5' 5\" (1.651 m)   Wt 201 lb (91.2 kg)   SpO2 95%   BMI 33.45 kg/m²   RADHA ordered due to murmur on auscultation and upcoming nephrectomy.     Multimodal analgesia pain management approach

## 2023-05-17 NOTE — PROGRESS NOTES
Pt was not able to complete all of the bowel prep but stated \"I drank as I much as I could\"     Rounds performed throughout shift. Pt denies needs at this time. Bed in low position, locked and call light/personal items within reach. Will report to day shift nurse.

## 2023-05-17 NOTE — PROCEDURES
Endoscopy and colonoscopy notes          Operative Report    Patient: Griffin Kan MRN: [id number]      YOB: 1940  Age: [de-identified] y.o. Sex: female            Indications:  Melena    Preoperative Evaluation: The patient was evaluated prior to the procedure in the GI lab admission area, the patient ASA was recorded . Consent was obtained from the patient with the risk of perforation bleeding and aspiration. The upper GI endoscope was advanced from the mouth to the second duodenum. Retroflexion was done in the stomach. Anesthesia: MORRO-per anesthesia    Findings:  Oropharynx normal.  Esophagus: Normal.  Stomach showed 3 cm hiatal hernia and diminutive gastric polyp that was removed by cold biopsy. Duodenum: Is normal.    Postoperative Diagnosis: Proceed with colonoscopy as planned. Continue acid suppressive treatment. [id number] Esophagogastroduodenoscopy, Flexible, transoral; biopsy; single or multiple      Recommendations: Await Pathology. Proceed with colonoscopy today. Colonoscopy note: The pediatric colonoscope  was advanced from the rectum to the terminal ileum. The prep was suboptimal considering inability of the patient to finish her prep. Graton score is 4. There was brown stool throughout the entire colon. No blood noted in the colon. No colonic obstruction. No active colitis. Findings:  Terminal ileum: Normal.  Cecum: Normal.  Ascending colon, transverse colon, descending colon are normal.  Sigmoid is fixated and tortuous with diverticulosis. Rectum revealed grade 2 internal hemorrhoids with no bleeding. Recommendations:  Advance diet. Consider repeating colonoscopy as outpatient in 3 to 6 months after better preparation. Consider outpatient capsule endoscopy. Patient will need to follow-up with [address] [place] [address] gastroenterology group, Dr. Nohemy Saha in 1 to 2 weeks after discharge.           Signed By:  Sanchez Pham MD     May 17, 2023

## 2023-05-17 NOTE — PLAN OF CARE
Problem: Discharge Planning  Goal: Discharge to home or other facility with appropriate resources  5/17/2023 1456 by Annie Rosenberg, RN  Outcome: Adequate for Discharge  5/17/2023 1455 by Annie Rosenberg, RN  Outcome: Progressing  5/17/2023 1438 by Annie Rosenberg, RN  Outcome: Adequate for Discharge  5/17/2023 1438 by Annie Rosenberg, RN  Outcome: Progressing     Problem: ABCDS Injury Assessment  Goal: Absence of physical injury  5/17/2023 1456 by Annie Rosenberg, RN  Outcome: Adequate for Discharge  5/17/2023 1455 by Annie Rosenberg, RN  Outcome: Progressing  5/17/2023 1438 by Annie Rosenberg, RN  Outcome: Adequate for Discharge  5/17/2023 1438 by Annie Rosenberg, RN  Outcome: Progressing     Problem: Safety - Adult  Goal: Free from fall injury  5/17/2023 1456 by Annie Rosenberg, RN  Outcome: Adequate for Discharge  5/17/2023 1455 by Annie Rosenberg, RN  Outcome: Progressing  5/17/2023 1438 by Annie Rosenberg, RN  Outcome: Adequate for Discharge  5/17/2023 1438 by Annie Rosenberg, RN  Outcome: Progressing     Problem: Chronic Conditions and Co-morbidities  Goal: Patient's chronic conditions and co-morbidity symptoms are monitored and maintained or improved  5/17/2023 1456 by Annie Rosenberg, RN  Outcome: Adequate for Discharge  5/17/2023 1455 by Annie Rosenberg, RN  Outcome: Progressing  5/17/2023 1438 by Annie Rosenberg, RN  Outcome: Adequate for Discharge  5/17/2023 1438 by Annie Rosenberg, RN  Outcome: Progressing     Problem: Pain  Goal: Verbalizes/displays adequate comfort level or baseline comfort level  5/17/2023 1456 by Annie Rosenberg, RN  Outcome: Adequate for Discharge  5/17/2023 1455 by Annie Rosenberg, RN  Outcome: Progressing  5/17/2023 1438 by Annie Rosenberg, RN  Outcome: Adequate for Discharge  5/17/2023 1438 by Annie Rosenberg, RN  Outcome: Progressing

## 2023-05-17 NOTE — PROGRESS NOTES
Patient in bed, alert and oriented x4 . Per night shift report patient unable to complete full prep for colonoscopy. Ken Osorio in the G. I  aware patient unable to complete prep. Patient denies pain. Placed in gown, watch left at bedside. Patient ready for G.I lab.   10:20 am   Patient returned to floor via chair, able to ambulate to the bed self assist. Denies pain at this time.

## 2023-05-17 NOTE — PLAN OF CARE
Problem: Discharge Planning  Goal: Discharge to home or other facility with appropriate resources  5/17/2023 1456 by Palma Bob RN  Outcome: Adequate for Discharge  5/17/2023 1455 by Palma Bob RN  Outcome: Progressing  5/17/2023 1438 by Palma Bob RN  Outcome: Adequate for Discharge  5/17/2023 1438 by Palma Bob RN  Outcome: Progressing     Problem: ABCDS Injury Assessment  Goal: Absence of physical injury  5/17/2023 1456 by Palma Bob RN  Outcome: Adequate for Discharge  5/17/2023 1455 by Palma Bob RN  Outcome: Progressing  5/17/2023 1438 by Palma Bob RN  Outcome: Adequate for Discharge  5/17/2023 1438 by Palma Bob RN  Outcome: Progressing     Problem: Safety - Adult  Goal: Free from fall injury  5/17/2023 1456 by Palma Bob RN  Outcome: Adequate for Discharge  5/17/2023 1455 by Palma Bob RN  Outcome: Progressing  5/17/2023 1438 by Palma Bob RN  Outcome: Adequate for Discharge  5/17/2023 1438 by Palma Bob RN  Outcome: Progressing     Problem: Chronic Conditions and Co-morbidities  Goal: Patient's chronic conditions and co-morbidity symptoms are monitored and maintained or improved  5/17/2023 1456 by Palma Bob RN  Outcome: Adequate for Discharge  5/17/2023 1455 by Palma Bob RN  Outcome: Progressing  5/17/2023 1438 by Palma Bob RN  Outcome: Adequate for Discharge  5/17/2023 1438 by Palma Bob RN  Outcome: Progressing     Problem: Pain  Goal: Verbalizes/displays adequate comfort level or baseline comfort level  5/17/2023 1456 by Palma Bob RN  Outcome: Adequate for Discharge  5/17/2023 1455 by Palma Bob RN  Outcome: Progressing  5/17/2023 1438 by Palma Bob RN  Outcome: Adequate for Discharge  5/17/2023 1438 by Palma Bob RN  Outcome: Progressing

## 2023-05-17 NOTE — PROGRESS NOTES
Physical Therapy Note:    Physical therapy evaluation orders received and chart reviewed. Attempted to see patient this AM to initiate assessment. Patient currently off of the floor at GI lab. Will follow and re-attempt at a later time/date as schedule permits/patient available.  Thank you,    Main Templeton, PT, DPT  5/17/23

## 2023-05-17 NOTE — PROGRESS NOTES
Hospitalist Progress Note   Admit Date:  2023  2:01 PM   Name:  Meseret Coley   Age:  [de-identified] y.o. Sex:  female  :  1940   MRN:  [id number]   Room:  602    Presenting/Chief Complaint: No chief complaint on file. Reason(s) for Admission: GI bleed [K92.2]     Hospital Course:   Meseret Coley is a [de-identified] y.o. female with medical history of PE diagnosed 2023, renal mass concerning for renal cell carcinoma, COPD who presented with fatigue and dyspnea especially on exertion of 1-2-week duration. She was planned for nephrectomy  and went to her PCP for preop blood work which showed hemoglobin 5.8 and patient was referred to the ED. In ED, Hemoccult was positive. She received 2 units PRBC in ED. Admitted to melena but no hematochezia. Patient was admitted with symptomatic anemia with acute concerns for UGIB. Patient was started on PPI IV twice daily. Xarelto held. GI was consulted. Urology was consulted for renal mass, already planning nephrectomy . Subjective & 24hr Events (23): Started progress note but after talking to patient decided she is OK to go home. See DC summary as well. Assessment & Plan:     Iron def anemia due to chronic blood loss  Hgb 5.8 on admission (baseline ~10-11) and pt received 2U PRBC. EGD and colonoscopy findings reviewed, diverticulosis, int hemorrhoids most likely cause of chronic blood loss. No active bleed or signs of blood. Takes Xarelto at home; has been 3 months since PE and with major surgery coming up will likely stay off AC until then.    Can resume after that if desired but would need close following of Hb by outpatient provider  PPI BID    Renal mass  Evidenced on CTAP in 2023 concerning for renal cell carcinoma  Patient of Dr. Andi Sue, has planned nephrectomy on 2023  Urology notified    Acute klebsiella UTI  -change abx to keflex based on sensitivities    Hx of PE  Diagnosed on CT chest

## 2023-05-24 ENCOUNTER — ANESTHESIA EVENT (OUTPATIENT)
Dept: SURGERY | Age: 83
End: 2023-05-24
Payer: MEDICARE

## 2023-05-25 ENCOUNTER — ANESTHESIA (OUTPATIENT)
Dept: SURGERY | Age: 83
End: 2023-05-25
Payer: MEDICARE

## 2023-05-25 ENCOUNTER — HOSPITAL ENCOUNTER (INPATIENT)
Age: 83
LOS: 6 days | Discharge: HOME | DRG: 656 | End: 2023-05-31
Attending: UROLOGY | Admitting: UROLOGY
Payer: MEDICARE

## 2023-05-25 DIAGNOSIS — N28.89 RENAL MASS: Primary | ICD-10-CM

## 2023-05-25 LAB
BASOPHILS # BLD: 0.1 K/UL (ref 0–0.2)
BASOPHILS NFR BLD: 1 % (ref 0–2)
DIFFERENTIAL METHOD BLD: ABNORMAL
EOSINOPHIL # BLD: 0.3 K/UL (ref 0–0.8)
EOSINOPHIL NFR BLD: 3 % (ref 0.5–7.8)
ERYTHROCYTE [DISTWIDTH] IN BLOOD BY AUTOMATED COUNT: 18 % (ref 11.9–14.6)
HCT VFR BLD AUTO: 21.9 % (ref 35.8–46.3)
HCT VFR BLD AUTO: 29.4 % (ref 35.8–46.3)
HCT VFR BLD AUTO: 30.5 % (ref 35.8–46.3)
HGB BLD-MCNC: 6.4 G/DL (ref 11.7–15.4)
HGB BLD-MCNC: 8.5 G/DL (ref 11.7–15.4)
HGB BLD-MCNC: 9 G/DL (ref 11.7–15.4)
HISTORY CHECK: NORMAL
IMM GRANULOCYTES # BLD AUTO: 0 K/UL (ref 0–0.5)
IMM GRANULOCYTES NFR BLD AUTO: 0 % (ref 0–5)
LYMPHOCYTES # BLD: 1.4 K/UL (ref 0.5–4.6)
LYMPHOCYTES NFR BLD: 18 % (ref 13–44)
MCH RBC QN AUTO: 23 PG (ref 26.1–32.9)
MCHC RBC AUTO-ENTMCNC: 28.9 G/DL (ref 31.4–35)
MCV RBC AUTO: 79.5 FL (ref 82–102)
MONOCYTES # BLD: 0.5 K/UL (ref 0.1–1.3)
MONOCYTES NFR BLD: 7 % (ref 4–12)
NEUTS SEG # BLD: 5.5 K/UL (ref 1.7–8.2)
NEUTS SEG NFR BLD: 71 % (ref 43–78)
NRBC # BLD: 0 K/UL (ref 0–0.2)
PLATELET # BLD AUTO: 534 K/UL (ref 150–450)
PMV BLD AUTO: 9.5 FL (ref 9.4–12.3)
RBC # BLD AUTO: 3.7 M/UL (ref 4.05–5.2)
WBC # BLD AUTO: 7.8 K/UL (ref 4.3–11.1)

## 2023-05-25 PROCEDURE — 6360000002 HC RX W HCPCS: Performed by: ANESTHESIOLOGY

## 2023-05-25 PROCEDURE — 3600000004 HC SURGERY LEVEL 4 BASE: Performed by: UROLOGY

## 2023-05-25 PROCEDURE — 2709999900 HC NON-CHARGEABLE SUPPLY: Performed by: UROLOGY

## 2023-05-25 PROCEDURE — 86900 BLOOD TYPING SEROLOGIC ABO: CPT

## 2023-05-25 PROCEDURE — 6370000000 HC RX 637 (ALT 250 FOR IP): Performed by: UROLOGY

## 2023-05-25 PROCEDURE — 86901 BLOOD TYPING SEROLOGIC RH(D): CPT

## 2023-05-25 PROCEDURE — 2500000003 HC RX 250 WO HCPCS: Performed by: REGISTERED NURSE

## 2023-05-25 PROCEDURE — 2580000003 HC RX 258: Performed by: REGISTERED NURSE

## 2023-05-25 PROCEDURE — 7100000000 HC PACU RECOVERY - FIRST 15 MIN: Performed by: UROLOGY

## 2023-05-25 PROCEDURE — 6360000002 HC RX W HCPCS: Performed by: REGISTERED NURSE

## 2023-05-25 PROCEDURE — 50545 LAPARO RADICAL NEPHRECTOMY: CPT | Performed by: UROLOGY

## 2023-05-25 PROCEDURE — 2580000003 HC RX 258: Performed by: ANESTHESIOLOGY

## 2023-05-25 PROCEDURE — 6360000002 HC RX W HCPCS: Performed by: UROLOGY

## 2023-05-25 PROCEDURE — 2720000010 HC SURG SUPPLY STERILE: Performed by: UROLOGY

## 2023-05-25 PROCEDURE — 85025 COMPLETE CBC W/AUTO DIFF WBC: CPT

## 2023-05-25 PROCEDURE — 2500000003 HC RX 250 WO HCPCS: Performed by: UROLOGY

## 2023-05-25 PROCEDURE — 3700000001 HC ADD 15 MINUTES (ANESTHESIA): Performed by: UROLOGY

## 2023-05-25 PROCEDURE — 3600000014 HC SURGERY LEVEL 4 ADDTL 15MIN: Performed by: UROLOGY

## 2023-05-25 PROCEDURE — 0TT14ZZ RESECTION OF LEFT KIDNEY, PERCUTANEOUS ENDOSCOPIC APPROACH: ICD-10-PCS | Performed by: UROLOGY

## 2023-05-25 PROCEDURE — 86923 COMPATIBILITY TEST ELECTRIC: CPT

## 2023-05-25 PROCEDURE — 88307 TISSUE EXAM BY PATHOLOGIST: CPT

## 2023-05-25 PROCEDURE — 2580000003 HC RX 258: Performed by: UROLOGY

## 2023-05-25 PROCEDURE — 85014 HEMATOCRIT: CPT

## 2023-05-25 PROCEDURE — 85018 HEMOGLOBIN: CPT

## 2023-05-25 PROCEDURE — 7100000001 HC PACU RECOVERY - ADDTL 15 MIN: Performed by: UROLOGY

## 2023-05-25 PROCEDURE — P9016 RBC LEUKOCYTES REDUCED: HCPCS

## 2023-05-25 PROCEDURE — 86850 RBC ANTIBODY SCREEN: CPT

## 2023-05-25 PROCEDURE — 3700000000 HC ANESTHESIA ATTENDED CARE: Performed by: UROLOGY

## 2023-05-25 PROCEDURE — 1100000000 HC RM PRIVATE

## 2023-05-25 PROCEDURE — 51702 INSERT TEMP BLADDER CATH: CPT

## 2023-05-25 RX ORDER — ONDANSETRON 2 MG/ML
4 INJECTION INTRAMUSCULAR; INTRAVENOUS
Status: COMPLETED | OUTPATIENT
Start: 2023-05-25 | End: 2023-05-25

## 2023-05-25 RX ORDER — ONDANSETRON 2 MG/ML
INJECTION INTRAMUSCULAR; INTRAVENOUS PRN
Status: DISCONTINUED | OUTPATIENT
Start: 2023-05-25 | End: 2023-05-25 | Stop reason: SDUPTHER

## 2023-05-25 RX ORDER — ONDANSETRON 2 MG/ML
4 INJECTION INTRAMUSCULAR; INTRAVENOUS EVERY 6 HOURS PRN
Status: DISCONTINUED | OUTPATIENT
Start: 2023-05-25 | End: 2023-05-31 | Stop reason: HOSPADM

## 2023-05-25 RX ORDER — GABAPENTIN 100 MG/1
100 CAPSULE ORAL 3 TIMES DAILY
Status: DISCONTINUED | OUTPATIENT
Start: 2023-05-25 | End: 2023-05-31 | Stop reason: HOSPADM

## 2023-05-25 RX ORDER — FENTANYL CITRATE 50 UG/ML
INJECTION, SOLUTION INTRAMUSCULAR; INTRAVENOUS PRN
Status: DISCONTINUED | OUTPATIENT
Start: 2023-05-25 | End: 2023-05-25 | Stop reason: SDUPTHER

## 2023-05-25 RX ORDER — PROPOFOL 10 MG/ML
INJECTION, EMULSION INTRAVENOUS PRN
Status: DISCONTINUED | OUTPATIENT
Start: 2023-05-25 | End: 2023-05-25 | Stop reason: SDUPTHER

## 2023-05-25 RX ORDER — DOCUSATE SODIUM 100 MG/1
100 CAPSULE, LIQUID FILLED ORAL 2 TIMES DAILY
Status: DISCONTINUED | OUTPATIENT
Start: 2023-05-25 | End: 2023-05-31 | Stop reason: HOSPADM

## 2023-05-25 RX ORDER — HYDROMORPHONE HYDROCHLORIDE 2 MG/ML
0.25 INJECTION, SOLUTION INTRAMUSCULAR; INTRAVENOUS; SUBCUTANEOUS EVERY 5 MIN PRN
Status: DISCONTINUED | OUTPATIENT
Start: 2023-05-25 | End: 2023-05-25 | Stop reason: HOSPADM

## 2023-05-25 RX ORDER — HYDROMORPHONE HYDROCHLORIDE 2 MG/ML
0.5 INJECTION, SOLUTION INTRAMUSCULAR; INTRAVENOUS; SUBCUTANEOUS EVERY 5 MIN PRN
Status: COMPLETED | OUTPATIENT
Start: 2023-05-25 | End: 2023-05-25

## 2023-05-25 RX ORDER — HYDROMORPHONE HCL 110MG/55ML
PATIENT CONTROLLED ANALGESIA SYRINGE INTRAVENOUS PRN
Status: DISCONTINUED | OUTPATIENT
Start: 2023-05-25 | End: 2023-05-25 | Stop reason: SDUPTHER

## 2023-05-25 RX ORDER — HYDROCODONE BITARTRATE AND ACETAMINOPHEN 5; 325 MG/1; MG/1
1 TABLET ORAL EVERY 4 HOURS PRN
Status: DISCONTINUED | OUTPATIENT
Start: 2023-05-25 | End: 2023-05-31 | Stop reason: HOSPADM

## 2023-05-25 RX ORDER — SODIUM CHLORIDE, SODIUM LACTATE, POTASSIUM CHLORIDE, CALCIUM CHLORIDE 600; 310; 30; 20 MG/100ML; MG/100ML; MG/100ML; MG/100ML
INJECTION, SOLUTION INTRAVENOUS CONTINUOUS PRN
Status: DISCONTINUED | OUTPATIENT
Start: 2023-05-25 | End: 2023-05-25 | Stop reason: SDUPTHER

## 2023-05-25 RX ORDER — MIDAZOLAM HYDROCHLORIDE 2 MG/2ML
2 INJECTION, SOLUTION INTRAMUSCULAR; INTRAVENOUS
Status: DISCONTINUED | OUTPATIENT
Start: 2023-05-25 | End: 2023-05-25 | Stop reason: HOSPADM

## 2023-05-25 RX ORDER — DEXTROSE AND SODIUM CHLORIDE 5; .45 G/100ML; G/100ML
INJECTION, SOLUTION INTRAVENOUS CONTINUOUS
Status: DISCONTINUED | OUTPATIENT
Start: 2023-05-25 | End: 2023-05-28

## 2023-05-25 RX ORDER — SODIUM CHLORIDE 0.9 % (FLUSH) 0.9 %
5-40 SYRINGE (ML) INJECTION PRN
Status: DISCONTINUED | OUTPATIENT
Start: 2023-05-25 | End: 2023-05-25 | Stop reason: HOSPADM

## 2023-05-25 RX ORDER — IPRATROPIUM BROMIDE AND ALBUTEROL SULFATE 2.5; .5 MG/3ML; MG/3ML
1 SOLUTION RESPIRATORY (INHALATION)
Status: DISCONTINUED | OUTPATIENT
Start: 2023-05-25 | End: 2023-05-25 | Stop reason: HOSPADM

## 2023-05-25 RX ORDER — NEOSTIGMINE METHYLSULFATE 1 MG/ML
INJECTION, SOLUTION INTRAVENOUS PRN
Status: DISCONTINUED | OUTPATIENT
Start: 2023-05-25 | End: 2023-05-25 | Stop reason: SDUPTHER

## 2023-05-25 RX ORDER — OXYCODONE HYDROCHLORIDE 5 MG/1
10 TABLET ORAL PRN
Status: DISCONTINUED | OUTPATIENT
Start: 2023-05-25 | End: 2023-05-25 | Stop reason: HOSPADM

## 2023-05-25 RX ORDER — AMLODIPINE BESYLATE 5 MG/1
5 TABLET ORAL DAILY
Status: DISCONTINUED | OUTPATIENT
Start: 2023-05-26 | End: 2023-05-31 | Stop reason: HOSPADM

## 2023-05-25 RX ORDER — BUPIVACAINE HYDROCHLORIDE 5 MG/ML
INJECTION, SOLUTION EPIDURAL; INTRACAUDAL PRN
Status: DISCONTINUED | OUTPATIENT
Start: 2023-05-25 | End: 2023-05-25 | Stop reason: HOSPADM

## 2023-05-25 RX ORDER — PROCHLORPERAZINE EDISYLATE 5 MG/ML
10 INJECTION INTRAMUSCULAR; INTRAVENOUS EVERY 6 HOURS PRN
Status: DISCONTINUED | OUTPATIENT
Start: 2023-05-25 | End: 2023-05-25

## 2023-05-25 RX ORDER — HYDROMORPHONE HYDROCHLORIDE 2 MG/ML
0.25 INJECTION, SOLUTION INTRAMUSCULAR; INTRAVENOUS; SUBCUTANEOUS
Status: DISCONTINUED | OUTPATIENT
Start: 2023-05-25 | End: 2023-05-25 | Stop reason: HOSPADM

## 2023-05-25 RX ORDER — ALBUTEROL SULFATE 90 UG/1
2 AEROSOL, METERED RESPIRATORY (INHALATION) 4 TIMES DAILY PRN
Status: DISCONTINUED | OUTPATIENT
Start: 2023-05-25 | End: 2023-05-31 | Stop reason: HOSPADM

## 2023-05-25 RX ORDER — OXYCODONE HYDROCHLORIDE 5 MG/1
5 TABLET ORAL PRN
Status: DISCONTINUED | OUTPATIENT
Start: 2023-05-25 | End: 2023-05-25 | Stop reason: HOSPADM

## 2023-05-25 RX ORDER — FERROUS SULFATE 325(65) MG
325 TABLET ORAL
Status: DISCONTINUED | OUTPATIENT
Start: 2023-05-26 | End: 2023-05-31 | Stop reason: HOSPADM

## 2023-05-25 RX ORDER — SODIUM CHLORIDE 9 MG/ML
INJECTION, SOLUTION INTRAVENOUS PRN
Status: DISCONTINUED | OUTPATIENT
Start: 2023-05-25 | End: 2023-05-31 | Stop reason: HOSPADM

## 2023-05-25 RX ORDER — HYDROMORPHONE HYDROCHLORIDE 2 MG/ML
0.5 INJECTION, SOLUTION INTRAMUSCULAR; INTRAVENOUS; SUBCUTANEOUS
Status: DISCONTINUED | OUTPATIENT
Start: 2023-05-25 | End: 2023-05-25 | Stop reason: HOSPADM

## 2023-05-25 RX ORDER — SODIUM CHLORIDE, SODIUM LACTATE, POTASSIUM CHLORIDE, CALCIUM CHLORIDE 600; 310; 30; 20 MG/100ML; MG/100ML; MG/100ML; MG/100ML
INJECTION, SOLUTION INTRAVENOUS CONTINUOUS
Status: DISCONTINUED | OUTPATIENT
Start: 2023-05-25 | End: 2023-05-25 | Stop reason: HOSPADM

## 2023-05-25 RX ORDER — PROCHLORPERAZINE EDISYLATE 5 MG/ML
5 INJECTION INTRAMUSCULAR; INTRAVENOUS
Status: COMPLETED | OUTPATIENT
Start: 2023-05-25 | End: 2023-05-25

## 2023-05-25 RX ORDER — VENLAFAXINE HYDROCHLORIDE 75 MG/1
75 CAPSULE, EXTENDED RELEASE ORAL DAILY
Status: DISCONTINUED | OUTPATIENT
Start: 2023-05-26 | End: 2023-05-31 | Stop reason: HOSPADM

## 2023-05-25 RX ORDER — DEXTROSE MONOHYDRATE 100 MG/ML
INJECTION, SOLUTION INTRAVENOUS CONTINUOUS PRN
Status: DISCONTINUED | OUTPATIENT
Start: 2023-05-25 | End: 2023-05-25 | Stop reason: HOSPADM

## 2023-05-25 RX ORDER — IPRATROPIUM BROMIDE AND ALBUTEROL SULFATE 2.5; .5 MG/3ML; MG/3ML
1 SOLUTION RESPIRATORY (INHALATION) EVERY 4 HOURS PRN
Status: DISCONTINUED | OUTPATIENT
Start: 2023-05-25 | End: 2023-05-31 | Stop reason: HOSPADM

## 2023-05-25 RX ORDER — ALBUTEROL SULFATE 2.5 MG/3ML
2.5 SOLUTION RESPIRATORY (INHALATION)
Status: DISCONTINUED | OUTPATIENT
Start: 2023-05-25 | End: 2023-05-25 | Stop reason: HOSPADM

## 2023-05-25 RX ORDER — LABETALOL HYDROCHLORIDE 5 MG/ML
10 INJECTION, SOLUTION INTRAVENOUS
Status: DISCONTINUED | OUTPATIENT
Start: 2023-05-25 | End: 2023-05-25 | Stop reason: HOSPADM

## 2023-05-25 RX ORDER — SODIUM CHLORIDE 0.9 % (FLUSH) 0.9 %
5-40 SYRINGE (ML) INJECTION EVERY 12 HOURS SCHEDULED
Status: DISCONTINUED | OUTPATIENT
Start: 2023-05-25 | End: 2023-05-25 | Stop reason: HOSPADM

## 2023-05-25 RX ORDER — MORPHINE SULFATE 2 MG/ML
2 INJECTION, SOLUTION INTRAMUSCULAR; INTRAVENOUS
Status: DISCONTINUED | OUTPATIENT
Start: 2023-05-25 | End: 2023-05-31 | Stop reason: HOSPADM

## 2023-05-25 RX ORDER — SODIUM CHLORIDE 9 MG/ML
INJECTION, SOLUTION INTRAVENOUS PRN
Status: DISCONTINUED | OUTPATIENT
Start: 2023-05-25 | End: 2023-05-25 | Stop reason: HOSPADM

## 2023-05-25 RX ORDER — LIDOCAINE HYDROCHLORIDE 10 MG/ML
1 INJECTION, SOLUTION INFILTRATION; PERINEURAL
Status: DISCONTINUED | OUTPATIENT
Start: 2023-05-25 | End: 2023-05-25 | Stop reason: HOSPADM

## 2023-05-25 RX ORDER — OXYBUTYNIN CHLORIDE 5 MG/1
5 TABLET ORAL 3 TIMES DAILY PRN
Status: DISCONTINUED | OUTPATIENT
Start: 2023-05-25 | End: 2023-05-31 | Stop reason: HOSPADM

## 2023-05-25 RX ORDER — GLYCOPYRROLATE 0.2 MG/ML
INJECTION INTRAMUSCULAR; INTRAVENOUS PRN
Status: DISCONTINUED | OUTPATIENT
Start: 2023-05-25 | End: 2023-05-25 | Stop reason: SDUPTHER

## 2023-05-25 RX ORDER — ACETAMINOPHEN 325 MG/1
650 TABLET ORAL EVERY 4 HOURS PRN
Status: DISCONTINUED | OUTPATIENT
Start: 2023-05-25 | End: 2023-05-31 | Stop reason: HOSPADM

## 2023-05-25 RX ORDER — PANTOPRAZOLE SODIUM 40 MG/1
40 TABLET, DELAYED RELEASE ORAL
Status: DISCONTINUED | OUTPATIENT
Start: 2023-05-26 | End: 2023-05-31 | Stop reason: HOSPADM

## 2023-05-25 RX ORDER — LIDOCAINE HYDROCHLORIDE 20 MG/ML
INJECTION, SOLUTION EPIDURAL; INFILTRATION; INTRACAUDAL; PERINEURAL PRN
Status: DISCONTINUED | OUTPATIENT
Start: 2023-05-25 | End: 2023-05-25 | Stop reason: SDUPTHER

## 2023-05-25 RX ORDER — ROCURONIUM BROMIDE 10 MG/ML
INJECTION, SOLUTION INTRAVENOUS PRN
Status: DISCONTINUED | OUTPATIENT
Start: 2023-05-25 | End: 2023-05-25 | Stop reason: SDUPTHER

## 2023-05-25 RX ADMIN — DEXTROSE AND SODIUM CHLORIDE: 5; 450 INJECTION, SOLUTION INTRAVENOUS at 15:57

## 2023-05-25 RX ADMIN — ROCURONIUM BROMIDE 10 MG: 50 INJECTION, SOLUTION INTRAVENOUS at 08:15

## 2023-05-25 RX ADMIN — FENTANYL CITRATE 50 MCG: 50 INJECTION, SOLUTION INTRAMUSCULAR; INTRAVENOUS at 07:19

## 2023-05-25 RX ADMIN — GABAPENTIN 100 MG: 100 CAPSULE ORAL at 20:43

## 2023-05-25 RX ADMIN — FENTANYL CITRATE 50 MCG: 50 INJECTION, SOLUTION INTRAMUSCULAR; INTRAVENOUS at 08:01

## 2023-05-25 RX ADMIN — DOCUSATE SODIUM 100 MG: 100 CAPSULE, LIQUID FILLED ORAL at 20:43

## 2023-05-25 RX ADMIN — HYDROMORPHONE HYDROCHLORIDE 0.4 MG: 2 INJECTION INTRAMUSCULAR; INTRAVENOUS; SUBCUTANEOUS at 08:40

## 2023-05-25 RX ADMIN — SODIUM CHLORIDE, SODIUM LACTATE, POTASSIUM CHLORIDE, AND CALCIUM CHLORIDE: 600; 310; 30; 20 INJECTION, SOLUTION INTRAVENOUS at 07:30

## 2023-05-25 RX ADMIN — ONDANSETRON 4 MG: 2 INJECTION INTRAMUSCULAR; INTRAVENOUS at 08:51

## 2023-05-25 RX ADMIN — HYDROMORPHONE HYDROCHLORIDE 0.5 MG: 2 INJECTION, SOLUTION INTRAMUSCULAR; INTRAVENOUS; SUBCUTANEOUS at 10:26

## 2023-05-25 RX ADMIN — SUGAMMADEX 200 MG: 100 INJECTION, SOLUTION INTRAVENOUS at 09:04

## 2023-05-25 RX ADMIN — PROPOFOL 160 MG: 10 INJECTION, EMULSION INTRAVENOUS at 07:19

## 2023-05-25 RX ADMIN — ONDANSETRON 4 MG: 2 INJECTION INTRAMUSCULAR; INTRAVENOUS at 09:43

## 2023-05-25 RX ADMIN — ONDANSETRON 4 MG: 2 INJECTION INTRAMUSCULAR; INTRAVENOUS at 18:19

## 2023-05-25 RX ADMIN — PROCHLORPERAZINE EDISYLATE 5 MG: 5 INJECTION INTRAMUSCULAR; INTRAVENOUS at 10:15

## 2023-05-25 RX ADMIN — SODIUM CHLORIDE, SODIUM LACTATE, POTASSIUM CHLORIDE, AND CALCIUM CHLORIDE: 600; 310; 30; 20 INJECTION, SOLUTION INTRAVENOUS at 08:45

## 2023-05-25 RX ADMIN — Medication 4 MG: at 08:53

## 2023-05-25 RX ADMIN — HYDROCODONE BITARTRATE AND ACETAMINOPHEN 1 TABLET: 5; 325 TABLET ORAL at 18:37

## 2023-05-25 RX ADMIN — CEFAZOLIN 1000 MG: 1 INJECTION, POWDER, FOR SOLUTION INTRAMUSCULAR; INTRAVENOUS at 15:50

## 2023-05-25 RX ADMIN — ROCURONIUM BROMIDE 50 MG: 50 INJECTION, SOLUTION INTRAVENOUS at 07:19

## 2023-05-25 RX ADMIN — GLYCOPYRROLATE 0.6 MG: 0.2 INJECTION INTRAMUSCULAR; INTRAVENOUS at 08:53

## 2023-05-25 RX ADMIN — MORPHINE SULFATE 2 MG: 2 INJECTION, SOLUTION INTRAMUSCULAR; INTRAVENOUS at 15:48

## 2023-05-25 RX ADMIN — HYDROCODONE BITARTRATE AND ACETAMINOPHEN 1 TABLET: 5; 325 TABLET ORAL at 16:29

## 2023-05-25 RX ADMIN — HYDROMORPHONE HYDROCHLORIDE 0.5 MG: 2 INJECTION, SOLUTION INTRAMUSCULAR; INTRAVENOUS; SUBCUTANEOUS at 10:31

## 2023-05-25 RX ADMIN — Medication 2000 MG: at 07:45

## 2023-05-25 RX ADMIN — MORPHINE SULFATE 2 MG: 2 INJECTION, SOLUTION INTRAMUSCULAR; INTRAVENOUS at 22:08

## 2023-05-25 RX ADMIN — SODIUM CHLORIDE, SODIUM LACTATE, POTASSIUM CHLORIDE, AND CALCIUM CHLORIDE: 600; 310; 30; 20 INJECTION, SOLUTION INTRAVENOUS at 07:15

## 2023-05-25 RX ADMIN — CEFAZOLIN 1000 MG: 1 INJECTION, POWDER, FOR SOLUTION INTRAMUSCULAR; INTRAVENOUS at 23:38

## 2023-05-25 RX ADMIN — LIDOCAINE HYDROCHLORIDE 80 MG: 20 INJECTION, SOLUTION EPIDURAL; INFILTRATION; INTRACAUDAL; PERINEURAL at 07:19

## 2023-05-25 ASSESSMENT — PAIN DESCRIPTION - ORIENTATION
ORIENTATION: MID
ORIENTATION: LEFT
ORIENTATION: RIGHT

## 2023-05-25 ASSESSMENT — PAIN DESCRIPTION - DESCRIPTORS
DESCRIPTORS: SORE
DESCRIPTORS: ACHING;DISCOMFORT
DESCRIPTORS: ACHING
DESCRIPTORS: SORE

## 2023-05-25 ASSESSMENT — PAIN DESCRIPTION - LOCATION
LOCATION: ABDOMEN

## 2023-05-25 ASSESSMENT — PAIN SCALES - GENERAL
PAINLEVEL_OUTOF10: 7
PAINLEVEL_OUTOF10: 10
PAINLEVEL_OUTOF10: 7
PAINLEVEL_OUTOF10: 6
PAINLEVEL_OUTOF10: 1
PAINLEVEL_OUTOF10: 3
PAINLEVEL_OUTOF10: 6
PAINLEVEL_OUTOF10: 1

## 2023-05-25 ASSESSMENT — PAIN - FUNCTIONAL ASSESSMENT
PAIN_FUNCTIONAL_ASSESSMENT: ACTIVITIES ARE NOT PREVENTED
PAIN_FUNCTIONAL_ASSESSMENT: 0-10
PAIN_FUNCTIONAL_ASSESSMENT: ADULT NONVERBAL PAIN SCALE (NPVS)

## 2023-05-25 NOTE — ANESTHESIA PRE PROCEDURE
Department of Anesthesiology  Preprocedure Note       Name:  Rick Newton   Age:  [de-identified] y.o.  :  1940                                          MRN:  [id number]         Date:  2023      Surgeon: Kai Moe):  DO Phillip Alegria MD    Procedure: Procedure(s):  NEPHRECTOMY LAPAROSCOPIC HAND ASSISTED LEFT/ FRAGOSO TO  ASSIST    Medications prior to admission:   Prior to Admission medications    Medication Sig Start Date End Date Taking? Authorizing Provider   ferrous sulfate (IRON 325) 325 (65 Fe) MG tablet Take 1 tablet by mouth daily (with breakfast) 23  Angel Kelly MD   amLODIPine (NORVASC) 5 MG tablet Take 1 tablet by mouth daily    Historical Provider, MD   gabapentin (NEURONTIN) 100 MG capsule Take 1 capsule by mouth 3 times daily. Historical Provider, MD   venlafaxine (EFFEXOR) 75 MG tablet Take 1 tablet by mouth 3 times daily    Historical Provider, MD   ipratropium-albuterol (DUONEB) 0.5-2.5 (3) MG/3ML SOLN nebulizer solution Inhale 3 mLs into the lungs every 4 hours as needed for Shortness of Breath 22  Peggy Méndez MD   Nebulizers (COMPRESSOR/NEBULIZER) MISC DuoNeb treatment every 4 hours as needed for shortness of breath 22   Peggy Méndez MD   fluticasone-salmeterol (ADVAIR DISKUS) 250-50 MCG/ACT AEPB diskus inhaler Inhale 1 puff into the lungs in the morning and 1 puff in the evening. 22  Peggy Méndez MD   albuterol sulfate HFA (VENTOLIN HFA) 108 (90 Base) MCG/ACT inhaler Inhale 2 puffs into the lungs 4 times daily as needed for Wheezing 22   Treva Brown MD   omeprazole (PRILOSEC) 40 MG delayed release capsule Take 1 capsule by mouth daily    Ar Automatic Reconciliation       Current medications:    No current facility-administered medications for this visit. No current outpatient medications on file.      Facility-Administered Medications Ordered in Other Visits   Medication Dose Route Frequency

## 2023-05-25 NOTE — ANESTHESIA POSTPROCEDURE EVALUATION
Department of Anesthesiology  Postprocedure Note    Patient: Adan Rae  MRN: [id number]  YOB: 1940  Date of evaluation: 5/25/2023      Procedure Summary     Date: 05/25/23 Room / Location: [facility] MAIN OR 09 / [facility] MAIN OR    Anesthesia Start: 0715 Anesthesia Stop: 1928    Procedure: NEPHRECTOMY LAPAROSCOPIC HAND ASSISTED LEFT/ FRAGOSO TO  ASSIST (Left: Abdomen) Diagnosis:       Renal mass      (Renal mass [N28.89])    Providers: Arun Bowens DO Responsible Provider: Chery Garcia MD    Anesthesia Type: general ASA Status: 3          Anesthesia Type: No value filed.     Ines Phase I: Ines Score: 7    Ines Phase II:        Anesthesia Post Evaluation    Patient location during evaluation: PACU  Patient participation: complete - patient participated  Level of consciousness: awake and alert  Pain score: 3  Airway patency: patent  Nausea & Vomiting: no nausea and no vomiting  Complications: no  Cardiovascular status: hemodynamically stable  Respiratory status: acceptable, nonlabored ventilation and spontaneous ventilation  Hydration status: euvolemic  Comments: BP (!) 158/68   Pulse 86   Temp 98.2 °F (36.8 °C) (Temporal)   Resp 16   Ht 5' 5.5\" (1.664 m)   Wt 192 lb 6.4 oz (87.3 kg)   SpO2 94%   BMI 31.53 kg/m²     Multimodal analgesia pain management approach

## 2023-05-25 NOTE — ANESTHESIA PROCEDURE NOTES
Arterial Line:    An arterial line was placed using surface landmarks, in the OR for the following indication(s): continuous blood pressure monitoring and blood sampling needed. A 20 gauge (size), 1 and 3/4 inch (length), Arrow (type) catheter was placed, Seldinger technique not used, into the right radial artery, secured by Tegaderm and tape. Anesthesia type: General    Events:  patient tolerated procedure well with no complications. Additional notes:  Potential access sites were examined with ultrasound and the acceptable patent access site was selected (site noted above). The needle path and vessel access were visualized in real time using ultrasonography. An image of wire in vessel was recorded for permanent record.   5/25/2023 7:30 AM5/25/2023 7:33 AM  Anesthesiologist: Ghulam Villagran MD  Performed: Anesthesiologist   Preanesthetic Checklist  Completed: patient identified, IV checked, risks and benefits discussed, surgical/procedural consents, equipment checked, pre-op evaluation, timeout performed, anesthesia consent given, oxygen available and monitors applied/VS acknowledged

## 2023-05-25 NOTE — ANESTHESIA PROCEDURE NOTES
Airway  Date/Time: 5/25/2023 7:21 AM  Urgency: elective    Airway not difficult    General Information and Staff    Patient location during procedure: OR  Resident/CRNA: JADEN Sweet CRNA  Performed: resident/CRNA     Indications and Patient Condition  Indications for airway management: anesthesia  Spontaneous Ventilation: absent  Sedation level: deep  Preoxygenated: yes  Patient position: sniffing  MILS not maintained throughout  Mask difficulty assessment: vent by bag mask    Final Airway Details  Final airway type: endotracheal airway      Successful airway: ETT  Cuffed: yes   Successful intubation technique: video laryngoscopy  Facilitating devices/methods: intubating stylet  Endotracheal tube insertion site: oral  Blade: Josue  Blade size: #3  ETT size (mm): 7.0  Cormack-Lehane Classification: grade I - full view of glottis  Placement verified by: chest auscultation and capnometry   Measured from: lips  ETT to lips (cm): 21  Number of attempts at approach: 1  Ventilation between attempts: bag mask  Number of other approaches attempted: 0    no

## 2023-05-26 LAB
ANION GAP SERPL CALC-SCNC: 6 MMOL/L (ref 2–11)
BUN SERPL-MCNC: 13 MG/DL (ref 8–23)
CALCIUM SERPL-MCNC: 8.5 MG/DL (ref 8.3–10.4)
CHLORIDE SERPL-SCNC: 105 MMOL/L (ref 101–110)
CO2 SERPL-SCNC: 24 MMOL/L (ref 21–32)
CREAT SERPL-MCNC: 1.1 MG/DL (ref 0.6–1)
GLUCOSE SERPL-MCNC: 174 MG/DL (ref 65–100)
HCT VFR BLD AUTO: 28.6 % (ref 35.8–46.3)
HGB BLD-MCNC: 8.2 G/DL (ref 11.7–15.4)
POTASSIUM SERPL-SCNC: 3.9 MMOL/L (ref 3.5–5.1)
SODIUM SERPL-SCNC: 135 MMOL/L (ref 133–143)

## 2023-05-26 PROCEDURE — 1100000000 HC RM PRIVATE

## 2023-05-26 PROCEDURE — 6360000002 HC RX W HCPCS: Performed by: UROLOGY

## 2023-05-26 PROCEDURE — 94640 AIRWAY INHALATION TREATMENT: CPT

## 2023-05-26 PROCEDURE — 2700000000 HC OXYGEN THERAPY PER DAY

## 2023-05-26 PROCEDURE — 2580000003 HC RX 258: Performed by: UROLOGY

## 2023-05-26 PROCEDURE — 85018 HEMOGLOBIN: CPT

## 2023-05-26 PROCEDURE — 97530 THERAPEUTIC ACTIVITIES: CPT

## 2023-05-26 PROCEDURE — 85014 HEMATOCRIT: CPT

## 2023-05-26 PROCEDURE — 36415 COLL VENOUS BLD VENIPUNCTURE: CPT

## 2023-05-26 PROCEDURE — 99024 POSTOP FOLLOW-UP VISIT: CPT | Performed by: UROLOGY

## 2023-05-26 PROCEDURE — 80048 BASIC METABOLIC PNL TOTAL CA: CPT

## 2023-05-26 PROCEDURE — 94760 N-INVAS EAR/PLS OXIMETRY 1: CPT

## 2023-05-26 PROCEDURE — 6370000000 HC RX 637 (ALT 250 FOR IP): Performed by: UROLOGY

## 2023-05-26 PROCEDURE — 97161 PT EVAL LOW COMPLEX 20 MIN: CPT

## 2023-05-26 RX ADMIN — GABAPENTIN 100 MG: 100 CAPSULE ORAL at 13:45

## 2023-05-26 RX ADMIN — HYDROCODONE BITARTRATE AND ACETAMINOPHEN 1 TABLET: 5; 325 TABLET ORAL at 21:24

## 2023-05-26 RX ADMIN — HYDROCODONE BITARTRATE AND ACETAMINOPHEN 1 TABLET: 5; 325 TABLET ORAL at 12:31

## 2023-05-26 RX ADMIN — FERROUS SULFATE TAB 325 MG (65 MG ELEMENTAL FE) 325 MG: 325 (65 FE) TAB at 08:40

## 2023-05-26 RX ADMIN — MOMETASONE FUROATE AND FORMOTEROL FUMARATE DIHYDRATE 2 PUFF: 200; 5 AEROSOL RESPIRATORY (INHALATION) at 08:04

## 2023-05-26 RX ADMIN — HYDROCODONE BITARTRATE AND ACETAMINOPHEN 1 TABLET: 5; 325 TABLET ORAL at 05:08

## 2023-05-26 RX ADMIN — DOCUSATE SODIUM 100 MG: 100 CAPSULE, LIQUID FILLED ORAL at 08:40

## 2023-05-26 RX ADMIN — DEXTROSE AND SODIUM CHLORIDE: 5; 450 INJECTION, SOLUTION INTRAVENOUS at 01:37

## 2023-05-26 RX ADMIN — GABAPENTIN 100 MG: 100 CAPSULE ORAL at 21:24

## 2023-05-26 RX ADMIN — DEXTROSE AND SODIUM CHLORIDE: 5; 450 INJECTION, SOLUTION INTRAVENOUS at 08:37

## 2023-05-26 RX ADMIN — CEFAZOLIN 1000 MG: 1 INJECTION, POWDER, FOR SOLUTION INTRAMUSCULAR; INTRAVENOUS at 08:37

## 2023-05-26 RX ADMIN — PANTOPRAZOLE SODIUM 40 MG: 40 TABLET, DELAYED RELEASE ORAL at 05:00

## 2023-05-26 RX ADMIN — DOCUSATE SODIUM 100 MG: 100 CAPSULE, LIQUID FILLED ORAL at 21:24

## 2023-05-26 RX ADMIN — AMLODIPINE BESYLATE 5 MG: 5 TABLET ORAL at 08:41

## 2023-05-26 RX ADMIN — MOMETASONE FUROATE AND FORMOTEROL FUMARATE DIHYDRATE 2 PUFF: 200; 5 AEROSOL RESPIRATORY (INHALATION) at 19:15

## 2023-05-26 RX ADMIN — VENLAFAXINE HYDROCHLORIDE 75 MG: 75 CAPSULE, EXTENDED RELEASE ORAL at 08:40

## 2023-05-26 RX ADMIN — GABAPENTIN 100 MG: 100 CAPSULE ORAL at 08:40

## 2023-05-26 ASSESSMENT — PAIN - FUNCTIONAL ASSESSMENT
PAIN_FUNCTIONAL_ASSESSMENT: ACTIVITIES ARE NOT PREVENTED
PAIN_FUNCTIONAL_ASSESSMENT: ACTIVITIES ARE NOT PREVENTED

## 2023-05-26 ASSESSMENT — PAIN DESCRIPTION - ORIENTATION
ORIENTATION: LEFT
ORIENTATION: LEFT
ORIENTATION: ANTERIOR

## 2023-05-26 ASSESSMENT — PAIN SCALES - WONG BAKER: WONGBAKER_NUMERICALRESPONSE: 0

## 2023-05-26 ASSESSMENT — PAIN SCALES - GENERAL
PAINLEVEL_OUTOF10: 6
PAINLEVEL_OUTOF10: 5
PAINLEVEL_OUTOF10: 0
PAINLEVEL_OUTOF10: 0
PAINLEVEL_OUTOF10: 6
PAINLEVEL_OUTOF10: 1
PAINLEVEL_OUTOF10: 0

## 2023-05-26 ASSESSMENT — PAIN DESCRIPTION - LOCATION
LOCATION: FLANK
LOCATION: ABDOMEN;GENERALIZED
LOCATION: FLANK;ABDOMEN

## 2023-05-26 ASSESSMENT — PAIN DESCRIPTION - DESCRIPTORS
DESCRIPTORS: SQUEEZING
DESCRIPTORS: ACHING;DISCOMFORT
DESCRIPTORS: ACHING;DISCOMFORT

## 2023-05-27 ENCOUNTER — APPOINTMENT (OUTPATIENT)
Dept: GENERAL RADIOLOGY | Age: 83
DRG: 656 | End: 2023-05-27
Attending: UROLOGY
Payer: MEDICARE

## 2023-05-27 ENCOUNTER — APPOINTMENT (OUTPATIENT)
Dept: CT IMAGING | Age: 83
DRG: 656 | End: 2023-05-27
Attending: UROLOGY
Payer: MEDICARE

## 2023-05-27 LAB
ANION GAP SERPL CALC-SCNC: 1 MMOL/L (ref 2–11)
APTT PPP: 29.8 SEC (ref 24.5–34.2)
BASOPHILS # BLD: 0.1 K/UL (ref 0–0.2)
BASOPHILS NFR BLD: 1 % (ref 0–2)
BUN SERPL-MCNC: 12 MG/DL (ref 8–23)
CALCIUM SERPL-MCNC: 8.1 MG/DL (ref 8.3–10.4)
CHLORIDE SERPL-SCNC: 107 MMOL/L (ref 101–110)
CO2 SERPL-SCNC: 26 MMOL/L (ref 21–32)
CREAT SERPL-MCNC: 1.1 MG/DL (ref 0.6–1)
DIFFERENTIAL METHOD BLD: ABNORMAL
EOSINOPHIL # BLD: 0.3 K/UL (ref 0–0.8)
EOSINOPHIL NFR BLD: 2 % (ref 0.5–7.8)
ERYTHROCYTE [DISTWIDTH] IN BLOOD BY AUTOMATED COUNT: 17.9 % (ref 11.9–14.6)
GLUCOSE SERPL-MCNC: 146 MG/DL (ref 65–100)
HCT VFR BLD AUTO: 28.8 % (ref 35.8–46.3)
HGB BLD-MCNC: 8.3 G/DL (ref 11.7–15.4)
IMM GRANULOCYTES # BLD AUTO: 0 K/UL (ref 0–0.5)
IMM GRANULOCYTES NFR BLD AUTO: 0 % (ref 0–5)
INR PPP: 1.1
LYMPHOCYTES # BLD: 0.8 K/UL (ref 0.5–4.6)
LYMPHOCYTES NFR BLD: 7 % (ref 13–44)
MCH RBC QN AUTO: 23.3 PG (ref 26.1–32.9)
MCHC RBC AUTO-ENTMCNC: 28.8 G/DL (ref 31.4–35)
MCV RBC AUTO: 80.9 FL (ref 82–102)
MONOCYTES # BLD: 0.9 K/UL (ref 0.1–1.3)
MONOCYTES NFR BLD: 9 % (ref 4–12)
NEUTS SEG # BLD: 9 K/UL (ref 1.7–8.2)
NEUTS SEG NFR BLD: 82 % (ref 43–78)
NRBC # BLD: 0 K/UL (ref 0–0.2)
PLATELET # BLD AUTO: 373 K/UL (ref 150–450)
PMV BLD AUTO: 9.7 FL (ref 9.4–12.3)
POTASSIUM SERPL-SCNC: 3.9 MMOL/L (ref 3.5–5.1)
PROTHROMBIN TIME: 14.9 SEC (ref 12.6–14.3)
RBC # BLD AUTO: 3.56 M/UL (ref 4.05–5.2)
SODIUM SERPL-SCNC: 134 MMOL/L (ref 133–143)
UFH PPP CHRO-ACNC: <0.09 IU/ML (ref 0.3–0.7)
WBC # BLD AUTO: 11 K/UL (ref 4.3–11.1)

## 2023-05-27 PROCEDURE — 1100000000 HC RM PRIVATE

## 2023-05-27 PROCEDURE — 85610 PROTHROMBIN TIME: CPT

## 2023-05-27 PROCEDURE — 94760 N-INVAS EAR/PLS OXIMETRY 1: CPT

## 2023-05-27 PROCEDURE — 85730 THROMBOPLASTIN TIME PARTIAL: CPT

## 2023-05-27 PROCEDURE — 6360000004 HC RX CONTRAST MEDICATION: Performed by: HOSPITALIST

## 2023-05-27 PROCEDURE — 71045 X-RAY EXAM CHEST 1 VIEW: CPT

## 2023-05-27 PROCEDURE — 2580000003 HC RX 258: Performed by: UROLOGY

## 2023-05-27 PROCEDURE — 6370000000 HC RX 637 (ALT 250 FOR IP): Performed by: UROLOGY

## 2023-05-27 PROCEDURE — 85520 HEPARIN ASSAY: CPT

## 2023-05-27 PROCEDURE — 80048 BASIC METABOLIC PNL TOTAL CA: CPT

## 2023-05-27 PROCEDURE — 6370000000 HC RX 637 (ALT 250 FOR IP): Performed by: HOSPITALIST

## 2023-05-27 PROCEDURE — 85025 COMPLETE CBC W/AUTO DIFF WBC: CPT

## 2023-05-27 PROCEDURE — 2700000000 HC OXYGEN THERAPY PER DAY

## 2023-05-27 PROCEDURE — 94640 AIRWAY INHALATION TREATMENT: CPT

## 2023-05-27 PROCEDURE — 2500000003 HC RX 250 WO HCPCS: Performed by: HOSPITALIST

## 2023-05-27 PROCEDURE — 36415 COLL VENOUS BLD VENIPUNCTURE: CPT

## 2023-05-27 PROCEDURE — 71260 CT THORAX DX C+: CPT

## 2023-05-27 RX ORDER — HEPARIN SODIUM 1000 [USP'U]/ML
40 INJECTION, SOLUTION INTRAVENOUS; SUBCUTANEOUS PRN
Status: DISCONTINUED | OUTPATIENT
Start: 2023-05-27 | End: 2023-05-30

## 2023-05-27 RX ORDER — BISACODYL 10 MG
10 SUPPOSITORY, RECTAL RECTAL ONCE
Status: COMPLETED | OUTPATIENT
Start: 2023-05-27 | End: 2023-05-27

## 2023-05-27 RX ORDER — BISACODYL 10 MG
10 SUPPOSITORY, RECTAL RECTAL DAILY PRN
Status: DISCONTINUED | OUTPATIENT
Start: 2023-05-27 | End: 2023-05-30 | Stop reason: SDUPTHER

## 2023-05-27 RX ORDER — HEPARIN SODIUM 1000 [USP'U]/ML
80 INJECTION, SOLUTION INTRAVENOUS; SUBCUTANEOUS PRN
Status: DISCONTINUED | OUTPATIENT
Start: 2023-05-27 | End: 2023-05-30

## 2023-05-27 RX ORDER — HEPARIN SODIUM 10000 [USP'U]/100ML
5-30 INJECTION, SOLUTION INTRAVENOUS CONTINUOUS
Status: DISCONTINUED | OUTPATIENT
Start: 2023-05-27 | End: 2023-05-30

## 2023-05-27 RX ADMIN — DEXTROSE AND SODIUM CHLORIDE: 5; 450 INJECTION, SOLUTION INTRAVENOUS at 10:05

## 2023-05-27 RX ADMIN — DEXTROSE AND SODIUM CHLORIDE: 5; 450 INJECTION, SOLUTION INTRAVENOUS at 02:29

## 2023-05-27 RX ADMIN — IOPAMIDOL 100 ML: 755 INJECTION, SOLUTION INTRAVENOUS at 13:28

## 2023-05-27 RX ADMIN — BISACODYL 10 MG: 10 SUPPOSITORY RECTAL at 11:16

## 2023-05-27 RX ADMIN — DOCUSATE SODIUM 100 MG: 100 CAPSULE, LIQUID FILLED ORAL at 08:54

## 2023-05-27 RX ADMIN — AMLODIPINE BESYLATE 5 MG: 5 TABLET ORAL at 08:53

## 2023-05-27 RX ADMIN — GABAPENTIN 100 MG: 100 CAPSULE ORAL at 08:54

## 2023-05-27 RX ADMIN — FERROUS SULFATE TAB 325 MG (65 MG ELEMENTAL FE) 325 MG: 325 (65 FE) TAB at 08:53

## 2023-05-27 RX ADMIN — GABAPENTIN 100 MG: 100 CAPSULE ORAL at 21:07

## 2023-05-27 RX ADMIN — HEPARIN SODIUM 18 UNITS/KG/HR: 10000 INJECTION, SOLUTION INTRAVENOUS at 17:26

## 2023-05-27 RX ADMIN — MOMETASONE FUROATE AND FORMOTEROL FUMARATE DIHYDRATE 2 PUFF: 200; 5 AEROSOL RESPIRATORY (INHALATION) at 21:07

## 2023-05-27 RX ADMIN — VENLAFAXINE HYDROCHLORIDE 75 MG: 75 CAPSULE, EXTENDED RELEASE ORAL at 08:53

## 2023-05-27 RX ADMIN — HEPARIN SODIUM 18 UNITS/KG/HR: 10000 INJECTION, SOLUTION INTRAVENOUS at 19:17

## 2023-05-27 RX ADMIN — MOMETASONE FUROATE AND FORMOTEROL FUMARATE DIHYDRATE 2 PUFF: 200; 5 AEROSOL RESPIRATORY (INHALATION) at 07:52

## 2023-05-27 RX ADMIN — DOCUSATE SODIUM 100 MG: 100 CAPSULE, LIQUID FILLED ORAL at 21:06

## 2023-05-27 RX ADMIN — PANTOPRAZOLE SODIUM 40 MG: 40 TABLET, DELAYED RELEASE ORAL at 05:42

## 2023-05-27 RX ADMIN — GABAPENTIN 100 MG: 100 CAPSULE ORAL at 13:09

## 2023-05-28 LAB
ANION GAP SERPL CALC-SCNC: ABNORMAL MMOL/L (ref 2–11)
BUN SERPL-MCNC: 11 MG/DL (ref 8–23)
CALCIUM SERPL-MCNC: 8.5 MG/DL (ref 8.3–10.4)
CHLORIDE SERPL-SCNC: 109 MMOL/L (ref 101–110)
CO2 SERPL-SCNC: 26 MMOL/L (ref 21–32)
CREAT SERPL-MCNC: 1.1 MG/DL (ref 0.6–1)
ERYTHROCYTE [DISTWIDTH] IN BLOOD BY AUTOMATED COUNT: 17.7 % (ref 11.9–14.6)
GLUCOSE SERPL-MCNC: 141 MG/DL (ref 65–100)
HCT VFR BLD AUTO: 26.4 % (ref 35.8–46.3)
HGB BLD-MCNC: 7.7 G/DL (ref 11.7–15.4)
MCH RBC QN AUTO: 23.1 PG (ref 26.1–32.9)
MCHC RBC AUTO-ENTMCNC: 29.2 G/DL (ref 31.4–35)
MCV RBC AUTO: 79.3 FL (ref 82–102)
NRBC # BLD: 0 K/UL (ref 0–0.2)
PLATELET # BLD AUTO: 358 K/UL (ref 150–450)
PMV BLD AUTO: 9.7 FL (ref 9.4–12.3)
POTASSIUM SERPL-SCNC: 3.8 MMOL/L (ref 3.5–5.1)
RBC # BLD AUTO: 3.33 M/UL (ref 4.05–5.2)
SODIUM SERPL-SCNC: 131 MMOL/L (ref 133–143)
UFH PPP CHRO-ACNC: <0.1 IU/ML (ref 0.3–0.7)
UFH PPP CHRO-ACNC: >1.1 IU/ML (ref 0.3–0.7)
UFH PPP CHRO-ACNC: >1.1 IU/ML (ref 0.3–0.7)
WBC # BLD AUTO: 9.5 K/UL (ref 4.3–11.1)

## 2023-05-28 PROCEDURE — 6360000002 HC RX W HCPCS: Performed by: UROLOGY

## 2023-05-28 PROCEDURE — 2700000000 HC OXYGEN THERAPY PER DAY

## 2023-05-28 PROCEDURE — 94760 N-INVAS EAR/PLS OXIMETRY 1: CPT

## 2023-05-28 PROCEDURE — 80048 BASIC METABOLIC PNL TOTAL CA: CPT

## 2023-05-28 PROCEDURE — 2580000003 HC RX 258: Performed by: HOSPITALIST

## 2023-05-28 PROCEDURE — 6370000000 HC RX 637 (ALT 250 FOR IP): Performed by: UROLOGY

## 2023-05-28 PROCEDURE — 36415 COLL VENOUS BLD VENIPUNCTURE: CPT

## 2023-05-28 PROCEDURE — 6360000002 HC RX W HCPCS: Performed by: HOSPITALIST

## 2023-05-28 PROCEDURE — 85027 COMPLETE CBC AUTOMATED: CPT

## 2023-05-28 PROCEDURE — 85520 HEPARIN ASSAY: CPT

## 2023-05-28 PROCEDURE — 1100000000 HC RM PRIVATE

## 2023-05-28 PROCEDURE — 94640 AIRWAY INHALATION TREATMENT: CPT

## 2023-05-28 PROCEDURE — 2500000003 HC RX 250 WO HCPCS: Performed by: HOSPITALIST

## 2023-05-28 RX ORDER — BISACODYL 10 MG
10 SUPPOSITORY, RECTAL RECTAL ONCE
Status: COMPLETED | OUTPATIENT
Start: 2023-05-28 | End: 2023-05-28

## 2023-05-28 RX ADMIN — DOCUSATE SODIUM 100 MG: 100 CAPSULE, LIQUID FILLED ORAL at 08:23

## 2023-05-28 RX ADMIN — GABAPENTIN 100 MG: 100 CAPSULE ORAL at 20:12

## 2023-05-28 RX ADMIN — BISACODYL 10 MG: 10 SUPPOSITORY RECTAL at 11:49

## 2023-05-28 RX ADMIN — MOMETASONE FUROATE AND FORMOTEROL FUMARATE DIHYDRATE 2 PUFF: 200; 5 AEROSOL RESPIRATORY (INHALATION) at 19:12

## 2023-05-28 RX ADMIN — HEPARIN SODIUM 22 UNITS/KG/HR: 10000 INJECTION, SOLUTION INTRAVENOUS at 06:52

## 2023-05-28 RX ADMIN — FERROUS SULFATE TAB 325 MG (65 MG ELEMENTAL FE) 325 MG: 325 (65 FE) TAB at 08:23

## 2023-05-28 RX ADMIN — VENLAFAXINE HYDROCHLORIDE 75 MG: 75 CAPSULE, EXTENDED RELEASE ORAL at 08:23

## 2023-05-28 RX ADMIN — AMLODIPINE BESYLATE 5 MG: 5 TABLET ORAL at 08:23

## 2023-05-28 RX ADMIN — HEPARIN SODIUM 26 UNITS/KG/HR: 10000 INJECTION, SOLUTION INTRAVENOUS at 19:15

## 2023-05-28 RX ADMIN — HEPARIN SODIUM 6980 UNITS: 1000 INJECTION, SOLUTION INTRAVENOUS; SUBCUTANEOUS at 01:34

## 2023-05-28 RX ADMIN — DOCUSATE SODIUM 100 MG: 100 CAPSULE, LIQUID FILLED ORAL at 20:12

## 2023-05-28 RX ADMIN — HEPARIN SODIUM 6980 UNITS: 1000 INJECTION, SOLUTION INTRAVENOUS; SUBCUTANEOUS at 10:32

## 2023-05-28 RX ADMIN — GABAPENTIN 100 MG: 100 CAPSULE ORAL at 08:23

## 2023-05-28 RX ADMIN — GABAPENTIN 100 MG: 100 CAPSULE ORAL at 14:05

## 2023-05-28 RX ADMIN — MORPHINE SULFATE 2 MG: 2 INJECTION, SOLUTION INTRAMUSCULAR; INTRAVENOUS at 01:44

## 2023-05-28 RX ADMIN — PANTOPRAZOLE SODIUM 40 MG: 40 TABLET, DELAYED RELEASE ORAL at 06:32

## 2023-05-28 RX ADMIN — MOMETASONE FUROATE AND FORMOTEROL FUMARATE DIHYDRATE 2 PUFF: 200; 5 AEROSOL RESPIRATORY (INHALATION) at 07:45

## 2023-05-28 RX ADMIN — DEXTROSE AND SODIUM CHLORIDE: 5; 450 INJECTION, SOLUTION INTRAVENOUS at 01:54

## 2023-05-28 RX ADMIN — HEPARIN SODIUM 26 UNITS/KG/HR: 10000 INJECTION, SOLUTION INTRAVENOUS at 10:35

## 2023-05-28 ASSESSMENT — PAIN SCALES - GENERAL
PAINLEVEL_OUTOF10: 1
PAINLEVEL_OUTOF10: 0
PAINLEVEL_OUTOF10: 8

## 2023-05-28 ASSESSMENT — PAIN DESCRIPTION - ORIENTATION: ORIENTATION: LEFT

## 2023-05-28 ASSESSMENT — PAIN DESCRIPTION - LOCATION
LOCATION: ABDOMEN
LOCATION: FLANK

## 2023-05-28 ASSESSMENT — PAIN SCALES - WONG BAKER: WONGBAKER_NUMERICALRESPONSE: 0

## 2023-05-29 DIAGNOSIS — I26.99 PULMONARY EMBOLISM, UNSPECIFIED CHRONICITY, UNSPECIFIED PULMONARY EMBOLISM TYPE, UNSPECIFIED WHETHER ACUTE COR PULMONALE PRESENT (HCC): Primary | Chronic | ICD-10-CM

## 2023-05-29 DIAGNOSIS — N28.89 RENAL MASS: ICD-10-CM

## 2023-05-29 LAB
ABO + RH BLD: NORMAL
ANION GAP SERPL CALC-SCNC: 2 MMOL/L (ref 2–11)
BLD PROD TYP BPU: NORMAL
BLD PROD TYP BPU: NORMAL
BLOOD BANK DISPENSE STATUS: NORMAL
BLOOD BANK DISPENSE STATUS: NORMAL
BLOOD GROUP ANTIBODIES SERPL: NORMAL
BPU ID: NORMAL
BPU ID: NORMAL
BUN SERPL-MCNC: 10 MG/DL (ref 8–23)
CALCIUM SERPL-MCNC: 8.9 MG/DL (ref 8.3–10.4)
CHLORIDE SERPL-SCNC: 108 MMOL/L (ref 101–110)
CO2 SERPL-SCNC: 27 MMOL/L (ref 21–32)
CREAT SERPL-MCNC: 1.2 MG/DL (ref 0.6–1)
CROSSMATCH RESULT: NORMAL
CROSSMATCH RESULT: NORMAL
ERYTHROCYTE [DISTWIDTH] IN BLOOD BY AUTOMATED COUNT: 17.8 % (ref 11.9–14.6)
FERRITIN SERPL-MCNC: 76 NG/ML (ref 8–388)
GLUCOSE SERPL-MCNC: 99 MG/DL (ref 65–100)
HCT VFR BLD AUTO: 26.5 % (ref 35.8–46.3)
HGB BLD-MCNC: 7.6 G/DL (ref 11.7–15.4)
IRON SATN MFR SERPL: 5 %
IRON SERPL-MCNC: 12 UG/DL (ref 35–150)
MAGNESIUM SERPL-MCNC: 2.1 MG/DL (ref 1.8–2.4)
MCH RBC QN AUTO: 22.9 PG (ref 26.1–32.9)
MCHC RBC AUTO-ENTMCNC: 28.7 G/DL (ref 31.4–35)
MCV RBC AUTO: 79.8 FL (ref 82–102)
NRBC # BLD: 0 K/UL (ref 0–0.2)
PLATELET # BLD AUTO: 390 K/UL (ref 150–450)
PMV BLD AUTO: 10.2 FL (ref 9.4–12.3)
POTASSIUM SERPL-SCNC: 3.8 MMOL/L (ref 3.5–5.1)
RBC # BLD AUTO: 3.32 M/UL (ref 4.05–5.2)
SODIUM SERPL-SCNC: 137 MMOL/L (ref 133–143)
SPECIMEN EXP DATE BLD: NORMAL
TIBC SERPL-MCNC: 227 UG/DL (ref 250–450)
UFH PPP CHRO-ACNC: 0.83 IU/ML (ref 0.3–0.7)
UFH PPP CHRO-ACNC: >1.1 IU/ML (ref 0.3–0.7)
UFH PPP CHRO-ACNC: >1.1 IU/ML (ref 0.3–0.7)
UNIT DIVISION: 0
UNIT DIVISION: 0
WBC # BLD AUTO: 8 K/UL (ref 4.3–11.1)

## 2023-05-29 PROCEDURE — 94664 DEMO&/EVAL PT USE INHALER: CPT

## 2023-05-29 PROCEDURE — 86901 BLOOD TYPING SEROLOGIC RH(D): CPT

## 2023-05-29 PROCEDURE — 86850 RBC ANTIBODY SCREEN: CPT

## 2023-05-29 PROCEDURE — 85027 COMPLETE CBC AUTOMATED: CPT

## 2023-05-29 PROCEDURE — 85520 HEPARIN ASSAY: CPT

## 2023-05-29 PROCEDURE — 86923 COMPATIBILITY TEST ELECTRIC: CPT

## 2023-05-29 PROCEDURE — 94640 AIRWAY INHALATION TREATMENT: CPT

## 2023-05-29 PROCEDURE — 99223 1ST HOSP IP/OBS HIGH 75: CPT | Performed by: INTERNAL MEDICINE

## 2023-05-29 PROCEDURE — 82728 ASSAY OF FERRITIN: CPT

## 2023-05-29 PROCEDURE — 6370000000 HC RX 637 (ALT 250 FOR IP): Performed by: UROLOGY

## 2023-05-29 PROCEDURE — 83735 ASSAY OF MAGNESIUM: CPT

## 2023-05-29 PROCEDURE — 2700000000 HC OXYGEN THERAPY PER DAY

## 2023-05-29 PROCEDURE — 83550 IRON BINDING TEST: CPT

## 2023-05-29 PROCEDURE — 2500000003 HC RX 250 WO HCPCS: Performed by: HOSPITALIST

## 2023-05-29 PROCEDURE — 80048 BASIC METABOLIC PNL TOTAL CA: CPT

## 2023-05-29 PROCEDURE — 1100000000 HC RM PRIVATE

## 2023-05-29 PROCEDURE — 83540 ASSAY OF IRON: CPT

## 2023-05-29 PROCEDURE — 94760 N-INVAS EAR/PLS OXIMETRY 1: CPT

## 2023-05-29 PROCEDURE — 94761 N-INVAS EAR/PLS OXIMETRY MLT: CPT

## 2023-05-29 PROCEDURE — 36415 COLL VENOUS BLD VENIPUNCTURE: CPT

## 2023-05-29 PROCEDURE — 86900 BLOOD TYPING SEROLOGIC ABO: CPT

## 2023-05-29 RX ADMIN — GABAPENTIN 100 MG: 100 CAPSULE ORAL at 09:07

## 2023-05-29 RX ADMIN — MOMETASONE FUROATE AND FORMOTEROL FUMARATE DIHYDRATE 2 PUFF: 200; 5 AEROSOL RESPIRATORY (INHALATION) at 07:33

## 2023-05-29 RX ADMIN — PANTOPRAZOLE SODIUM 40 MG: 40 TABLET, DELAYED RELEASE ORAL at 05:22

## 2023-05-29 RX ADMIN — MOMETASONE FUROATE AND FORMOTEROL FUMARATE DIHYDRATE 2 PUFF: 200; 5 AEROSOL RESPIRATORY (INHALATION) at 20:50

## 2023-05-29 RX ADMIN — FERROUS SULFATE TAB 325 MG (65 MG ELEMENTAL FE) 325 MG: 325 (65 FE) TAB at 09:07

## 2023-05-29 RX ADMIN — AMLODIPINE BESYLATE 5 MG: 5 TABLET ORAL at 09:07

## 2023-05-29 RX ADMIN — DOCUSATE SODIUM 100 MG: 100 CAPSULE, LIQUID FILLED ORAL at 20:26

## 2023-05-29 RX ADMIN — HEPARIN SODIUM 20 UNITS/KG/HR: 10000 INJECTION, SOLUTION INTRAVENOUS at 11:52

## 2023-05-29 RX ADMIN — VENLAFAXINE HYDROCHLORIDE 75 MG: 75 CAPSULE, EXTENDED RELEASE ORAL at 09:07

## 2023-05-29 RX ADMIN — GABAPENTIN 100 MG: 100 CAPSULE ORAL at 14:17

## 2023-05-29 RX ADMIN — DOCUSATE SODIUM 100 MG: 100 CAPSULE, LIQUID FILLED ORAL at 09:07

## 2023-05-29 RX ADMIN — GABAPENTIN 100 MG: 100 CAPSULE ORAL at 20:26

## 2023-05-29 ASSESSMENT — PAIN SCALES - GENERAL: PAINLEVEL_OUTOF10: 0

## 2023-05-30 LAB
ANION GAP SERPL CALC-SCNC: 6 MMOL/L (ref 2–11)
BUN SERPL-MCNC: 12 MG/DL (ref 8–23)
CALCIUM SERPL-MCNC: 8.9 MG/DL (ref 8.3–10.4)
CHLORIDE SERPL-SCNC: 106 MMOL/L (ref 101–110)
CO2 SERPL-SCNC: 27 MMOL/L (ref 21–32)
CREAT SERPL-MCNC: 1.2 MG/DL (ref 0.6–1)
ERYTHROCYTE [DISTWIDTH] IN BLOOD BY AUTOMATED COUNT: 17.6 % (ref 11.9–14.6)
GLUCOSE SERPL-MCNC: 118 MG/DL (ref 65–100)
HCT VFR BLD AUTO: 26.7 % (ref 35.8–46.3)
HCT VFR BLD AUTO: 29.7 % (ref 35.8–46.3)
HGB BLD-MCNC: 7.8 G/DL (ref 11.7–15.4)
HGB BLD-MCNC: 8.8 G/DL (ref 11.7–15.4)
HISTORY CHECK: NORMAL
MCH RBC QN AUTO: 23 PG (ref 26.1–32.9)
MCHC RBC AUTO-ENTMCNC: 29.2 G/DL (ref 31.4–35)
MCV RBC AUTO: 78.8 FL (ref 82–102)
NRBC # BLD: 0 K/UL (ref 0–0.2)
PLATELET # BLD AUTO: 397 K/UL (ref 150–450)
PMV BLD AUTO: 9.9 FL (ref 9.4–12.3)
POTASSIUM SERPL-SCNC: 3.7 MMOL/L (ref 3.5–5.1)
RBC # BLD AUTO: 3.39 M/UL (ref 4.05–5.2)
SODIUM SERPL-SCNC: 139 MMOL/L (ref 133–143)
UFH PPP CHRO-ACNC: 0.51 IU/ML (ref 0.3–0.7)
WBC # BLD AUTO: 7 K/UL (ref 4.3–11.1)

## 2023-05-30 PROCEDURE — 2500000003 HC RX 250 WO HCPCS: Performed by: HOSPITALIST

## 2023-05-30 PROCEDURE — 85520 HEPARIN ASSAY: CPT

## 2023-05-30 PROCEDURE — 99024 POSTOP FOLLOW-UP VISIT: CPT | Performed by: UROLOGY

## 2023-05-30 PROCEDURE — 1100000000 HC RM PRIVATE

## 2023-05-30 PROCEDURE — 94760 N-INVAS EAR/PLS OXIMETRY 1: CPT

## 2023-05-30 PROCEDURE — P9016 RBC LEUKOCYTES REDUCED: HCPCS

## 2023-05-30 PROCEDURE — 80048 BASIC METABOLIC PNL TOTAL CA: CPT

## 2023-05-30 PROCEDURE — 85014 HEMATOCRIT: CPT

## 2023-05-30 PROCEDURE — 6370000000 HC RX 637 (ALT 250 FOR IP): Performed by: UROLOGY

## 2023-05-30 PROCEDURE — 85027 COMPLETE CBC AUTOMATED: CPT

## 2023-05-30 PROCEDURE — 30233N1 TRANSFUSION OF NONAUTOLOGOUS RED BLOOD CELLS INTO PERIPHERAL VEIN, PERCUTANEOUS APPROACH: ICD-10-PCS | Performed by: HOSPITALIST

## 2023-05-30 PROCEDURE — 6370000000 HC RX 637 (ALT 250 FOR IP): Performed by: HOSPITALIST

## 2023-05-30 PROCEDURE — 36415 COLL VENOUS BLD VENIPUNCTURE: CPT

## 2023-05-30 PROCEDURE — 36430 TRANSFUSION BLD/BLD COMPNT: CPT

## 2023-05-30 PROCEDURE — 85018 HEMOGLOBIN: CPT

## 2023-05-30 PROCEDURE — 2700000000 HC OXYGEN THERAPY PER DAY

## 2023-05-30 PROCEDURE — 6370000000 HC RX 637 (ALT 250 FOR IP): Performed by: NURSE PRACTITIONER

## 2023-05-30 PROCEDURE — 94640 AIRWAY INHALATION TREATMENT: CPT

## 2023-05-30 PROCEDURE — 97530 THERAPEUTIC ACTIVITIES: CPT

## 2023-05-30 RX ORDER — SODIUM CHLORIDE 9 MG/ML
INJECTION, SOLUTION INTRAVENOUS PRN
Status: DISCONTINUED | OUTPATIENT
Start: 2023-05-30 | End: 2023-05-31 | Stop reason: HOSPADM

## 2023-05-30 RX ORDER — BISACODYL 10 MG
10 SUPPOSITORY, RECTAL RECTAL DAILY PRN
Status: DISCONTINUED | OUTPATIENT
Start: 2023-05-30 | End: 2023-05-31 | Stop reason: HOSPADM

## 2023-05-30 RX ADMIN — HYDROCODONE BITARTRATE AND ACETAMINOPHEN 1 TABLET: 5; 325 TABLET ORAL at 09:00

## 2023-05-30 RX ADMIN — HEPARIN SODIUM 15.01 UNITS/KG/HR: 10000 INJECTION, SOLUTION INTRAVENOUS at 09:07

## 2023-05-30 RX ADMIN — VENLAFAXINE HYDROCHLORIDE 75 MG: 75 CAPSULE, EXTENDED RELEASE ORAL at 09:00

## 2023-05-30 RX ADMIN — HYDROCODONE BITARTRATE AND ACETAMINOPHEN 1 TABLET: 5; 325 TABLET ORAL at 18:27

## 2023-05-30 RX ADMIN — BISACODYL 10 MG: 10 SUPPOSITORY RECTAL at 22:02

## 2023-05-30 RX ADMIN — GABAPENTIN 100 MG: 100 CAPSULE ORAL at 22:02

## 2023-05-30 RX ADMIN — DOCUSATE SODIUM 100 MG: 100 CAPSULE, LIQUID FILLED ORAL at 22:02

## 2023-05-30 RX ADMIN — GABAPENTIN 100 MG: 100 CAPSULE ORAL at 09:00

## 2023-05-30 RX ADMIN — GABAPENTIN 100 MG: 100 CAPSULE ORAL at 15:22

## 2023-05-30 RX ADMIN — HYDROCODONE BITARTRATE AND ACETAMINOPHEN 1 TABLET: 5; 325 TABLET ORAL at 13:51

## 2023-05-30 RX ADMIN — AMLODIPINE BESYLATE 5 MG: 5 TABLET ORAL at 09:00

## 2023-05-30 RX ADMIN — MOMETASONE FUROATE AND FORMOTEROL FUMARATE DIHYDRATE 2 PUFF: 200; 5 AEROSOL RESPIRATORY (INHALATION) at 09:42

## 2023-05-30 RX ADMIN — DOCUSATE SODIUM 100 MG: 100 CAPSULE, LIQUID FILLED ORAL at 09:00

## 2023-05-30 RX ADMIN — FERROUS SULFATE TAB 325 MG (65 MG ELEMENTAL FE) 325 MG: 325 (65 FE) TAB at 09:00

## 2023-05-30 RX ADMIN — RIVAROXABAN 20 MG: 20 TABLET, FILM COATED ORAL at 11:47

## 2023-05-30 RX ADMIN — PANTOPRAZOLE SODIUM 40 MG: 40 TABLET, DELAYED RELEASE ORAL at 05:08

## 2023-05-30 ASSESSMENT — PAIN DESCRIPTION - ORIENTATION
ORIENTATION: MID
ORIENTATION: LEFT
ORIENTATION: LEFT

## 2023-05-30 ASSESSMENT — PAIN DESCRIPTION - LOCATION
LOCATION: ABDOMEN

## 2023-05-30 ASSESSMENT — PAIN DESCRIPTION - DESCRIPTORS
DESCRIPTORS: SORE
DESCRIPTORS: ACHING

## 2023-05-30 ASSESSMENT — PAIN SCALES - GENERAL
PAINLEVEL_OUTOF10: 6
PAINLEVEL_OUTOF10: 1
PAINLEVEL_OUTOF10: 4
PAINLEVEL_OUTOF10: 4
PAINLEVEL_OUTOF10: 0

## 2023-05-30 NOTE — CONSENT
Informed Consent for Blood Component Transfusion Note    I have discussed with the patient the rationale for blood component transfusion; its benefits in treating or preventing fatigue, organ damage, or death; and its risk which includes mild transfusion reactions, rare risk of blood borne infection, or more serious but rare reactions. I have discussed the alternatives to transfusion, including the risk and consequences of not receiving transfusion. The patient had an opportunity to ask questions and had agreed to proceed with transfusion of blood components.     Electronically signed by Estella Barrios MD on 5/30/23 at 10:18 AM EDT

## 2023-05-30 NOTE — PLAN OF CARE
Problem: Chronic Conditions and Co-morbidities  Goal: Patient's chronic conditions and co-morbidity symptoms are monitored and maintained or improved  Outcome: Progressing     Problem: Pain  Goal: Verbalizes/displays adequate comfort level or baseline comfort level  Outcome: Progressing     Problem: Safety - Adult  Goal: Free from fall injury  Outcome: Progressing     Problem: Discharge Planning  Goal: Discharge to home or other facility with appropriate resources  Outcome: Progressing     Problem: ABCDS Injury Assessment  Goal: Absence of physical injury  Outcome: Progressing     Problem: Respiratory - Adult  Goal: Achieves optimal ventilation and oxygenation  5/30/2023 1747 by Genevieve Hansen RN  Outcome: Progressing  5/30/2023 0950 by Orlando Gates RCP  Outcome: Progressing

## 2023-05-30 NOTE — CONSULTS
Admit Date: 5/25/2023      Subjective:     Erin Schulte is POD 4 Procedure(s):  NEPHRECTOMY LAPAROSCOPIC HAND ASSISTED LEFT/ FRAGOSO TO  ASSIST    No new complaints. Objective:     Patient Vitals for the past 8 hrs:   BP Temp Temp src Pulse Resp SpO2   05/30/23 0948 -- -- -- 75 15 95 %   05/30/23 0900 -- -- -- -- 18 --   05/30/23 0803 (!) 151/62 98.4 °F (36.9 °C) Oral 71 18 97 %     No intake/output data recorded. 05/28 1901 - 05/30 0700  In: 5 [P.O.:720]  Out: -     Physical Exam:  GENERAL ASSESSMENT: alert, oriented to person, place and time, no acute distress and no anxiety, depression or agitation  Chest: normal work of breathing  CVS exam: normal rate, regular rhythm, normal S1, S2, no murmurs, rubs, clicks or gallops. ABDOMEN: soft, some tenderness, non distended  Neurological exam reveals alert, oriented, normal speech, no focal findings or movement disorder noted.   FEMALE GENITOURINARY EXAM: not done  MALE GENITAL EXAM: not done    Data Review   Recent Results (from the past 24 hour(s))   Anti-Xa, Unfractionated Heparin    Collection Time: 05/29/23  1:29 PM   Result Value Ref Range    Anti-XA Unfrac Heparin >1.1 (H) 0.3 - 0.7 IU/mL   Anti-Xa, Unfractionated Heparin    Collection Time: 05/29/23 10:06 PM   Result Value Ref Range    Anti-XA Unfrac Heparin 0.83 (H) 0.3 - 0.7 IU/mL   Anti-Xa, Unfractionated Heparin    Collection Time: 05/30/23  5:07 AM   Result Value Ref Range    Anti-XA Unfrac Heparin 0.51 0.3 - 0.7 IU/mL   CBC    Collection Time: 05/30/23  5:07 AM   Result Value Ref Range    WBC 7.0 4.3 - 11.1 K/uL    RBC 3.39 (L) 4.05 - 5.2 M/uL    Hemoglobin 7.8 (L) 11.7 - 15.4 g/dL    Hematocrit 26.7 (L) 35.8 - 46.3 %    MCV 78.8 (L) 82 - 102 FL    MCH 23.0 (L) 26.1 - 32.9 PG    MCHC 29.2 (L) 31.4 - 35.0 g/dL    RDW 17.6 (H) 11.9 - 14.6 %    Platelets 057 638 - 844 K/uL    MPV 9.9 9.4 - 12.3 FL    nRBC 0.00 0.0 - 0.2 K/uL   Basic Metabolic Panel w/ Reflex to MG    Collection Time: 05/30/23
Time: 05/29/23  4:47 AM   Result Value Ref Range    WBC 8.0 4.3 - 11.1 K/uL    RBC 3.32 (L) 4.05 - 5.2 M/uL    Hemoglobin 7.6 (L) 11.7 - 15.4 g/dL    Hematocrit 26.5 (L) 35.8 - 46.3 %    MCV 79.8 (L) 82 - 102 FL    MCH 22.9 (L) 26.1 - 32.9 PG    MCHC 28.7 (L) 31.4 - 35.0 g/dL    RDW 17.8 (H) 11.9 - 14.6 %    Platelets 982 910 - 625 K/uL    MPV 10.2 9.4 - 12.3 FL    nRBC 0.00 0.0 - 0.2 K/uL   Basic Metabolic Panel    Collection Time: 05/29/23  4:47 AM   Result Value Ref Range    Sodium 137 133 - 143 mmol/L    Potassium 3.8 3.5 - 5.1 mmol/L    Chloride 108 101 - 110 mmol/L    CO2 27 21 - 32 mmol/L    Anion Gap 2 2 - 11 mmol/L    Glucose 99 65 - 100 mg/dL    BUN 10 8 - 23 MG/DL    Creatinine 1.20 (H) 0.6 - 1.0 MG/DL    Est, Glom Filt Rate 46 (L) >60 ml/min/1.73m2    Calcium 8.9 8.3 - 10.4 MG/DL   Magnesium    Collection Time: 05/29/23  4:47 AM   Result Value Ref Range    Magnesium 2.1 1.8 - 2.4 mg/dL   Anti-Xa, Unfractionated Heparin    Collection Time: 05/29/23  4:47 AM   Result Value Ref Range    Anti-XA Unfrac Heparin >1.1 (H) 0.3 - 0.7 IU/mL   TYPE AND SCREEN    Collection Time: 05/29/23  4:47 AM   Result Value Ref Range    Crossmatch expiration date 06/01/2023,2359     ABO/Rh O POSITIVE     Antibody Screen NEG        Imaging:  XR CHEST (2 VW)    Result Date: 5/15/2023  CHEST X-RAY, 2 views. INDICATION:  Proper evaluation for laparoscopic nephrectomy. TECHNIQUE: PA and lateral views. COMPARISON: February 2023. FINDINGS: Lungs: are clear. Costophrenic angles: are sharp. Heart size: is normal. Pulmonary vasculature: is unremarkable. Aorta: Arch calcifications. Included portion of the upper abdomen: is unremarkable. Bones: No gross bony lesions. Other: None. Negative for acute abnormality. Aortic atherosclerosis. .     XR CHEST PORTABLE    Result Date: 5/27/2023  EXAMINATION: XR CHEST PORTABLE 5/27/2023 10:47 AM ACCESSION NUMBER: [id number] COMPARISON: 5/15/2023 INDICATION: SHORTNESS OF BREATH TECHNIQUE: A single

## 2023-05-31 VITALS
SYSTOLIC BLOOD PRESSURE: 147 MMHG | HEART RATE: 74 BPM | WEIGHT: 192.4 LBS | BODY MASS INDEX: 30.92 KG/M2 | HEIGHT: 66 IN | RESPIRATION RATE: 16 BRPM | TEMPERATURE: 98.4 F | OXYGEN SATURATION: 95 % | DIASTOLIC BLOOD PRESSURE: 55 MMHG

## 2023-05-31 PROBLEM — J96.91 RESPIRATORY FAILURE WITH HYPOXIA (HCC): Status: RESOLVED | Noted: 2022-11-24 | Resolved: 2023-05-31

## 2023-05-31 PROBLEM — N28.89 RENAL MASS: Status: RESOLVED | Noted: 2023-02-17 | Resolved: 2023-05-31

## 2023-05-31 LAB
ABO + RH BLD: NORMAL
ANION GAP SERPL CALC-SCNC: 5 MMOL/L (ref 2–11)
BASOPHILS # BLD: 0 K/UL (ref 0–0.2)
BASOPHILS NFR BLD: 1 % (ref 0–2)
BLD PROD TYP BPU: NORMAL
BLOOD BANK DISPENSE STATUS: NORMAL
BLOOD GROUP ANTIBODIES SERPL: NORMAL
BPU ID: NORMAL
BUN SERPL-MCNC: 10 MG/DL (ref 8–23)
CALCIUM SERPL-MCNC: 9 MG/DL (ref 8.3–10.4)
CHLORIDE SERPL-SCNC: 105 MMOL/L (ref 101–110)
CO2 SERPL-SCNC: 29 MMOL/L (ref 21–32)
CREAT SERPL-MCNC: 1.2 MG/DL (ref 0.6–1)
CROSSMATCH RESULT: NORMAL
DIFFERENTIAL METHOD BLD: ABNORMAL
EOSINOPHIL # BLD: 0.4 K/UL (ref 0–0.8)
EOSINOPHIL NFR BLD: 6 % (ref 0.5–7.8)
ERYTHROCYTE [DISTWIDTH] IN BLOOD BY AUTOMATED COUNT: 17.4 % (ref 11.9–14.6)
GLUCOSE SERPL-MCNC: 107 MG/DL (ref 65–100)
HCT VFR BLD AUTO: 32.2 % (ref 35.8–46.3)
HGB BLD-MCNC: 9.6 G/DL (ref 11.7–15.4)
IMM GRANULOCYTES # BLD AUTO: 0.1 K/UL (ref 0–0.5)
IMM GRANULOCYTES NFR BLD AUTO: 1 % (ref 0–5)
LYMPHOCYTES # BLD: 1.1 K/UL (ref 0.5–4.6)
LYMPHOCYTES NFR BLD: 16 % (ref 13–44)
MCH RBC QN AUTO: 23.1 PG (ref 26.1–32.9)
MCHC RBC AUTO-ENTMCNC: 29.8 G/DL (ref 31.4–35)
MCV RBC AUTO: 77.6 FL (ref 82–102)
MONOCYTES # BLD: 0.6 K/UL (ref 0.1–1.3)
MONOCYTES NFR BLD: 9 % (ref 4–12)
NEUTS SEG # BLD: 4.9 K/UL (ref 1.7–8.2)
NEUTS SEG NFR BLD: 69 % (ref 43–78)
NRBC # BLD: 0 K/UL (ref 0–0.2)
PLATELET # BLD AUTO: 477 K/UL (ref 150–450)
PMV BLD AUTO: 10.1 FL (ref 9.4–12.3)
POTASSIUM SERPL-SCNC: 3.7 MMOL/L (ref 3.5–5.1)
RBC # BLD AUTO: 4.15 M/UL (ref 4.05–5.2)
SODIUM SERPL-SCNC: 139 MMOL/L (ref 133–143)
SPECIMEN EXP DATE BLD: NORMAL
UNIT DIVISION: 0
WBC # BLD AUTO: 7.1 K/UL (ref 4.3–11.1)

## 2023-05-31 PROCEDURE — 6370000000 HC RX 637 (ALT 250 FOR IP): Performed by: UROLOGY

## 2023-05-31 PROCEDURE — 85025 COMPLETE CBC W/AUTO DIFF WBC: CPT

## 2023-05-31 PROCEDURE — 6370000000 HC RX 637 (ALT 250 FOR IP): Performed by: NURSE PRACTITIONER

## 2023-05-31 PROCEDURE — 94760 N-INVAS EAR/PLS OXIMETRY 1: CPT

## 2023-05-31 PROCEDURE — 94640 AIRWAY INHALATION TREATMENT: CPT

## 2023-05-31 PROCEDURE — 80048 BASIC METABOLIC PNL TOTAL CA: CPT

## 2023-05-31 PROCEDURE — 99024 POSTOP FOLLOW-UP VISIT: CPT | Performed by: UROLOGY

## 2023-05-31 PROCEDURE — 36415 COLL VENOUS BLD VENIPUNCTURE: CPT

## 2023-05-31 RX ORDER — IBUPROFEN 600 MG/1
600 TABLET ORAL EVERY 6 HOURS PRN
COMMUNITY

## 2023-05-31 RX ORDER — POLYETHYLENE GLYCOL 3350 17 G/17G
17 POWDER, FOR SOLUTION ORAL DAILY
Qty: 1530 G | Refills: 1 | Status: SHIPPED | OUTPATIENT
Start: 2023-05-31 | End: 2023-06-30

## 2023-05-31 RX ORDER — ROSUVASTATIN CALCIUM 20 MG/1
20 TABLET, COATED ORAL DAILY
COMMUNITY

## 2023-05-31 RX ORDER — FUROSEMIDE 20 MG/1
20 TABLET ORAL AS NEEDED
COMMUNITY

## 2023-05-31 RX ORDER — MULTIVIT-MIN/IRON/FOLIC ACID/K 18-600-40
2000 CAPSULE ORAL DAILY
COMMUNITY

## 2023-05-31 RX ORDER — LISINOPRIL 40 MG/1
40 TABLET ORAL DAILY
COMMUNITY

## 2023-05-31 RX ORDER — POLYETHYLENE GLYCOL 3350 17 G/17G
17 POWDER, FOR SOLUTION ORAL DAILY
Status: DISCONTINUED | OUTPATIENT
Start: 2023-05-31 | End: 2023-05-31 | Stop reason: HOSPADM

## 2023-05-31 RX ORDER — DOCUSATE SODIUM 100 MG/1
100 CAPSULE, LIQUID FILLED ORAL DAILY
COMMUNITY

## 2023-05-31 RX ORDER — HYDROCODONE BITARTRATE AND ACETAMINOPHEN 5; 325 MG/1; MG/1
1 TABLET ORAL EVERY 4 HOURS PRN
Qty: 12 TABLET | Refills: 0 | Status: SHIPPED | OUTPATIENT
Start: 2023-05-31 | End: 2023-06-03

## 2023-05-31 RX ORDER — ASPIRIN 81 MG/1
81 TABLET, CHEWABLE ORAL DAILY
COMMUNITY

## 2023-05-31 RX ADMIN — POLYETHYLENE GLYCOL 3350 17 G: 17 POWDER, FOR SOLUTION ORAL at 11:38

## 2023-05-31 RX ADMIN — DOCUSATE SODIUM 100 MG: 100 CAPSULE, LIQUID FILLED ORAL at 09:26

## 2023-05-31 RX ADMIN — AMLODIPINE BESYLATE 5 MG: 5 TABLET ORAL at 09:26

## 2023-05-31 RX ADMIN — VENLAFAXINE HYDROCHLORIDE 75 MG: 75 CAPSULE, EXTENDED RELEASE ORAL at 09:26

## 2023-05-31 RX ADMIN — FERROUS SULFATE TAB 325 MG (65 MG ELEMENTAL FE) 325 MG: 325 (65 FE) TAB at 09:26

## 2023-05-31 RX ADMIN — GABAPENTIN 100 MG: 100 CAPSULE ORAL at 09:26

## 2023-05-31 RX ADMIN — MOMETASONE FUROATE AND FORMOTEROL FUMARATE DIHYDRATE 2 PUFF: 200; 5 AEROSOL RESPIRATORY (INHALATION) at 08:20

## 2023-05-31 RX ADMIN — PANTOPRAZOLE SODIUM 40 MG: 40 TABLET, DELAYED RELEASE ORAL at 05:49

## 2023-05-31 NOTE — DISCHARGE SUMMARY
Discharge Summary    Date: 5/31/2023  Patient Name: Ava Francisco    YOB: 1940     Age: [de-identified] y.o. Admit Date: 5/25/2023  Discharge Date: 5/31/2023  Discharge Condition: Stable    Admission Diagnosis  Renal mass [N28.89]      Discharge Diagnosis  Principal Problem (Resolved):    Renal mass  Active Problems:    * No active hospital problems. *  Resolved Problems:    Respiratory failure with hypoxia [facility])      Hospital Stay  Narrative of Hospital Course:  Patient Active Problem List:     Diabetes mellitus type 2, diet-controlled ([facility] [address]     Hypertension     Community acquired pneumonia of right lower lobe of lung     Reactive airway disease     Pulmonary embolism, unspecified chronicity, unspecified pulmonary embolism type, unspecified whether acute cor pulmonale present (HCC)     Hyperkalemia     Acute GI bleeding     Acute UTI from klebsiella     Hyperlipidemia     Obstructive sleep apnea     Iron deficiency anemia due to chronic blood loss     Diverticulosis     Internal hemorrhoid     Hiatal hernia        Consultants:  IP CONSULT TO HOSPITALIST  IP CONSULT TO HEMATOLOGY  IP Edificmirlande MARCH/ [facility] Medic    Surgeries/procedures Performed:  nephrectomy     Treatments:    Analgesia, IV Hydration and Surgery        Discharge Plan/Disposition:  Home    Hospital/Incidental Findings Requiring Follow Up:    Patient Instructions:    Diet: Regular Diet    Activity:Activity as Tolerated and No Driving While on Analgesics  For number of days (if applicable): Other Instructions:    Provider Follow-Up:   No follow-ups on file. Significant Diagnostic Studies:    Recent Labs:  Admission on 05/25/2023  No results displayed because visit has over 200 results. ------------    Radiology last 7 days:  XR CHEST PORTABLE    Result Date: 5/27/2023  1. Cardiac silhouette is borderline enlarged, not significant changed. 2.  No acute airspace consolidation.      CT CHEST PULMONARY EMBOLISM W CONTRAST    Result Date:
Mucous membranes moist and pink without lesions/Lip, palate and uvula with acceptable anatomic shape

## 2023-05-31 NOTE — PROGRESS NOTES
completed initial visit with patient. Son was at bedside, supportive, with other family members also coming and going throughout. Patient expressed a positive life review and positive outlook on her recovery.  provided pastoral presence, prayer and empathetic listening.    Signed by  Lasha Lassiter M.Div.
ACUTE PHYSICAL THERAPY GOALS:   (Developed with and agreed upon by patient and/or caregiver.)  Pt will perform bed mobility with Maryjo in 7 therapy sessions. Pt will perform sit-to-stand/ stand-to-sit transfers Maryjo in 7 therapy sessions. Pt will ambulate 250 ft Maryjo with use of LRAD/no device and breaks as needed in 7 therapy sessions. Pt will ambulate up and down 4 steps Maryjo. Pt will perform standing dynamic balance activities with minimal postural sway in 7 therapy sessions. Pt will tolerate multiple sets and reps of BLE exercises in 7 therapy sessions. PHYSICAL THERAPY: Daily Note AM   (Link to Caseload Tracking: PT Visit Days : 2  Time In/Out PT Charge Capture  Rehab Caseload Tracker  Orders    Severo Sink is a [de-identified] y.o. female   PRIMARY DIAGNOSIS: Renal mass  Renal mass [N28.89]  Procedure(s) (LRB):  NEPHRECTOMY LAPAROSCOPIC HAND ASSISTED LEFT/ FRAGOSO TO  ASSIST (Left)  5 Days Post-Op  Inpatient: Payor: MEDICARE / Plan: MEDICARE PART A AND B / Product Type: *No Product type* /     ASSESSMENT:     REHAB RECOMMENDATIONS:   Recommendation to date pending progress:  Setting:  [facility] 13:     Shower chair, hand held shower head, large stool for into/out of bed-all discussed with family     ASSESSMENT:  Ms. Pedro Leblanc is in bathroom on arrival, son present, states just ambulated but willing to go again. Pt on RA, noted fatigue with activity at times, SPO2 anywhere from 87/88-91% on RA, cues for pursed lip breathing, rest breaks as needed, O2 would return quickly to >90% within 30 seconds. Pt educated on energy conservation with activity, seated breaks as needed, seated with activity. Pt with R sided limp with initial mobility, states \"it gets locked up at times but works itself out with walking\", pain not in actual hip joint, more LBP to middle buttocks, possible bursitis? Pt reports is relieved with pain medication and heating pad.  Pt ambulated 250' with slow demarcus, no
Admit Date: 5/25/2023      Subjective:     Miguel Carranza is POD  5 Procedure(s):  NEPHRECTOMY LAPAROSCOPIC HAND ASSISTED LEFT/ FRAGOSO TO  ASSIST    Reports feeling well. On RA. Ambulating. Denies BM. Passing flatus. Objective:     Patient Vitals for the past 8 hrs:   BP Temp Temp src Pulse Resp SpO2   05/31/23 0821 -- -- -- 68 16 96 %   05/31/23 0711 (!) 160/67 97.3 °F (36.3 °C) Oral 77 18 95 %   05/31/23 0420 (!) 165/61 99 °F (37.2 °C) Oral 74 16 96 %     05/31 0701 - 05/31 1900  In: 180 [P.O.:180]  Out: -   05/29 1901 - 05/31 0700  In: 663.8 [P.O.:200]  Out: 560 [Urine:560]    Physical Exam:  GENERAL ASSESSMENT: alert, oriented to person, place and time, no acute distress and no anxiety, depression or agitation  Chest: normal work of breathing  CVS exam: normal rate, regular rhythm, normal S1, S2, no murmurs, rubs, clicks or gallops. ABDOMEN: not done  Neurological exam reveals alert, oriented, normal speech, no focal findings or movement disorder noted.   FEMALE GENITOURINARY EXAM: not done  MALE GENITAL EXAM: not done    Data Review   Recent Results (from the past 24 hour(s))   PREPARE RBC (CROSSMATCH), 1 Units    Collection Time: 05/30/23 10:30 AM   Result Value Ref Range    History Check Historical check performed    Hemoglobin and Hematocrit    Collection Time: 05/30/23  4:18 PM   Result Value Ref Range    Hemoglobin 8.8 (L) 11.7 - 15.4 g/dL    Hematocrit 29.7 (L) 35.8 - 46.3 %   CBC with Auto Differential    Collection Time: 05/31/23  5:46 AM   Result Value Ref Range    WBC 7.1 4.3 - 11.1 K/uL    RBC 4.15 4.05 - 5.2 M/uL    Hemoglobin 9.6 (L) 11.7 - 15.4 g/dL    Hematocrit 32.2 (L) 35.8 - 46.3 %    MCV 77.6 (L) 82 - 102 FL    MCH 23.1 (L) 26.1 - 32.9 PG    MCHC 29.8 (L) 31.4 - 35.0 g/dL    RDW 17.4 (H) 11.9 - 14.6 %    Platelets 399 (H) 531 - 450 K/uL    MPV 10.1 9.4 - 12.3 FL    nRBC 0.00 0.0 - 0.2 K/uL    Differential Type AUTOMATED      Neutrophils % 69 43 - 78 %    Lymphocytes % 16 13 - 44 %
Admit Date: 5/25/2023    Subjective:     Patient has no new complaint. She is passing flatus. Objective:     Patient Vitals for the past 8 hrs:   BP Temp Temp src Pulse Resp SpO2   05/29/23 0734 -- -- -- 81 15 96 %   05/29/23 0717 (!) 165/63 98.1 °F (36.7 °C) Oral 79 17 96 %   05/29/23 0501 (!) 159/63 98.6 °F (37 °C) Oral 79 18 92 %     No intake/output data recorded. 05/27 1901 - 05/29 0700  In: 720 [P.O.:720]  Out: -     Physical Exam: abd still with mild distention. Incisions C/D/I.         Data Review   Recent Results (from the past 24 hour(s))   Anti-Xa, Unfractionated Heparin    Collection Time: 05/28/23  5:10 PM   Result Value Ref Range    Anti-XA Unfrac Heparin >1.1 (H) 0.3 - 0.7 IU/mL   Anti-Xa, Unfractionated Heparin    Collection Time: 05/28/23  7:04 PM   Result Value Ref Range    Anti-XA Unfrac Heparin >1.1 (H) 0.3 - 0.7 IU/mL   CBC    Collection Time: 05/29/23  4:47 AM   Result Value Ref Range    WBC 8.0 4.3 - 11.1 K/uL    RBC 3.32 (L) 4.05 - 5.2 M/uL    Hemoglobin 7.6 (L) 11.7 - 15.4 g/dL    Hematocrit 26.5 (L) 35.8 - 46.3 %    MCV 79.8 (L) 82 - 102 FL    MCH 22.9 (L) 26.1 - 32.9 PG    MCHC 28.7 (L) 31.4 - 35.0 g/dL    RDW 17.8 (H) 11.9 - 14.6 %    Platelets 156 439 - 964 K/uL    MPV 10.2 9.4 - 12.3 FL    nRBC 0.00 0.0 - 0.2 K/uL   Basic Metabolic Panel    Collection Time: 05/29/23  4:47 AM   Result Value Ref Range    Sodium 137 133 - 143 mmol/L    Potassium 3.8 3.5 - 5.1 mmol/L    Chloride 108 101 - 110 mmol/L    CO2 27 21 - 32 mmol/L    Anion Gap 2 2 - 11 mmol/L    Glucose 99 65 - 100 mg/dL    BUN 10 8 - 23 MG/DL    Creatinine 1.20 (H) 0.6 - 1.0 MG/DL    Est, Glom Filt Rate 46 (L) >60 ml/min/1.73m2    Calcium 8.9 8.3 - 10.4 MG/DL   Magnesium    Collection Time: 05/29/23  4:47 AM   Result Value Ref Range    Magnesium 2.1 1.8 - 2.4 mg/dL   Anti-Xa, Unfractionated Heparin    Collection Time: 05/29/23  4:47 AM   Result Value Ref Range    Anti-XA Unfrac Heparin >1.1 (H) 0.3 - 0.7 IU/mL   TYPE AND
Both IV'S removed. Discharge instructions provided, pt verbalized understanding. D/C paper signed and placed in chart. Pt transported out.
Hospitalist Progress Note   Admit Date:  2023  5:33 AM   Name:  Albania Ellington   Age:  [de-identified] y.o. Sex:  female  :  1940   MRN:  [id number]   Room:  [facility]/    Presenting/Chief Complaint: No chief complaint on file. Reason(s) for Admission: Renal mass [N28.89]     Hospital Course:   Albania Ellington is a [de-identified] y.o. female with history of PE diagnosed in 2023, renal mass concerning for renal cell carcinoma, COPD, was admitted to urology service status post left nephrectomy on . She was recently discharged from this facility on  after being admitted for GI bleed. She had endoscopy and colonoscopy on that admission which showed no obvious bleeding. Recommendations for repeat colonoscopy in 3 to 6 months due to poor bowel prep. For her PE, patient was initially on Eliquis and subsequently switched to Xarelto. Xarelto was held on discharge last week due to GI bleed. Today's postop day 5 status post left nephrectomy. Postoperatively, patient was hypoxic and desaturated to 83% on room air. Hospitalist consulted, for postop hypoxia. CT chest was repeated yesterday and shows several segmental PE. Patient started on heparin drip after discussing with urology. Hematology consulted. Subjective & 24hr Events (23): Patient is doing well this morning. No fever no chills. No chest pain. No shortness of breath. Passing flatus but no BM.  P.o. intake improved. Assessment & Plan: This is a 82-year-old female with:      Postop acute hypoxemic respiratory failure resolved/PE:   As patient has recently diagnosed PE and has been off of anticoagulation for more than a week, repeat CT chest was done and showed several segmental PE. Discussed risk benefits with patient and family at bedside. Initially on heparin drip. Hematology consulted.   Because patient has been off of Xarelto for more than a week, prior to admission hematology recommends restarting
Hospitalist Progress Note   Admit Date:  2023  5:33 AM   Name:  Soumya Conn   Age:  [de-identified] y.o. Sex:  female  :  1940   MRN:  [id number]   Room:      Presenting/Chief Complaint: No chief complaint on file. Reason(s) for Admission: Renal mass [N28.89]     Hospital Course:   Soumya Conn is a [de-identified] y.o. female with history of PE diagnosed in 2023, renal mass concerning for renal cell carcinoma, COPD, was admitted to urology service status post left nephrectomy on . She was recently discharged from this facility on  after being admitted for GI bleed. She had endoscopy and colonoscopy on that admission which showed no obvious bleeding. Recommendations for repeat colonoscopy in 3 to 6 months due to poor bowel prep. For her PE, patient was initially on Eliquis and subsequently switched to Xarelto. Xarelto was held on discharge last week due to GI bleed. Today's postop day 3 status post left nephrectomy. Postoperatively, patient was hypoxic and desaturated to 83% on room air. Hospitalist consulted, for postop hypoxia. CT chest was repeated yesterday and shows several segmental PE. Patient started on heparin drip after discussing with urology. Hematology consulted. Subjective & 24hr Events (23): Patient is doing well this morning able to ambulate in the hallway without getting short of breath. Oxygen saturations stayed above 90% per the respiratory therapist.  No active bleeding. Assessment & Plan: This is a 82-year-old female with:      Postop acute hypoxemic respiratory failure resolved/PE:   As patient has recently diagnosed PE and has been off of anticoagulation for more than a week, repeat CT chest was done yesterday and showed several segmental PE. Discussed risk benefits with patient and family at bedside. Continue heparin drip. Monitor for bleeding. Monitor hemoglobin closely.   Hemoglobin is 7.8 this morning from 7.6
Physical Therapy Note:    Attempted to see patient this PM for physical therapy treatment  session. Patient states that she just got back form walk and just got into bed, request to hold off on PT treatment for today. Will follow and re-attempt as schedule permits/patient available.  Thank you,    Henry Roman, PT     Rehab Caseload Tracker
Physician Progress Note      Christopher Aguirre  CSN #:                  [id number]  :                       1940  ADMIT DATE:       2023 5:33 AM  DISCH DATE:  Aspen Bowden  PROVIDER #:        Damian Billy          QUERY TEXT:    Pt admitted with a renal mass sp Left nephrectomy this admission . Pt noted to   have a >2gm drop in hgb. If possible, please document in the progress notes   and discharge summary if you are evaluating and/or treating any of the   following: The medical record reflects the following:  Risk Factors: SP left nephrectomy this admission. . recent hx of a gi bleed. Clinical Indicators: hgb 8.5 dropping to 6.4 after surgery-- surgical EBL   25cc's but noted in op note a small subcapsular spleen tear--covered with   surgicell. Treatment: blood, type and cross, fluids, labs, essential home meds. Options provided:  -- Acute blood loss anemia  -- Chronic blood loss anemia  -- Acute on chronic blood loss anemia  -- Iron deficiency anemia  -- Postoperative acute blood loss anemia  -- Dilutional anemia  -- Precipitous drop in Hemoglobin and Hematocrit  -- Other - I will add my own diagnosis  -- Disagree - Not applicable / Not valid  -- Disagree - Clinically unable to determine / Unknown  -- Refer to Clinical Documentation Reviewer    PROVIDER RESPONSE TEXT:    This patient has postoperative acute blood loss anemia. Query created by: Shirin Barker on 2023 2:33 PM      Electronically signed by:   Sanaz Jain 2023 10:12 AM
Heparin >1.1 (H) 0.3 - 0.7 IU/mL   TYPE AND SCREEN    Collection Time: 05/29/23  4:47 AM   Result Value Ref Range    Crossmatch expiration date 06/01/2023,2203     ABO/Rh O POSITIVE     Antibody Screen NEG    Transferrin Saturation    Collection Time: 05/29/23  4:47 AM   Result Value Ref Range    Iron 12 (L) 35 - 150 ug/dL    TIBC 227 (L) 250 - 450 ug/dL    TRANSFERRIN SATURATION 5 (L) >20 %   Ferritin    Collection Time: 05/29/23  4:47 AM   Result Value Ref Range    Ferritin 76 8 - 388 NG/ML   Anti-Xa, Unfractionated Heparin    Collection Time: 05/29/23  1:29 PM   Result Value Ref Range    Anti-XA Unfrac Heparin >1.1 (H) 0.3 - 0.7 IU/mL       Current Meds:  Current Facility-Administered Medications   Medication Dose Route Frequency    bisacodyl (DULCOLAX) suppository 10 mg  10 mg Rectal Daily PRN    heparin (porcine) injection 6,980 Units  80 Units/kg IntraVENous PRN    heparin (porcine) injection 3,490 Units  40 Units/kg IntraVENous PRN    heparin 25,000 unit in sodium chloride 0.45% 250 mL (premix) infusion  5-30 Units/kg/hr IntraVENous Continuous    albuterol sulfate HFA (PROVENTIL;VENTOLIN;PROAIR) 108 (90 Base) MCG/ACT inhaler 2 puff  2 puff Inhalation 4x Daily PRN    amLODIPine (NORVASC) tablet 5 mg  5 mg Oral Daily    ferrous sulfate (IRON 325) tablet 325 mg  325 mg Oral Daily with breakfast    mometasone-formoterol (DULERA) 200-5 MCG/ACT inhaler 2 puff  2 puff Inhalation BID    gabapentin (NEURONTIN) capsule 100 mg  100 mg Oral TID    ipratropium 0.5 mg-albuterol 2.5 mg (DUONEB) nebulizer solution 3 mL  1 vial Inhalation Q4H PRN    pantoprazole (PROTONIX) tablet 40 mg  40 mg Oral QAM AC    venlafaxine (EFFEXOR XR) extended release capsule 75 mg  75 mg Oral Daily    acetaminophen (TYLENOL) tablet 650 mg  650 mg Oral Q4H PRN    morphine (PF) injection 2 mg  2 mg IntraVENous Q1H PRN    ondansetron (ZOFRAN) injection 4 mg  4 mg IntraVENous Q6H PRN    HYDROcodone-acetaminophen (NORCO) 5-325 MG per tablet 1 tablet

## 2023-05-31 NOTE — CARE COORDINATION
Met with patient and son at bedside to discuss therapy recommendations for [facility] PT/OT. Patient requested [facility]. Referral faxed to [facility]. CM will continue to follow.

## 2023-06-06 ENCOUNTER — TELEPHONE (OUTPATIENT)
Dept: UROLOGY | Age: 83
End: 2023-06-06

## 2023-06-06 NOTE — TELEPHONE ENCOUNTER
Marlee Bob home health nurse for Mrs. Waldo Awan called to discuss the redness and pain around Mrs. Phipps incision site. She has a Laparoscopic radical nephrectomy on 5/25/2003. After speaking with KALI Barrios and our clinical Manager -Mahesh Vicente I informed Marlee Bob that redness/pain is normal after that procedure and if it becomes worse before her follow up appointment 6/8 at 815 am with Michele Augustine to call us back.

## 2023-06-08 ENCOUNTER — OFFICE VISIT (OUTPATIENT)
Dept: UROLOGY | Age: 83
End: 2023-06-08

## 2023-06-08 DIAGNOSIS — N28.89 RENAL MASS: Primary | ICD-10-CM

## 2023-06-08 PROCEDURE — 99024 POSTOP FOLLOW-UP VISIT: CPT | Performed by: NURSE PRACTITIONER

## 2023-06-08 ASSESSMENT — ENCOUNTER SYMPTOMS
NAUSEA: 0
BACK PAIN: 0

## 2023-06-08 NOTE — PROGRESS NOTES
[facility]  [address] [facility] [address], [address]  [phone]          Lisa Mcarthur  : 1940    Chief Complaint   Patient presents with    Follow-up          HPI     Lisa Mcarthur is a [de-identified] y.o. female s/p L HALN. 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 Margins negative. Here today for staple removal. Having some tenderness at site. No fever/chills, constipation, or LUTS.        Past Medical History:   Diagnosis Date    Arthritis     Chronic pain     hands and toes    Former smoker, stopped smoking in distant past     GERD (gastroesophageal reflux disease)     controlled with med    High cholesterol     on medication    History of pulmonary embolus (PE) 2023    Hypertension     Morbid obesity (HCC)     Nausea & vomiting     Pre-diabetes     Sleep apnea     Status post total left knee replacement 2017    Urinary incontinence     med     Past Surgical History:   Procedure Laterality Date    BREAST SURGERY      Imer. breast bx    COLONOSCOPY N/A 2023    COLORECTAL CANCER SCREENING, NOT HIGH RISK performed by Ronnie Galvez MD at [address] [place] [address]      Hysterectomy    HEENT      T&A    KIDNEY SURGERY Left 2023    NEPHRECTOMY LAPAROSCOPIC HAND ASSISTED LEFT/ FRAGOSO TO  ASSIST performed by Anna Moya DO at [facility] MAIN OR    TAMIA BIOPSY BREAST STEREOTACTIC  over 20 yrs ago    TOTAL KNEE ARTHROPLASTY Right     UPPER GASTROINTESTINAL ENDOSCOPY N/A 2023    EGD, POLYP performed by Ronnie Galvez MD at [address]  over 20 yrs ago     Current Outpatient Medications   Medication Sig Dispense Refill    lisinopril (PRINIVIL;ZESTRIL) 40 MG tablet Take 1 tablet by mouth daily      rosuvastatin (CRESTOR) 20 MG tablet Take 1 tablet by mouth daily      furosemide (LASIX) 20 MG tablet Take 1 tablet by mouth as needed      ibuprofen (ADVIL;MOTRIN) 600 MG tablet Take 1 tablet by mouth every 6 hours as needed for Pain

## 2023-06-09 ENCOUNTER — OFFICE VISIT (OUTPATIENT)
Dept: ONCOLOGY | Age: 83
End: 2023-06-09
Payer: MEDICARE

## 2023-06-09 VITALS
OXYGEN SATURATION: 94 % | TEMPERATURE: 98.1 F | BODY MASS INDEX: 31.09 KG/M2 | RESPIRATION RATE: 14 BRPM | DIASTOLIC BLOOD PRESSURE: 65 MMHG | HEART RATE: 81 BPM | SYSTOLIC BLOOD PRESSURE: 125 MMHG | WEIGHT: 186.6 LBS | HEIGHT: 65 IN

## 2023-06-09 DIAGNOSIS — D50.0 IRON DEFICIENCY ANEMIA DUE TO CHRONIC BLOOD LOSS: ICD-10-CM

## 2023-06-09 DIAGNOSIS — I26.99 PULMONARY EMBOLISM, UNSPECIFIED CHRONICITY, UNSPECIFIED PULMONARY EMBOLISM TYPE, UNSPECIFIED WHETHER ACUTE COR PULMONALE PRESENT (HCC): Primary | Chronic | ICD-10-CM

## 2023-06-09 DIAGNOSIS — C64.2 RENAL CELL CANCER, LEFT (HCC): ICD-10-CM

## 2023-06-09 PROCEDURE — 99214 OFFICE O/P EST MOD 30 MIN: CPT | Performed by: INTERNAL MEDICINE

## 2023-06-09 PROCEDURE — 3078F DIAST BP <80 MM HG: CPT | Performed by: INTERNAL MEDICINE

## 2023-06-09 PROCEDURE — G8428 CUR MEDS NOT DOCUMENT: HCPCS | Performed by: INTERNAL MEDICINE

## 2023-06-09 PROCEDURE — 1123F ACP DISCUSS/DSCN MKR DOCD: CPT | Performed by: INTERNAL MEDICINE

## 2023-06-09 PROCEDURE — 1090F PRES/ABSN URINE INCON ASSESS: CPT | Performed by: INTERNAL MEDICINE

## 2023-06-09 PROCEDURE — G8417 CALC BMI ABV UP PARAM F/U: HCPCS | Performed by: INTERNAL MEDICINE

## 2023-06-09 PROCEDURE — G8400 PT W/DXA NO RESULTS DOC: HCPCS | Performed by: INTERNAL MEDICINE

## 2023-06-09 PROCEDURE — 1111F DSCHRG MED/CURRENT MED MERGE: CPT | Performed by: INTERNAL MEDICINE

## 2023-06-09 PROCEDURE — 3074F SYST BP LT 130 MM HG: CPT | Performed by: INTERNAL MEDICINE

## 2023-06-09 PROCEDURE — 1036F TOBACCO NON-USER: CPT | Performed by: INTERNAL MEDICINE

## 2023-06-09 NOTE — PROGRESS NOTES
MSK Normal range of motion in general.  No edema and no tenderness. Psych Appropriate mood and affect. Labs:  No results found for this or any previous visit (from the past 96 hour(s)). Imaging:  XR CHEST (2 VW)    Result Date: 5/15/2023  Negative for acute abnormality. Aortic atherosclerosis. .     XR CHEST PORTABLE    Result Date: 5/27/2023  1. Cardiac silhouette is borderline enlarged, not significant changed. 2.  No acute airspace consolidation. CT CHEST PULMONARY EMBOLISM W CONTRAST    Result Date: 5/27/2023  1. Bilateral segmental pulmonary emboli. No CT evidence of right heart strain. 2.  Subpleural atelectasis within the left lower lobe. Bilateral pulmonary emboli this finding was discussed with the patient's nurse, Shashank Song via telephone at 5/27/2023 2:24 PM.         ASSESSMENT:   Diagnosis Orders   1. Pulmonary embolism, unspecified chronicity, unspecified pulmonary embolism type, unspecified whether acute cor pulmonale present ([address]        2. Iron deficiency anemia due to chronic blood loss        3. Renal cell cancer, left [facility])              PLAN:  Lab studies were personally reviewed. PE: diagnosed in February 2023 after presenting with SOB and chest pain, imaging also showed a complex left renal mass. She was discharged on Eliquis but eventually changed to Xarelto by her PCP. She was seen for pre-op prior to nephrectomy and noted to have a Hgb of 5.8, she was felt to have an acute GIB, endoscopy and colonoscopy showed no active bleeding although the colon prep was suboptimal.  She was transfused with good relief of dyspnea. She completed nephrectomy on 5/25/23, she was placed on heparin as she had a repeat CT PE which showed multiple persistent PEs.   Hematology was consulted for recommendations, we recommended restarting Xarelto - the persistence of thrombotic disease at this point does not make this a Xarelto failure, especially with removal of the likely provoking

## 2023-06-09 NOTE — PATIENT INSTRUCTIONS
Patient Instructions from Today's Visit    Reason for Visit:  New Patient Pulmonary Embolism     Plan:  Labs today  Proferrin twice a day for at least 3 months     Follow Up: Follow up in 6-8 weeks    Recent Lab Results:  No visits with results within 3 Day(s) from this visit. Latest known visit with results is:   No results displayed because visit has over 200 results. Treatment Summary has been discussed and given to patient: N/A    -------------------------------------------------------------------------------------------------------------------  Please call our office at [phone] if you have any  of the following symptoms:   Fever of 100.5 or greater  Chills  Shortness of breath  Swelling or pain in one leg    After office hours an answering service is available and will contact a provider for emergencies or if you are experiencing any of the above symptoms. Patient does express an interest in My Chart. My Chart log in information explained on the after visit summary printout at the [address] desk.     Nithin Yoder RN

## 2023-08-03 DIAGNOSIS — I26.99 PULMONARY EMBOLISM, UNSPECIFIED CHRONICITY, UNSPECIFIED PULMONARY EMBOLISM TYPE, UNSPECIFIED WHETHER ACUTE COR PULMONALE PRESENT (HCC): Primary | ICD-10-CM

## 2023-08-03 DIAGNOSIS — R89.7 ABNORMAL HISTOLOGICAL FINDINGS IN SPECIMENS FROM OTHER ORGANS, SYSTEMS AND TISSUES: ICD-10-CM

## 2023-08-03 DIAGNOSIS — D50.0 IRON DEFICIENCY ANEMIA DUE TO CHRONIC BLOOD LOSS: ICD-10-CM

## 2023-08-04 ENCOUNTER — HOSPITAL ENCOUNTER (OUTPATIENT)
Dept: LAB | Age: 83
End: 2023-08-04
Payer: MEDICARE

## 2023-08-04 ENCOUNTER — TELEPHONE (OUTPATIENT)
Dept: ONCOLOGY | Age: 83
End: 2023-08-04

## 2023-08-04 ENCOUNTER — OFFICE VISIT (OUTPATIENT)
Dept: ONCOLOGY | Age: 83
End: 2023-08-04
Payer: MEDICARE

## 2023-08-04 ENCOUNTER — CLINICAL DOCUMENTATION (OUTPATIENT)
Dept: ONCOLOGY | Age: 83
End: 2023-08-04

## 2023-08-04 VITALS
HEIGHT: 65 IN | TEMPERATURE: 97.5 F | OXYGEN SATURATION: 95 % | DIASTOLIC BLOOD PRESSURE: 58 MMHG | HEART RATE: 95 BPM | RESPIRATION RATE: 22 BRPM | SYSTOLIC BLOOD PRESSURE: 137 MMHG | WEIGHT: 186.9 LBS | BODY MASS INDEX: 31.14 KG/M2

## 2023-08-04 DIAGNOSIS — D50.0 IRON DEFICIENCY ANEMIA DUE TO CHRONIC BLOOD LOSS: Primary | ICD-10-CM

## 2023-08-04 DIAGNOSIS — R89.7 ABNORMAL HISTOLOGICAL FINDINGS IN SPECIMENS FROM OTHER ORGANS, SYSTEMS AND TISSUES: ICD-10-CM

## 2023-08-04 DIAGNOSIS — D50.0 IRON DEFICIENCY ANEMIA DUE TO CHRONIC BLOOD LOSS: ICD-10-CM

## 2023-08-04 DIAGNOSIS — I26.99 PULMONARY EMBOLISM, UNSPECIFIED CHRONICITY, UNSPECIFIED PULMONARY EMBOLISM TYPE, UNSPECIFIED WHETHER ACUTE COR PULMONALE PRESENT (HCC): ICD-10-CM

## 2023-08-04 LAB
ALBUMIN SERPL-MCNC: 3.5 G/DL (ref 3.2–4.6)
ALBUMIN/GLOB SERPL: 0.8 (ref 0.4–1.6)
ALP SERPL-CCNC: 109 U/L (ref 50–136)
ALT SERPL-CCNC: 26 U/L (ref 12–65)
ANION GAP SERPL CALC-SCNC: 7 MMOL/L (ref 2–11)
AST SERPL-CCNC: 23 U/L (ref 15–37)
BASOPHILS # BLD: 0 K/UL (ref 0–0.2)
BASOPHILS NFR BLD: 1 % (ref 0–2)
BILIRUB SERPL-MCNC: 0.4 MG/DL (ref 0.2–1.1)
BUN SERPL-MCNC: 24 MG/DL (ref 8–23)
CALCIUM SERPL-MCNC: 9.1 MG/DL (ref 8.3–10.4)
CHLORIDE SERPL-SCNC: 107 MMOL/L (ref 101–110)
CO2 SERPL-SCNC: 24 MMOL/L (ref 21–32)
CREAT SERPL-MCNC: 1.5 MG/DL (ref 0.6–1)
DIFFERENTIAL METHOD BLD: ABNORMAL
EOSINOPHIL # BLD: 0.3 K/UL (ref 0–0.8)
EOSINOPHIL NFR BLD: 3 % (ref 0.5–7.8)
ERYTHROCYTE [DISTWIDTH] IN BLOOD BY AUTOMATED COUNT: 19.6 % (ref 11.9–14.6)
FERRITIN SERPL-MCNC: 19 NG/ML (ref 8–388)
GLOBULIN SER CALC-MCNC: 4.2 G/DL (ref 2.8–4.5)
GLUCOSE SERPL-MCNC: 147 MG/DL (ref 65–100)
HCT VFR BLD AUTO: 32.9 % (ref 35.8–46.3)
HGB BLD-MCNC: 9.8 G/DL (ref 11.7–15.4)
IMM GRANULOCYTES # BLD AUTO: 0 K/UL (ref 0–0.5)
IMM GRANULOCYTES NFR BLD AUTO: 0 % (ref 0–5)
IRON SATN MFR SERPL: 9 %
IRON SERPL-MCNC: 33 UG/DL (ref 35–150)
LDH SERPL L TO P-CCNC: 177 U/L (ref 110–210)
LYMPHOCYTES # BLD: 1.6 K/UL (ref 0.5–4.6)
LYMPHOCYTES NFR BLD: 19 % (ref 13–44)
MCH RBC QN AUTO: 23 PG (ref 26.1–32.9)
MCHC RBC AUTO-ENTMCNC: 29.8 G/DL (ref 31.4–35)
MCV RBC AUTO: 77 FL (ref 82–102)
MONOCYTES # BLD: 0.6 K/UL (ref 0.1–1.3)
MONOCYTES NFR BLD: 7 % (ref 4–12)
NEUTS SEG # BLD: 6 K/UL (ref 1.7–8.2)
NEUTS SEG NFR BLD: 70 % (ref 43–78)
NRBC # BLD: 0 K/UL (ref 0–0.2)
PLATELET # BLD AUTO: 319 K/UL (ref 150–450)
PMV BLD AUTO: 9.6 FL (ref 9.4–12.3)
POTASSIUM SERPL-SCNC: 3.8 MMOL/L (ref 3.5–5.1)
PROT SERPL-MCNC: 7.7 G/DL (ref 6.3–8.2)
RBC # BLD AUTO: 4.27 M/UL (ref 4.05–5.2)
SODIUM SERPL-SCNC: 138 MMOL/L (ref 133–143)
TIBC SERPL-MCNC: 376 UG/DL (ref 250–450)
WBC # BLD AUTO: 8.6 K/UL (ref 4.3–11.1)

## 2023-08-04 PROCEDURE — 3078F DIAST BP <80 MM HG: CPT | Performed by: NURSE PRACTITIONER

## 2023-08-04 PROCEDURE — G8417 CALC BMI ABV UP PARAM F/U: HCPCS | Performed by: NURSE PRACTITIONER

## 2023-08-04 PROCEDURE — 82728 ASSAY OF FERRITIN: CPT

## 2023-08-04 PROCEDURE — G8400 PT W/DXA NO RESULTS DOC: HCPCS | Performed by: NURSE PRACTITIONER

## 2023-08-04 PROCEDURE — 85025 COMPLETE CBC W/AUTO DIFF WBC: CPT

## 2023-08-04 PROCEDURE — 99214 OFFICE O/P EST MOD 30 MIN: CPT | Performed by: NURSE PRACTITIONER

## 2023-08-04 PROCEDURE — 3075F SYST BP GE 130 - 139MM HG: CPT | Performed by: NURSE PRACTITIONER

## 2023-08-04 PROCEDURE — 1036F TOBACCO NON-USER: CPT | Performed by: NURSE PRACTITIONER

## 2023-08-04 PROCEDURE — 1123F ACP DISCUSS/DSCN MKR DOCD: CPT | Performed by: NURSE PRACTITIONER

## 2023-08-04 PROCEDURE — 36415 COLL VENOUS BLD VENIPUNCTURE: CPT

## 2023-08-04 PROCEDURE — 83550 IRON BINDING TEST: CPT

## 2023-08-04 PROCEDURE — G8427 DOCREV CUR MEDS BY ELIG CLIN: HCPCS | Performed by: NURSE PRACTITIONER

## 2023-08-04 PROCEDURE — 80053 COMPREHEN METABOLIC PANEL: CPT

## 2023-08-04 PROCEDURE — 1090F PRES/ABSN URINE INCON ASSESS: CPT | Performed by: NURSE PRACTITIONER

## 2023-08-04 PROCEDURE — 83540 ASSAY OF IRON: CPT

## 2023-08-04 PROCEDURE — 83615 LACTATE (LD) (LDH) ENZYME: CPT

## 2023-08-04 ASSESSMENT — PATIENT HEALTH QUESTIONNAIRE - PHQ9
SUM OF ALL RESPONSES TO PHQ QUESTIONS 1-9: 0
SUM OF ALL RESPONSES TO PHQ QUESTIONS 1-9: 0
1. LITTLE INTEREST OR PLEASURE IN DOING THINGS: 0
2. FEELING DOWN, DEPRESSED OR HOPELESS: 0
SUM OF ALL RESPONSES TO PHQ9 QUESTIONS 1 & 2: 0
SUM OF ALL RESPONSES TO PHQ QUESTIONS 1-9: 0
SUM OF ALL RESPONSES TO PHQ QUESTIONS 1-9: 0

## 2023-08-04 NOTE — PROGRESS NOTES
RBC 4.27 4.05 - 5.2 M/uL    Hemoglobin 9.8 (L) 11.7 - 15.4 g/dL    Hematocrit 32.9 (L) 35.8 - 46.3 %    MCV 77.0 (L) 82.0 - 102.0 FL    MCH 23.0 (L) 26.1 - 32.9 PG    MCHC 29.8 (L) 31.4 - 35.0 g/dL    RDW 19.6 (H) 11.9 - 14.6 %    Platelets 720 763 - 258 K/uL    MPV 9.6 9.4 - 12.3 FL    nRBC 0.00 0.0 - 0.2 K/uL    Neutrophils % 70 43 - 78 %    Lymphocytes % 19 13 - 44 %    Monocytes % 7 4.0 - 12.0 %    Eosinophils % 3 0.5 - 7.8 %    Basophils % 1 0.0 - 2.0 %    Immature Granulocytes 0 0.0 - 5.0 %    Neutrophils Absolute 6.0 1.7 - 8.2 K/UL    Lymphocytes Absolute 1.6 0.5 - 4.6 K/UL    Monocytes Absolute 0.6 0.1 - 1.3 K/UL    Eosinophils Absolute 0.3 0.0 - 0.8 K/UL    Basophils Absolute 0.0 0.0 - 0.2 K/UL    Absolute Immature Granulocyte 0.0 0.0 - 0.5 K/UL    Differential Type AUTOMATED     Comprehensive Metabolic Panel    Collection Time: 08/04/23  2:51 PM   Result Value Ref Range    Sodium 138 133 - 143 mmol/L    Potassium 3.8 3.5 - 5.1 mmol/L    Chloride 107 101 - 110 mmol/L    CO2 24 21 - 32 mmol/L    Anion Gap 7 2 - 11 mmol/L    Glucose 147 (H) 65 - 100 mg/dL    BUN 24 (H) 8 - 23 MG/DL    Creatinine 1.50 (H) 0.6 - 1.0 MG/DL    Est, Glom Filt Rate 35 (L) >60 ml/min/1.73m2    Calcium 9.1 8.3 - 10.4 MG/DL    Total Bilirubin 0.4 0.2 - 1.1 MG/DL    ALT 26 12 - 65 U/L    AST 23 15 - 37 U/L    Alk Phosphatase 109 50 - 136 U/L    Total Protein 7.7 6.3 - 8.2 g/dL    Albumin 3.5 3.2 - 4.6 g/dL    Globulin 4.2 2.8 - 4.5 g/dL    Albumin/Globulin Ratio 0.8 0.4 - 1.6     Lactate Dehydrogenase    Collection Time: 08/04/23  2:51 PM   Result Value Ref Range     110 - 210 U/L   Ferritin    Collection Time: 08/04/23  2:51 PM   Result Value Ref Range    Ferritin 19 8 - 388 NG/ML   Transferrin Saturation    Collection Time: 08/04/23  2:51 PM   Result Value Ref Range    Iron 33 (L) 35 - 150 ug/dL    TIBC 376 250 - 450 ug/dL    TRANSFERRIN SATURATION 9 (L) >20 %       Imaging:  XR CHEST (2 VW)    Result Date: 5/15/2023  Negative

## 2023-08-04 NOTE — TELEPHONE ENCOUNTER
Physician provider: Monico Soares MD  Reason for today's call: Results  Last office visit:08.04.23    Pt called stating she received a call from \"Belen\" regarding her lab results and possibly scheduling an infusion. Pt was asked to return call to discuss her options. PROCEDURES:  Primary low transverse  section 28-Mar-2020 00:30:21 via Pfannenstiel skin incision Mary Caldwell

## 2023-08-04 NOTE — PROGRESS NOTES
LVM letting patient know her iron stores are still low at this time. I gave her the option of increasing her oral iron to twice daily or we could bring her in for intravenous iron. Advised patient to call the office back with her decision.

## 2023-08-07 RX ORDER — ACETAMINOPHEN 325 MG/1
650 TABLET ORAL
OUTPATIENT
Start: 2023-08-14

## 2023-08-07 RX ORDER — SODIUM CHLORIDE 9 MG/ML
5-250 INJECTION, SOLUTION INTRAVENOUS PRN
OUTPATIENT
Start: 2023-08-14

## 2023-08-07 RX ORDER — SODIUM CHLORIDE 0.9 % (FLUSH) 0.9 %
5-40 SYRINGE (ML) INJECTION PRN
OUTPATIENT
Start: 2023-08-14

## 2023-08-07 RX ORDER — ALBUTEROL SULFATE 90 UG/1
4 AEROSOL, METERED RESPIRATORY (INHALATION) PRN
OUTPATIENT
Start: 2023-08-14

## 2023-08-07 RX ORDER — FAMOTIDINE 10 MG/ML
20 INJECTION, SOLUTION INTRAVENOUS
OUTPATIENT
Start: 2023-08-14

## 2023-08-07 RX ORDER — SODIUM CHLORIDE 9 MG/ML
INJECTION, SOLUTION INTRAVENOUS CONTINUOUS
OUTPATIENT
Start: 2023-08-14

## 2023-08-07 RX ORDER — HEPARIN SODIUM (PORCINE) LOCK FLUSH IV SOLN 100 UNIT/ML 100 UNIT/ML
500 SOLUTION INTRAVENOUS PRN
OUTPATIENT
Start: 2023-08-14

## 2023-08-07 RX ORDER — DIPHENHYDRAMINE HYDROCHLORIDE 50 MG/ML
50 INJECTION INTRAMUSCULAR; INTRAVENOUS
OUTPATIENT
Start: 2023-08-14

## 2023-08-07 RX ORDER — ONDANSETRON 2 MG/ML
8 INJECTION INTRAMUSCULAR; INTRAVENOUS
OUTPATIENT
Start: 2023-08-14

## 2023-08-07 RX ORDER — EPINEPHRINE 1 MG/ML
0.3 INJECTION, SOLUTION, CONCENTRATE INTRAVENOUS PRN
OUTPATIENT
Start: 2023-08-14

## 2023-08-07 NOTE — TELEPHONE ENCOUNTER
Call back to patient. No answer. Message left  Patient returned call. Reviewed options. She would like to do the IV iron. Will send message to ROCKY Ramirez who saw the patient on Friday.

## 2023-08-14 ENCOUNTER — HOSPITAL ENCOUNTER (OUTPATIENT)
Dept: INFUSION THERAPY | Age: 83
Discharge: HOME | End: 2023-08-14
Payer: MEDICARE

## 2023-08-14 VITALS
DIASTOLIC BLOOD PRESSURE: 68 MMHG | SYSTOLIC BLOOD PRESSURE: 137 MMHG | HEART RATE: 82 BPM | RESPIRATION RATE: 18 BRPM | OXYGEN SATURATION: 97 % | TEMPERATURE: 98.4 F

## 2023-08-14 DIAGNOSIS — D50.0 IRON DEFICIENCY ANEMIA DUE TO CHRONIC BLOOD LOSS: Primary | ICD-10-CM

## 2023-08-14 PROCEDURE — 96365 THER/PROPH/DIAG IV INF INIT: CPT

## 2023-08-14 PROCEDURE — 6360000002 HC RX W HCPCS: Performed by: INTERNAL MEDICINE

## 2023-08-14 PROCEDURE — 2580000003 HC RX 258: Performed by: INTERNAL MEDICINE

## 2023-08-14 RX ORDER — ALBUTEROL SULFATE 90 UG/1
4 AEROSOL, METERED RESPIRATORY (INHALATION) PRN
Status: CANCELLED | OUTPATIENT
Start: 2023-08-21

## 2023-08-14 RX ORDER — DIPHENHYDRAMINE HYDROCHLORIDE 50 MG/ML
50 INJECTION INTRAMUSCULAR; INTRAVENOUS
Status: CANCELLED | OUTPATIENT
Start: 2023-08-21

## 2023-08-14 RX ORDER — SODIUM CHLORIDE 9 MG/ML
5-250 INJECTION, SOLUTION INTRAVENOUS PRN
OUTPATIENT
Start: 2023-08-21

## 2023-08-14 RX ORDER — ACETAMINOPHEN 325 MG/1
650 TABLET ORAL
Status: CANCELLED | OUTPATIENT
Start: 2023-08-21

## 2023-08-14 RX ORDER — SODIUM CHLORIDE 9 MG/ML
INJECTION, SOLUTION INTRAVENOUS CONTINUOUS
Status: CANCELLED | OUTPATIENT
Start: 2023-08-21

## 2023-08-14 RX ORDER — ACETAMINOPHEN 325 MG/1
650 TABLET ORAL
Status: DISCONTINUED | OUTPATIENT
Start: 2023-08-14 | End: 2023-08-15 | Stop reason: HOSPADM

## 2023-08-14 RX ORDER — HEPARIN 100 UNIT/ML
500 SYRINGE INTRAVENOUS PRN
OUTPATIENT
Start: 2023-08-21

## 2023-08-14 RX ORDER — DIPHENHYDRAMINE HYDROCHLORIDE 50 MG/ML
50 INJECTION INTRAMUSCULAR; INTRAVENOUS
Status: DISCONTINUED | OUTPATIENT
Start: 2023-08-14 | End: 2023-08-15 | Stop reason: HOSPADM

## 2023-08-14 RX ORDER — EPINEPHRINE 1 MG/ML
0.3 INJECTION, SOLUTION, CONCENTRATE INTRAVENOUS PRN
Status: CANCELLED | OUTPATIENT
Start: 2023-08-21

## 2023-08-14 RX ORDER — ALBUTEROL SULFATE 90 UG/1
4 AEROSOL, METERED RESPIRATORY (INHALATION) PRN
Status: DISCONTINUED | OUTPATIENT
Start: 2023-08-14 | End: 2023-08-15 | Stop reason: HOSPADM

## 2023-08-14 RX ORDER — SODIUM CHLORIDE 9 MG/ML
5-250 INJECTION, SOLUTION INTRAVENOUS PRN
Status: CANCELLED | OUTPATIENT
Start: 2023-08-21

## 2023-08-14 RX ORDER — EPINEPHRINE 1 MG/ML
0.3 INJECTION, SOLUTION, CONCENTRATE INTRAVENOUS PRN
Status: DISCONTINUED | OUTPATIENT
Start: 2023-08-14 | End: 2023-08-15 | Stop reason: HOSPADM

## 2023-08-14 RX ORDER — SODIUM CHLORIDE 9 MG/ML
5-250 INJECTION, SOLUTION INTRAVENOUS PRN
Status: DISCONTINUED | OUTPATIENT
Start: 2023-08-14 | End: 2023-08-15 | Stop reason: HOSPADM

## 2023-08-14 RX ORDER — SODIUM CHLORIDE 0.9 % (FLUSH) 0.9 %
5-40 SYRINGE (ML) INJECTION PRN
Status: CANCELLED | OUTPATIENT
Start: 2023-08-21

## 2023-08-14 RX ORDER — ONDANSETRON 2 MG/ML
8 INJECTION INTRAMUSCULAR; INTRAVENOUS
Status: DISCONTINUED | OUTPATIENT
Start: 2023-08-14 | End: 2023-08-15 | Stop reason: HOSPADM

## 2023-08-14 RX ORDER — ONDANSETRON 2 MG/ML
8 INJECTION INTRAMUSCULAR; INTRAVENOUS
Status: CANCELLED | OUTPATIENT
Start: 2023-08-21

## 2023-08-14 RX ORDER — SODIUM CHLORIDE 9 MG/ML
INJECTION, SOLUTION INTRAVENOUS CONTINUOUS
Status: DISCONTINUED | OUTPATIENT
Start: 2023-08-14 | End: 2023-08-15 | Stop reason: HOSPADM

## 2023-08-14 RX ADMIN — SODIUM CHLORIDE 20 ML/HR: 9 INJECTION, SOLUTION INTRAVENOUS at 15:20

## 2023-08-14 RX ADMIN — FERUMOXYTOL 510 MG: 510 INJECTION INTRAVENOUS at 15:31

## 2023-08-14 NOTE — PROGRESS NOTES
Arrived to the [address] [place] [address]. Assessment completed. Feraheme completed. Patient tolerated without problems. Patient stayed for 30 min observation post infusion with no problems noted  Any issues or concerns during appointment: None  Instructed to call Dr Briana Hernandez with any side effects or concerns  Patient aware of next infusion appointment on 8/21/23(date) at 3 PM (time).   Discharged ambulatory

## 2023-08-21 ENCOUNTER — HOSPITAL ENCOUNTER (OUTPATIENT)
Dept: INFUSION THERAPY | Age: 83
Discharge: HOME | End: 2023-08-21
Payer: MEDICARE

## 2023-08-21 VITALS
TEMPERATURE: 97.7 F | DIASTOLIC BLOOD PRESSURE: 55 MMHG | RESPIRATION RATE: 16 BRPM | OXYGEN SATURATION: 98 % | HEART RATE: 77 BPM | SYSTOLIC BLOOD PRESSURE: 126 MMHG

## 2023-08-21 DIAGNOSIS — D50.0 IRON DEFICIENCY ANEMIA DUE TO CHRONIC BLOOD LOSS: Primary | ICD-10-CM

## 2023-08-21 PROCEDURE — 96365 THER/PROPH/DIAG IV INF INIT: CPT

## 2023-08-21 PROCEDURE — 2580000003 HC RX 258: Performed by: INTERNAL MEDICINE

## 2023-08-21 PROCEDURE — 6360000002 HC RX W HCPCS: Performed by: INTERNAL MEDICINE

## 2023-08-21 RX ORDER — ALBUTEROL SULFATE 90 UG/1
4 AEROSOL, METERED RESPIRATORY (INHALATION) PRN
OUTPATIENT
Start: 2023-08-21

## 2023-08-21 RX ORDER — SODIUM CHLORIDE 0.9 % (FLUSH) 0.9 %
5-40 SYRINGE (ML) INJECTION PRN
Status: DISCONTINUED | OUTPATIENT
Start: 2023-08-21 | End: 2023-08-22 | Stop reason: HOSPADM

## 2023-08-21 RX ORDER — SODIUM CHLORIDE 9 MG/ML
5-250 INJECTION, SOLUTION INTRAVENOUS PRN
Status: DISCONTINUED | OUTPATIENT
Start: 2023-08-21 | End: 2023-08-22 | Stop reason: HOSPADM

## 2023-08-21 RX ORDER — HEPARIN 100 UNIT/ML
500 SYRINGE INTRAVENOUS PRN
OUTPATIENT
Start: 2023-08-21

## 2023-08-21 RX ORDER — SODIUM CHLORIDE 9 MG/ML
5-250 INJECTION, SOLUTION INTRAVENOUS PRN
Status: CANCELLED | OUTPATIENT
Start: 2023-08-21

## 2023-08-21 RX ORDER — EPINEPHRINE 1 MG/ML
0.3 INJECTION, SOLUTION, CONCENTRATE INTRAVENOUS PRN
Status: DISCONTINUED | OUTPATIENT
Start: 2023-08-21 | End: 2023-08-22 | Stop reason: HOSPADM

## 2023-08-21 RX ORDER — ACETAMINOPHEN 325 MG/1
650 TABLET ORAL
Status: DISCONTINUED | OUTPATIENT
Start: 2023-08-21 | End: 2023-08-22 | Stop reason: HOSPADM

## 2023-08-21 RX ORDER — DIPHENHYDRAMINE HYDROCHLORIDE 50 MG/ML
50 INJECTION INTRAMUSCULAR; INTRAVENOUS
OUTPATIENT
Start: 2023-08-21

## 2023-08-21 RX ORDER — EPINEPHRINE 1 MG/ML
0.3 INJECTION, SOLUTION, CONCENTRATE INTRAVENOUS PRN
OUTPATIENT
Start: 2023-08-21

## 2023-08-21 RX ORDER — SODIUM CHLORIDE 9 MG/ML
5-250 INJECTION, SOLUTION INTRAVENOUS PRN
OUTPATIENT
Start: 2023-08-21

## 2023-08-21 RX ORDER — ONDANSETRON 2 MG/ML
8 INJECTION INTRAMUSCULAR; INTRAVENOUS
OUTPATIENT
Start: 2023-08-21

## 2023-08-21 RX ORDER — ACETAMINOPHEN 325 MG/1
650 TABLET ORAL
OUTPATIENT
Start: 2023-08-21

## 2023-08-21 RX ORDER — SODIUM CHLORIDE 0.9 % (FLUSH) 0.9 %
5-40 SYRINGE (ML) INJECTION PRN
OUTPATIENT
Start: 2023-08-21

## 2023-08-21 RX ORDER — SODIUM CHLORIDE 9 MG/ML
INJECTION, SOLUTION INTRAVENOUS CONTINUOUS
OUTPATIENT
Start: 2023-08-21

## 2023-08-21 RX ORDER — DIPHENHYDRAMINE HYDROCHLORIDE 50 MG/ML
50 INJECTION INTRAMUSCULAR; INTRAVENOUS
Status: DISCONTINUED | OUTPATIENT
Start: 2023-08-21 | End: 2023-08-22 | Stop reason: HOSPADM

## 2023-08-21 RX ORDER — ALBUTEROL SULFATE 90 UG/1
4 AEROSOL, METERED RESPIRATORY (INHALATION) PRN
Status: DISCONTINUED | OUTPATIENT
Start: 2023-08-21 | End: 2023-08-22 | Stop reason: HOSPADM

## 2023-08-21 RX ORDER — ONDANSETRON 2 MG/ML
8 INJECTION INTRAMUSCULAR; INTRAVENOUS
Status: DISCONTINUED | OUTPATIENT
Start: 2023-08-21 | End: 2023-08-22 | Stop reason: HOSPADM

## 2023-08-21 RX ORDER — SODIUM CHLORIDE 9 MG/ML
INJECTION, SOLUTION INTRAVENOUS CONTINUOUS
Status: DISCONTINUED | OUTPATIENT
Start: 2023-08-21 | End: 2023-08-22 | Stop reason: HOSPADM

## 2023-08-21 RX ADMIN — FERUMOXYTOL 510 MG: 510 INJECTION INTRAVENOUS at 15:40

## 2023-08-21 RX ADMIN — SODIUM CHLORIDE 50 ML/HR: 9 INJECTION, SOLUTION INTRAVENOUS at 15:27

## 2023-08-21 RX ADMIN — SODIUM CHLORIDE, PRESERVATIVE FREE 10 ML: 5 INJECTION INTRAVENOUS at 15:27

## 2023-08-21 NOTE — PROGRESS NOTES
Arrived to the [address] [facility]. Maria Dolores completed. Patient tolerated well. Any issues or concerns during appointment: none. Patient aware of next lab and [facility] office visit on 11/03/23 (date) at 1500 (time). Patient instructed to call provider with temperature of 100.4 or greater or nausea/vomiting/ diarrhea or pain not controlled by medications  Discharged ambulatory.

## 2023-09-08 ENCOUNTER — OFFICE VISIT (OUTPATIENT)
Dept: UROLOGY | Age: 83
End: 2023-09-08
Payer: MEDICARE

## 2023-09-08 DIAGNOSIS — C64.2 MALIGNANT NEOPLASM OF LEFT KIDNEY (HCC): Primary | ICD-10-CM

## 2023-09-08 PROCEDURE — 1036F TOBACCO NON-USER: CPT | Performed by: UROLOGY

## 2023-09-08 PROCEDURE — G8400 PT W/DXA NO RESULTS DOC: HCPCS | Performed by: UROLOGY

## 2023-09-08 PROCEDURE — G8417 CALC BMI ABV UP PARAM F/U: HCPCS | Performed by: UROLOGY

## 2023-09-08 PROCEDURE — G8427 DOCREV CUR MEDS BY ELIG CLIN: HCPCS | Performed by: UROLOGY

## 2023-09-08 PROCEDURE — 99214 OFFICE O/P EST MOD 30 MIN: CPT | Performed by: UROLOGY

## 2023-09-08 PROCEDURE — 1123F ACP DISCUSS/DSCN MKR DOCD: CPT | Performed by: UROLOGY

## 2023-09-08 PROCEDURE — 1090F PRES/ABSN URINE INCON ASSESS: CPT | Performed by: UROLOGY

## 2023-09-08 NOTE — PROGRESS NOTES
[facility]  [address] [address]  [phone]    Garrison Briseno  : 1940     HPI   82 y.o., female returns in follow up for RCC. S/P L HALN on 23. 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 Last Cr was 1.5 on 23.       Past Medical History:   Diagnosis Date    Arthritis     Chronic pain     hands and toes    Former smoker, stopped smoking in distant past     GERD (gastroesophageal reflux disease)     controlled with med    High cholesterol     on medication    History of pulmonary embolus (PE) 2023    Hypertension     Morbid obesity (HCC)     Nausea & vomiting     Pre-diabetes     Sleep apnea     Status post total left knee replacement 2017    Urinary incontinence     med     Past Surgical History:   Procedure Laterality Date    BREAST SURGERY      Imer. breast bx    COLONOSCOPY N/A 2023    COLORECTAL CANCER SCREENING, NOT HIGH RISK performed by Cortney José MD at [address] [place] [address]      Hysterectomy    HEENT      T&A    KIDNEY SURGERY Left 2023    NEPHRECTOMY LAPAROSCOPIC HAND ASSISTED LEFT/ [address] TO  ASSIST performed by Erika Woody DO at [facility] MAIN OR    [facility] BIOPSY BREAST STEREOTACTIC  over 20 yrs ago    TOTAL KNEE ARTHROPLASTY Right     UPPER GASTROINTESTINAL ENDOSCOPY N/A 2023    EGD, POLYP performed by Cortney José MD at [address] [place] [address]  over 20 yrs ago     Current Outpatient Medications   Medication Sig Dispense Refill    lisinopril (PRINIVIL;ZESTRIL) 40 MG tablet Take 1 tablet by mouth daily      rosuvastatin (CRESTOR) 20 MG tablet Take 1 tablet by mouth daily      furosemide (LASIX) 20 MG tablet Take 1 tablet by mouth as needed      Cholecalciferol (VITAMIN D) 50 MCG (2000) CAPS capsule Take 2,000 Units by mouth daily      vitamin E 600 units capsule Take 1 capsule by mouth 2 times daily      aspirin 81 MG chewable tablet Take 1 tablet by mouth daily      docusate sodium

## 2023-09-26 ENCOUNTER — TRANSCRIBE ORDERS (OUTPATIENT)
Dept: SCHEDULING | Age: 83
End: 2023-09-26

## 2023-09-26 DIAGNOSIS — Z12.31 VISIT FOR SCREENING MAMMOGRAM: Primary | ICD-10-CM

## 2023-11-01 ENCOUNTER — HOSPITAL ENCOUNTER (OUTPATIENT)
Dept: MAMMOGRAPHY | Age: 83
Discharge: HOME | End: 2023-11-04
Payer: MEDICARE

## 2023-11-01 DIAGNOSIS — Z12.31 VISIT FOR SCREENING MAMMOGRAM: ICD-10-CM

## 2023-11-01 DIAGNOSIS — D50.0 IRON DEFICIENCY ANEMIA DUE TO CHRONIC BLOOD LOSS: Primary | ICD-10-CM

## 2023-11-01 DIAGNOSIS — Z03.89 ENCOUNTER FOR OBSERVATION FOR OTHER SUSPECTED DISEASES AND CONDITIONS RULED OUT: ICD-10-CM

## 2023-11-01 DIAGNOSIS — C64.2 RENAL CELL CANCER, LEFT (HCC): ICD-10-CM

## 2023-11-01 PROCEDURE — 77063 BREAST TOMOSYNTHESIS BI: CPT

## 2023-11-03 ENCOUNTER — HOSPITAL ENCOUNTER (OUTPATIENT)
Dept: LAB | Age: 83
End: 2023-11-03
Payer: MEDICARE

## 2023-11-03 ENCOUNTER — OFFICE VISIT (OUTPATIENT)
Dept: ONCOLOGY | Age: 83
End: 2023-11-03

## 2023-11-03 VITALS
RESPIRATION RATE: 16 BRPM | DIASTOLIC BLOOD PRESSURE: 64 MMHG | HEIGHT: 65 IN | SYSTOLIC BLOOD PRESSURE: 155 MMHG | WEIGHT: 193.9 LBS | BODY MASS INDEX: 32.3 KG/M2 | TEMPERATURE: 97.7 F | HEART RATE: 89 BPM | OXYGEN SATURATION: 99 %

## 2023-11-03 DIAGNOSIS — D50.0 IRON DEFICIENCY ANEMIA DUE TO CHRONIC BLOOD LOSS: ICD-10-CM

## 2023-11-03 DIAGNOSIS — Z03.89 ENCOUNTER FOR OBSERVATION FOR OTHER SUSPECTED DISEASES AND CONDITIONS RULED OUT: ICD-10-CM

## 2023-11-03 DIAGNOSIS — N18.32 ANEMIA DUE TO STAGE 3B CHRONIC KIDNEY DISEASE (HCC): ICD-10-CM

## 2023-11-03 DIAGNOSIS — I26.99 PULMONARY EMBOLISM, UNSPECIFIED CHRONICITY, UNSPECIFIED PULMONARY EMBOLISM TYPE, UNSPECIFIED WHETHER ACUTE COR PULMONALE PRESENT (HCC): Primary | ICD-10-CM

## 2023-11-03 DIAGNOSIS — D63.1 ANEMIA DUE TO STAGE 3B CHRONIC KIDNEY DISEASE (HCC): ICD-10-CM

## 2023-11-03 DIAGNOSIS — C64.2 RENAL CELL CANCER, LEFT (HCC): ICD-10-CM

## 2023-11-03 LAB
ALBUMIN SERPL-MCNC: 3.5 G/DL (ref 3.2–4.6)
ALBUMIN/GLOB SERPL: 0.8 (ref 0.4–1.6)
ALP SERPL-CCNC: 128 U/L (ref 50–136)
ALT SERPL-CCNC: 27 U/L (ref 12–65)
ANION GAP SERPL CALC-SCNC: 5 MMOL/L (ref 2–11)
AST SERPL-CCNC: 20 U/L (ref 15–37)
BASOPHILS # BLD: 0.1 K/UL (ref 0–0.2)
BASOPHILS NFR BLD: 1 % (ref 0–2)
BILIRUB SERPL-MCNC: 0.2 MG/DL (ref 0.2–1.1)
BUN SERPL-MCNC: 27 MG/DL (ref 8–23)
CALCIUM SERPL-MCNC: 8.7 MG/DL (ref 8.3–10.4)
CHLORIDE SERPL-SCNC: 112 MMOL/L (ref 101–110)
CO2 SERPL-SCNC: 25 MMOL/L (ref 21–32)
CREAT SERPL-MCNC: 1.7 MG/DL (ref 0.6–1)
DIFFERENTIAL METHOD BLD: ABNORMAL
EOSINOPHIL # BLD: 0.2 K/UL (ref 0–0.8)
EOSINOPHIL NFR BLD: 3 % (ref 0.5–7.8)
ERYTHROCYTE [DISTWIDTH] IN BLOOD BY AUTOMATED COUNT: 15 % (ref 11.9–14.6)
FERRITIN SERPL-MCNC: 20 NG/ML (ref 8–388)
GLOBULIN SER CALC-MCNC: 4.3 G/DL (ref 2.8–4.5)
GLUCOSE SERPL-MCNC: 177 MG/DL (ref 65–100)
HCT VFR BLD AUTO: 33.8 % (ref 35.8–46.3)
HGB BLD-MCNC: 10.4 G/DL (ref 11.7–15.4)
IMM GRANULOCYTES # BLD AUTO: 0 K/UL (ref 0–0.5)
IMM GRANULOCYTES NFR BLD AUTO: 0 % (ref 0–5)
IRON SATN MFR SERPL: 6 %
IRON SERPL-MCNC: 23 UG/DL (ref 35–150)
LDH SERPL L TO P-CCNC: 189 U/L (ref 110–210)
LYMPHOCYTES # BLD: 1.5 K/UL (ref 0.5–4.6)
LYMPHOCYTES NFR BLD: 20 % (ref 13–44)
MCH RBC QN AUTO: 26.5 PG (ref 26.1–32.9)
MCHC RBC AUTO-ENTMCNC: 30.8 G/DL (ref 31.4–35)
MCV RBC AUTO: 86 FL (ref 82–102)
MONOCYTES # BLD: 0.4 K/UL (ref 0.1–1.3)
MONOCYTES NFR BLD: 5 % (ref 4–12)
NEUTS SEG # BLD: 5.4 K/UL (ref 1.7–8.2)
NEUTS SEG NFR BLD: 71 % (ref 43–78)
NRBC # BLD: 0 K/UL (ref 0–0.2)
PLATELET # BLD AUTO: 301 K/UL (ref 150–450)
PMV BLD AUTO: 9.9 FL (ref 9.4–12.3)
POTASSIUM SERPL-SCNC: 3.9 MMOL/L (ref 3.5–5.1)
PROT SERPL-MCNC: 7.8 G/DL (ref 6.3–8.2)
RBC # BLD AUTO: 3.93 M/UL (ref 4.05–5.2)
SODIUM SERPL-SCNC: 142 MMOL/L (ref 133–143)
TIBC SERPL-MCNC: 358 UG/DL (ref 250–450)
WBC # BLD AUTO: 7.6 K/UL (ref 4.3–11.1)

## 2023-11-03 PROCEDURE — 80053 COMPREHEN METABOLIC PANEL: CPT

## 2023-11-03 PROCEDURE — 83615 LACTATE (LD) (LDH) ENZYME: CPT

## 2023-11-03 PROCEDURE — 82728 ASSAY OF FERRITIN: CPT

## 2023-11-03 PROCEDURE — 83550 IRON BINDING TEST: CPT

## 2023-11-03 PROCEDURE — 85025 COMPLETE CBC W/AUTO DIFF WBC: CPT

## 2023-11-03 PROCEDURE — 83540 ASSAY OF IRON: CPT

## 2023-11-03 PROCEDURE — 36415 COLL VENOUS BLD VENIPUNCTURE: CPT

## 2023-11-03 ASSESSMENT — PATIENT HEALTH QUESTIONNAIRE - PHQ9
2. FEELING DOWN, DEPRESSED OR HOPELESS: 0
SUM OF ALL RESPONSES TO PHQ QUESTIONS 1-9: 0
SUM OF ALL RESPONSES TO PHQ QUESTIONS 1-9: 0
SUM OF ALL RESPONSES TO PHQ9 QUESTIONS 1 & 2: 0
SUM OF ALL RESPONSES TO PHQ QUESTIONS 1-9: 0
SUM OF ALL RESPONSES TO PHQ QUESTIONS 1-9: 0
1. LITTLE INTEREST OR PLEASURE IN DOING THINGS: 0

## 2023-11-03 NOTE — PATIENT INSTRUCTIONS
any  of the following symptoms:   Fever of 100.5 or greater  Chills  Shortness of breath  Swelling or pain in one leg    After office hours an answering service is available and will contact a provider for emergencies or if you are experiencing any of the above symptoms. Patient does express an interest in My Chart. My Chart log in information explained on the after visit summary printout at the [address] [employer] desk.     Roberta Palacios RN

## 2023-11-03 NOTE — PROGRESS NOTES
PORTABLE    Result Date: 5/27/2023  1. Cardiac silhouette is borderline enlarged, not significant changed. 2.  No acute airspace consolidation. CT CHEST PULMONARY EMBOLISM W CONTRAST    Result Date: 5/27/2023  1. Bilateral segmental pulmonary emboli. No CT evidence of right heart strain. 2.  Subpleural atelectasis within the left lower lobe. Bilateral pulmonary emboli this finding was discussed with the patient's nurse, Eloise Escobedo via telephone at 5/27/2023 2:24 PM.         ASSESSMENT:   Diagnosis Orders   1. Pulmonary embolism, unspecified chronicity, unspecified pulmonary embolism type, unspecified whether acute cor pulmonale present (HCC)  CBC with Auto Differential    Comprehensive Metabolic Panel    Ferritin    Transferrin Saturation    CBC with Auto Differential    Comprehensive Metabolic Panel    Ferritin    Transferrin Saturation      2. Iron deficiency anemia due to chronic blood loss  CBC with Auto Differential    Comprehensive Metabolic Panel    Ferritin    Transferrin Saturation    CBC with Auto Differential    Comprehensive Metabolic Panel    Ferritin    Transferrin Saturation      3. Encounter for observation for other suspected diseases and conditions ruled out  Transferrin Saturation    Transferrin Saturation      4. Anemia due to stage 3b chronic kidney disease ([address])                PLAN:  Lab studies were personally reviewed. PE: diagnosed in February 2023 after presenting with SOB and chest pain, imaging also showed a complex left renal mass. She was discharged on Eliquis but eventually changed to Xarelto by her PCP. She was seen for pre-op prior to nephrectomy and noted to have a Hgb of 5.8, she was felt to have an acute GIB, endoscopy and colonoscopy showed no active bleeding although the colon prep was suboptimal.  She was transfused with good relief of dyspnea.   She completed nephrectomy on 5/25/23, she was placed on heparin as she had a repeat CT PE which showed multiple

## 2023-11-30 NOTE — PROCEDURE: REASSURANCE
Detail Level: Generalized
Include Location In Plan?: No
Detail Level: Simple
3 = A little assistance

## 2023-12-29 ENCOUNTER — OFFICE VISIT (OUTPATIENT)
Dept: UROLOGY | Age: 83
End: 2023-12-29
Payer: MEDICARE

## 2023-12-29 DIAGNOSIS — C64.2 MALIGNANT NEOPLASM OF LEFT KIDNEY (HCC): Primary | ICD-10-CM

## 2023-12-29 LAB
BILIRUBIN, URINE, POC: NEGATIVE
BLOOD URINE, POC: NORMAL
GLUCOSE URINE, POC: NEGATIVE
KETONES, URINE, POC: NEGATIVE
LEUKOCYTE ESTERASE, URINE, POC: NORMAL
NITRITE, URINE, POC: NEGATIVE
PH, URINE, POC: 5.5 (ref 4.6–8)
PROTEIN,URINE, POC: 100
SPECIFIC GRAVITY, URINE, POC: 1.02 (ref 1–1.03)
URINALYSIS CLARITY, POC: NORMAL
URINALYSIS COLOR, POC: NORMAL
UROBILINOGEN, POC: NORMAL

## 2023-12-29 PROCEDURE — G8484 FLU IMMUNIZE NO ADMIN: HCPCS | Performed by: UROLOGY

## 2023-12-29 PROCEDURE — 81003 URINALYSIS AUTO W/O SCOPE: CPT | Performed by: UROLOGY

## 2023-12-29 PROCEDURE — 1036F TOBACCO NON-USER: CPT | Performed by: UROLOGY

## 2023-12-29 PROCEDURE — 99214 OFFICE O/P EST MOD 30 MIN: CPT | Performed by: UROLOGY

## 2023-12-29 PROCEDURE — 1123F ACP DISCUSS/DSCN MKR DOCD: CPT | Performed by: UROLOGY

## 2023-12-29 PROCEDURE — G8417 CALC BMI ABV UP PARAM F/U: HCPCS | Performed by: UROLOGY

## 2023-12-29 PROCEDURE — G8427 DOCREV CUR MEDS BY ELIG CLIN: HCPCS | Performed by: UROLOGY

## 2023-12-29 PROCEDURE — G8400 PT W/DXA NO RESULTS DOC: HCPCS | Performed by: UROLOGY

## 2023-12-29 PROCEDURE — 1090F PRES/ABSN URINE INCON ASSESS: CPT | Performed by: UROLOGY

## 2023-12-29 NOTE — PROGRESS NOTES
Tobacco comments:     Quit smoking: \"40 years ago\"   Vaping Use    Vaping Use: Never used   Substance and Sexual Activity    Alcohol use: Yes    Drug use: No    Sexual activity: Not on file   Other Topics Concern    Not on file   Social History Narrative    Not on file     Social Determinants of Health     Financial Resource Strain: Not on file   Food Insecurity: Not on file   Transportation Needs: Not on file   Physical Activity: Not on file   Stress: Not on file   Social Connections: Not on file   Intimate Partner Violence: Not on file   Housing Stability: Not on file     Family History   Problem Relation Age of Onset    Delayed Awakening Neg Hx     Post-op Nausea/Vomiting Neg Hx     Emergence Delirium Neg Hx     Post-op Cognitive Dysfunction Neg Hx     Other Neg Hx     Breast Cancer Neg Hx     Malig Hypertherm Neg Hx     Pseudochol. Deficiency Neg Hx     Cancer Sister     Hypertension Father     Elevated Lipids Father     Cancer Father     Elevated Lipids Mother     Heart Disease Mother     Hypertension Mother        Review of Systems  All systems reviewed and are negative at this time. Physical Exam  There were no vitals taken for this visit.   General appearance - alert, well appearing, and in no distress  Mental status - alert, oriented to person, place, and time  Eyes - extraocular eye movements intact, sclera anicteric  Abdomen - soft, nontender, nondistended, no masses or organomegaly  Neurological -  normal speech, no focal findings or movement disorder noted  Skin - normal coloration and turgor      Urinalysis  UA - Dipstick  Results for orders placed or performed in visit on 12/29/23   AMB POC URINALYSIS DIP STICK AUTO W/O MICRO   Result Value Ref Range    Color (UA POC)      Clarity (UA POC)      Glucose, Urine, POC Negative     Bilirubin, Urine, POC Negative     KETONES, Urine, POC Negative     Specific Gravity, Urine, POC 1.020 1.001 - 1.035    Blood (UA POC) Trace-intact     pH, Urine, POC 5.5 4.6

## 2024-01-10 ENCOUNTER — HOSPITAL ENCOUNTER (OUTPATIENT)
Dept: ULTRASOUND IMAGING | Age: 84
Discharge: HOME | End: 2024-01-13
Attending: UROLOGY
Payer: MEDICARE

## 2024-01-10 DIAGNOSIS — C64.2 MALIGNANT NEOPLASM OF LEFT KIDNEY (HCC): ICD-10-CM

## 2024-01-10 PROCEDURE — 76770 US EXAM ABDO BACK WALL COMP: CPT

## 2024-01-18 ENCOUNTER — APPOINTMENT (RX ONLY)
Dept: URBAN - METROPOLITAN AREA CLINIC 24 | Facility: CLINIC | Age: 84
Setting detail: DERMATOLOGY
End: 2024-01-18

## 2024-01-18 DIAGNOSIS — L82.0 INFLAMED SEBORRHEIC KERATOSIS: ICD-10-CM

## 2024-01-18 DIAGNOSIS — Z85.828 PERSONAL HISTORY OF OTHER MALIGNANT NEOPLASM OF SKIN: ICD-10-CM

## 2024-01-18 DIAGNOSIS — L57.8 OTHER SKIN CHANGES DUE TO CHRONIC EXPOSURE TO NONIONIZING RADIATION: ICD-10-CM | Status: INADEQUATELY CONTROLLED

## 2024-01-18 DIAGNOSIS — Z71.89 OTHER SPECIFIED COUNSELING: ICD-10-CM

## 2024-01-18 DIAGNOSIS — L57.0 ACTINIC KERATOSIS: ICD-10-CM

## 2024-01-18 DIAGNOSIS — Z87.2 PERSONAL HISTORY OF DISEASES OF THE SKIN AND SUBCUTANEOUS TISSUE: ICD-10-CM

## 2024-01-18 PROCEDURE — ? MEDICATION COUNSELING

## 2024-01-18 PROCEDURE — ? PRESCRIPTION

## 2024-01-18 PROCEDURE — 17000 DESTRUCT PREMALG LESION: CPT | Mod: 59

## 2024-01-18 PROCEDURE — 17110 DESTRUCTION B9 LES UP TO 14: CPT

## 2024-01-18 PROCEDURE — ? LIQUID NITROGEN

## 2024-01-18 PROCEDURE — 99214 OFFICE O/P EST MOD 30 MIN: CPT | Mod: 25

## 2024-01-18 PROCEDURE — ? SUNSCREEN RECOMMENDATIONS

## 2024-01-18 PROCEDURE — ? PRESCRIPTION MEDICATION MANAGEMENT

## 2024-01-18 PROCEDURE — ? COUNSELING

## 2024-01-18 RX ORDER — PHARMACY COMPOUNDING ACCESSORY
EACH MISCELLANEOUS
Qty: 15 | Refills: 1 | Status: ERX | COMMUNITY
Start: 2024-01-18

## 2024-01-18 RX ADMIN — Medication: at 00:00

## 2024-01-18 ASSESSMENT — LOCATION ZONE DERM
LOCATION ZONE: LEG
LOCATION ZONE: ARM
LOCATION ZONE: NOSE
LOCATION ZONE: TRUNK

## 2024-01-18 ASSESSMENT — LOCATION DETAILED DESCRIPTION DERM
LOCATION DETAILED: LEFT MEDIAL BREAST 9-10:00 REGION
LOCATION DETAILED: NASAL TIP
LOCATION DETAILED: RIGHT MEDIAL BREAST 3-4:00 REGION
LOCATION DETAILED: LEFT SUPERIOR MEDIAL UPPER BACK
LOCATION DETAILED: LEFT MEDIAL BREAST 10-11:00 REGION
LOCATION DETAILED: LEFT MEDIAL SUPERIOR CHEST
LOCATION DETAILED: RIGHT SUPERIOR MEDIAL UPPER BACK
LOCATION DETAILED: RIGHT MEDIAL BREAST 4-5:00 REGION
LOCATION DETAILED: RIGHT MEDIAL BREAST 1-2:00 REGION
LOCATION DETAILED: RIGHT DISTAL PRETIBIAL REGION
LOCATION DETAILED: SUPERIOR THORACIC SPINE
LOCATION DETAILED: LEFT LATERAL BREAST 12-1:00 REGION
LOCATION DETAILED: LEFT LATERAL PROXIMAL UPPER ARM
LOCATION DETAILED: RIGHT SUPERIOR UPPER BACK
LOCATION DETAILED: LEFT DISTAL PRETIBIAL REGION
LOCATION DETAILED: MIDDLE STERNUM
LOCATION DETAILED: LEFT PROXIMAL PRETIBIAL REGION

## 2024-01-18 ASSESSMENT — LOCATION SIMPLE DESCRIPTION DERM
LOCATION SIMPLE: RIGHT UPPER BACK
LOCATION SIMPLE: CHEST
LOCATION SIMPLE: LEFT UPPER ARM
LOCATION SIMPLE: RIGHT PRETIBIAL REGION
LOCATION SIMPLE: NOSE
LOCATION SIMPLE: LEFT PRETIBIAL REGION
LOCATION SIMPLE: LEFT UPPER BACK
LOCATION SIMPLE: LEFT BREAST
LOCATION SIMPLE: RIGHT BREAST
LOCATION SIMPLE: UPPER BACK

## 2024-01-18 NOTE — PROCEDURE: MEDICATION COUNSELING
Clofazimine Pregnancy And Lactation Text: This medication is Pregnancy Category C and isn't considered safe during pregnancy. It is excreted in breast milk.
Cyclophosphamide Pregnancy And Lactation Text: This medication is Pregnancy Category D and it isn't considered safe during pregnancy. This medication is excreted in breast milk.
Sotyktu Pregnancy And Lactation Text: There is insufficient data to evaluate whether or not Sotyktu is safe to use during pregnancy.? ?It is not known if Sotyktu passes into breast milk and whether or not it is safe to use when breastfeeding.??
Doxepin Pregnancy And Lactation Text: This medication is Pregnancy Category C and it isn't known if it is safe during pregnancy. It is also excreted in breast milk and breast feeding isn't recommended.
Cephalexin Pregnancy And Lactation Text: This medication is Pregnancy Category B and considered safe during pregnancy.  It is also excreted in breast milk but can be used safely for shorter doses.
Klisyri Counseling:  I discussed with the patient the risks of Klisyri including but not limited to erythema, scaling, itching, weeping, crusting, and pain.
Cibinqo Pregnancy And Lactation Text: It is unknown if this medication will adversely affect pregnancy or breast feeding.  You should not take this medication if you are currently pregnant or planning a pregnancy or while breastfeeding.
Simponi Counseling:  I discussed with the patient the risks of golimumab including but not limited to myelosuppression, immunosuppression, autoimmune hepatitis, demyelinating diseases, lymphoma, and serious infections.  The patient understands that monitoring is required including a PPD at baseline and must alert us or the primary physician if symptoms of infection or other concerning signs are noted.
Benzoyl Peroxide Pregnancy And Lactation Text: This medication is Pregnancy Category C. It is unknown if benzoyl peroxide is excreted in breast milk.
Topical Ketoconazole Counseling: Patient counseled that this medication may cause skin irritation or allergic reactions.  In the event of skin irritation, the patient was advised to reduce the amount of the drug applied or use it less frequently.   The patient verbalized understanding of the proper use and possible adverse effects of ketoconazole.  All of the patient's questions and concerns were addressed.
Dupixent Counseling: I discussed with the patient the risks of dupilumab including but not limited to eye infection and irritation, cold sores, injection site reactions, worsening of asthma, allergic reactions and increased risk of parasitic infection.  Live vaccines should be avoided while taking dupilumab. Dupilumab will also interact with certain medications such as warfarin and cyclosporine. The patient understands that monitoring is required and they must alert us or the primary physician if symptoms of infection or other concerning signs are noted.
Itraconazole Pregnancy And Lactation Text: This medication is Pregnancy Category C and it isn't know if it is safe during pregnancy. It is also excreted in breast milk.
Adbry Counseling: I discussed with the patient the risks of tralokinumab including but not limited to eye infection and irritation, cold sores, injection site reactions, worsening of asthma, allergic reactions and increased risk of parasitic infection.  Live vaccines should be avoided while taking tralokinumab. The patient understands that monitoring is required and they must alert us or the primary physician if symptoms of infection or other concerning signs are noted.
Propranolol Pregnancy And Lactation Text: This medication is Pregnancy Category C and it isn't known if it is safe during pregnancy. It is excreted in breast milk.
Detail Level: Simple
Zoryve Pregnancy And Lactation Text: It is unknown if this medication can cause problems during pregnancy and breastfeeding.
Birth Control Pills Pregnancy And Lactation Text: This medication should be avoided if pregnant and for the first 30 days post-partum.
Ilumya Counseling: I discussed with the patient the risks of tildrakizumab including but not limited to immunosuppression, malignancy, posterior leukoencephalopathy syndrome, and serious infections.  The patient understands that monitoring is required including a PPD at baseline and must alert us or the primary physician if symptoms of infection or other concerning signs are noted.
Tremfya Counseling: I discussed with the patient the risks of guselkumab including but not limited to immunosuppression, serious infections, and drug reactions.  The patient understands that monitoring is required including a PPD at baseline and must alert us or the primary physician if symptoms of infection or other concerning signs are noted.
Solaraze Pregnancy And Lactation Text: This medication is Pregnancy Category B and is considered safe. There is some data to suggest avoiding during the third trimester. It is unknown if this medication is excreted in breast milk.
Metronidazole Pregnancy And Lactation Text: This medication is Pregnancy Category B and considered safe during pregnancy.  It is also excreted in breast milk.
Olanzapine Counseling- I discussed with the patient the common side effects of olanzapine including but are not limited to: lack of energy, dry mouth, increased appetite, sleepiness, tremor, constipation, dizziness, changes in behavior, or restlessness.  Explained that teenagers are more likely to experience headaches, abdominal pain, pain in the arms or legs, tiredness, and sleepiness.  Serious side effects include but are not limited: increased risk of death in elderly patients who are confused, have memory loss, or dementia-related psychosis; hyperglycemia; increased cholesterol and triglycerides; and weight gain.
Topical Sulfur Applications Counseling: Topical Sulfur Counseling: Patient counseled that this medication may cause skin irritation or allergic reactions.  In the event of skin irritation, the patient was advised to reduce the amount of the drug applied or use it less frequently.   The patient verbalized understanding of the proper use and possible adverse effects of topical sulfur application.  All of the patient's questions and concerns were addressed.
Drysol Pregnancy And Lactation Text: This medication is considered safe during pregnancy and breast feeding.
High Dose Vitamin A Counseling: Side effects reviewed, pt to contact office should one occur.
Sarecycline Pregnancy And Lactation Text: This medication is Pregnancy Category D and not consider safe during pregnancy. It is also excreted in breast milk.
Glycopyrrolate Pregnancy And Lactation Text: This medication is Pregnancy Category B and is considered safe during pregnancy. It is unknown if it is excreted breast milk.
Picato Counseling:  I discussed with the patient the risks of Picato including but not limited to erythema, scaling, itching, weeping, crusting, and pain.
Cephalexin Counseling: I counseled the patient regarding use of cephalexin as an antibiotic for prophylactic and/or therapeutic purposes. Cephalexin (commonly prescribed under brand name Keflex) is a cephalosporin antibiotic which is active against numerous classes of bacteria, including most skin bacteria. Side effects may include nausea, diarrhea, gastrointestinal upset, rash, hives, yeast infections, and in rare cases, hepatitis, kidney disease, seizures, fever, confusion, neurologic symptoms, and others. Patients with severe allergies to penicillin medications are cautioned that there is about a 10% incidence of cross-reactivity with cephalosporins. When possible, patients with penicillin allergies should use alternatives to cephalosporins for antibiotic therapy.
Libtayo Pregnancy And Lactation Text: This medication is contraindicated in pregnancy and when breast feeding.
Xeljanz Counseling: I discussed with the patient the risks of Xeljanz therapy including increased risk of infection, liver issues, headache, diarrhea, or cold symptoms. Live vaccines should be avoided. They were instructed to call if they have any problems.
Benzoyl Peroxide Counseling: Patient counseled that medicine may cause skin irritation and bleach clothing.  In the event of skin irritation, the patient was advised to reduce the amount of the drug applied or use it less frequently.   The patient verbalized understanding of the proper use and possible adverse effects of benzoyl peroxide.  All of the patient's questions and concerns were addressed.
Clofazimine Counseling:  I discussed with the patient the risks of clofazimine including but not limited to skin and eye pigmentation, liver damage, nausea/vomiting, gastrointestinal bleeding and allergy.
Cyclophosphamide Counseling:  I discussed with the patient the risks of cyclophosphamide including but not limited to hair loss, hormonal abnormalities, decreased fertility, abdominal pain, diarrhea, nausea and vomiting, bone marrow suppression and infection. The patient understands that monitoring is required while taking this medication.
Doxepin Counseling:  Patient advised that the medication is sedating and not to drive a car after taking this medication. Patient informed of potential adverse effects including but not limited to dry mouth, urinary retention, and blurry vision.  The patient verbalized understanding of the proper use and possible adverse effects of doxepin.  All of the patient's questions and concerns were addressed.
Propranolol Counseling:  I discussed with the patient the risks of propranolol including but not limited to low heart rate, low blood pressure, low blood sugar, restlessness and increased cold sensitivity. They should call the office if they experience any of these side effects.
Imiquimod Pregnancy And Lactation Text: This medication is Pregnancy Category C. It is unknown if this medication is excreted in breast milk.
Itraconazole Counseling:  I discussed with the patient the risks of itraconazole including but not limited to liver damage, nausea/vomiting, neuropathy, and severe allergy.  The patient understands that this medication is best absorbed when taken with acidic beverages such as non-diet cola or ginger ale.  The patient understands that monitoring is required including baseline LFTs and repeat LFTs at intervals.  The patient understands that they are to contact us or the primary physician if concerning signs are noted.
Humira Pregnancy And Lactation Text: This medication is Pregnancy Category B and is considered safe during pregnancy. It is unknown if this medication is excreted in breast milk.
Litfulo Counseling: I discussed with the patient the risks of Litfulo therapy including but not limited to upper respiratory tract infections, shingles, cold sores, and nausea. Live vaccines should be avoided.  This medication has been linked to serious infections; higher rate of mortality; malignancy and lymphoproliferative disorders; major adverse cardiovascular events; thrombosis; gastrointestinal perforations; neutropenia; lymphopenia; anemia; liver enzyme elevations; and lipid elevations.
Zoryve Counseling:  I discussed with the patient that Zoryve is not for use in the eyes, mouth or vagina. The most commonly reported side effects include diarrhea, headache, insomnia, application site pain, upper respiratory tract infections, and urinary tract infections.  All of the patient's questions and concerns were addressed.
Siliq Pregnancy And Lactation Text: The risk during pregnancy and breastfeeding is uncertain with this medication.
Topical Clindamycin Pregnancy And Lactation Text: This medication is Pregnancy Category B and is considered safe during pregnancy. It is unknown if it is excreted in breast milk.
Nsaids Pregnancy And Lactation Text: These medications are considered safe up to 30 weeks gestation. It is excreted in breast milk.
Metronidazole Counseling:  I discussed with the patient the risks of metronidazole including but not limited to seizures, nausea/vomiting, a metallic taste in the mouth, nausea/vomiting and severe allergy.
Sski Pregnancy And Lactation Text: This medication is Pregnancy Category D and isn't considered safe during pregnancy. It is excreted in breast milk.
Birth Control Pills Counseling: Birth Control Pill Counseling: I discussed with the patient the potential side effects of OCPs including but not limited to increased risk of stroke, heart attack, thrombophlebitis, deep venous thrombosis, hepatic adenomas, breast changes, GI upset, headaches, and depression.  The patient verbalized understanding of the proper use and possible adverse effects of OCPs. All of the patient's questions and concerns were addressed.
Solaraze Counseling:  I discussed with the patient the risks of Solaraze including but not limited to erythema, scaling, itching, weeping, crusting, and pain.
Finasteride Female Counseling: Finasteride Counseling:  I discussed with the patient the risks of use of finasteride including but not limited to decreased libido and sexual dysfunction. Explained the teratogenic nature of the medication and stressed the importance of not getting pregnant during treatment. All of the patient's questions and concerns were addressed.
Isotretinoin Pregnancy And Lactation Text: This medication is Pregnancy Category X and is considered extremely dangerous during pregnancy. It is unknown if it is excreted in breast milk.
Glycopyrrolate Counseling:  I discussed with the patient the risks of glycopyrrolate including but not limited to skin rash, drowsiness, dry mouth, difficulty urinating, and blurred vision.
Drysol Counseling:  I discussed with the patient the risks of drysol/aluminum chloride including but not limited to skin rash, itching, irritation, burning.
Topical Steroids Applications Pregnancy And Lactation Text: Most topical steroids are considered safe to use during pregnancy and lactation.  Any topical steroid applied to the breast or nipple should be washed off before breastfeeding.
Opzelura Pregnancy And Lactation Text: There is insufficient data to evaluate drug-associated risk for major birth defects, miscarriage, or other adverse maternal or fetal outcomes.  There is a pregnancy registry that monitors pregnancy outcomes in pregnant persons exposed to the medication during pregnancy.  It is unknown if this medication is excreted in breast milk.  Do not breastfeed during treatment and for about 4 weeks after the last dose.
Sarecycline Counseling: Patient advised regarding possible photosensitivity and discoloration of the teeth, skin, lips, tongue and gums.  Patient instructed to avoid sunlight, if possible.  When exposed to sunlight, patients should wear protective clothing, sunglasses, and sunscreen.  The patient was instructed to call the office immediately if the following severe adverse effects occur:  hearing changes, easy bruising/bleeding, severe headache, or vision changes.  The patient verbalized understanding of the proper use and possible adverse effects of sarecycline.  All of the patient's questions and concerns were addressed.
Arava Pregnancy And Lactation Text: This medication is Pregnancy Category X and is absolutely contraindicated during pregnancy. It is unknown if it is excreted in breast milk.
Cimetidine Pregnancy And Lactation Text: This medication is Pregnancy Category B and is considered safe during pregnancy. It is also excreted in breast milk and breast feeding isn't recommended.
Ivermectin Pregnancy And Lactation Text: This medication is Pregnancy Category C and it isn't known if it is safe during pregnancy. It is also excreted in breast milk.
Libtayo Counseling- I discussed with the patient the risks of Libtayo including but not limited to nausea, vomiting, diarrhea, and bone or muscle pain.  The patient verbalized understanding of the proper use and possible adverse effects of Libtayo.  All of the patient's questions and concerns were addressed.
Bactrim Pregnancy And Lactation Text: This medication is Pregnancy Category D and is known to cause fetal risk.  It is also excreted in breast milk.
Imiquimod Counseling:  I discussed with the patient the risks of imiquimod including but not limited to erythema, scaling, itching, weeping, crusting, and pain.  Patient understands that the inflammatory response to imiquimod is variable from person to person and was educated regarded proper titration schedule.  If flu-like symptoms develop, patient knows to discontinue the medication and contact us.
Cellcept Pregnancy And Lactation Text: This medication is Pregnancy Category D and isn't considered safe during pregnancy. It is unknown if this medication is excreted in breast milk.
Topical Clindamycin Counseling: Patient counseled that this medication may cause skin irritation or allergic reactions.  In the event of skin irritation, the patient was advised to reduce the amount of the drug applied or use it less frequently.   The patient verbalized understanding of the proper use and possible adverse effects of clindamycin.  All of the patient's questions and concerns were addressed.
Litfulo Pregnancy And Lactation Text: Based on animal studies, Lifulo may cause embryo-fetal harm when administered to pregnant women.  The medication should not be used in pregnancy.  Breastfeeding is not recommended during treatment.
Taltz Counseling: I discussed with the patient the risks of ixekizumab including but not limited to immunosuppression, serious infections, worsening of inflammatory bowel disease and drug reactions.  The patient understands that monitoring is required including a PPD at baseline and must alert us or the primary physician if symptoms of infection or other concerning signs are noted.
Siliq Counseling:  I discussed with the patient the risks of Siliq including but not limited to new or worsening depression, suicidal thoughts and behavior, immunosuppression, malignancy, posterior leukoencephalopathy syndrome, and serious infections.  The patient understands that monitoring is required including a PPD at baseline and must alert us or the primary physician if symptoms of infection or other concerning signs are noted. There is also a special program designed to monitor depression which is required with Siliq.
Humira Counseling:  I discussed with the patient the risks of adalimumab including but not limited to myelosuppression, immunosuppression, autoimmune hepatitis, demyelinating diseases, lymphoma, and serious infections.  The patient understands that monitoring is required including a PPD at baseline and must alert us or the primary physician if symptoms of infection or other concerning signs are noted.
Griseofulvin Pregnancy And Lactation Text: This medication is Pregnancy Category X and is known to cause serious birth defects. It is unknown if this medication is excreted in breast milk but breast feeding should be avoided.
Cosentyx Counseling:  I discussed with the patient the risks of Cosentyx including but not limited to worsening of Crohn's disease, immunosuppression, allergic reactions and infections.  The patient understands that monitoring is required including a PPD at baseline and must alert us or the primary physician if symptoms of infection or other concerning signs are noted.
Erythromycin Pregnancy And Lactation Text: This medication is Pregnancy Category B and is considered safe during pregnancy. It is also excreted in breast milk.
Nsaids Counseling: NSAID Counseling: I discussed with the patient that NSAIDs should be taken with food. Prolonged use of NSAIDs can result in the development of stomach ulcers.  Patient advised to stop taking NSAIDs if abdominal pain occurs.  The patient verbalized understanding of the proper use and possible adverse effects of NSAIDs.  All of the patient's questions and concerns were addressed.
Rhofade Pregnancy And Lactation Text: This medication has not been assigned a Pregnancy Risk Category. It is unknown if the medication is excreted in breast milk.
Thalidomide Counseling: I discussed with the patient the risks of thalidomide including but not limited to birth defects, anxiety, weakness, chest pain, dizziness, cough and severe allergy.
Finasteride Pregnancy And Lactation Text: This medication is absolutely contraindicated during pregnancy. It is unknown if it is excreted in breast milk.
5-Fu Pregnancy And Lactation Text: This medication is Pregnancy Category X and contraindicated in pregnancy and in women who may become pregnant. It is unknown if this medication is excreted in breast milk.
Finasteride Male Counseling: Finasteride Counseling:  I discussed with the patient the risks of use of finasteride including but not limited to decreased libido, decreased ejaculate volume, gynecomastia, and depression. Women should not handle medication.  All of the patient's questions and concerns were addressed.
Topical Steroids Counseling: I discussed with the patient that prolonged use of topical steroids can result in the increased appearance of superficial blood vessels (telangiectasias), lightening (hypopigmentation) and thinning of the skin (atrophy).  Patient understands to avoid using high potency steroids in skin folds, the groin or the face.  The patient verbalized understanding of the proper use and possible adverse effects of topical steroids.  All of the patient's questions and concerns were addressed.
Isotretinoin Counseling: Patient should get monthly blood tests, not donate blood, not drive at night if vision affected, not share medication, and not undergo elective surgery for 6 months after tx completed. Side effects reviewed, pt to contact office should one occur.
Opzelura Counseling:  I discussed with the patient the risks of Opzelura including but not limited to nasopharngitis, bronchitis, ear infection, eosinophila, hives, diarrhea, folliculitis, tonsillitis, and rhinorrhea.  Taken orally, this medication has been linked to serious infections; higher rate of mortality; malignancy and lymphoproliferative disorders; major adverse cardiovascular events; thrombosis; thrombocytopenia, anemia, and neutropenia; and lipid elevations.
Rifampin Pregnancy And Lactation Text: This medication is Pregnancy Category C and it isn't know if it is safe during pregnancy. It is also excreted in breast milk and should not be used if you are breast feeding.
Azelaic Acid Counseling: Patient counseled that medicine may cause skin irritation and to avoid applying near the eyes.  In the event of skin irritation, the patient was advised to reduce the amount of the drug applied or use it less frequently.   The patient verbalized understanding of the proper use and possible adverse effects of azelaic acid.  All of the patient's questions and concerns were addressed.
Ivermectin Counseling:  Patient instructed to take medication on an empty stomach with a full glass of water.  Patient informed of potential adverse effects including but not limited to nausea, diarrhea, dizziness, itching, and swelling of the extremities or lymph nodes.  The patient verbalized understanding of the proper use and possible adverse effects of ivermectin.  All of the patient's questions and concerns were addressed.
Arava Counseling:  Patient counseled regarding adverse effects of Arava including but not limited to nausea, vomiting, abnormalities in liver function tests. Patients may develop mouth sores, rash, diarrhea, and abnormalities in blood counts. The patient understands that monitoring is required including LFTs and blood counts.  There is a rare possibility of scarring of the liver and lung problems that can occur when taking methotrexate. Persistent nausea, loss of appetite, pale stools, dark urine, cough, and shortness of breath should be reported immediately. Patient advised to discontinue Arava treatment and consult with a physician prior to attempting conception. The patient will have to undergo a treatment to eliminate Arava from the body prior to conception.
Tazorac Pregnancy And Lactation Text: This medication is not safe during pregnancy. It is unknown if this medication is excreted in breast milk.
Cimetidine Counseling:  I discussed with the patient the risks of Cimetidine including but not limited to gynecomastia, headache, diarrhea, nausea, drowsiness, arrhythmias, pancreatitis, skin rashes, psychosis, bone marrow suppression and kidney toxicity.
Oxybutynin Counseling:  I discussed with the patient the risks of oxybutynin including but not limited to skin rash, drowsiness, dry mouth, difficulty urinating, and blurred vision.
Bactrim Counseling:  I discussed with the patient the risks of sulfa antibiotics including but not limited to GI upset, allergic reaction, drug rash, diarrhea, dizziness, photosensitivity, and yeast infections.  Rarely, more serious reactions can occur including but not limited to aplastic anemia, agranulocytosis, methemoglobinemia, blood dyscrasias, liver or kidney failure, lung infiltrates or desquamative/blistering drug rashes.
Hydroquinone Pregnancy And Lactation Text: This medication has not been assigned a Pregnancy Risk Category but animal studies failed to show danger with the topical medication. It is unknown if the medication is excreted in breast milk.
VTAMA Counseling: I discussed with the patient that VTAMA is not for use in the eyes, mouth or mouth. They should call the office if they develop any signs of allergic reactions to VTAMA. The patient verbalized understanding of the proper use and possible adverse effects of VTAMA.  All of the patient's questions and concerns were addressed.
Cellcept Counseling:  I discussed with the patient the risks of mycophenolate mofetil including but not limited to infection/immunosuppression, GI upset, hypokalemia, hypercholesterolemia, bone marrow suppression, lymphoproliferative disorders, malignancy, GI ulceration/bleed/perforation, colitis, interstitial lung disease, kidney failure, progressive multifocal leukoencephalopathy, and birth defects.  The patient understands that monitoring is required including a baseline creatinine and regular CBC testing. In addition, patient must alert us immediately if symptoms of infection or other concerning signs are noted.
Griseofulvin Counseling:  I discussed with the patient the risks of griseofulvin including but not limited to photosensitivity, cytopenia, liver damage, nausea/vomiting and severe allergy.  The patient understands that this medication is best absorbed when taken with a fatty meal (e.g., ice cream or french fries).
5-Fu Counseling: 5-Fluorouracil Counseling:  I discussed with the patient the risks of 5-fluorouracil including but not limited to erythema, scaling, itching, weeping, crusting, and pain.
Niacinamide Pregnancy And Lactation Text: These medications are considered safe during pregnancy.
Olumiant Counseling: I discussed with the patient the risks of Olumiant therapy including but not limited to upper respiratory tract infections, shingles, cold sores, and nausea. Live vaccines should be avoided.  This medication has been linked to serious infections; higher rate of mortality; malignancy and lymphoproliferative disorders; major adverse cardiovascular events; thrombosis; gastrointestinal perforations; neutropenia; lymphopenia; anemia; liver enzyme elevations; and lipid elevations.
Cimzia Pregnancy And Lactation Text: This medication crosses the placenta but can be considered safe in certain situations. Cimzia may be excreted in breast milk.
Rituxan Pregnancy And Lactation Text: This medication is Pregnancy Category C and it isn't know if it is safe during pregnancy. It is unknown if this medication is excreted in breast milk but similar antibodies are known to be excreted.
Rhofade Counseling: Rhofade is a topical medication which can decrease superficial blood flow where applied. Side effects are uncommon and include stinging, redness and allergic reactions.
Erythromycin Counseling:  I discussed with the patient the risks of erythromycin including but not limited to GI upset, allergic reaction, drug rash, diarrhea, increase in liver enzymes, and yeast infections.
Gabapentin Counseling: I discussed with the patient the risks of gabapentin including but not limited to dizziness, somnolence, fatigue and ataxia.
Bexarotene Pregnancy And Lactation Text: This medication is Pregnancy Category X and should not be given to women who are pregnant or may become pregnant. This medication should not be used if you are breast feeding.
Dutasteride Pregnancy And Lactation Text: This medication is absolutely contraindicated in women, especially during pregnancy and breast feeding. Feminization of male fetuses is possible if taking while pregnant.
Rifampin Counseling: I discussed with the patient the risks of rifampin including but not limited to liver damage, kidney damage, red-orange body fluids, nausea/vomiting and severe allergy.
Methotrexate Pregnancy And Lactation Text: This medication is Pregnancy Category X and is known to cause fetal harm. This medication is excreted in breast milk.
Cantharidin Pregnancy And Lactation Text: This medication has not been proven safe during pregnancy. It is unknown if this medication is excreted in breast milk.
Aklief Pregnancy And Lactation Text: It is unknown if this medication is safe to use during pregnancy.  It is unknown if this medication is excreted in breast milk.  Breastfeeding women should use the topical cream on the smallest area of the skin for the shortest time needed while breastfeeding.  Do not apply to nipple and areola.
Otezla Pregnancy And Lactation Text: This medication is Pregnancy Category C and it isn't known if it is safe during pregnancy. It is unknown if it is excreted in breast milk.
Hydroquinone Counseling:  Patient advised that medication may result in skin irritation, lightening (hypopigmentation), dryness, and burning.  In the event of skin irritation, the patient was advised to reduce the amount of the drug applied or use it less frequently.  Rarely, spots that are treated with hydroquinone can become darker (pseudoochronosis).  Should this occur, patient instructed to stop medication and call the office. The patient verbalized understanding of the proper use and possible adverse effects of hydroquinone.  All of the patient's questions and concerns were addressed.
Tazorac Counseling:  Patient advised that medication is irritating and drying.  Patient may need to apply sparingly and wash off after an hour before eventually leaving it on overnight.  The patient verbalized understanding of the proper use and possible adverse effects of tazorac.  All of the patient's questions and concerns were addressed.
Erivedge Counseling- I discussed with the patient the risks of Erivedge including but not limited to nausea, vomiting, diarrhea, constipation, weight loss, changes in the sense of taste, decreased appetite, muscle spasms, and hair loss.  The patient verbalized understanding of the proper use and possible adverse effects of Erivedge.  All of the patient's questions and concerns were addressed.
Azithromycin Pregnancy And Lactation Text: This medication is considered safe during pregnancy and is also secreted in breast milk.
Niacinamide Counseling: I recommended taking niacin or niacinamide, also know as vitamin B3, twice daily. Recent evidence suggests that taking vitamin B3 (500 mg twice daily) can reduce the risk of actinic keratoses and non-melanoma skin cancers. Side effects of vitamin B3 include flushing and headache.
Winlevi Pregnancy And Lactation Text: This medication is considered safe during pregnancy and breastfeeding.
Qbrexza Pregnancy And Lactation Text: There is no available data on Qbrexza use in pregnant women.  There is no available data on Qbrexza use in lactation.
Cantharidin Counseling:  I discussed with the patient the risks of Cantharidin including but not limited to pain, redness, burning, itching, and blistering.
Doxycycline Pregnancy And Lactation Text: This medication is Pregnancy Category D and not consider safe during pregnancy. It is also excreted in breast milk but is considered safe for shorter treatment courses.
Tranexamic Acid Counseling:  Patient advised of the small risk of bleeding problems with tranexamic acid. They were also instructed to call if they developed any nausea, vomiting or diarrhea. All of the patient's questions and concerns were addressed.
Enbrel Counseling:  I discussed with the patient the risks of etanercept including but not limited to myelosuppression, immunosuppression, autoimmune hepatitis, demyelinating diseases, lymphoma, and infections.  The patient understands that monitoring is required including a PPD at baseline and must alert us or the primary physician if symptoms of infection or other concerning signs are noted.
Olumiant Pregnancy And Lactation Text: Based on animal studies, Olumiant may cause embryo-fetal harm when administered to pregnant women.  The medication should not be used in pregnancy.  Breastfeeding is not recommended during treatment.
Opioid Pregnancy And Lactation Text: These medications can lead to premature delivery and should be avoided during pregnancy. These medications are also present in breast milk in small amounts.
Rituxan Counseling:  I discussed with the patient the risks of Rituxan infusions. Side effects can include infusion reactions, severe drug rashes including mucocutaneous reactions, reactivation of latent hepatitis and other infections and rarely progressive multifocal leukoencephalopathy.  All of the patient's questions and concerns were addressed.
Stelara Counseling:  I discussed with the patient the risks of ustekinumab including but not limited to immunosuppression, malignancy, posterior leukoencephalopathy syndrome, and serious infections.  The patient understands that monitoring is required including a PPD at baseline and must alert us or the primary physician if symptoms of infection or other concerning signs are noted.
Dutasteride Female Counseling: Dutasteride Counseling:  I discussed with the patient the risks of use of dutasteride including but not limited to decreased libido and sexual dysfunction. Explained the teratogenic nature of the medication and stressed the importance of not getting pregnant during treatment. All of the patient's questions and concerns were addressed.
Cimzia Counseling:  I discussed with the patient the risks of Cimzia including but not limited to immunosuppression, allergic reactions and infections.  The patient understands that monitoring is required including a PPD at baseline and must alert us or the primary physician if symptoms of infection or other concerning signs are noted.
Bexarotene Counseling:  I discussed with the patient the risks of bexarotene including but not limited to hair loss, dry lips/skin/eyes, liver abnormalities, hyperlipidemia, pancreatitis, depression/suicidal ideation, photosensitivity, drug rash/allergic reactions, hypothyroidism, anemia, leukopenia, infection, cataracts, and teratogenicity.  Patient understands that they will need regular blood tests to check lipid profile, liver function tests, white blood cell count, thyroid function tests and pregnancy test if applicable.
Mirvaso Counseling: Mirvaso is a topical medication which can decrease superficial blood flow where applied. Side effects are uncommon and include stinging, redness and allergic reactions.
Dapsone Pregnancy And Lactation Text: This medication is Pregnancy Category C and is not considered safe during pregnancy or breast feeding.
Prednisone Counseling:  I discussed with the patient the risks of prolonged use of prednisone including but not limited to weight gain, insomnia, osteoporosis, mood changes, diabetes, susceptibility to infection, glaucoma and high blood pressure.  In cases where prednisone use is prolonged, patients should be monitored with blood pressure checks, serum glucose levels and an eye exam.  Additionally, the patient may need to be placed on GI prophylaxis, PCP prophylaxis, and calcium and vitamin D supplementation and/or a bisphosphonate.  The patient verbalized understanding of the proper use and the possible adverse effects of prednisone.  All of the patient's questions and concerns were addressed.
Albendazole Counseling:  I discussed with the patient the risks of albendazole including but not limited to cytopenia, kidney damage, nausea/vomiting and severe allergy.  The patient understands that this medication is being used in an off-label manner.
Aklief counseling:  Patient advised to apply a pea-sized amount only at bedtime and wait 30 minutes after washing their face before applying.  If too drying, patient may add a non-comedogenic moisturizer.  The most commonly reported side effects including irritation, redness, scaling, dryness, stinging, burning, itching, and increased risk of sunburn.  The patient verbalized understanding of the proper use and possible adverse effects of retinoids.  All of the patient's questions and concerns were addressed.
Calcipotriene Pregnancy And Lactation Text: The use of this medication during pregnancy or lactation is not recommended as there is insufficient data.
Otezla Counseling: The side effects of Otezla were discussed with the patient, including but not limited to worsening or new depression, weight loss, diarrhea, nausea, upper respiratory tract infection, and headache. Patient instructed to call the office should any adverse effect occur.  The patient verbalized understanding of the proper use and possible adverse effects of Otezla.  All the patient's questions and concerns were addressed.
Winlevi Counseling:  I discussed with the patient the risks of topical clascoterone including but not limited to erythema, scaling, itching, and stinging. Patient voiced their understanding.
Azithromycin Counseling:  I discussed with the patient the risks of azithromycin including but not limited to GI upset, allergic reaction, drug rash, diarrhea, and yeast infections.
Low Dose Naltrexone Pregnancy And Lactation Text: Naltrexone is pregnancy category C.  There have been no adequate and well-controlled studies in pregnant women.  It should be used in pregnancy only if the potential benefit justifies the potential risk to the fetus.   Limited data indicates that naltrexone is minimally excreted into breastmilk.
Tranexamic Acid Pregnancy And Lactation Text: It is unknown if this medication is safe during pregnancy or breast feeding.
Opioid Counseling: I discussed with the patient the potential side effects of opioids including but not limited to addiction, altered mental status, and depression. I stressed avoiding alcohol, benzodiazepines, muscle relaxants and sleep aids unless specifically okayed by a physician. The patient verbalized understanding of the proper use and possible adverse effects of opioids. All of the patient's questions and concerns were addressed. They were instructed to flush the remaining pills down the toilet if they did not need them for pain.
Azathioprine Counseling:  I discussed with the patient the risks of azathioprine including but not limited to myelosuppression, immunosuppression, hepatotoxicity, lymphoma, and infections.  The patient understands that monitoring is required including baseline LFTs, Creatinine, possible TPMP genotyping and weekly CBCs for the first month and then every 2 weeks thereafter.  The patient verbalized understanding of the proper use and possible adverse effects of azathioprine.  All of the patient's questions and concerns were addressed.
Rinvoq Counseling: I discussed with the patient the risks of Rinvoq therapy including but not limited to upper respiratory tract infections, shingles, cold sores, bronchitis, nausea, cough, fever, acne, and headache. Live vaccines should be avoided.  This medication has been linked to serious infections; higher rate of mortality; malignancy and lymphoproliferative disorders; major adverse cardiovascular events; thrombosis; thrombocytopenia, anemia, and neutropenia; lipid elevations; liver enzyme elevations; and gastrointestinal perforations.
Methotrexate Counseling:  Patient counseled regarding adverse effects of methotrexate including but not limited to nausea, vomiting, abnormalities in liver function tests. Patients may develop mouth sores, rash, diarrhea, and abnormalities in blood counts. The patient understands that monitoring is required including LFT's and blood counts.  There is a rare possibility of scarring of the liver and lung problems that can occur when taking methotrexate. Persistent nausea, loss of appetite, pale stools, dark urine, cough, and shortness of breath should be reported immediately. Patient advised to discontinue methotrexate treatment at least three months before attempting to become pregnant.  I discussed the need for folate supplements while taking methotrexate.  These supplements can decrease side effects during methotrexate treatment. The patient verbalized understanding of the proper use and possible adverse effects of methotrexate.  All of the patient's questions and concerns were addressed.
Terbinafine Counseling: Patient counseling regarding adverse effects of terbinafine including but not limited to headache, diarrhea, rash, upset stomach, liver function test abnormalities, itching, taste/smell disturbance, nausea, abdominal pain, and flatulence.  There is a rare possibility of liver failure that can occur when taking terbinafine.  The patient understands that a baseline LFT and kidney function test may be required. The patient verbalized understanding of the proper use and possible adverse effects of terbinafine.  All of the patient's questions and concerns were addressed.
Fluconazole Counseling:  Patient counseled regarding adverse effects of fluconazole including but not limited to headache, diarrhea, nausea, upset stomach, liver function test abnormalities, taste disturbance, and stomach pain.  There is a rare possibility of liver failure that can occur when taking fluconazole.  The patient understands that monitoring of LFTs and kidney function test may be required, especially at baseline. The patient verbalized understanding of the proper use and possible adverse effects of fluconazole.  All of the patient's questions and concerns were addressed.
Bimzelx Pregnancy And Lactation Text: This medication crosses the placenta and the safety is uncertain during pregnancy. It is unknown if this medication is present in breast milk.
Doxycycline Counseling:  Patient counseled regarding possible photosensitivity and increased risk for sunburn.  Patient instructed to avoid sunlight, if possible.  When exposed to sunlight, patients should wear protective clothing, sunglasses, and sunscreen.  The patient was instructed to call the office immediately if the following severe adverse effects occur:  hearing changes, easy bruising/bleeding, severe headache, or vision changes.  The patient verbalized understanding of the proper use and possible adverse effects of doxycycline.  All of the patient's questions and concerns were addressed.
Qbrexza Counseling:  I discussed with the patient the risks of Qbrexza including but not limited to headache, mydriasis, blurred vision, dry eyes, nasal dryness, dry mouth, dry throat, dry skin, urinary hesitation, and constipation.  Local skin reactions including erythema, burning, stinging, and itching can also occur.
Dapsone Counseling: I discussed with the patient the risks of dapsone including but not limited to hemolytic anemia, agranulocytosis, rashes, methemoglobinemia, kidney failure, peripheral neuropathy, headaches, GI upset, and liver toxicity.  Patients who start dapsone require monitoring including baseline LFTs and weekly CBCs for the first month, then every month thereafter.  The patient verbalized understanding of the proper use and possible adverse effects of dapsone.  All of the patient's questions and concerns were addressed.
Calcipotriene Counseling:  I discussed with the patient the risks of calcipotriene including but not limited to erythema, scaling, itching, and irritation.
Dutasteride Male Counseling: Dustasteride Counseling:  I discussed with the patient the risks of use of dutasteride including but not limited to decreased libido, decreased ejaculate volume, and gynecomastia. Women who can become pregnant should not handle medication.  All of the patient's questions and concerns were addressed.
Xolair Pregnancy And Lactation Text: This medication is Pregnancy Category B and is considered safe during pregnancy. This medication is excreted in breast milk.
Acitretin Pregnancy And Lactation Text: This medication is Pregnancy Category X and should not be given to women who are pregnant or may become pregnant in the future. This medication is excreted in breast milk.
Prednisone Pregnancy And Lactation Text: This medication is Pregnancy Category C and it isn't know if it is safe during pregnancy. This medication is excreted in breast milk.
Topical Metronidazole Pregnancy And Lactation Text: This medication is Pregnancy Category B and considered safe during pregnancy.  It is also considered safe to use while breastfeeding.
Quinolones Counseling:  I discussed with the patient the risks of fluoroquinolones including but not limited to GI upset, allergic reaction, drug rash, diarrhea, dizziness, photosensitivity, yeast infections, liver function test abnormalities, tendonitis/tendon rupture.
Oral Minoxidil Pregnancy And Lactation Text: This medication should only be used when clearly needed if you are pregnant, attempting to become pregnant or breast feeding.
Eucrisa Counseling: Patient may experience a mild burning sensation during topical application. Eucrisa is not approved in children less than 3 months of age.
Topical Retinoid counseling:  Patient advised to apply a pea-sized amount only at bedtime and wait 30 minutes after washing their face before applying.  If too drying, patient may add a non-comedogenic moisturizer. The patient verbalized understanding of the proper use and possible adverse effects of retinoids.  All of the patient's questions and concerns were addressed.
Low Dose Naltrexone Counseling- I discussed with the patient the potential risks and side effects of low dose naltrexone including but not limited to: more vivid dreams, headaches, nausea, vomiting, abdominal pain, fatigue, dizziness, and anxiety.
Valtrex Counseling: I discussed with the patient the risks of valacyclovir including but not limited to kidney damage, nausea, vomiting and severe allergy.  The patient understands that if the infection seems to be worsening or is not improving, they are to call.
Clindamycin Pregnancy And Lactation Text: This medication can be used in pregnancy if certain situations. Clindamycin is also present in breast milk.
Protopic Pregnancy And Lactation Text: This medication is Pregnancy Category C. It is unknown if this medication is excreted in breast milk when applied topically.
Rinvoq Pregnancy And Lactation Text: Based on animal studies, Rinvoq may cause embryo-fetal harm when administered to pregnant women.  The medication should not be used in pregnancy.  Breastfeeding is not recommended during treatment and for 6 days after the last dose.
Bimzelx Counseling:  I discussed with the patient the risks of Bimzelx including but not limited to depression, immunosuppression, allergic reactions and infections.  The patient understands that monitoring is required including a PPD at baseline and must alert us or the primary physician if symptoms of infection or other concerning signs are noted.
Ketoconazole Pregnancy And Lactation Text: This medication is Pregnancy Category C and it isn't know if it is safe during pregnancy. It is also excreted in breast milk and breast feeding isn't recommended.
Hydroxyzine Pregnancy And Lactation Text: This medication is not safe during pregnancy and should not be taken. It is also excreted in breast milk and breast feeding isn't recommended.
Minoxidil Counseling: Minoxidil is a topical medication which can increase blood flow where it is applied. It is uncertain how this medication increases hair growth. Side effects are uncommon and include stinging and allergic reactions.
Infliximab Counseling:  I discussed with the patient the risks of infliximab including but not limited to myelosuppression, immunosuppression, autoimmune hepatitis, demyelinating diseases, lymphoma, and serious infections.  The patient understands that monitoring is required including a PPD at baseline and must alert us or the primary physician if symptoms of infection or other concerning signs are noted.
Skyrizi Counseling: I discussed with the patient the risks of risankizumab-rzaa including but not limited to immunosuppression, and serious infections.  The patient understands that monitoring is required including a PPD at baseline and must alert us or the primary physician if symptoms of infection or other concerning signs are noted.
Xolair Counseling:  Patient informed of potential adverse effects including but not limited to fever, muscle aches, rash and allergic reactions.  The patient verbalized understanding of the proper use and possible adverse effects of Xolair.  All of the patient's questions and concerns were addressed.
Acitretin Counseling:  I discussed with the patient the risks of acitretin including but not limited to hair loss, dry lips/skin/eyes, liver damage, hyperlipidemia, depression/suicidal ideation, photosensitivity.  Serious rare side effects can include but are not limited to pancreatitis, pseudotumor cerebri, bony changes, clot formation/stroke/heart attack.  Patient understands that alcohol is contraindicated since it can result in liver toxicity and significantly prolong the elimination of the drug by many years.
Topical Metronidazole Counseling: Metronidazole is a topical antibiotic medication. You may experience burning, stinging, redness, or allergic reactions.  Please call our office if you develop any problems from using this medication.
Spironolactone Pregnancy And Lactation Text: This medication can cause feminization of the male fetus and should be avoided during pregnancy. The active metabolite is also found in breast milk.
Soolantra Pregnancy And Lactation Text: This medication is Pregnancy Category C. This medication is considered safe during breast feeding.
Oral Minoxidil Counseling- I discussed with the patient the risks of oral minoxidil including but not limited to shortness of breath, swelling of the feet or ankles, dizziness, lightheadedness, unwanted hair growth and allergic reaction.  The patient verbalized understanding of the proper use and possible adverse effects of oral minoxidil.  All of the patient's questions and concerns were addressed.
Wartpeel Counseling:  I discussed with the patient the risks of Wartpeel including but not limited to erythema, scaling, itching, weeping, crusting, and pain.
Include Pregnancy/Lactation Warning?: No
Protopic Counseling: Patient may experience a mild burning sensation during topical application. Protopic is not approved in children less than 2 years of age. There have been case reports of hematologic and skin malignancies in patients using topical calcineurin inhibitors although causality is questionable.
Hydroxychloroquine Pregnancy And Lactation Text: This medication has been shown to cause fetal harm but it isn't assigned a Pregnancy Risk Category. There are small amounts excreted in breast milk.
Valtrex Pregnancy And Lactation Text: this medication is Pregnancy Category B and is considered safe during pregnancy. This medication is not directly found in breast milk but it's metabolite acyclovir is present.
Hydroxyzine Counseling: Patient advised that the medication is sedating and not to drive a car after taking this medication.  Patient informed of potential adverse effects including but not limited to dry mouth, urinary retention, and blurry vision.  The patient verbalized understanding of the proper use and possible adverse effects of hydroxyzine.  All of the patient's questions and concerns were addressed.
Carac Counseling:  I discussed with the patient the risks of Carac including but not limited to erythema, scaling, itching, weeping, crusting, and pain.
Clindamycin Counseling: I counseled the patient regarding use of clindamycin as an antibiotic for prophylactic and/or therapeutic purposes. Clindamycin is active against numerous classes of bacteria, including skin bacteria. Side effects may include nausea, diarrhea, gastrointestinal upset, rash, hives, yeast infections, and in rare cases, colitis.
Colchicine Counseling:  Patient counseled regarding adverse effects including but not limited to stomach upset (nausea, vomiting, stomach pain, or diarrhea).  Patient instructed to limit alcohol consumption while taking this medication.  Colchicine may reduce blood counts especially with prolonged use.  The patient understands that monitoring of kidney function and blood counts may be required, especially at baseline. The patient verbalized understanding of the proper use and possible adverse effects of colchicine.  All of the patient's questions and concerns were addressed.
Cyclosporine Counseling:  I discussed with the patient the risks of cyclosporine including but not limited to hypertension, gingival hyperplasia,myelosuppression, immunosuppression, liver damage, kidney damage, neurotoxicity, lymphoma, and serious infections. The patient understands that monitoring is required including baseline blood pressure, CBC, CMP, lipid panel and uric acid, and then 1-2 times monthly CMP and blood pressure.
Cibinqo Counseling: I discussed with the patient the risks of Cibinqo therapy including but not limited to common cold, nausea, headache, cold sores, increased blood CPK levels, dizziness, UTIs, fatigue, acne, and vomitting. Live vaccines should be avoided.  This medication has been linked to serious infections; higher rate of mortality; malignancy and lymphoproliferative disorders; major adverse cardiovascular events; thrombosis; thrombocytopenia and lymphopenia; lipid elevations; and retinal detachment.
Sotyktu Counseling:  I discussed the most common side effects of Sotyktu including: common cold, sore throat, sinus infections, cold sores, canker sores, folliculitis, and acne.? I also discussed more serious side effects of Sotyktu including but not limited to: serious allergic reactions; increased risk for infections such as TB; cancers such as lymphomas; rhabdomyolysis and elevated CPK; and elevated triglycerides and liver enzymes.?
Dupixent Pregnancy And Lactation Text: This medication likely crosses the placenta but the risk for the fetus is uncertain. This medication is excreted in breast milk.
Adbry Pregnancy And Lactation Text: It is unknown if this medication will adversely affect pregnancy or breast feeding.
Ketoconazole Counseling:   Patient counseled regarding improving absorption with orange juice.  Adverse effects include but are not limited to breast enlargement, headache, diarrhea, nausea, upset stomach, liver function test abnormalities, taste disturbance, and stomach pain.  There is a rare possibility of liver failure that can occur when taking ketoconazole. The patient understands that monitoring of LFTs may be required, especially at baseline. The patient verbalized understanding of the proper use and possible adverse effects of ketoconazole.  All of the patient's questions and concerns were addressed.
SSKI Counseling:  I discussed with the patient the risks of SSKI including but not limited to thyroid abnormalities, metallic taste, GI upset, fever, headache, acne, arthralgias, paraesthesias, lymphadenopathy, easy bleeding, arrhythmias, and allergic reaction.
Klisyri Pregnancy And Lactation Text: It is unknown if this medication can harm a developing fetus or if it is excreted in breast milk.
Zyclara Counseling:  I discussed with the patient the risks of imiquimod including but not limited to erythema, scaling, itching, weeping, crusting, and pain.  Patient understands that the inflammatory response to imiquimod is variable from person to person and was educated regarded proper titration schedule.  If flu-like symptoms develop, patient knows to discontinue the medication and contact us.
Minocycline Counseling: Patient advised regarding possible photosensitivity and discoloration of the teeth, skin, lips, tongue and gums.  Patient instructed to avoid sunlight, if possible.  When exposed to sunlight, patients should wear protective clothing, sunglasses, and sunscreen.  The patient was instructed to call the office immediately if the following severe adverse effects occur:  hearing changes, easy bruising/bleeding, severe headache, or vision changes.  The patient verbalized understanding of the proper use and possible adverse effects of minocycline.  All of the patient's questions and concerns were addressed.
Olanzapine Pregnancy And Lactation Text: This medication is pregnancy category C.   There are no adequate and well controlled trials with olanzapine in pregnant females.  Olanzapine should be used during pregnancy only if the potential benefit justifies the potential risk to the fetus.   In a study in lactating healthy women, olanzapine was excreted in breast milk.  It is recommended that women taking olanzapine should not breast feed.
Tetracycline Counseling: Patient counseled regarding possible photosensitivity and increased risk for sunburn.  Patient instructed to avoid sunlight, if possible.  When exposed to sunlight, patients should wear protective clothing, sunglasses, and sunscreen.  The patient was instructed to call the office immediately if the following severe adverse effects occur:  hearing changes, easy bruising/bleeding, severe headache, or vision changes.  The patient verbalized understanding of the proper use and possible adverse effects of tetracycline.  All of the patient's questions and concerns were addressed. Patient understands to avoid pregnancy while on therapy due to potential birth defects.
Spironolactone Counseling: Patient advised regarding risks of diarrhea, abdominal pain, hyperkalemia, birth defects (for female patients), liver toxicity and renal toxicity. The patient may need blood work to monitor liver and kidney function and potassium levels while on therapy. The patient verbalized understanding of the proper use and possible adverse effects of spironolactone.  All of the patient's questions and concerns were addressed.
Elidel Counseling: Patient may experience a mild burning sensation during topical application. Elidel is not approved in children less than 2 years of age. There have been case reports of hematologic and skin malignancies in patients using topical calcineurin inhibitors although causality is questionable.
High Dose Vitamin A Pregnancy And Lactation Text: High dose vitamin A therapy is contraindicated during pregnancy and breast feeding.
Soolantra Counseling: I discussed with the patients the risks of topial Soolantra. This is a medicine which decreases the number of mites and inflammation in the skin. You experience burning, stinging, eye irritation or allergic reactions.  Please call our office if you develop any problems from using this medication.
Xelmikez Pregnancy And Lactation Text: This medication is Pregnancy Category D and is not considered safe during pregnancy.  The risk during breast feeding is also uncertain.
Hydroxychloroquine Counseling:  I discussed with the patient that a baseline ophthalmologic exam is needed at the start of therapy and every year thereafter while on therapy. A CBC may also be warranted for monitoring.  The side effects of this medication were discussed with the patient, including but not limited to agranulocytosis, aplastic anemia, seizures, rashes, retinopathy, and liver toxicity. Patient instructed to call the office should any adverse effect occur.  The patient verbalized understanding of the proper use and possible adverse effects of Plaquenil.  All the patient's questions and concerns were addressed.
Topical Sulfur Applications Pregnancy And Lactation Text: This medication is considered safe during pregnancy and breast feeding secondary to limited systemic absorption.
Odomzo Counseling- I discussed with the patient the risks of Odomzo including but not limited to nausea, vomiting, diarrhea, constipation, weight loss, changes in the sense of taste, decreased appetite, muscle spasms, and hair loss.  The patient verbalized understanding of the proper use and possible adverse effects of Odomzo.  All of the patient's questions and concerns were addressed.

## 2024-01-18 NOTE — PROCEDURE: COUNSELING
Detail Level: Detailed
Detail Level: Generalized
Sunscreen Recommendations: Broad Spectrum
Detail Level: Zone

## 2024-01-18 NOTE — PROCEDURE: LIQUID NITROGEN
Render Post-Care Instructions In Note?: no
Number Of Freeze-Thaw Cycles: 2 freeze-thaw cycles
Detail Level: Detailed
Post-Care Instructions: I reviewed with the patient in detail post-care instructions. Patient is to wear sunprotection, and avoid picking at any of the treated lesions. Pt may apply Vaseline to crusted or scabbing areas.
Aperture Size (Optional): C
Show Applicator Variable?: Yes
Application Tool (Optional): Liquid Nitrogen Sprayer
Duration Of Freeze Thaw-Cycle (Seconds): 0
Consent: The patient's consent was obtained including but not limited to risks of crusting, scabbing, blistering, scarring, darker or lighter pigmentary change, recurrence, incomplete removal and infection.
Medical Necessity Information: It is in your best interest to select a reason for this procedure from the list below. All of these items fulfill various CMS LCD requirements except the new and changing color options.
Medical Necessity Clause: This procedure was medically necessary because the lesions that were treated were:
Spray Paint Text: The liquid nitrogen was applied to the skin utilizing a spray paint frosting technique.
Number Of Freeze-Thaw Cycles: 3 freeze-thaw cycles
Application Tool (Optional): Cry-AC

## 2024-03-14 ENCOUNTER — APPOINTMENT (RX ONLY)
Dept: URBAN - METROPOLITAN AREA CLINIC 24 | Facility: CLINIC | Age: 84
Setting detail: DERMATOLOGY
End: 2024-03-14

## 2024-03-14 DIAGNOSIS — L82.0 INFLAMED SEBORRHEIC KERATOSIS: ICD-10-CM

## 2024-03-14 DIAGNOSIS — L57.8 OTHER SKIN CHANGES DUE TO CHRONIC EXPOSURE TO NONIONIZING RADIATION: ICD-10-CM | Status: WELL CONTROLLED

## 2024-03-14 PROCEDURE — ? LIQUID NITROGEN

## 2024-03-14 PROCEDURE — ? PRESCRIPTION MEDICATION MANAGEMENT

## 2024-03-14 PROCEDURE — 17110 DESTRUCTION B9 LES UP TO 14: CPT

## 2024-03-14 PROCEDURE — ? COUNSELING

## 2024-03-14 PROCEDURE — 99214 OFFICE O/P EST MOD 30 MIN: CPT | Mod: 25

## 2024-03-14 ASSESSMENT — LOCATION SIMPLE DESCRIPTION DERM
LOCATION SIMPLE: LEFT CALF
LOCATION SIMPLE: RIGHT PRETIBIAL REGION
LOCATION SIMPLE: NOSE

## 2024-03-14 ASSESSMENT — LOCATION ZONE DERM
LOCATION ZONE: LEG
LOCATION ZONE: NOSE

## 2024-03-14 ASSESSMENT — LOCATION DETAILED DESCRIPTION DERM
LOCATION DETAILED: NASAL SUPRATIP
LOCATION DETAILED: RIGHT PROXIMAL PRETIBIAL REGION
LOCATION DETAILED: LEFT DISTAL CALF
LOCATION DETAILED: LEFT DISTAL LATERAL CALF

## 2024-03-14 NOTE — PROCEDURE: LIQUID NITROGEN
Add 52 Modifier (Optional): no
Show Applicator Variable?: Yes
Number Of Freeze-Thaw Cycles: 3 freeze-thaw cycles
Medical Necessity Clause: This procedure was medically necessary because the lesions that were treated were:
Spray Paint Text: The liquid nitrogen was applied to the skin utilizing a spray paint frosting technique.
Medical Necessity Information: It is in your best interest to select a reason for this procedure from the list below. All of these items fulfill various CMS LCD requirements except the new and changing color options.
Post-Care Instructions: I reviewed with the patient in detail post-care instructions. Patient is to wear sunprotection, and avoid picking at any of the treated lesions. Pt may apply Vaseline to crusted or scabbing areas.
Consent: The patient's consent was obtained including but not limited to risks of crusting, scabbing, blistering, scarring, darker or lighter pigmentary change, recurrence, incomplete removal and infection.
Application Tool (Optional): Cry-AC
Detail Level: Detailed
Aperture Size (Optional): C

## 2024-04-11 ENCOUNTER — APPOINTMENT (RX ONLY)
Dept: URBAN - METROPOLITAN AREA CLINIC 24 | Facility: CLINIC | Age: 84
Setting detail: DERMATOLOGY
End: 2024-04-11

## 2024-04-11 DIAGNOSIS — L82.0 INFLAMED SEBORRHEIC KERATOSIS: ICD-10-CM

## 2024-04-11 PROCEDURE — ? LIQUID NITROGEN

## 2024-04-11 PROCEDURE — ? COUNSELING

## 2024-04-11 PROCEDURE — 17111 DESTRUCTION B9 LESIONS 15/>: CPT

## 2024-04-11 ASSESSMENT — LOCATION SIMPLE DESCRIPTION DERM
LOCATION SIMPLE: RIGHT BREAST
LOCATION SIMPLE: LEFT BREAST
LOCATION SIMPLE: RIGHT UPPER BACK
LOCATION SIMPLE: LEFT LOWER BACK

## 2024-04-11 ASSESSMENT — LOCATION DETAILED DESCRIPTION DERM
LOCATION DETAILED: LEFT INFRAMAMMARY CREASE (INNER QUADRANT)
LOCATION DETAILED: RIGHT INFRAMAMMARY CREASE (INNER QUADRANT)
LOCATION DETAILED: LEFT SUPERIOR LATERAL MIDBACK
LOCATION DETAILED: RIGHT INFERIOR UPPER BACK
LOCATION DETAILED: LEFT MEDIAL BREAST 8-9:00 REGION
LOCATION DETAILED: RIGHT MEDIAL BREAST 4-5:00 REGION

## 2024-04-11 ASSESSMENT — LOCATION ZONE DERM: LOCATION ZONE: TRUNK

## 2024-04-11 NOTE — PROCEDURE: LIQUID NITROGEN
Render Post Care In The Note?: yes
Render In Bullet Format When Appropriate: No
Medical Necessity Information: It is in your best interest to select a reason for this procedure from the list below. All of these items fulfill various CMS LCD requirements except the new and changing color options.
Total Number Of Lesions Treated: 18
Detail Level: Zone
Post-Care Instructions: I reviewed with the patient in detail post-care instructions. Patient is to wear sunprotection, and avoid picking at any of the treated lesions. Pt may apply Vaseline to crusted or scabbing areas.
Consent: The patient's consent was obtained including but not limited to risks of crusting, scabbing, blistering, scarring, darker or lighter pigmentary change, recurrence, incomplete removal and infection.
Medical Necessity Clause: This procedure was medically necessary because the lesions that were treated were:
Aperture Size (Optional): C
Duration Of Freeze Thaw-Cycle (Seconds): 0
Spray Paint Text: The liquid nitrogen was applied to the skin utilizing a spray paint frosting technique.
Number Of Freeze-Thaw Cycles: 2 freeze-thaw cycles

## 2024-05-14 ENCOUNTER — OFFICE VISIT (OUTPATIENT)
Dept: ONCOLOGY | Age: 84
End: 2024-05-14
Payer: MEDICARE

## 2024-05-14 ENCOUNTER — HOSPITAL ENCOUNTER (OUTPATIENT)
Dept: LAB | Age: 84
Discharge: HOME | End: 2024-05-17
Payer: MEDICARE

## 2024-05-14 VITALS
BODY MASS INDEX: 33.15 KG/M2 | DIASTOLIC BLOOD PRESSURE: 62 MMHG | SYSTOLIC BLOOD PRESSURE: 150 MMHG | HEIGHT: 65 IN | WEIGHT: 199 LBS | HEART RATE: 76 BPM | OXYGEN SATURATION: 96 % | TEMPERATURE: 98 F | RESPIRATION RATE: 16 BRPM

## 2024-05-14 DIAGNOSIS — Z03.89 ENCOUNTER FOR OBSERVATION FOR OTHER SUSPECTED DISEASES AND CONDITIONS RULED OUT: ICD-10-CM

## 2024-05-14 DIAGNOSIS — Z86.711 HISTORY OF PULMONARY EMBOLUS (PE): ICD-10-CM

## 2024-05-14 DIAGNOSIS — D50.0 IRON DEFICIENCY ANEMIA DUE TO CHRONIC BLOOD LOSS: ICD-10-CM

## 2024-05-14 DIAGNOSIS — D63.1 ANEMIA DUE TO STAGE 3B CHRONIC KIDNEY DISEASE (HCC): ICD-10-CM

## 2024-05-14 DIAGNOSIS — D50.0 IRON DEFICIENCY ANEMIA DUE TO CHRONIC BLOOD LOSS: Primary | ICD-10-CM

## 2024-05-14 DIAGNOSIS — N18.32 ANEMIA DUE TO STAGE 3B CHRONIC KIDNEY DISEASE (HCC): ICD-10-CM

## 2024-05-14 DIAGNOSIS — I26.99 PULMONARY EMBOLISM, UNSPECIFIED CHRONICITY, UNSPECIFIED PULMONARY EMBOLISM TYPE, UNSPECIFIED WHETHER ACUTE COR PULMONALE PRESENT (HCC): ICD-10-CM

## 2024-05-14 LAB
ALBUMIN SERPL-MCNC: 3.6 G/DL (ref 3.2–4.6)
ALBUMIN/GLOB SERPL: 1.1 (ref 1–1.9)
ALP SERPL-CCNC: 126 U/L (ref 35–104)
ALT SERPL-CCNC: 30 U/L (ref 12–65)
ANION GAP SERPL CALC-SCNC: 12 MMOL/L (ref 9–18)
AST SERPL-CCNC: 29 U/L (ref 15–37)
BASOPHILS # BLD: 0.1 K/UL (ref 0–0.2)
BASOPHILS NFR BLD: 1 % (ref 0–2)
BILIRUB SERPL-MCNC: 0.2 MG/DL (ref 0–1.2)
BUN SERPL-MCNC: 25 MG/DL (ref 8–23)
CALCIUM SERPL-MCNC: 9 MG/DL (ref 8.8–10.2)
CHLORIDE SERPL-SCNC: 104 MMOL/L (ref 98–107)
CO2 SERPL-SCNC: 24 MMOL/L (ref 20–28)
CREAT SERPL-MCNC: 1.23 MG/DL (ref 0.6–1.1)
DIFFERENTIAL METHOD BLD: ABNORMAL
EOSINOPHIL # BLD: 0.2 K/UL (ref 0–0.8)
EOSINOPHIL NFR BLD: 2 % (ref 0.5–7.8)
ERYTHROCYTE [DISTWIDTH] IN BLOOD BY AUTOMATED COUNT: 15.1 % (ref 11.9–14.6)
FERRITIN SERPL-MCNC: 28 NG/ML (ref 8–388)
GLOBULIN SER CALC-MCNC: 3.4 G/DL (ref 2.3–3.5)
GLUCOSE SERPL-MCNC: 232 MG/DL (ref 70–99)
HCT VFR BLD AUTO: 32.9 % (ref 35.8–46.3)
HGB BLD-MCNC: 10.4 G/DL (ref 11.7–15.4)
IMM GRANULOCYTES # BLD AUTO: 0.1 K/UL (ref 0–0.5)
IMM GRANULOCYTES NFR BLD AUTO: 1 % (ref 0–5)
IRON SATN MFR SERPL: 8 % (ref 20–50)
IRON SERPL-MCNC: 26 UG/DL (ref 35–100)
LYMPHOCYTES # BLD: 1.6 K/UL (ref 0.5–4.6)
LYMPHOCYTES NFR BLD: 19 % (ref 13–44)
MCH RBC QN AUTO: 25.5 PG (ref 26.1–32.9)
MCHC RBC AUTO-ENTMCNC: 31.6 G/DL (ref 31.4–35)
MCV RBC AUTO: 80.6 FL (ref 82–102)
MONOCYTES # BLD: 0.5 K/UL (ref 0.1–1.3)
MONOCYTES NFR BLD: 6 % (ref 4–12)
NEUTS SEG # BLD: 5.8 K/UL (ref 1.7–8.2)
NEUTS SEG NFR BLD: 71 % (ref 43–78)
NRBC # BLD: 0 K/UL (ref 0–0.2)
PLATELET # BLD AUTO: 301 K/UL (ref 150–450)
PMV BLD AUTO: 10.3 FL (ref 9.4–12.3)
POTASSIUM SERPL-SCNC: 4 MMOL/L (ref 3.5–5.1)
PROT SERPL-MCNC: 7 G/DL (ref 6.3–8.2)
RBC # BLD AUTO: 4.08 M/UL (ref 4.05–5.2)
SODIUM SERPL-SCNC: 140 MMOL/L (ref 136–145)
TIBC SERPL-MCNC: 333 UG/DL (ref 240–450)
UIBC SERPL-MCNC: 307 UG/DL (ref 112–347)
WBC # BLD AUTO: 8.2 K/UL (ref 4.3–11.1)

## 2024-05-14 PROCEDURE — 1090F PRES/ABSN URINE INCON ASSESS: CPT | Performed by: NURSE PRACTITIONER

## 2024-05-14 PROCEDURE — 3077F SYST BP >= 140 MM HG: CPT | Performed by: NURSE PRACTITIONER

## 2024-05-14 PROCEDURE — G8417 CALC BMI ABV UP PARAM F/U: HCPCS | Performed by: NURSE PRACTITIONER

## 2024-05-14 PROCEDURE — 36415 COLL VENOUS BLD VENIPUNCTURE: CPT

## 2024-05-14 PROCEDURE — G8400 PT W/DXA NO RESULTS DOC: HCPCS | Performed by: NURSE PRACTITIONER

## 2024-05-14 PROCEDURE — 83540 ASSAY OF IRON: CPT

## 2024-05-14 PROCEDURE — G8427 DOCREV CUR MEDS BY ELIG CLIN: HCPCS | Performed by: NURSE PRACTITIONER

## 2024-05-14 PROCEDURE — 85025 COMPLETE CBC W/AUTO DIFF WBC: CPT

## 2024-05-14 PROCEDURE — 1123F ACP DISCUSS/DSCN MKR DOCD: CPT | Performed by: NURSE PRACTITIONER

## 2024-05-14 PROCEDURE — 1036F TOBACCO NON-USER: CPT | Performed by: NURSE PRACTITIONER

## 2024-05-14 PROCEDURE — 82728 ASSAY OF FERRITIN: CPT

## 2024-05-14 PROCEDURE — 80053 COMPREHEN METABOLIC PANEL: CPT

## 2024-05-14 PROCEDURE — 83550 IRON BINDING TEST: CPT

## 2024-05-14 PROCEDURE — 3078F DIAST BP <80 MM HG: CPT | Performed by: NURSE PRACTITIONER

## 2024-05-14 PROCEDURE — 99214 OFFICE O/P EST MOD 30 MIN: CPT | Performed by: NURSE PRACTITIONER

## 2024-05-14 RX ORDER — HYDROCHLOROTHIAZIDE 25 MG/1
25 TABLET ORAL DAILY
COMMUNITY
Start: 2024-04-02

## 2024-05-14 RX ORDER — DIPHENHYDRAMINE HYDROCHLORIDE 50 MG/ML
50 INJECTION INTRAMUSCULAR; INTRAVENOUS
OUTPATIENT
Start: 2024-06-03

## 2024-05-14 RX ORDER — METOPROLOL SUCCINATE 50 MG/1
50 TABLET, EXTENDED RELEASE ORAL DAILY
COMMUNITY
Start: 2024-05-03

## 2024-05-14 RX ORDER — EPINEPHRINE 1 MG/ML
0.3 INJECTION, SOLUTION, CONCENTRATE INTRAVENOUS PRN
OUTPATIENT
Start: 2024-06-03

## 2024-05-14 RX ORDER — SODIUM CHLORIDE 9 MG/ML
INJECTION, SOLUTION INTRAVENOUS CONTINUOUS
OUTPATIENT
Start: 2024-06-03

## 2024-05-14 RX ORDER — SODIUM CHLORIDE 9 MG/ML
5-250 INJECTION, SOLUTION INTRAVENOUS PRN
OUTPATIENT
Start: 2024-06-03

## 2024-05-14 RX ORDER — SODIUM CHLORIDE 0.9 % (FLUSH) 0.9 %
5-40 SYRINGE (ML) INJECTION PRN
OUTPATIENT
Start: 2024-06-03

## 2024-05-14 RX ORDER — ONDANSETRON 2 MG/ML
8 INJECTION INTRAMUSCULAR; INTRAVENOUS
OUTPATIENT
Start: 2024-06-03

## 2024-05-14 RX ORDER — HEPARIN 100 UNIT/ML
500 SYRINGE INTRAVENOUS PRN
OUTPATIENT
Start: 2024-06-03

## 2024-05-14 RX ORDER — ACETAMINOPHEN 325 MG/1
650 TABLET ORAL
OUTPATIENT
Start: 2024-06-03

## 2024-05-14 RX ORDER — ALBUTEROL SULFATE 90 UG/1
4 AEROSOL, METERED RESPIRATORY (INHALATION) PRN
OUTPATIENT
Start: 2024-06-03

## 2024-05-14 ASSESSMENT — PATIENT HEALTH QUESTIONNAIRE - PHQ9
SUM OF ALL RESPONSES TO PHQ QUESTIONS 1-9: 0
1. LITTLE INTEREST OR PLEASURE IN DOING THINGS: NOT AT ALL
SUM OF ALL RESPONSES TO PHQ QUESTIONS 1-9: 0
SUM OF ALL RESPONSES TO PHQ9 QUESTIONS 1 & 2: 0
SUM OF ALL RESPONSES TO PHQ QUESTIONS 1-9: 0
SUM OF ALL RESPONSES TO PHQ QUESTIONS 1-9: 0
2. FEELING DOWN, DEPRESSED OR HOPELESS: NOT AT ALL

## 2024-05-14 NOTE — PATIENT INSTRUCTIONS
- 4.6 g/dL Final    Globulin 05/14/2024 3.4  2.3 - 3.5 g/dL Final    Albumin/Globulin Ratio 05/14/2024 1.1  1.0 - 1.9   Final    WBC 05/14/2024 8.2  4.3 - 11.1 K/uL Final    RBC 05/14/2024 4.08  4.05 - 5.2 M/uL Final    Hemoglobin 05/14/2024 10.4 (L)  11.7 - 15.4 g/dL Final    Hematocrit 05/14/2024 32.9 (L)  35.8 - 46.3 % Final    MCV 05/14/2024 80.6 (L)  82.0 - 102.0 FL Final    MCH 05/14/2024 25.5 (L)  26.1 - 32.9 PG Final    MCHC 05/14/2024 31.6  31.4 - 35.0 g/dL Final    RDW 05/14/2024 15.1 (H)  11.9 - 14.6 % Final    Platelets 05/14/2024 301  150 - 450 K/uL Final    MPV 05/14/2024 10.3  9.4 - 12.3 FL Final    nRBC 05/14/2024 0.00  0.0 - 0.2 K/uL Final    **Note: Absolute NRBC parameter is now reported with Hemogram**    Neutrophils % 05/14/2024 71  43 - 78 % Final    Lymphocytes % 05/14/2024 19  13 - 44 % Final    Monocytes % 05/14/2024 6  4.0 - 12.0 % Final    Eosinophils % 05/14/2024 2  0.5 - 7.8 % Final    Basophils % 05/14/2024 1  0.0 - 2.0 % Final    Immature Granulocytes % 05/14/2024 1  0.0 - 5.0 % Final    Neutrophils Absolute 05/14/2024 5.8  1.7 - 8.2 K/UL Final    Lymphocytes Absolute 05/14/2024 1.6  0.5 - 4.6 K/UL Final    Monocytes Absolute 05/14/2024 0.5  0.1 - 1.3 K/UL Final    Eosinophils Absolute 05/14/2024 0.2  0.0 - 0.8 K/UL Final    Basophils Absolute 05/14/2024 0.1  0.0 - 0.2 K/UL Final    Immature Granulocytes Absolute 05/14/2024 0.1  0.0 - 0.5 K/UL Final    Differential Type 05/14/2024 AUTOMATED    Final

## 2024-05-14 NOTE — PROGRESS NOTES
[facility]: Office Visit Established Patient    Chief Complaint:    Chief Complaint   Patient presents with    Follow-up         History of Present Illness:  Ms. Phipps is a 83 y.o. female who returns today for management of PE and renal mass.  She was diagnosed with PE in February 2023 after presenting with SOB and chest pain, imaging also showed a complex left renal mass.  She was discharged on Eliquis but eventually changed to Xarelto by her PCP.  She was seen for pre-op prior to nephrectomy and noted to have a Hgb of 5.8, she was felt to have an acute GIB, endoscopy and colonoscopy showed no active bleeding although the colon prep was suboptimal.  She was transfused with good relief of dyspnea.  She completed nephrectomy on 5/25 and is recovering well from this, she is currently on heparin as she had a repeat CT PE which showed multiple persistent PEs.  Hematology was consulted for recommendations, we recommended restarting Xarelto - the persistence of thrombotic disease at this point does not make this a Xarelto failure, especially with removal of the likely provoking factor.  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      She is here today for follow up after stopping Xarelto for PE  6 months ago and for and TOVA.  Since last seen she has been well overall.  There are no GI or bowel complaints.  Appetite is good and weight is stable.  Mild fatigue is ongoing.  She denies any chest pain or calf pain/edema.  There is mild exertional shortness of breath at times.  There has been no bleeding or easy bruising after stopping Xarelto.      Review of Systems:  Constitutional: Negative.   HENT: Negative.   Eyes: Negative.   Respiratory: Negative.   Cardiovascular: Negative.   Gastrointestinal: Negative.   Genitourinary: Negative.   Musculoskeletal: Negative.   Skin: Negative.   Neurological: Negative.

## 2024-06-05 ENCOUNTER — APPOINTMENT (OUTPATIENT)
Dept: INFUSION THERAPY | Age: 84
End: 2024-06-05
Payer: MEDICARE

## 2024-06-05 ENCOUNTER — HOSPITAL ENCOUNTER (OUTPATIENT)
Dept: INFUSION THERAPY | Age: 84
Setting detail: INFUSION SERIES
Discharge: HOME | End: 2024-06-05
Payer: MEDICARE

## 2024-06-05 VITALS
HEART RATE: 61 BPM | RESPIRATION RATE: 18 BRPM | SYSTOLIC BLOOD PRESSURE: 142 MMHG | TEMPERATURE: 97.7 F | OXYGEN SATURATION: 95 % | DIASTOLIC BLOOD PRESSURE: 59 MMHG

## 2024-06-05 DIAGNOSIS — D50.0 IRON DEFICIENCY ANEMIA DUE TO CHRONIC BLOOD LOSS: Primary | ICD-10-CM

## 2024-06-05 PROCEDURE — 2580000003 HC RX 258: Performed by: NURSE PRACTITIONER

## 2024-06-05 PROCEDURE — 96365 THER/PROPH/DIAG IV INF INIT: CPT

## 2024-06-05 PROCEDURE — 6360000002 HC RX W HCPCS: Performed by: NURSE PRACTITIONER

## 2024-06-05 RX ORDER — SODIUM CHLORIDE 9 MG/ML
INJECTION, SOLUTION INTRAVENOUS CONTINUOUS
Status: CANCELLED | OUTPATIENT
Start: 2024-06-12

## 2024-06-05 RX ORDER — SODIUM CHLORIDE 9 MG/ML
5-250 INJECTION, SOLUTION INTRAVENOUS PRN
Status: CANCELLED | OUTPATIENT
Start: 2024-06-12

## 2024-06-05 RX ORDER — ALBUTEROL SULFATE 90 UG/1
4 AEROSOL, METERED RESPIRATORY (INHALATION) PRN
Status: CANCELLED | OUTPATIENT
Start: 2024-06-12

## 2024-06-05 RX ORDER — ACETAMINOPHEN 325 MG/1
650 TABLET ORAL
Status: CANCELLED | OUTPATIENT
Start: 2024-06-12

## 2024-06-05 RX ORDER — DIPHENHYDRAMINE HYDROCHLORIDE 50 MG/ML
50 INJECTION INTRAMUSCULAR; INTRAVENOUS
Status: CANCELLED | OUTPATIENT
Start: 2024-06-12

## 2024-06-05 RX ORDER — SODIUM CHLORIDE 0.9 % (FLUSH) 0.9 %
5-40 SYRINGE (ML) INJECTION PRN
Status: CANCELLED | OUTPATIENT
Start: 2024-06-12

## 2024-06-05 RX ORDER — ONDANSETRON 2 MG/ML
8 INJECTION INTRAMUSCULAR; INTRAVENOUS
Status: DISCONTINUED | OUTPATIENT
Start: 2024-06-05 | End: 2024-06-06 | Stop reason: HOSPADM

## 2024-06-05 RX ORDER — ACETAMINOPHEN 325 MG/1
650 TABLET ORAL
Status: DISCONTINUED | OUTPATIENT
Start: 2024-06-05 | End: 2024-06-06 | Stop reason: HOSPADM

## 2024-06-05 RX ORDER — ALBUTEROL SULFATE 90 UG/1
4 AEROSOL, METERED RESPIRATORY (INHALATION) PRN
Status: DISCONTINUED | OUTPATIENT
Start: 2024-06-05 | End: 2024-06-06 | Stop reason: HOSPADM

## 2024-06-05 RX ORDER — DIPHENHYDRAMINE HYDROCHLORIDE 50 MG/ML
50 INJECTION INTRAMUSCULAR; INTRAVENOUS
Status: DISCONTINUED | OUTPATIENT
Start: 2024-06-05 | End: 2024-06-06 | Stop reason: HOSPADM

## 2024-06-05 RX ORDER — EPINEPHRINE 1 MG/ML
0.3 INJECTION, SOLUTION, CONCENTRATE INTRAVENOUS PRN
Status: CANCELLED | OUTPATIENT
Start: 2024-06-12

## 2024-06-05 RX ORDER — EPINEPHRINE 1 MG/ML
0.3 INJECTION, SOLUTION, CONCENTRATE INTRAVENOUS PRN
Status: DISCONTINUED | OUTPATIENT
Start: 2024-06-05 | End: 2024-06-06 | Stop reason: HOSPADM

## 2024-06-05 RX ORDER — ONDANSETRON 2 MG/ML
8 INJECTION INTRAMUSCULAR; INTRAVENOUS
Status: CANCELLED | OUTPATIENT
Start: 2024-06-12

## 2024-06-05 RX ORDER — HEPARIN 100 UNIT/ML
500 SYRINGE INTRAVENOUS PRN
Status: CANCELLED | OUTPATIENT
Start: 2024-06-12

## 2024-06-05 RX ORDER — SODIUM CHLORIDE 9 MG/ML
5-250 INJECTION, SOLUTION INTRAVENOUS PRN
Status: DISCONTINUED | OUTPATIENT
Start: 2024-06-05 | End: 2024-06-06 | Stop reason: HOSPADM

## 2024-06-05 RX ADMIN — FERUMOXYTOL 510 MG: 510 INJECTION INTRAVENOUS at 08:47

## 2024-06-05 RX ADMIN — SODIUM CHLORIDE 100 ML/HR: 9 INJECTION, SOLUTION INTRAVENOUS at 08:46

## 2024-06-05 NOTE — PROGRESS NOTES
Arrived to the Infusion Center.  Feraheme completed. Patient tolerated without problems.   Any issues or concerns during appointment: no.  Patient aware of next infusion appointment on 6/12/24 (date) at 0930   Patient instructed to call provider with temperature of 100.4 or greater or nausea/vomiting/ diarrhea or pain not controlled by medications  Discharged ambulatory.'

## 2024-06-12 ENCOUNTER — APPOINTMENT (OUTPATIENT)
Dept: INFUSION THERAPY | Age: 84
End: 2024-06-12
Payer: MEDICARE

## 2024-06-12 ENCOUNTER — HOSPITAL ENCOUNTER (OUTPATIENT)
Dept: INFUSION THERAPY | Age: 84
Setting detail: INFUSION SERIES
Discharge: HOME | End: 2024-06-12
Payer: MEDICARE

## 2024-06-12 VITALS
DIASTOLIC BLOOD PRESSURE: 58 MMHG | HEART RATE: 62 BPM | TEMPERATURE: 97.9 F | SYSTOLIC BLOOD PRESSURE: 143 MMHG | RESPIRATION RATE: 18 BRPM | OXYGEN SATURATION: 96 %

## 2024-06-12 DIAGNOSIS — D50.0 IRON DEFICIENCY ANEMIA DUE TO CHRONIC BLOOD LOSS: Primary | ICD-10-CM

## 2024-06-12 PROCEDURE — 96365 THER/PROPH/DIAG IV INF INIT: CPT

## 2024-06-12 PROCEDURE — 2580000003 HC RX 258: Performed by: NURSE PRACTITIONER

## 2024-06-12 PROCEDURE — 6360000002 HC RX W HCPCS: Performed by: NURSE PRACTITIONER

## 2024-06-12 RX ORDER — ONDANSETRON 2 MG/ML
8 INJECTION INTRAMUSCULAR; INTRAVENOUS
Status: DISCONTINUED | OUTPATIENT
Start: 2024-06-12 | End: 2024-06-13 | Stop reason: HOSPADM

## 2024-06-12 RX ORDER — EPINEPHRINE 1 MG/ML
0.3 INJECTION, SOLUTION, CONCENTRATE INTRAVENOUS PRN
OUTPATIENT
Start: 2024-06-19

## 2024-06-12 RX ORDER — DIPHENHYDRAMINE HYDROCHLORIDE 50 MG/ML
50 INJECTION INTRAMUSCULAR; INTRAVENOUS
OUTPATIENT
Start: 2024-06-19

## 2024-06-12 RX ORDER — DIPHENHYDRAMINE HYDROCHLORIDE 50 MG/ML
50 INJECTION INTRAMUSCULAR; INTRAVENOUS
Status: DISCONTINUED | OUTPATIENT
Start: 2024-06-12 | End: 2024-06-13 | Stop reason: HOSPADM

## 2024-06-12 RX ORDER — ACETAMINOPHEN 325 MG/1
650 TABLET ORAL
OUTPATIENT
Start: 2024-06-19

## 2024-06-12 RX ORDER — EPINEPHRINE 1 MG/ML
0.3 INJECTION, SOLUTION, CONCENTRATE INTRAVENOUS PRN
Status: DISCONTINUED | OUTPATIENT
Start: 2024-06-12 | End: 2024-06-13 | Stop reason: HOSPADM

## 2024-06-12 RX ORDER — SODIUM CHLORIDE 0.9 % (FLUSH) 0.9 %
5-40 SYRINGE (ML) INJECTION PRN
Status: DISCONTINUED | OUTPATIENT
Start: 2024-06-12 | End: 2024-06-13 | Stop reason: HOSPADM

## 2024-06-12 RX ORDER — ALBUTEROL SULFATE 90 UG/1
4 AEROSOL, METERED RESPIRATORY (INHALATION) PRN
Status: DISCONTINUED | OUTPATIENT
Start: 2024-06-12 | End: 2024-06-13 | Stop reason: HOSPADM

## 2024-06-12 RX ORDER — SODIUM CHLORIDE 0.9 % (FLUSH) 0.9 %
5-40 SYRINGE (ML) INJECTION PRN
OUTPATIENT
Start: 2024-06-19

## 2024-06-12 RX ORDER — ALBUTEROL SULFATE 90 UG/1
4 AEROSOL, METERED RESPIRATORY (INHALATION) PRN
OUTPATIENT
Start: 2024-06-19

## 2024-06-12 RX ORDER — ACETAMINOPHEN 325 MG/1
650 TABLET ORAL
Status: DISCONTINUED | OUTPATIENT
Start: 2024-06-12 | End: 2024-06-13 | Stop reason: HOSPADM

## 2024-06-12 RX ORDER — SODIUM CHLORIDE 9 MG/ML
5-250 INJECTION, SOLUTION INTRAVENOUS PRN
Status: DISCONTINUED | OUTPATIENT
Start: 2024-06-12 | End: 2024-06-13 | Stop reason: HOSPADM

## 2024-06-12 RX ORDER — SODIUM CHLORIDE 9 MG/ML
5-250 INJECTION, SOLUTION INTRAVENOUS PRN
Status: CANCELLED | OUTPATIENT
Start: 2024-06-19

## 2024-06-12 RX ORDER — HEPARIN 100 UNIT/ML
500 SYRINGE INTRAVENOUS PRN
OUTPATIENT
Start: 2024-06-19

## 2024-06-12 RX ORDER — SODIUM CHLORIDE 9 MG/ML
INJECTION, SOLUTION INTRAVENOUS CONTINUOUS
OUTPATIENT
Start: 2024-06-19

## 2024-06-12 RX ORDER — SODIUM CHLORIDE 9 MG/ML
5-250 INJECTION, SOLUTION INTRAVENOUS PRN
OUTPATIENT
Start: 2024-06-19

## 2024-06-12 RX ORDER — ONDANSETRON 2 MG/ML
8 INJECTION INTRAMUSCULAR; INTRAVENOUS
OUTPATIENT
Start: 2024-06-19

## 2024-06-12 RX ADMIN — SODIUM CHLORIDE 125 ML/HR: 9 INJECTION, SOLUTION INTRAVENOUS at 10:26

## 2024-06-12 RX ADMIN — SODIUM CHLORIDE, PRESERVATIVE FREE 10 ML: 5 INJECTION INTRAVENOUS at 10:28

## 2024-06-12 RX ADMIN — FERUMOXYTOL 510 MG: 510 INJECTION INTRAVENOUS at 10:37

## 2024-06-12 NOTE — PROGRESS NOTES
Patient arrived to Infusion Center for Feraheme. Assessment completed.  No needs voiced at this time. Patient tolerated infusion well and was observed 30 mins post infusion. VSS. Patient discharged ambulatory.

## 2024-06-28 ENCOUNTER — HOSPITAL ENCOUNTER (OUTPATIENT)
Dept: CT IMAGING | Age: 84
End: 2024-06-28
Attending: UROLOGY
Payer: MEDICARE

## 2024-06-28 DIAGNOSIS — C64.2 MALIGNANT NEOPLASM OF LEFT KIDNEY (HCC): ICD-10-CM

## 2024-06-28 PROCEDURE — 74176 CT ABD & PELVIS W/O CONTRAST: CPT

## 2024-06-28 PROCEDURE — 74176 CT ABD & PELVIS W/O CONTRAST: CPT | Performed by: RADIOLOGY

## 2024-07-11 ENCOUNTER — TELEPHONE (OUTPATIENT)
Dept: UROLOGY | Age: 84
End: 2024-07-11

## 2024-08-23 NOTE — HPI: SKIN LESION (IRRITATED SKIN TAGS)
Duplicate. In error  
How Severe Are They?: moderate
Is This A New Presentation, Or A Follow-Up?: Irritated Skin Lesion
94

## 2024-09-13 ENCOUNTER — OFFICE VISIT (OUTPATIENT)
Dept: UROLOGY | Age: 84
End: 2024-09-13
Payer: MEDICARE

## 2024-09-13 DIAGNOSIS — C64.2 MALIGNANT NEOPLASM OF LEFT KIDNEY (HCC): Primary | ICD-10-CM

## 2024-09-13 LAB
BILIRUBIN, URINE, POC: NEGATIVE
BLOOD URINE, POC: NORMAL
GLUCOSE URINE, POC: 100
KETONES, URINE, POC: NEGATIVE
LEUKOCYTE ESTERASE, URINE, POC: NORMAL
NITRITE, URINE, POC: POSITIVE
PH, URINE, POC: 7 (ref 4.6–8)
PROTEIN,URINE, POC: 100
SPECIFIC GRAVITY, URINE, POC: 1.02 (ref 1–1.03)
URINALYSIS CLARITY, POC: NORMAL
URINALYSIS COLOR, POC: NORMAL
UROBILINOGEN, POC: NORMAL

## 2024-09-13 PROCEDURE — 81003 URINALYSIS AUTO W/O SCOPE: CPT | Performed by: UROLOGY

## 2024-09-13 PROCEDURE — G8400 PT W/DXA NO RESULTS DOC: HCPCS | Performed by: UROLOGY

## 2024-09-13 PROCEDURE — 1090F PRES/ABSN URINE INCON ASSESS: CPT | Performed by: UROLOGY

## 2024-09-13 PROCEDURE — G8427 DOCREV CUR MEDS BY ELIG CLIN: HCPCS | Performed by: UROLOGY

## 2024-09-13 PROCEDURE — 1123F ACP DISCUSS/DSCN MKR DOCD: CPT | Performed by: UROLOGY

## 2024-09-13 PROCEDURE — 99214 OFFICE O/P EST MOD 30 MIN: CPT | Performed by: UROLOGY

## 2024-09-13 PROCEDURE — G8417 CALC BMI ABV UP PARAM F/U: HCPCS | Performed by: UROLOGY

## 2024-09-13 PROCEDURE — 1036F TOBACCO NON-USER: CPT | Performed by: UROLOGY

## 2024-10-01 ENCOUNTER — TRANSCRIBE ORDERS (OUTPATIENT)
Dept: SCHEDULING | Age: 84
End: 2024-10-01

## 2024-10-01 DIAGNOSIS — Z12.31 OTHER SCREENING MAMMOGRAM: Primary | ICD-10-CM

## 2024-11-11 ENCOUNTER — HOSPITAL ENCOUNTER (OUTPATIENT)
Dept: MAMMOGRAPHY | Age: 84
Discharge: HOME | End: 2024-11-14
Payer: MEDICARE

## 2024-11-11 DIAGNOSIS — Z12.31 OTHER SCREENING MAMMOGRAM: ICD-10-CM

## 2024-11-11 PROCEDURE — 77063 BREAST TOMOSYNTHESIS BI: CPT

## 2024-11-15 ENCOUNTER — HOSPITAL ENCOUNTER (OUTPATIENT)
Dept: LAB | Age: 84
Discharge: HOME | End: 2024-11-15
Payer: MEDICARE

## 2024-11-15 ENCOUNTER — OFFICE VISIT (OUTPATIENT)
Dept: ONCOLOGY | Age: 84
End: 2024-11-15

## 2024-11-15 VITALS
HEART RATE: 77 BPM | HEIGHT: 65 IN | RESPIRATION RATE: 20 BRPM | OXYGEN SATURATION: 97 % | WEIGHT: 203 LBS | TEMPERATURE: 98.5 F | DIASTOLIC BLOOD PRESSURE: 68 MMHG | SYSTOLIC BLOOD PRESSURE: 180 MMHG | BODY MASS INDEX: 33.82 KG/M2

## 2024-11-15 DIAGNOSIS — D63.1 ANEMIA DUE TO STAGE 3B CHRONIC KIDNEY DISEASE (HCC): ICD-10-CM

## 2024-11-15 DIAGNOSIS — D50.0 IRON DEFICIENCY ANEMIA DUE TO CHRONIC BLOOD LOSS: ICD-10-CM

## 2024-11-15 DIAGNOSIS — I26.99 PULMONARY EMBOLISM, UNSPECIFIED CHRONICITY, UNSPECIFIED PULMONARY EMBOLISM TYPE, UNSPECIFIED WHETHER ACUTE COR PULMONALE PRESENT (HCC): ICD-10-CM

## 2024-11-15 DIAGNOSIS — N18.32 ANEMIA DUE TO STAGE 3B CHRONIC KIDNEY DISEASE (HCC): ICD-10-CM

## 2024-11-15 DIAGNOSIS — Z03.89 ENCOUNTER FOR OBSERVATION FOR OTHER SUSPECTED DISEASES AND CONDITIONS RULED OUT: ICD-10-CM

## 2024-11-15 DIAGNOSIS — D50.0 IRON DEFICIENCY ANEMIA DUE TO CHRONIC BLOOD LOSS: Primary | ICD-10-CM

## 2024-11-15 LAB
ALBUMIN SERPL-MCNC: 3.7 G/DL (ref 3.2–4.6)
ALBUMIN/GLOB SERPL: 0.9 (ref 1–1.9)
ALP SERPL-CCNC: 120 U/L (ref 35–104)
ALT SERPL-CCNC: 23 U/L (ref 8–45)
ANION GAP SERPL CALC-SCNC: 11 MMOL/L (ref 7–16)
AST SERPL-CCNC: 28 U/L (ref 15–37)
BASOPHILS # BLD: 0.1 K/UL (ref 0–0.2)
BASOPHILS NFR BLD: 1 % (ref 0–2)
BILIRUB SERPL-MCNC: 0.3 MG/DL (ref 0–1.2)
BUN SERPL-MCNC: 23 MG/DL (ref 8–23)
CALCIUM SERPL-MCNC: 9.4 MG/DL (ref 8.8–10.2)
CHLORIDE SERPL-SCNC: 105 MMOL/L (ref 98–107)
CO2 SERPL-SCNC: 25 MMOL/L (ref 20–29)
CREAT SERPL-MCNC: 1.34 MG/DL (ref 0.6–1.1)
DIFFERENTIAL METHOD BLD: ABNORMAL
EOSINOPHIL # BLD: 0.2 K/UL (ref 0–0.8)
EOSINOPHIL NFR BLD: 2 % (ref 0.5–7.8)
ERYTHROCYTE [DISTWIDTH] IN BLOOD BY AUTOMATED COUNT: 13.2 % (ref 11.9–14.6)
FERRITIN SERPL-MCNC: 226 NG/ML (ref 8–388)
GLOBULIN SER CALC-MCNC: 3.9 G/DL (ref 2.3–3.5)
GLUCOSE SERPL-MCNC: 99 MG/DL (ref 70–99)
HCT VFR BLD AUTO: 35.5 % (ref 35.8–46.3)
HGB BLD-MCNC: 11.2 G/DL (ref 11.7–15.4)
IMM GRANULOCYTES # BLD AUTO: 0.1 K/UL (ref 0–0.5)
IMM GRANULOCYTES NFR BLD AUTO: 1 % (ref 0–5)
IRON SATN MFR SERPL: 20 % (ref 20–50)
IRON SERPL-MCNC: 52 UG/DL (ref 35–100)
LYMPHOCYTES # BLD: 2.1 K/UL (ref 0.5–4.6)
LYMPHOCYTES NFR BLD: 25 % (ref 13–44)
MCH RBC QN AUTO: 28.1 PG (ref 26.1–32.9)
MCHC RBC AUTO-ENTMCNC: 31.5 G/DL (ref 31.4–35)
MCV RBC AUTO: 89.2 FL (ref 82–102)
MONOCYTES # BLD: 0.6 K/UL (ref 0.1–1.3)
MONOCYTES NFR BLD: 7 % (ref 4–12)
NEUTS SEG # BLD: 5.4 K/UL (ref 1.7–8.2)
NEUTS SEG NFR BLD: 64 % (ref 43–78)
NRBC # BLD: 0 K/UL (ref 0–0.2)
PLATELET # BLD AUTO: 236 K/UL (ref 150–450)
PMV BLD AUTO: 9.9 FL (ref 9.4–12.3)
POTASSIUM SERPL-SCNC: 4.2 MMOL/L (ref 3.5–5.1)
PROT SERPL-MCNC: 7.7 G/DL (ref 6.3–8.2)
RBC # BLD AUTO: 3.98 M/UL (ref 4.05–5.2)
SODIUM SERPL-SCNC: 141 MMOL/L (ref 136–145)
TIBC SERPL-MCNC: 257 UG/DL (ref 240–450)
UIBC SERPL-MCNC: 205 UG/DL (ref 112–347)
WBC # BLD AUTO: 8.4 K/UL (ref 4.3–11.1)

## 2024-11-15 PROCEDURE — 36415 COLL VENOUS BLD VENIPUNCTURE: CPT

## 2024-11-15 PROCEDURE — 80053 COMPREHEN METABOLIC PANEL: CPT

## 2024-11-15 PROCEDURE — 83540 ASSAY OF IRON: CPT

## 2024-11-15 PROCEDURE — 82728 ASSAY OF FERRITIN: CPT

## 2024-11-15 PROCEDURE — 85025 COMPLETE CBC W/AUTO DIFF WBC: CPT

## 2024-11-15 PROCEDURE — 83550 IRON BINDING TEST: CPT

## 2024-11-15 RX ORDER — CHLORTHALIDONE 50 MG/1
50 TABLET ORAL DAILY
COMMUNITY
Start: 2024-10-16

## 2024-11-15 ASSESSMENT — PATIENT HEALTH QUESTIONNAIRE - PHQ9
1. LITTLE INTEREST OR PLEASURE IN DOING THINGS: NOT AT ALL
SUM OF ALL RESPONSES TO PHQ QUESTIONS 1-9: 0
SUM OF ALL RESPONSES TO PHQ9 QUESTIONS 1 & 2: 0
2. FEELING DOWN, DEPRESSED OR HOPELESS: NOT AT ALL

## 2024-11-15 NOTE — PROGRESS NOTES
[facility]: Office Visit Established Patient    Chief Complaint:    Chief Complaint   Patient presents with    Follow-up         History of Present Illness:  Ms. Phipps is a 83 y.o. female who returns today for management of PE and renal mass.  She was diagnosed with PE in February 2023 after presenting with SOB and chest pain, imaging also showed a complex left renal mass.  She was discharged on Eliquis but eventually changed to Xarelto by her PCP.  She was seen for pre-op prior to nephrectomy and noted to have a Hgb of 5.8, she was felt to have an acute GIB, endoscopy and colonoscopy showed no active bleeding although the colon prep was suboptimal.  She was transfused with good relief of dyspnea.  She completed nephrectomy on 5/25 and is recovering well from this, she is currently on heparin as she had a repeat CT PE which showed multiple persistent PEs.  Hematology was consulted for recommendations, we recommended restarting Xarelto - the persistence of thrombotic disease at this point does not make this a Xarelto failure, especially with removal of the likely provoking factor.  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      She is here today for follow up.  She is off Xarelto after completing 6 months of AC, this was her preference.  She denies any new complaints.  Energy levels are age appropriate and unchanged.  There are no GI or bowel complaints.  Appetite is good and weight is stable.  Mild fatigue is ongoing.  She denies any chest pain or calf pain/edema.  There has been no bleeding or easy bruising after stopping Xarelto.      Review of Systems:  Constitutional: Negative.   HENT: Negative.   Eyes: Negative.   Respiratory: Negative.   Cardiovascular: Negative.   Gastrointestinal: Negative.   Genitourinary: Negative.   Musculoskeletal: Negative.   Skin: Negative.   Neurological: Negative.

## 2024-11-15 NOTE — PATIENT INSTRUCTIONS
Patient Information from Today's Visit    Diagnosis: Anemia/PE      Follow Up Instructions: 6 months      Treatment Summary has been discussed and given to patient: N/A      Current Labs:   Hospital Outpatient Visit on 11/15/2024   Component Date Value Ref Range Status    WBC 11/15/2024 8.4  4.3 - 11.1 K/uL Final    RBC 11/15/2024 3.98 (L)  4.05 - 5.2 M/uL Final    Hemoglobin 11/15/2024 11.2 (L)  11.7 - 15.4 g/dL Final    Hematocrit 11/15/2024 35.5 (L)  35.8 - 46.3 % Final    MCV 11/15/2024 89.2  82.0 - 102.0 FL Final    MCH 11/15/2024 28.1  26.1 - 32.9 PG Final    MCHC 11/15/2024 31.5  31.4 - 35.0 g/dL Final    RDW 11/15/2024 13.2  11.9 - 14.6 % Final    Platelets 11/15/2024 236  150 - 450 K/uL Final    MPV 11/15/2024 9.9  9.4 - 12.3 FL Final    nRBC 11/15/2024 0.00  0.0 - 0.2 K/uL Final    **Note: Absolute NRBC parameter is now reported with Hemogram**    Neutrophils % 11/15/2024 64  43 - 78 % Final    Lymphocytes % 11/15/2024 25  13 - 44 % Final    Monocytes % 11/15/2024 7  4.0 - 12.0 % Final    Eosinophils % 11/15/2024 2  0.5 - 7.8 % Final    Basophils % 11/15/2024 1  0.0 - 2.0 % Final    Immature Granulocytes % 11/15/2024 1  0.0 - 5.0 % Final    Neutrophils Absolute 11/15/2024 5.4  1.7 - 8.2 K/UL Final    Lymphocytes Absolute 11/15/2024 2.1  0.5 - 4.6 K/UL Final    Monocytes Absolute 11/15/2024 0.6  0.1 - 1.3 K/UL Final    Eosinophils Absolute 11/15/2024 0.2  0.0 - 0.8 K/UL Final    Basophils Absolute 11/15/2024 0.1  0.0 - 0.2 K/UL Final    Immature Granulocytes Absolute 11/15/2024 0.1  0.0 - 0.5 K/UL Final    Differential Type 11/15/2024 AUTOMATED    Final    Sodium 11/15/2024 141  136 - 145 mmol/L Final    Potassium 11/15/2024 4.2  3.5 - 5.1 mmol/L Final    Chloride 11/15/2024 105  98 - 107 mmol/L Final    CO2 11/15/2024 25  20 - 29 mmol/L Final    Anion Gap 11/15/2024 11  7 - 16 mmol/L Final    Glucose 11/15/2024 99  70 - 99 mg/dL Final    Comment: <70 mg/dL Consistent with, but not fully diagnostic of

## 2025-01-23 ENCOUNTER — APPOINTMENT (OUTPATIENT)
Dept: URBAN - METROPOLITAN AREA CLINIC 24 | Facility: CLINIC | Age: 85
Setting detail: DERMATOLOGY
End: 2025-01-23

## 2025-01-23 DIAGNOSIS — Z85.828 PERSONAL HISTORY OF OTHER MALIGNANT NEOPLASM OF SKIN: ICD-10-CM

## 2025-01-23 DIAGNOSIS — D485 NEOPLASM OF UNCERTAIN BEHAVIOR OF SKIN: ICD-10-CM

## 2025-01-23 DIAGNOSIS — L57.0 ACTINIC KERATOSIS: ICD-10-CM

## 2025-01-23 DIAGNOSIS — L57.8 OTHER SKIN CHANGES DUE TO CHRONIC EXPOSURE TO NONIONIZING RADIATION: ICD-10-CM | Status: INADEQUATELY CONTROLLED

## 2025-01-23 DIAGNOSIS — Z71.89 OTHER SPECIFIED COUNSELING: ICD-10-CM

## 2025-01-23 DIAGNOSIS — L82.0 INFLAMED SEBORRHEIC KERATOSIS: ICD-10-CM

## 2025-01-23 DIAGNOSIS — Z87.2 PERSONAL HISTORY OF DISEASES OF THE SKIN AND SUBCUTANEOUS TISSUE: ICD-10-CM

## 2025-01-23 PROBLEM — D48.5 NEOPLASM OF UNCERTAIN BEHAVIOR OF SKIN: Status: ACTIVE | Noted: 2025-01-23

## 2025-01-23 PROCEDURE — 17003 DESTRUCT PREMALG LES 2-14: CPT | Mod: 59

## 2025-01-23 PROCEDURE — ? SUNSCREEN RECOMMENDATIONS

## 2025-01-23 PROCEDURE — ? BIOPSY BY SHAVE METHOD

## 2025-01-23 PROCEDURE — 11102 TANGNTL BX SKIN SINGLE LES: CPT | Mod: 59

## 2025-01-23 PROCEDURE — 99213 OFFICE O/P EST LOW 20 MIN: CPT | Mod: 25

## 2025-01-23 PROCEDURE — 17110 DESTRUCTION B9 LES UP TO 14: CPT

## 2025-01-23 PROCEDURE — 17000 DESTRUCT PREMALG LESION: CPT | Mod: 59

## 2025-01-23 PROCEDURE — ? LIQUID NITROGEN

## 2025-01-23 PROCEDURE — ? COUNSELING

## 2025-01-23 ASSESSMENT — LOCATION DETAILED DESCRIPTION DERM
LOCATION DETAILED: RIGHT DISTAL PRETIBIAL REGION
LOCATION DETAILED: LEFT LATERAL PROXIMAL UPPER ARM
LOCATION DETAILED: LEFT PROXIMAL PRETIBIAL REGION
LOCATION DETAILED: LEFT DISTAL PRETIBIAL REGION
LOCATION DETAILED: RIGHT SUPERIOR UPPER BACK
LOCATION DETAILED: RIGHT SUPERIOR LATERAL FOREHEAD
LOCATION DETAILED: RIGHT PROXIMAL DORSAL FOREARM
LOCATION DETAILED: MIDDLE STERNUM
LOCATION DETAILED: RIGHT INFERIOR LATERAL NECK
LOCATION DETAILED: RIGHT ANTERIOR SHOULDER
LOCATION DETAILED: RIGHT RADIAL DORSAL HAND
LOCATION DETAILED: LEFT SUPERIOR MEDIAL UPPER BACK
LOCATION DETAILED: SUPERIOR THORACIC SPINE
LOCATION DETAILED: RIGHT SUPERIOR MEDIAL UPPER BACK
LOCATION DETAILED: LEFT SUPERIOR FOREHEAD
LOCATION DETAILED: NASAL SUPRATIP
LOCATION DETAILED: LEFT DORSAL WRIST

## 2025-01-23 ASSESSMENT — LOCATION SIMPLE DESCRIPTION DERM
LOCATION SIMPLE: LEFT FOREHEAD
LOCATION SIMPLE: RIGHT UPPER BACK
LOCATION SIMPLE: RIGHT PRETIBIAL REGION
LOCATION SIMPLE: NOSE
LOCATION SIMPLE: LEFT UPPER ARM
LOCATION SIMPLE: CHEST
LOCATION SIMPLE: RIGHT FOREARM
LOCATION SIMPLE: RIGHT ANTERIOR NECK
LOCATION SIMPLE: LEFT UPPER BACK
LOCATION SIMPLE: RIGHT FOREHEAD
LOCATION SIMPLE: LEFT WRIST
LOCATION SIMPLE: UPPER BACK
LOCATION SIMPLE: RIGHT HAND
LOCATION SIMPLE: RIGHT SHOULDER
LOCATION SIMPLE: LEFT PRETIBIAL REGION

## 2025-01-23 ASSESSMENT — LOCATION ZONE DERM
LOCATION ZONE: LEG
LOCATION ZONE: HAND
LOCATION ZONE: ARM
LOCATION ZONE: NECK
LOCATION ZONE: TRUNK
LOCATION ZONE: FACE
LOCATION ZONE: NOSE

## 2025-01-23 NOTE — PROCEDURE: LIQUID NITROGEN
Duration Of Freeze Thaw-Cycle (Seconds): 0
Add 52 Modifier (Optional): no
Render Post Care In The Note?: yes
Detail Level: Zone
Medical Necessity Clause: This procedure was medically necessary because the lesions that were treated were:
Post-Care Instructions: I reviewed with the patient in detail post-care instructions. Patient is to wear sunprotection, and avoid picking at any of the treated lesions. Pt may apply Vaseline to crusted or scabbing areas.
Spray Paint Text: The liquid nitrogen was applied to the skin utilizing a spray paint frosting technique.
Aperture Size (Optional): C
Total Number Of Lesions Treated: 9
Consent: The patient's consent was obtained including but not limited to risks of crusting, scabbing, blistering, scarring, darker or lighter pigmentary change, recurrence, incomplete removal and infection.
Number Of Freeze-Thaw Cycles: 2 freeze-thaw cycles
Medical Necessity Information: It is in your best interest to select a reason for this procedure from the list below. All of these items fulfill various CMS LCD requirements except the new and changing color options.
Detail Level: Detailed
Application Tool (Optional): Liquid Nitrogen Sprayer

## 2025-01-23 NOTE — PROCEDURE: BIOPSY BY SHAVE METHOD
Detail Level: Detailed
Depth Of Biopsy: dermis
Was A Bandage Applied: Yes
Size Of Lesion In Cm: 0
Biopsy Type: H and E
Biopsy Method: double edge Personna blade
Anesthesia Type: 1% lidocaine without epinephrine and a 1:12 solution of 8.4% sodium bicarbonate
Anesthesia Volume In Cc: 0.5
Hemostasis: Aluminum Chloride
Wound Care: Petrolatum
Dressing: bandage
Destruction After The Procedure: No
Type Of Destruction Used: Curettage
Curettage Text: The wound bed was treated with curettage after the biopsy was performed.
Cryotherapy Text: The wound bed was treated with cryotherapy after the biopsy was performed.
Electrodesiccation Text: The wound bed was treated with electrodesiccation after the biopsy was performed.
Electrodesiccation And Curettage Text: The wound bed was treated with electrodesiccation and curettage after the biopsy was performed.
Silver Nitrate Text: The wound bed was treated with silver nitrate after the biopsy was performed.
Lab: [id number]
Lab Facility: [id number]
Medical Necessity Information: It is in your best interest to select a reason for this procedure from the list below. All of these items fulfill various CMS LCD requirements except the new and changing color options.
Consent: Written consent was obtained and risks were reviewed including but not limited to scarring, infection, bleeding, scabbing, incomplete removal, nerve damage and allergy to anesthesia.
Post-Care Instructions: I reviewed with the patient in detail post-care instructions. Patient is to keep the biopsy site dry overnight, and then apply bacitracin twice daily until healed. Patient may apply hydrogen peroxide soaks to remove any crusting.
Notification Instructions: Patient will be notified of biopsy results. However, patient instructed to call the office if not contacted within 2 weeks.
Billing Type: Third-Party Bill
Information: Selecting Yes will display possible errors in your note based on the variables you have selected. This validation is only offered as a suggestion for you. PLEASE NOTE THAT THE VALIDATION TEXT WILL BE REMOVED WHEN YOU FINALIZE YOUR NOTE. IF YOU WANT TO FAX A PRELIMINARY NOTE YOU WILL NEED TO TOGGLE THIS TO 'NO' IF YOU DO NOT WANT IT IN YOUR FAXED NOTE.

## 2025-01-23 NOTE — PROCEDURE: SUNSCREEN RECOMMENDATIONS
[Dear  ___] : Dear  [unfilled],
[FreeTextEntry1] :  I had the pleasure of evaluating your patient, Lurdes Stiles.  Thank you very much for allowing me to participate in the care of this patient. Please see my note below.  If you have any questions, please do not hesitate to contact me.  Sincerely,  Benedicto Brown III, MD [facility]/sg
Detail Level: Zone
General Sunscreen Counseling: I recommended a broad spectrum sunscreen with a SPF of 30 or higher.  I explained that SPF 30 sunscreens block approximately 97 percent of the sun's harmful rays.  Sunscreens should be applied at least 15 minutes prior to expected sun exposure and then every 2 hours after that as long as sun exposure continues. If swimming or exercising sunscreen should be reapplied every 45 minutes to an hour after getting wet or sweating.  One ounce, or the equivalent of a shot glass full of sunscreen, is adequate to protect the skin not covered by a bathing suit. I also recommended a lip balm with a sunscreen as well. Sun protective clothing can be used in lieu of sunscreen but must be worn the entire time you are exposed to the sun's rays.

## 2025-04-24 ENCOUNTER — APPOINTMENT (OUTPATIENT)
Dept: URBAN - METROPOLITAN AREA CLINIC 24 | Facility: CLINIC | Age: 85
Setting detail: DERMATOLOGY
End: 2025-04-24

## 2025-04-24 DIAGNOSIS — L82.0 INFLAMED SEBORRHEIC KERATOSIS: ICD-10-CM

## 2025-04-24 PROCEDURE — 17111 DESTRUCTION B9 LESIONS 15/>: CPT

## 2025-04-24 PROCEDURE — ? LIQUID NITROGEN

## 2025-04-24 PROCEDURE — ? COUNSELING

## 2025-04-24 ASSESSMENT — LOCATION DETAILED DESCRIPTION DERM
LOCATION DETAILED: RIGHT MEDIAL BREAST 4-5:00 REGION
LOCATION DETAILED: LEFT SUPERIOR LATERAL MIDBACK
LOCATION DETAILED: LEFT LATERAL BREAST 5-6:00 REGION
LOCATION DETAILED: RIGHT LATERAL BREAST 6-7:00 REGION
LOCATION DETAILED: LEFT PERIAREOLAR BREAST 8-9:00 REGION
LOCATION DETAILED: RIGHT SUPERIOR UPPER BACK
LOCATION DETAILED: LEFT MEDIAL BREAST 6-7:00 REGION
LOCATION DETAILED: RIGHT CLAVICULAR NECK
LOCATION DETAILED: RIGHT LATERAL BREAST 7-8:00 REGION
LOCATION DETAILED: RIGHT MEDIAL UPPER BACK
LOCATION DETAILED: RIGHT MID-UPPER BACK
LOCATION DETAILED: LEFT SUPERIOR MEDIAL UPPER BACK
LOCATION DETAILED: RIGHT RIB CAGE
LOCATION DETAILED: LEFT MEDIAL UPPER BACK
LOCATION DETAILED: LEFT SUPERIOR LATERAL UPPER BACK
LOCATION DETAILED: LEFT INFERIOR LATERAL UPPER BACK
LOCATION DETAILED: RIGHT MEDIAL BREAST 5-6:00 REGION
LOCATION DETAILED: RIGHT INFERIOR LATERAL NECK
LOCATION DETAILED: RIGHT INFERIOR UPPER BACK
LOCATION DETAILED: LEFT MID-UPPER BACK
LOCATION DETAILED: LEFT SUPERIOR UPPER BACK

## 2025-04-24 ASSESSMENT — LOCATION ZONE DERM
LOCATION ZONE: NECK
LOCATION ZONE: TRUNK

## 2025-04-24 ASSESSMENT — LOCATION SIMPLE DESCRIPTION DERM
LOCATION SIMPLE: RIGHT BREAST
LOCATION SIMPLE: RIGHT ANTERIOR NECK
LOCATION SIMPLE: ABDOMEN
LOCATION SIMPLE: RIGHT UPPER BACK
LOCATION SIMPLE: LEFT LOWER BACK
LOCATION SIMPLE: LEFT BREAST
LOCATION SIMPLE: LEFT UPPER BACK

## 2025-04-24 NOTE — PROCEDURE: LIQUID NITROGEN
Consent: The patient's consent was obtained including but not limited to risks of crusting, scabbing, blistering, scarring, darker or lighter pigmentary change, recurrence, incomplete removal and infection.
Detail Level: Detailed
Medical Necessity Information: It is in your best interest to select a reason for this procedure from the list below. All of these items fulfill various CMS LCD requirements except the new and changing color options.
Post-Care Instructions: I reviewed with the patient in detail post-care instructions. Patient is to wear sunprotection, and avoid picking at any of the treated lesions. Pt may apply Vaseline to crusted or scabbing areas.
Show Topical Anesthesia Variable?: Yes
Aperture Size (Optional): C
Application Tool (Optional): Cry-AC
Number Of Freeze-Thaw Cycles: 3 freeze-thaw cycles
Spray Paint Text: The liquid nitrogen was applied to the skin utilizing a spray paint frosting technique.
Include Z78.9 (Other Specified Conditions Influencing Health Status) As An Associated Diagnosis?: No
Medical Necessity Clause: This procedure was medically necessary because the lesions that were treated were:

## 2025-04-29 ENCOUNTER — TELEPHONE (OUTPATIENT)
Dept: ONCOLOGY | Age: 85
End: 2025-04-29

## 2025-05-19 DIAGNOSIS — N18.32 ANEMIA DUE TO STAGE 3B CHRONIC KIDNEY DISEASE (HCC): ICD-10-CM

## 2025-05-19 DIAGNOSIS — D50.0 IRON DEFICIENCY ANEMIA DUE TO CHRONIC BLOOD LOSS: ICD-10-CM

## 2025-05-19 DIAGNOSIS — D63.1 ANEMIA DUE TO STAGE 3B CHRONIC KIDNEY DISEASE (HCC): ICD-10-CM

## 2025-05-19 LAB
ALBUMIN SERPL-MCNC: 3.6 G/DL (ref 3.2–4.6)
ALBUMIN/GLOB SERPL: 1.1 (ref 1–1.9)
ALP SERPL-CCNC: 101 U/L (ref 35–104)
ALT SERPL-CCNC: 27 U/L (ref 8–45)
ANION GAP SERPL CALC-SCNC: 12 MMOL/L (ref 7–16)
AST SERPL-CCNC: 25 U/L (ref 15–37)
BASOPHILS # BLD: 0.05 K/UL (ref 0–0.2)
BASOPHILS NFR BLD: 0.7 % (ref 0–2)
BILIRUB SERPL-MCNC: 0.3 MG/DL (ref 0–1.2)
BUN SERPL-MCNC: 33 MG/DL (ref 8–23)
CALCIUM SERPL-MCNC: 9.9 MG/DL (ref 8.8–10.2)
CHLORIDE SERPL-SCNC: 104 MMOL/L (ref 98–107)
CO2 SERPL-SCNC: 26 MMOL/L (ref 20–29)
CREAT SERPL-MCNC: 1.39 MG/DL (ref 0.6–1.1)
DIFFERENTIAL METHOD BLD: ABNORMAL
EOSINOPHIL # BLD: 0.19 K/UL (ref 0–0.8)
EOSINOPHIL NFR BLD: 2.5 % (ref 0.5–7.8)
ERYTHROCYTE [DISTWIDTH] IN BLOOD BY AUTOMATED COUNT: 13.4 % (ref 11.9–14.6)
FERRITIN SERPL-MCNC: 170 NG/ML (ref 8–388)
GLOBULIN SER CALC-MCNC: 3.4 G/DL (ref 2.3–3.5)
GLUCOSE SERPL-MCNC: 158 MG/DL (ref 70–99)
HCT VFR BLD AUTO: 36.8 % (ref 35.8–46.3)
HGB BLD-MCNC: 11.6 G/DL (ref 11.7–15.4)
IMM GRANULOCYTES # BLD AUTO: 0.03 K/UL (ref 0–0.5)
IMM GRANULOCYTES NFR BLD AUTO: 0.4 % (ref 0–5)
IRON SATN MFR SERPL: 17 % (ref 20–50)
IRON SERPL-MCNC: 50 UG/DL (ref 35–100)
LYMPHOCYTES # BLD: 1.6 K/UL (ref 0.5–4.6)
LYMPHOCYTES NFR BLD: 20.8 % (ref 13–44)
MCH RBC QN AUTO: 28 PG (ref 26.1–32.9)
MCHC RBC AUTO-ENTMCNC: 31.5 G/DL (ref 31.4–35)
MCV RBC AUTO: 88.7 FL (ref 82–102)
MONOCYTES # BLD: 0.51 K/UL (ref 0.1–1.3)
MONOCYTES NFR BLD: 6.6 % (ref 4–12)
NEUTS SEG # BLD: 5.3 K/UL (ref 1.7–8.2)
NEUTS SEG NFR BLD: 69 % (ref 43–78)
NRBC # BLD: 0 K/UL (ref 0–0.2)
PLATELET # BLD AUTO: 243 K/UL (ref 150–450)
PMV BLD AUTO: 10.8 FL (ref 9.4–12.3)
POTASSIUM SERPL-SCNC: 4.5 MMOL/L (ref 3.5–5.1)
PROT SERPL-MCNC: 7 G/DL (ref 6.3–8.2)
RBC # BLD AUTO: 4.15 M/UL (ref 4.05–5.2)
SODIUM SERPL-SCNC: 141 MMOL/L (ref 136–145)
TIBC SERPL-MCNC: 293 UG/DL (ref 240–450)
UIBC SERPL-MCNC: 243 UG/DL (ref 112–347)
WBC # BLD AUTO: 7.7 K/UL (ref 4.3–11.1)

## 2025-06-02 ENCOUNTER — OFFICE VISIT (OUTPATIENT)
Dept: ONCOLOGY | Age: 85
End: 2025-06-02
Payer: MEDICARE

## 2025-06-02 VITALS
BODY MASS INDEX: 32.59 KG/M2 | OXYGEN SATURATION: 94 % | DIASTOLIC BLOOD PRESSURE: 66 MMHG | HEART RATE: 68 BPM | SYSTOLIC BLOOD PRESSURE: 129 MMHG | TEMPERATURE: 97.5 F | HEIGHT: 65 IN | WEIGHT: 195.6 LBS | RESPIRATION RATE: 18 BRPM

## 2025-06-02 DIAGNOSIS — R53.83 FATIGUE, UNSPECIFIED TYPE: ICD-10-CM

## 2025-06-02 DIAGNOSIS — D50.0 IRON DEFICIENCY ANEMIA DUE TO CHRONIC BLOOD LOSS: ICD-10-CM

## 2025-06-02 DIAGNOSIS — D63.1 ANEMIA DUE TO STAGE 3B CHRONIC KIDNEY DISEASE (HCC): ICD-10-CM

## 2025-06-02 DIAGNOSIS — Z86.711 HISTORY OF PULMONARY EMBOLUS (PE): Primary | ICD-10-CM

## 2025-06-02 DIAGNOSIS — N18.32 ANEMIA DUE TO STAGE 3B CHRONIC KIDNEY DISEASE (HCC): ICD-10-CM

## 2025-06-02 PROCEDURE — G8400 PT W/DXA NO RESULTS DOC: HCPCS | Performed by: NURSE PRACTITIONER

## 2025-06-02 PROCEDURE — 3078F DIAST BP <80 MM HG: CPT | Performed by: NURSE PRACTITIONER

## 2025-06-02 PROCEDURE — 1159F MED LIST DOCD IN RCRD: CPT | Performed by: NURSE PRACTITIONER

## 2025-06-02 PROCEDURE — 1123F ACP DISCUSS/DSCN MKR DOCD: CPT | Performed by: NURSE PRACTITIONER

## 2025-06-02 PROCEDURE — 1160F RVW MEDS BY RX/DR IN RCRD: CPT | Performed by: NURSE PRACTITIONER

## 2025-06-02 PROCEDURE — 99214 OFFICE O/P EST MOD 30 MIN: CPT | Performed by: NURSE PRACTITIONER

## 2025-06-02 PROCEDURE — 1036F TOBACCO NON-USER: CPT | Performed by: NURSE PRACTITIONER

## 2025-06-02 PROCEDURE — G8417 CALC BMI ABV UP PARAM F/U: HCPCS | Performed by: NURSE PRACTITIONER

## 2025-06-02 PROCEDURE — 3074F SYST BP LT 130 MM HG: CPT | Performed by: NURSE PRACTITIONER

## 2025-06-02 PROCEDURE — 1090F PRES/ABSN URINE INCON ASSESS: CPT | Performed by: NURSE PRACTITIONER

## 2025-06-02 PROCEDURE — 1126F AMNT PAIN NOTED NONE PRSNT: CPT | Performed by: NURSE PRACTITIONER

## 2025-06-02 PROCEDURE — G8427 DOCREV CUR MEDS BY ELIG CLIN: HCPCS | Performed by: NURSE PRACTITIONER

## 2025-06-02 RX ORDER — METFORMIN HYDROCHLORIDE 500 MG/1
500 TABLET, EXTENDED RELEASE ORAL 2 TIMES DAILY
COMMUNITY

## 2025-06-02 RX ORDER — SPIRONOLACTONE 25 MG/1
25 TABLET ORAL DAILY
COMMUNITY
Start: 2025-05-06

## 2025-06-02 RX ORDER — ALENDRONATE SODIUM 70 MG/1
70 TABLET ORAL WEEKLY
COMMUNITY
Start: 2025-01-17

## 2025-06-02 ASSESSMENT — PATIENT HEALTH QUESTIONNAIRE - PHQ9
SUM OF ALL RESPONSES TO PHQ QUESTIONS 1-9: 0
1. LITTLE INTEREST OR PLEASURE IN DOING THINGS: NOT AT ALL
SUM OF ALL RESPONSES TO PHQ QUESTIONS 1-9: 0
2. FEELING DOWN, DEPRESSED OR HOPELESS: NOT AT ALL

## 2025-06-02 NOTE — PROGRESS NOTES
05/19/2025 36.8  35.8 - 46.3 % Final    MCV 05/19/2025 88.7  82 - 102 FL Final    MCH 05/19/2025 28.0  26.1 - 32.9 PG Final    MCHC 05/19/2025 31.5  31.4 - 35.0 g/dL Final    RDW 05/19/2025 13.4  11.9 - 14.6 % Final    Platelets 05/19/2025 243  150 - 450 K/uL Final    MPV 05/19/2025 10.8  9.4 - 12.3 FL Final    nRBC 05/19/2025 0.00  0.0 - 0.2 K/uL Final    **Note: Absolute NRBC parameter is now reported with Hemogram**    Differential Type 05/19/2025 AUTOMATED    Final    Neutrophils % 05/19/2025 69.0  43.0 - 78.0 % Final    Lymphocytes % 05/19/2025 20.8  13.0 - 44.0 % Final    Monocytes % 05/19/2025 6.6  4.0 - 12.0 % Final    Eosinophils % 05/19/2025 2.5  0.5 - 7.8 % Final    Basophils % 05/19/2025 0.7  0.0 - 2.0 % Final    Immature Granulocytes % 05/19/2025 0.4  0.0 - 5.0 % Final    Neutrophils Absolute 05/19/2025 5.30  1.70 - 8.20 K/UL Final    Lymphocytes Absolute 05/19/2025 1.60  0.50 - 4.60 K/UL Final    Monocytes Absolute 05/19/2025 0.51  0.10 - 1.30 K/UL Final    Eosinophils Absolute 05/19/2025 0.19  0.00 - 0.80 K/UL Final    Basophils Absolute 05/19/2025 0.05  0.00 - 0.20 K/UL Final    Immature Granulocytes Absolute 05/19/2025 0.03  0.0 - 0.5 K/UL Final          Imaging:  XR CHEST (2 VW)    Result Date: 5/15/2023  Negative for acute abnormality. Aortic atherosclerosis..     XR CHEST PORTABLE    Result Date: 5/27/2023  1.  Cardiac silhouette is borderline enlarged, not significant changed. 2.  No acute airspace consolidation.     CT CHEST PULMONARY EMBOLISM W CONTRAST    Result Date: 5/27/2023  1.  Bilateral segmental pulmonary emboli. No CT evidence of right heart strain. 2.  Subpleural atelectasis within the left lower lobe. Bilateral pulmonary emboli this finding was discussed with the patient's nurse, Liza Blanco via telephone at 5/27/2023 2:24 PM.         ASSESSMENT:   Diagnosis Orders   1. History of pulmonary embolus (PE)        2. Anemia due to stage 3b chronic kidney disease (HCC)        3.

## (undated) DEVICE — SYRINGE MED 10ML LUERLOCK TIP W/O SFTY DISP

## (undated) DEVICE — BUTTON SWITCH PENCIL BLADE ELECTRODE, HOLSTER: Brand: EDGE

## (undated) DEVICE — APPLIER CLP M L L11.4IN DIA10MM ENDOSCP ROT MULT FOR LIG

## (undated) DEVICE — (D)SYR 10ML 1/5ML GRAD NSAF -- PKGING CHANGE USE ITEM 338027

## (undated) DEVICE — CURETTE BNE CEM 10IN DISP --

## (undated) DEVICE — (D)PREP SKN CHLRAPRP APPL 26ML -- CONVERT TO ITEM 371833

## (undated) DEVICE — 3M™ STERI-DRAPE™ INSTRUMENT POUCH 1018: Brand: STERI-DRAPE™

## (undated) DEVICE — 3M™ COBAN™ NL STERILE NON-LATEX SELF-ADHERENT WRAP, 2084S, 4 IN X 5 YD (10 CM X 4,5 M), 18 ROLLS/CASE: Brand: 3M™ COBAN™

## (undated) DEVICE — SINGLE PORT MANIFOLD: Brand: NEPTUNE 2

## (undated) DEVICE — TRAY PREP DRY W/ PREM GLV 2 APPL 6 SPNG 2 UNDPD 1 OVERWRAP

## (undated) DEVICE — PUMP SUC IRR TBNG L10FT W/ HNDPC ASSEMB STRYKEFLOW 2

## (undated) DEVICE — TROCARS: Brand: KII® OPTICAL ACCESS SYSTEM

## (undated) DEVICE — LAPAROSCOPIC TROCAR SLEEVE/SINGLE USE: Brand: KII® SLEEVE

## (undated) DEVICE — PACK PROCEDURE SURG TOT KNEE

## (undated) DEVICE — SYR LR LCK 1ML GRAD NSAF 30ML --

## (undated) DEVICE — SUTURE PDS II SZ 1 L96IN ABSRB VLT TP-1 L65MM 1/2 CIR Z880G

## (undated) DEVICE — LOGICUT SCISSOR LENGTH 320MM: Brand: LOGI - LAPAROSCOPIC INSTRUMENT SYSTEM

## (undated) DEVICE — GENERAL LAPAROSCOPY: Brand: MEDLINE INDUSTRIES, INC.

## (undated) DEVICE — SOLUTION IV DEXTROSE/SALINE 5%-0.9% 500ML - 500ML

## (undated) DEVICE — Z DISCONTINUED USE 2744636  DRESSING AQUACEL 14 IN ALG W3.5XL14IN POLYUR FLM CVR W/ HYDRCOLL

## (undated) DEVICE — SOLUTION IV 1000ML 0.9% SOD CHL

## (undated) DEVICE — [HIGH FLOW INSUFFLATOR,  DO NOT USE IF PACKAGE IS DAMAGED,  KEEP DRY,  KEEP AWAY FROM SUNLIGHT,  PROTECT FROM HEAT AND RADIOACTIVE SOURCES.]: Brand: PNEUMOSURE

## (undated) DEVICE — TRAY CATH 16F DRN BG LTX -- CONVERT TO ITEM 363158

## (undated) DEVICE — SPONGE LAPAROTOMY W18XL18IN WHITE STRUNG RADIOPAQUE STERILE

## (undated) DEVICE — SUTURE ABSRB X-1 REV CUT 1/2 CIR 22MM UD BRAID 27IN SZ 3-0 J458H

## (undated) DEVICE — MEDI-VAC YANKAUER SUCTION HANDLE W/BULBOUS TIP: Brand: CARDINAL HEALTH

## (undated) DEVICE — SUTURE VCRL SZ 0 L36IN ABSRB UD L36MM CT-1 1/2 CIR J946H

## (undated) DEVICE — CANNULA NSL ORAL AD FOR CAPNOFLEX CO2 O2 AIRLFE

## (undated) DEVICE — X-LARGE COTTON GLOVE: Brand: DEROYAL

## (undated) DEVICE — INTENDED FOR TISSUE SEPARATION, AND OTHER PROCEDURES THAT REQUIRE A SHARP SURGICAL BLADE TO PUNCTURE OR CUT.: Brand: BARD-PARKER ® STAINLESS STEEL BLADES

## (undated) DEVICE — SYR 50ML LR LCK 1ML GRAD NSAF --

## (undated) DEVICE — T4 HOOD

## (undated) DEVICE — BLADE SAW PAT RMR PILT H 41MM --

## (undated) DEVICE — SUTURE VCRL SZ 1 L27IN ABSRB UD L36MM CP-1 1/2 CIR REV CUT J268H

## (undated) DEVICE — TUBING INSUFFLATION SMK EVAC HI FLO SET PNEUMOCLEAR

## (undated) DEVICE — CONTAINER FORMALIN PREFILLED 10% NBF 60ML

## (undated) DEVICE — YANKAUER,BULB TIP,W/O VENT,RIGID,STERILE: Brand: MEDLINE

## (undated) DEVICE — SOLUTION IRRIG 3000ML 0.9% SOD CHL USP UROMATIC PLAS CONT

## (undated) DEVICE — 3000CC GUARDIAN II: Brand: GUARDIAN

## (undated) DEVICE — CATHETER URETH 16FR BLLN 5CC L16IN SIL F INDWL 2 W SHT LEN

## (undated) DEVICE — SHEARS ENDOSCP HARM 36CM ULTRASONIC CRV TIP UPGRD

## (undated) DEVICE — KENDALL RADIOLUCENT FOAM MONITORING ELECTRODE RECTANGULAR SHAPE: Brand: KENDALL

## (undated) DEVICE — BIPOLAR SEALER 23-112-1 AQM 6.0: Brand: AQUAMANTYS ®

## (undated) DEVICE — SYRINGE CATH TIP 50ML

## (undated) DEVICE — LUBE JELLY FOIL PACK 1.4 OZ: Brand: MEDLINE INDUSTRIES, INC.

## (undated) DEVICE — AIRLIFE™ OXYGEN TUBING 7 FEET (2.1 M) CRUSH RESISTANT OXYGEN TUBING, VINYL TIPPED: Brand: AIRLIFE™

## (undated) DEVICE — TOTAL TRAY, DB, 100% SILI FOLEY, 16FR 10: Brand: MEDLINE

## (undated) DEVICE — FORCEPS BX L240CM JAW DIA2.8MM L CAP W/ NDL MIC MESH TOOTH

## (undated) DEVICE — IMPLANTABLE DEVICE
Type: IMPLANTABLE DEVICE | Site: KNEE | Status: NON-FUNCTIONAL
Removed: 2017-04-05

## (undated) DEVICE — SYRINGE MED 3ML CLR PLAS STD N CTRL LUERLOCK TIP DISP

## (undated) DEVICE — AGENT HEMSTAT W3XL4IN OXIDIZED REGENERATED CELOS ABSRB FOR

## (undated) DEVICE — NEEDLE SYR 18GA L1.5IN RED PLAS HUB S STL BLNT FILL W/O

## (undated) DEVICE — PRECISION FALCON OSCILLATING TIP SAW CARTRIDGE: Brand: PRECISION FALCON

## (undated) DEVICE — SUTURE PDS II SZ 1 L27IN ABSRB VLT CT-1 L36MM 1/2 CIR Z341H

## (undated) DEVICE — SOLUTION IRRIG 3000ML 0.9% SOD CHL FLX CONT 0797208] ICU MEDICAL INC]

## (undated) DEVICE — SUTURE STRATAFIX SYMMETRIC PDS + SZ 2-0 L18IN ABSRB VLT SXPP1A403

## (undated) DEVICE — STAPLER ECHELON 3000 45MM STANDARD

## (undated) DEVICE — TROCAR: Brand: KII FIOS FIRST ENTRY

## (undated) DEVICE — SUT ETHBND 2 30IN LR DA GRN --

## (undated) DEVICE — 2000CC GUARDIAN II: Brand: GUARDIAN

## (undated) DEVICE — DRAPE,TOP,102X53,STERILE: Brand: MEDLINE

## (undated) DEVICE — 3M™ IOBAN™ 2 ANTIMICROBIAL INCISE DRAPE 6650EZ: Brand: IOBAN™ 2

## (undated) DEVICE — SYRINGE, LUER SLIP, STERILE, 60ML: Brand: MEDLINE

## (undated) DEVICE — POUCH SPEC RETRV SHFT 15MM 13X23CM GRN POLYUR DBL RNG HNDL

## (undated) DEVICE — RELOAD STPL L45MM H1-2.6MM MESENTERY THN TISS WHT GRIPPING

## (undated) DEVICE — SKIN STAPLER: Brand: SIGNET

## (undated) DEVICE — BLOCK BITE AD 60FR W/ VELC STRP ADDRESSES MOST PT AND

## (undated) DEVICE — Device

## (undated) DEVICE — REM POLYHESIVE ADULT PATIENT RETURN ELECTRODE: Brand: VALLEYLAB

## (undated) DEVICE — GAUZE,SPONGE,4"X4",12PLY,WOVEN,NS,LF: Brand: MEDLINE

## (undated) DEVICE — SUTURE VCRL SZ 2-0 L27IN ABSRB VLT L26MM SH 1/2 CIR J317H

## (undated) DEVICE — ACCESS PLATFORM FOR MINIMALLY INVASIVE SURGERY.: Brand: GELPORT® LAPAROSCOPIC  SYSTEM

## (undated) DEVICE — CONNECTOR TBNG OD5-7MM O2 END DISP

## (undated) DEVICE — FAN SPRAY KIT: Brand: PULSAVAC®